# Patient Record
Sex: FEMALE | Race: WHITE | NOT HISPANIC OR LATINO | Employment: FULL TIME | URBAN - METROPOLITAN AREA
[De-identification: names, ages, dates, MRNs, and addresses within clinical notes are randomized per-mention and may not be internally consistent; named-entity substitution may affect disease eponyms.]

---

## 2017-02-01 ENCOUNTER — OFFICE VISIT (OUTPATIENT)
Dept: INTERNAL MEDICINE | Facility: CLINIC | Age: 37
End: 2017-02-01
Attending: INTERNAL MEDICINE
Payer: COMMERCIAL

## 2017-02-01 VITALS
HEIGHT: 64 IN | DIASTOLIC BLOOD PRESSURE: 65 MMHG | WEIGHT: 135 LBS | SYSTOLIC BLOOD PRESSURE: 99 MMHG | HEART RATE: 83 BPM | BODY MASS INDEX: 23.05 KG/M2

## 2017-02-01 DIAGNOSIS — Z15.01 BRCA1 GENETIC CARRIER: Primary | ICD-10-CM

## 2017-02-01 DIAGNOSIS — Z15.09 BRCA1 GENETIC CARRIER: Primary | ICD-10-CM

## 2017-02-01 DIAGNOSIS — R05.9 COUGH: ICD-10-CM

## 2017-02-01 DIAGNOSIS — Z00.00 ROUTINE GENERAL MEDICAL EXAMINATION AT A HEALTH CARE FACILITY: ICD-10-CM

## 2017-02-01 PROCEDURE — 99213 OFFICE O/P EST LOW 20 MIN: CPT | Mod: ZF

## 2017-02-01 ASSESSMENT — PAIN SCALES - GENERAL: PAINLEVEL: NO PAIN (0)

## 2017-02-01 NOTE — Clinical Note
2/1/2017       RE: Yamila Myles  [address] [address]     Dear Colleague,    Thank you for referring your patient, Yamila Myles, to the [facility]  at [facility]. Please see a copy of my visit note below.       SUBJECTIVE:     CC: Yamila Myles is an 36 year old woman who presents for preventive health visit.     Healthy Habits:    Do you get at least three servings of calcium containing foods daily (dairy, green leafy vegetables, etc.)? yes    Amount of exercise or daily activities, outside of work: elliptical, 10 minutes, stretching.     Problems taking medications regularly No    Medication side effects: No    Have you had an eye exam in the past two years? yes    Do you see a dentist twice per year? yes    Do you have sleep apnea, excessive snoring or daytime drowsiness?no    Patient reports that she had 2 episodes of cough and sore throat. She initially had symptoms in November. She developed cough productive of clear mucus. She was seen at the [facility]. She also had nasal congestion. She was diagnosed with bronchitis which lasted 3 weeks. She states that she has noticed that she still has a cough that starts when she has to talk for extended period of time. She also has cough with exposure to cold. She has some shortness of breath with cough. She denies wheezing. She has some nasal congestion, has been using neti pot. She denies post-nasal drip.    -------------------------------------    Today's PHQ-2 Score:   PHQ-2 ( 1999 Pfizer) 1/1/2016   Little interest or pleasure in doing things Not at all   Feeling down, depressed or hopeless Not at all   PHQ-2 Score 0       Abuse: Current or Past(Physical, Sexual or Emotional)- No  Do you feel safe in your environment - Yes    Social History   Substance Use Topics     Smoking status: Never Smoker      Smokeless tobacco: Never Used     Alcohol Use: 0.0 oz/week     0  Standard drinks or equivalent per week      Comment: 2 glasses/wk - none during pregnancy     The patient does not drink >3 drinks per day nor >7 drinks per week.    Recent Labs   Lab Test  01/15/16   0921  02/13/15   0831  01/31/14   1220   CHOL  154  134  155   HDL  71  75  67   LDL  72  48  77   TRIG  55  54  56   CHOLHDLRATIO   --   1.8  2.0   NHDL  83   --    --        Reviewed orders with patient.  Reviewed health maintenance and updated orders accordingly - Yes    Mammo Decision Support:  Alternate mammogram schedule due to BRCA1 carrier state    Pertinent mammograms are reviewed under the imaging tab.  History of abnormal Pap smear: NO - age 30-65 PAP every 5 years with negative HPV co-testing recommended  All Histories reviewed and updated in Epic.  Past Medical History   Diagnosis Date     Lump or mass in breast 2001     BRCA-1 positive (Mother, MGM w/ breast ca, mother w/ gene +), mother dx at 32)     Microcalcifications of the breast, right uoq 6/8/2011     Encounter for insertion of mirena IUD 07/12/16      Past Surgical History   Procedure Laterality Date     C appendectomy  1987     Laparoscopy operative adult N/A 7/12/2016     Procedure: LAPAROSCOPY OPERATIVE ADULT;  Surgeon: Fidelia Ricks MD;  [facility]     Laparoscopic salpingo-oophorectomy Bilateral 7/12/2016     Procedure: LAPAROSCOPIC SALPINGO-OOPHORECTOMY;  Surgeon: Fidelia Ricks MD;  [facility]     Insert intrauterine device N/A 7/12/2016     Procedure: INSERT INTRAUTERINE DEVICE;  Surgeon: Fidelia Ricks MD;  [facility] OR       ROS:  C: NEGATIVE for fever, chills, change in weight  I: NEGATIVE for worrisome rashes, moles or lesions  E: NEGATIVE for vision changes or irritation  ENT: throat irritation  R: dry cough  B: NEGATIVE for masses, tenderness or discharge  CV: NEGATIVE for chest pain, palpitations or peripheral edema  GI: NEGATIVE for nausea, abdominal pain, heartburn, or change in bowel habits  :  "NEGATIVE for unusual urinary or vaginal symptoms. No vaginal bleeding.  M: NEGATIVE for significant arthralgias or myalgia  N: NEGATIVE for weakness, dizziness or paresthesias  P: NEGATIVE for changes in mood or affect     Problem list, Medication list, Allergies, and Medical/Social/Surgical histories reviewed in [facility] and updated as appropriate.  OBJECTIVE:     BP 99/65 mmHg  Pulse 83  Ht 1.626 m (5' 4\")  Wt 61.236 kg (135 lb)  BMI 23.16 kg/m2  Breastfeeding? No  EXAM:  GENERAL APPEARANCE: healthy, alert and no distress  EYES: Eyes grossly normal to inspection, PERRL and conjunctivae and sclerae normal  HENT: ear canals and TM's normal, nose and mouth without ulcers or lesions, oropharynx clear and oral mucous membranes moist  NECK: no adenopathy, no asymmetry, masses, or scars and thyroid normal to palpation  RESP: lungs clear to auscultation - no rales, rhonchi or wheezes  CV: regular rate and rhythm, normal S1 S2, no S3 or S4, no murmur, click or rub, no peripheral edema and peripheral pulses strong  ABDOMEN: soft, nontender, no hepatosplenomegaly, no masses and bowel sounds normal  MS: no musculoskeletal defects are noted and gait is age appropriate without ataxia  SKIN: no suspicious lesions or rashes  NEURO: Normal strength and tone, sensory exam grossly normal, mentation intact and speech normal  PSYCH: mentation appears normal and affect normal/bright    ASSESSMENT/PLAN:     1. 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  Patient will continue with regular breast cancer screening. She is due for MRI of the breast.  - MR Breast Bilateral w/o & w Contrast; Future    2. Routine general medical examination at a health care facility  Patient is up to date on vaccinations. Will screen for diabetes with fasting blood glucose. Patient is up to date on PAP smear.   - Basic Metabolic Panel; Future    3. Cough  Discussed possible causes of cough with patient. Suspect laryngeal hypersensitivity as the most likely etiology. Recommend ENT " "evaluation, referral was made today.   - OTOLARYNGOLOGY REFERRAL    COUNSELING:   Reviewed preventive health counseling, as reflected in patient instructions       Regular exercise       Healthy diet/nutrition       Vision screening         reports that she has never smoked. She has never used smokeless tobacco.    Estimated body mass index is 23.16 kg/(m^2) as calculated from the following:    Height as of this encounter: 1.626 m (5' 4\").    Weight as of this encounter: 61.236 kg (135 lb).       Counseling Resources:  ATP IV Guidelines  Pooled Cohorts Equation Calculator  Breast Cancer Risk Calculator  FRAX Risk Assessment  ICSI Preventive Guidelines  Dietary Guidelines for Americans, 2010  [employer]'s MyPlate  ASA Prophylaxis  Lung CA Screening    Antonina Martinez MD  [facility]     Again, thank you for allowing me to participate in the care of your patient.      Sincerely,    Antonina Martinez MD      "

## 2017-02-01 NOTE — PROGRESS NOTES
SUBJECTIVE:     CC: Yamila Myles is an 36 year old woman who presents for preventive health visit.     Healthy Habits:    Do you get at least three servings of calcium containing foods daily (dairy, green leafy vegetables, etc.)? yes    Amount of exercise or daily activities, outside of work: elliptical, 10 minutes, stretching.     Problems taking medications regularly No    Medication side effects: No    Have you had an eye exam in the past two years? yes    Do you see a dentist twice per year? yes    Do you have sleep apnea, excessive snoring or daytime drowsiness?no    Patient reports that she had 2 episodes of cough and sore throat. She initially had symptoms in November. She developed cough productive of clear mucus. She was seen at the [facility]. She also had nasal congestion. She was diagnosed with bronchitis which lasted 3 weeks. She states that she has noticed that she still has a cough that starts when she has to talk for extended period of time. She also has cough with exposure to cold. She has some shortness of breath with cough. She denies wheezing. She has some nasal congestion, has been using neti pot. She denies post-nasal drip.    -------------------------------------    Today's PHQ-2 Score:   PHQ-2 ( 1999 Pfizer) 1/1/2016   Little interest or pleasure in doing things Not at all   Feeling down, depressed or hopeless Not at all   PHQ-2 Score 0       Abuse: Current or Past(Physical, Sexual or Emotional)- No  Do you feel safe in your environment - Yes    Social History   Substance Use Topics     Smoking status: Never Smoker      Smokeless tobacco: Never Used     Alcohol Use: 0.0 oz/week     0 Standard drinks or equivalent per week      Comment: 2 glasses/wk - none during pregnancy     The patient does not drink >3 drinks per day nor >7 drinks per week.    Recent Labs   Lab Test  01/15/16   0921  02/13/15   0831  01/31/14   1220   CHOL  154  134  155   HDL  71  75  67   LDL  72  48  77    TRIG  55  54  56   CHOLHDLRATIO   --   1.8  2.0   NHDL  83   --    --        Reviewed orders with patient.  Reviewed health maintenance and updated orders accordingly - Yes    Mammo Decision Support:  Alternate mammogram schedule due to BRCA1 carrier state    Pertinent mammograms are reviewed under the imaging tab.  History of abnormal Pap smear: NO - age 30-65 PAP every 5 years with negative HPV co-testing recommended  All Histories reviewed and updated in Epic.  Past Medical History   Diagnosis Date     Lump or mass in breast 2001     BRCA-1 positive (Mother, MGM w/ breast ca, mother w/ gene +), mother dx at 32)     Microcalcifications of the breast, right uoq 6/8/2011     Encounter for insertion of mirena IUD 07/12/16      Past Surgical History   Procedure Laterality Date     C appendectomy  1987     Laparoscopy operative adult N/A 7/12/2016     Procedure: LAPAROSCOPY OPERATIVE ADULT;  Surgeon: Fidelia Ricks MD;  [facility]     Laparoscopic salpingo-oophorectomy Bilateral 7/12/2016     Procedure: LAPAROSCOPIC SALPINGO-OOPHORECTOMY;  Surgeon: Fidelia Ricks MD;  [facility]     Insert intrauterine device N/A 7/12/2016     Procedure: INSERT INTRAUTERINE DEVICE;  Surgeon: Fidelia Ricks MD;  [facility]       ROS:  C: NEGATIVE for fever, chills, change in weight  I: NEGATIVE for worrisome rashes, moles or lesions  E: NEGATIVE for vision changes or irritation  ENT: throat irritation  R: dry cough  B: NEGATIVE for masses, tenderness or discharge  CV: NEGATIVE for chest pain, palpitations or peripheral edema  GI: NEGATIVE for nausea, abdominal pain, heartburn, or change in bowel habits  : NEGATIVE for unusual urinary or vaginal symptoms. No vaginal bleeding.  M: NEGATIVE for significant arthralgias or myalgia  N: NEGATIVE for weakness, dizziness or paresthesias  P: NEGATIVE for changes in mood or affect     Problem list, Medication list, Allergies, and Medical/Social/Surgical  "histories reviewed in EPIC and updated as appropriate.  OBJECTIVE:     BP 99/65 mmHg  Pulse 83  Ht 1.626 m (5' 4\")  Wt 61.236 kg (135 lb)  BMI 23.16 kg/m2  Breastfeeding? No  EXAM:  GENERAL APPEARANCE: healthy, alert and no distress  EYES: Eyes grossly normal to inspection, PERRL and conjunctivae and sclerae normal  HENT: ear canals and TM's normal, nose and mouth without ulcers or lesions, oropharynx clear and oral mucous membranes moist  NECK: no adenopathy, no asymmetry, masses, or scars and thyroid normal to palpation  RESP: lungs clear to auscultation - no rales, rhonchi or wheezes  CV: regular rate and rhythm, normal S1 S2, no S3 or S4, no murmur, click or rub, no peripheral edema and peripheral pulses strong  ABDOMEN: soft, nontender, no hepatosplenomegaly, no masses and bowel sounds normal  MS: no musculoskeletal defects are noted and gait is age appropriate without ataxia  SKIN: no suspicious lesions or rashes  NEURO: Normal strength and tone, sensory exam grossly normal, mentation intact and speech normal  PSYCH: mentation appears normal and affect normal/bright    ASSESSMENT/PLAN:     1. 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  Patient will continue with regular breast cancer screening. She is due for MRI of the breast.  - MR Breast Bilateral w/o & w Contrast; Future    2. Routine general medical examination at a health care facility  Patient is up to date on vaccinations. Will screen for diabetes with fasting blood glucose. Patient is up to date on PAP smear.   - Basic Metabolic Panel; Future    3. Cough  Discussed possible causes of cough with patient. Suspect laryngeal hypersensitivity as the most likely etiology. Recommend ENT evaluation, referral was made today.   - OTOLARYNGOLOGY REFERRAL    COUNSELING:   Reviewed preventive health counseling, as reflected in patient instructions       Regular exercise       Healthy diet/nutrition       Vision screening         reports that she has never smoked. She has never " "used smokeless tobacco.    Estimated body mass index is 23.16 kg/(m^2) as calculated from the following:    Height as of this encounter: 1.626 m (5' 4\").    Weight as of this encounter: 61.236 kg (135 lb).       Counseling Resources:  ATP IV Guidelines  Pooled Cohorts Equation Calculator  Breast Cancer Risk Calculator  FRAX Risk Assessment  ICSI Preventive Guidelines  Dietary Guidelines for Americans, 2010  USDA's MyPlate  ASA Prophylaxis  Lung CA Screening    Antonina Martinez MD  [facility]   "

## 2017-02-01 NOTE — Clinical Note
Date:February 6, 2017      Patient was self referred, no letter generated. Do not send.        [facility] Health Information

## 2017-02-01 NOTE — MR AVS SNAPSHOT
After Visit Summary   2/1/2017    Yamila Myles    MRN: [id number]           Patient Information     Date Of Birth          1980        Visit Information        Provider Department      2/1/2017 9:20 AM Antonina Martinez MD [facility]         Today's Diagnoses     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    -  1     Routine general medical examination at a health care facility         Cough           Care Instructions      Preventive Health Recommendations  Female Ages 26 - 39  Yearly exam:   See your health care provider every year in order to    Review health changes.     Discuss preventive care.      Review your medicines if you your doctor has prescribed any.    Until age 30: Get a Pap test every three years (more often if you have had an abnormal result).    After age 30: Talk to your doctor about whether you should have a Pap test every 3 years or have a Pap test with HPV screening every 5 years.   You do not need a Pap test if your uterus was removed (hysterectomy) and you have not had cancer.  You should be tested each year for STDs (sexually transmitted diseases), if you're at risk.   Talk to your provider about how often to have your cholesterol checked.  If you are at risk for diabetes, you should have a diabetes test (fasting glucose).  Shots: Get a flu shot each year. Get a tetanus shot every 10 years.   Nutrition:     Eat at least 5 servings of fruits and vegetables each day.    Eat whole-grain bread, whole-wheat pasta and brown rice instead of white grains and rice.    Talk to your provider about Calcium and Vitamin D.     Lifestyle    Exercise at least 150 minutes a week (30 minutes a day, 5 days of the week). This will help you control your weight and prevent disease.    Limit alcohol to one drink per day.    No smoking.     Wear sunscreen to prevent skin cancer.    See your dentist every six months for an exam and cleaning.            Follow-ups after your visit         Additional Services     OTOLARYNGOLOGY REFERRAL       Your provider has referred you to: [facility]: Adult [facility] (Otolaryngology) - [place] [phone]  [email or web address]    Please be aware that coverage of these services is subject to the terms and limitations of your health insurance plan.  Call member services at your health plan with any benefit or coverage questions.      Please bring the following with you to your appointment:    (1) Any X-Rays, CTs or MRIs which have been performed.  Contact the facility where they were done to arrange for  prior to your scheduled appointment.   (2) List of current medications  (3) This referral request   (4) Any documents/labs given to you for this referral                  Future tests that were ordered for you today     Open Future Orders        Priority Expected Expires Ordered    MR Breast Bilateral w/o & w Contrast Routine  2/1/2018 2/1/2017            Who to contact     Please call your clinic at [phone] to:    Ask questions about your health    Make or cancel appointments    Discuss your medicines    Learn about your test results    Speak to your doctor   If you have compliments or concerns about an experience at your clinic, or if you wish to file a complaint, please contact [facility] Patient Relations at [phone] or email us at [email or web address]         Additional Information About Your Visit        MyChart Information     [employer] gives you secure access to your electronic health record. If you see a primary care provider, you can also send messages to your care team and make appointments. If you have questions, please call your primary care clinic.  If you do not have a primary care provider, please call [phone] and they will assist you.      [employer] is an electronic gateway that provides easy, online access to your medical records.  "With MyChart, you can request a clinic appointment, read your test results, renew a prescription or communicate with your care team.     To access your existing account, please contact your [facility] or call [phone] for assistance.        Care EveryWhere ID     This is your Care EveryWhere ID. This could be used by other organizations to access your [facility] medical records  [id number]        Your Vitals Were     Pulse Height BMI (Body Mass Index) Breastfeeding?          83 1.626 m (5' 4\") 23.16 kg/m2 No         Blood Pressure from Last 3 Encounters:   02/01/17 99/65   09/09/16 94/64   07/12/16 101/61    Weight from Last 3 Encounters:   02/01/17 61.236 kg (135 lb)   07/12/16 61.8 kg (136 lb 3.9 oz)   07/06/16 61.462 kg (135 lb 8 oz)              We Performed the Following     Basic Metabolic Panel     OTOLARYNGOLOGY REFERRAL          Today's Medication Changes          These changes are accurate as of: 2/1/17  9:49 AM.  If you have any questions, ask your nurse or doctor.               Stop taking these medicines if you haven't already. Please contact your care team if you have questions.     ibuprofen 600 MG tablet   Commonly known as:  ADVIL/MOTRIN   Stopped by:  Antonina Martinez MD           oxyCODONE-acetaminophen 5-325 MG per tablet   Commonly known as:  PERCOCET   Stopped by:  Antonina aMrtinez MD           senna-docusate 8.6-50 MG per tablet   Commonly known as:  SENOKOT-S;PERICOLACE   Stopped by:  Antonina Martinez MD                    Primary Care Provider Office Phone # Fax #    Antonina Martinez [phone]        [facility] [address]  [facility] [address]        Thank you!     Thank you for choosing [facility]   for your care. Our goal is always to provide you with excellent care. Hearing back from our patients is one way we can continue to improve our services. Please take a few minutes to " complete the written survey that you may receive in the mail after your visit with us. Thank you!             Your Updated Medication List - Protect others around you: Learn how to safely use, store and throw away your medicines at www.disposemymeds.org.          This list is accurate as of: 2/1/17  9:49 AM.  Always use your most recent med list.                   Brand Name Dispense Instructions for use    estradiol 2 MG tablet    ESTRACE    30 tablet    Take 1 tablet (2 mg) by mouth daily       fluticasone 50 MCG/ACT spray    FLONASE    16 g    Spray 2 sprays into both nostrils daily       loratadine 10 MG tablet    CLARITIN     Take 10 mg by mouth daily       MIRENA (52 MG) 20 MCG/24HR IUD   Generic drug:  levonorgestrel      1 each by Intrauterine route once. Lot # [id number]  To be removed by 6/12/2017

## 2017-02-20 ENCOUNTER — PRE VISIT (OUTPATIENT)
Dept: OTOLARYNGOLOGY | Facility: CLINIC | Age: 37
End: 2017-02-20

## 2017-02-20 NOTE — TELEPHONE ENCOUNTER
1.  Date/reason for appt: 3/3/17 - cough  2.  Referring provider: Dr. Martinez  3.  Call to patient (Yes / No - short description): no, recs in epic  4.  Previous care at:   - [facility]

## 2017-03-01 DIAGNOSIS — Z00.00 ROUTINE GENERAL MEDICAL EXAMINATION AT A HEALTH CARE FACILITY: ICD-10-CM

## 2017-03-01 LAB
ANION GAP SERPL CALCULATED.3IONS-SCNC: 6 MMOL/L (ref 3–14)
BUN SERPL-MCNC: 18 MG/DL (ref 7–30)
CALCIUM SERPL-MCNC: 8.8 MG/DL (ref 8.5–10.1)
CHLORIDE SERPL-SCNC: 106 MMOL/L (ref 94–109)
CO2 SERPL-SCNC: 29 MMOL/L (ref 20–32)
CREAT SERPL-MCNC: 0.73 MG/DL (ref 0.52–1.04)
GFR SERPL CREATININE-BSD FRML MDRD: 89 ML/MIN/1.7M2
GLUCOSE SERPL-MCNC: 95 MG/DL (ref 70–99)
POTASSIUM SERPL-SCNC: 3.7 MMOL/L (ref 3.4–5.3)
SODIUM SERPL-SCNC: 141 MMOL/L (ref 133–144)

## 2017-03-01 PROCEDURE — 80048 BASIC METABOLIC PNL TOTAL CA: CPT | Performed by: INTERNAL MEDICINE

## 2017-03-01 PROCEDURE — 36415 COLL VENOUS BLD VENIPUNCTURE: CPT | Performed by: INTERNAL MEDICINE

## 2017-03-03 ENCOUNTER — OFFICE VISIT (OUTPATIENT)
Dept: OTOLARYNGOLOGY | Facility: CLINIC | Age: 37
End: 2017-03-03

## 2017-03-03 ENCOUNTER — HOSPITAL ENCOUNTER (OUTPATIENT)
Dept: MRI IMAGING | Facility: CLINIC | Age: 37
Discharge: HOME | End: 2017-03-03
Attending: INTERNAL MEDICINE | Admitting: INTERNAL MEDICINE
Payer: COMMERCIAL

## 2017-03-03 VITALS — WEIGHT: 136 LBS | BODY MASS INDEX: 22.66 KG/M2 | HEIGHT: 65 IN

## 2017-03-03 DIAGNOSIS — Z15.09 BRCA1 GENETIC CARRIER: ICD-10-CM

## 2017-03-03 DIAGNOSIS — Z15.01 BRCA1 GENETIC CARRIER: ICD-10-CM

## 2017-03-03 DIAGNOSIS — J45.909 UNCOMPLICATED ASTHMA, UNSPECIFIED ASTHMA SEVERITY: Primary | ICD-10-CM

## 2017-03-03 DIAGNOSIS — R05.3 CHRONIC COUGHING: ICD-10-CM

## 2017-03-03 PROCEDURE — 0159T MR BREAST BILATERAL W/O & W CONTRAST: CPT

## 2017-03-03 PROCEDURE — 25000128 H RX IP 250 OP 636: Performed by: INTERNAL MEDICINE

## 2017-03-03 PROCEDURE — A9585 GADOBUTROL INJECTION: HCPCS | Performed by: INTERNAL MEDICINE

## 2017-03-03 PROCEDURE — 25500064 ZZH RX 255 OP 636: Performed by: INTERNAL MEDICINE

## 2017-03-03 RX ORDER — GADOBUTROL 604.72 MG/ML
7.5 INJECTION INTRAVENOUS ONCE
Status: COMPLETED | OUTPATIENT
Start: 2017-03-03 | End: 2017-03-03

## 2017-03-03 RX ORDER — ALBUTEROL SULFATE 90 UG/1
2 AEROSOL, METERED RESPIRATORY (INHALATION) EVERY 6 HOURS PRN
Qty: 3 INHALER | Refills: 1 | Status: SHIPPED | OUTPATIENT
Start: 2017-03-03 | End: 2022-04-01

## 2017-03-03 RX ADMIN — SODIUM CHLORIDE 100 ML: 9 INJECTION, SOLUTION INTRAVENOUS at 16:36

## 2017-03-03 RX ADMIN — GADOBUTROL 7.5 ML: 604.72 INJECTION INTRAVENOUS at 16:36

## 2017-03-03 ASSESSMENT — PAIN SCALES - GENERAL: PAINLEVEL: NO PAIN (0)

## 2017-03-03 NOTE — LETTER
3/3/2017       RE: Yamila Myles  [address] [facility] [address]     Dear Colleague,    Thank you for referring your patient, Yamila Myles, to the [facility] EAR NOSE AND THROAT at [facility]. Please see a copy of my visit note below.    The patient presents with a history of chronic coughing. She reports that after she develops a respiratory infection, the patient will cough for months. She reports a deep, non-productive and non-purulent chest cough. The patient also reports that she has recently developed reactions to NSAIDS. She reports allergy like symptoms with nasal congestion and rhinitis immediately after initiating the use of such medications, but if she continues to take the medication daily, her symptoms resolve. The patient denies sinusitis, rhinitis, facial pain, nasal obstruction or purulent nasal discharge. The patient denies chronic or recurrent tonsillitis, chronic or recurrent pharyngitis. The patient denies otalgia, otorrhea, eustachian tube dysfunction, ear infections, dizziness or tinnitus.     This patient is seen in consultation at the request of Dr. Vero Martinez.    All other systems were reviewed and they are either negative or they are not directly pertinent to this Otolaryngology examination.      Past Medical History:    Past Medical History   Diagnosis Date     Encounter for insertion of mirena IUD 07/12/16     Hoarseness      Lump or mass in breast 2001     BRCA-1 positive (Mother, MGM w/ breast ca, mother w/ gene +), mother dx at 32)     Microcalcifications of the breast, right uoq 6/8/2011       Past Surgical History:    Past Surgical History   Procedure Laterality Date     C appendectomy  1987     Laparoscopy operative adult N/A 7/12/2016     Procedure: LAPAROSCOPY OPERATIVE ADULT;  Surgeon: Fidelia Ricks MD;  [facility]     Laparoscopic salpingo-oophorectomy Bilateral 7/12/2016     Procedure: LAPAROSCOPIC  SALPINGO-OOPHORECTOMY;  Surgeon: Fidelia Ricks MD;  [facility] OR     Insert intrauterine device N/A 7/12/2016     Procedure: INSERT INTRAUTERINE DEVICE;  Surgeon: Fidelia Ricks MD;  [facility]       Medications:      Current Outpatient Prescriptions:      estradiol (ESTRACE) 2 MG tablet, Take 1 tablet (2 mg) by mouth daily, Disp: 30 tablet, Rfl: 11     loratadine (CLARITIN) 10 MG tablet, Take 10 mg by mouth daily, Disp: , Rfl:      fluticasone (FLONASE) 50 MCG/ACT nasal spray, Spray 2 sprays into both nostrils daily, Disp: 16 g, Rfl: 3     levonorgestrel (MIRENA) 20 MCG/24HR IUD, 1 each by Intrauterine route once. Lot # [id number]  To be removed by 6/12/2017, Disp: , Rfl:     Allergies:    Advil [nsaids]    Physical Examination:    The patient is a well developed, well nourished female in no apparent distress.  She is normocephalic, atraumatic with pupils equally round and reactive to light.    Oral Cavity Examination:  Normal mucosa with no masses or lesions  Nasal Examination: Normal mucosa with no masses or lesions  Ear Examination: Ear canals clear, tympanic membranes and middle ear spaces normal  Neurological Examination: Facial nerve function intact and symmetric  Integumentary Examination: No lesions on the skin of the head and neck  Neck Examination: No masses or lesions, no lymphadenopathy  Endocrine Examination: Normal thyroid examination  Flexible Fiberoptic Laryngoscopy:  Normal nasopharynx, base of tongue, pyriform sinuses, epiglottis, valleculae, false vocal cords, true vocal cords, and larynx.  Normal motion of the vocal cords with no lesions, masses, nodules, or polyps bilaterally.     Assessment and Plan:    The patient presents with a history of chronic coughing that persists after respiratory infections. The patient is also having allergy type reactions to the use of NSAIDS with suppression of symptoms with persistent use. The patient will be referred to Dr. Hari Maloney for  allergy and asthma evaluation. She will use Albuterol inhaler as needed for chronic wheezing or coughing. She will be referred for PFTs prior to her visit with Dr. Hari Maloney and she will also discuss with Dr. Hari Maloney her symptoms associated with NSAID use. She has no evidence of nasal polyps at this time.     CC: Dr. Antonina Martinez  CC: Dr. Hari Maloney        Again, thank you for allowing me to participate in the care of your patient.      Sincerely,    Jayant Goldberg MD

## 2017-03-03 NOTE — PATIENT INSTRUCTIONS
The patient presents with a history of chronic coughing that persists after respiratory infections. The patient is also having allergy type reactions to the use of NSAIDS with suppression of symptoms with persistent use. The patient will be referred to Dr. Hari Maloney for allergy and asthma evaluation. She will use Albuterol inhaler as needed for chronic wheezing or coughing. She will be referred for PFTs prior to her visit with Dr. Hari Maloney and she will also discuss with Dr. Hari Maloney her symptoms associated with NSAID use. She has no evidence of nasal polyps at this time.

## 2017-03-03 NOTE — PROGRESS NOTES
The patient presents with a history of chronic coughing. She reports that after she develops a respiratory infection, the patient will cough for months. She reports a deep, non-productive and non-purulent chest cough. The patient also reports that she has recently developed reactions to NSAIDS. She reports allergy like symptoms with nasal congestion and rhinitis immediately after initiating the use of such medications, but if she continues to take the medication daily, her symptoms resolve. The patient denies sinusitis, rhinitis, facial pain, nasal obstruction or purulent nasal discharge. The patient denies chronic or recurrent tonsillitis, chronic or recurrent pharyngitis. The patient denies otalgia, otorrhea, eustachian tube dysfunction, ear infections, dizziness or tinnitus.     This patient is seen in consultation at the request of Dr. Vero Martinez.    All other systems were reviewed and they are either negative or they are not directly pertinent to this Otolaryngology examination.      Past Medical History:    Past Medical History   Diagnosis Date     Encounter for insertion of mirena IUD 07/12/16     Hoarseness      Lump or mass in breast 2001     BRCA-1 positive (Mother, MGM w/ breast ca, mother w/ gene +), mother dx at 32)     Microcalcifications of the breast, right uoq 6/8/2011       Past Surgical History:    Past Surgical History   Procedure Laterality Date     C appendectomy  1987     Laparoscopy operative adult N/A 7/12/2016     Procedure: LAPAROSCOPY OPERATIVE ADULT;  Surgeon: Fidelia Ricks MD;  [facility]     Laparoscopic salpingo-oophorectomy Bilateral 7/12/2016     Procedure: LAPAROSCOPIC SALPINGO-OOPHORECTOMY;  Surgeon: Fidelia Ricks MD;  [facility]     Insert intrauterine device N/A 7/12/2016     Procedure: INSERT INTRAUTERINE DEVICE;  Surgeon: Fidelia Ricks MD;  [facility]       Medications:      Current Outpatient Prescriptions:      estradiol (ESTRACE) 2 MG  tablet, Take 1 tablet (2 mg) by mouth daily, Disp: 30 tablet, Rfl: 11     loratadine (CLARITIN) 10 MG tablet, Take 10 mg by mouth daily, Disp: , Rfl:      fluticasone (FLONASE) 50 MCG/ACT nasal spray, Spray 2 sprays into both nostrils daily, Disp: 16 g, Rfl: 3     levonorgestrel (MIRENA) 20 MCG/24HR IUD, 1 each by Intrauterine route once. Lot # [id number]  To be removed by 6/12/2017, Disp: , Rfl:     Allergies:    Advil [nsaids]    Physical Examination:    The patient is a well developed, well nourished female in no apparent distress.  She is normocephalic, atraumatic with pupils equally round and reactive to light.    Oral Cavity Examination:  Normal mucosa with no masses or lesions  Nasal Examination: Normal mucosa with no masses or lesions  Ear Examination: Ear canals clear, tympanic membranes and middle ear spaces normal  Neurological Examination: Facial nerve function intact and symmetric  Integumentary Examination: No lesions on the skin of the head and neck  Neck Examination: No masses or lesions, no lymphadenopathy  Endocrine Examination: Normal thyroid examination  Flexible Fiberoptic Laryngoscopy:  Normal nasopharynx, base of tongue, pyriform sinuses, epiglottis, valleculae, false vocal cords, true vocal cords, and larynx.  Normal motion of the vocal cords with no lesions, masses, nodules, or polyps bilaterally.     Assessment and Plan:    The patient presents with a history of chronic coughing that persists after respiratory infections. The patient is also having allergy type reactions to the use of NSAIDS with suppression of symptoms with persistent use. The patient will be referred to Dr. Hari Maloney for allergy and asthma evaluation. She will use Albuterol inhaler as needed for chronic wheezing or coughing. She will be referred for PFTs prior to her visit with Dr. Hari Maloney and she will also discuss with Dr. Hari Maloney her symptoms associated with NSAID use. She has no evidence of nasal  polyps at this time.     CC: Dr. Antonina Martinez  CC: Dr. Hari Maolney

## 2017-03-03 NOTE — MR AVS SNAPSHOT
After Visit Summary   3/3/2017    Yamila Myles    MRN: [id number]           Patient Information     Date Of Birth          1980        Visit Information        Provider Department      3/3/2017 8:00 AM Jayant Goldberg MD [facility] Ear Nose and Throat        Today's Diagnoses     Uncomplicated asthma, unspecified asthma severity    -  1    Chronic coughing          Care Instructions    The patient presents with a history of chronic coughing that persists after respiratory infections. The patient is also having allergy type reactions to the use of NSAIDS with suppression of symptoms with persistent use. The patient will be referred to Dr. Hari Maloney for allergy and asthma evaluation. She will use Albuterol inhaler as needed for chronic wheezing or coughing. She will be referred for PFTs prior to her visit with Dr. Hari Maloney and she will also discuss with Dr. Hari Maloney her symptoms associated with NSAID use. She has no evidence of nasal polyps at this time.           Follow-ups after your visit        Your next 10 appointments already scheduled     Mar 03, 2017  3:00 PM CST   MR BREAST BILATERAL W/O & W CONTRAST with UUMR1   [facility] [place] [facility]    [address] [address] [phone]           Take your medicines as usual, unless your doctor tells you not to. Bring a list of your current medicines to your exam (including vitamins, minerals and over-the-counter drugs).  The timing of your exam may depend on the start of your last period. If you re in menopause, you may have the exam anytime.  Please bring any previous mammograms or breast MRIs from other facilities to the MRI dept. Do not mail these items to us.  You will have contrast for this exam. To prepare:   The day before your exam, drink extra fluids at least six 8-ounce glasses (unless your doctor tells you to restrict your  fluids).   Have a blood test (creatinine test) within 30 days of your exam. Go to your clinic or Diagnostic Imaging Department for this test.  The MRI machine uses a strong magnet. Please wear clothes without metal (snaps, zippers). A sweatsuit works well, or we may give you a hospital gown.  Please remove any body piercings and hair extensions before you arrive. You will also remove watches, jewelry, hairpins, wallets, dentures, partial dental plates and hearing aids. You may wear contact lenses, and you may be able to wear your rings. We have a safe place to keep your personal items, but it is safer to leave them at home.   **IMPORTANT** THE INSTRUCTIONS BELOW ARE ONLY FOR THOSE PATIENTS WHO HAVE BEEN TOLD THEY WILL RECEIVE SEDATION OR GENERAL ANESTHESIA DURING THEIR MRI PROCEDURE:  IF YOU WILL RECEIVE SEDATION (take medicine to help you relax during your exam)   You must get the medicine from your doctor before you arrive. Bring the medicine to the exam. Do not take it at home.   Arrive one hour early. Bring someone who can take you home after the test. Your medicine will make you sleepy. After the exam, you may not drive, take a bus or take a taxi by yourself.   No eating 8 hours before your exam. You may have clear liquids up until 4 hours before your exam. (Clear liquids include water, clear tea, black coffee and fruit juice without pulp.)  IF YOU WILL RECEIVE ANESTHESIA (be asleep for your exam)   Arrive 1 1/2 hours early. Bring someone who can take you home after the test. You may not drive, take a bus or take a taxi by yourself.   No eating 8 hours before your exam. You may have clear liquids up until 4 hours before your exam. (Clear liquids include water, clear tea, black coffee and fruit juice without pulp.)  If you have any questions, please contact your Imaging Department exam site.            Mar 16, 2017  4:30 PM CDT   Six Minute Walk with UC PFL 6 MINUTE WALK 1   [facility]  ([facility])    [address]  [address] [facility] [id number] [phone]            Mar 27, 2017 12:35 PM CDT   (Arrive by 12:20 PM)   New Allergy with Hari Maloney MD   [facility] ([facility])    [address]  [address] [facility] [id number] [phone]           Do not take anti-histamines or Zantac for seven day prior to your appointment.              Future tests that were ordered for you today     Open Future Orders        Priority Expected Expires Ordered    General PFT Lab (Please always keep checked) Routine  3/3/2018 3/3/2017    Pulmonary Function Test Routine  3/3/2018 3/3/2017            Who to contact     Please call your clinic at [phone] to:    Ask questions about your health    Make or cancel appointments    Discuss your medicines    Learn about your test results    Speak to your doctor   If you have compliments or concerns about an experience at your clinic, or if you wish to file a complaint, please contact [facility] Patient Relations at [phone] or email us at [email or web address]         Additional Information About Your Visit        [employer] Information     [employer] gives you secure access to your electronic health record. If you see a primary care provider, you can also send messages to your care team and make appointments. If you have questions, please call your primary care clinic.  If you do not have a primary care provider, please call [phone] and they will assist you.      [employer] is an electronic gateway that provides easy, online access to your medical records. With [employer], you can request a clinic appointment, read your test results, renew a prescription or communicate with your care team.     To access your existing account, please contact your [facility] Clinic or call [phone] for  "assistance.        Care EveryWhere ID     This is your Care EveryWhere ID. This could be used by other organizations to access your [place] medical records  [id number]        Your Vitals Were     Height BMI (Body Mass Index)                1.65 m (5' 4.96\") 22.66 kg/m2           Blood Pressure from Last 3 Encounters:   02/01/17 99/65   09/09/16 94/64   07/12/16 101/61    Weight from Last 3 Encounters:   03/03/17 61.7 kg (136 lb)   02/01/17 61.2 kg (135 lb)   07/12/16 61.8 kg (136 lb 3.9 oz)              We Performed the Following     LARYNGOSCOPY FLEX FIBEROPTIC, DIAGNOSTIC     OFFICE CONSULTATION,LEVEL III          Today's Medication Changes          These changes are accurate as of: 3/3/17  8:39 AM.  If you have any questions, ask your nurse or doctor.               Start taking these medicines.        Dose/Directions    albuterol 108 (90 BASE) MCG/ACT Inhaler   Commonly known as:  PROAIR HFA/PROVENTIL HFA/VENTOLIN HFA   Used for:  Chronic coughing, Uncomplicated asthma, unspecified asthma severity   Started by:  Jayant Goldberg MD        Dose:  2 puff   Inhale 2 puffs into the lungs every 6 hours as needed for shortness of breath / dyspnea or wheezing   Quantity:  3 Inhaler   Refills:  1            Where to get your medicines      These medications were sent to [facility] [address] [facility] [address] AT [facility] [address]  [address] [address]     Phone:  [phone]     albuterol 108 (90 BASE) MCG/ACT Inhaler                Primary Care Provider Office Phone # Fax #    Antonina Summer Martinez [phone]        [facility] [address] [facility] [address]  [facility] [address]        Thank you!     Thank you for choosing [facility] EAR NOSE AND THROAT  for your care. Our goal is always to provide you with excellent care. Hearing back from our patients is one way we can continue to improve our services. Please take a few minutes to complete " the written survey that you may receive in the mail after your visit with us. Thank you!             Your Updated Medication List - Protect others around you: Learn how to safely use, store and throw away your medicines at www.disposemymeds.org.          This list is accurate as of: 3/3/17  8:39 AM.  Always use your most recent med list.                   Brand Name Dispense Instructions for use    albuterol 108 (90 BASE) MCG/ACT Inhaler    PROAIR HFA/PROVENTIL HFA/VENTOLIN HFA    3 Inhaler    Inhale 2 puffs into the lungs every 6 hours as needed for shortness of breath / dyspnea or wheezing       estradiol 2 MG tablet    ESTRACE    30 tablet    Take 1 tablet (2 mg) by mouth daily       fluticasone 50 MCG/ACT spray    FLONASE    16 g    Spray 2 sprays into both nostrils daily       loratadine 10 MG tablet    CLARITIN     Take 10 mg by mouth daily       MIRENA (52 MG) 20 MCG/24HR IUD   Generic drug:  levonorgestrel      1 each by Intrauterine route once. Lot # [id number]  To be removed by 6/12/2017

## 2017-03-07 ENCOUNTER — TELEPHONE (OUTPATIENT)
Dept: MAMMOGRAPHY | Facility: CLINIC | Age: 37
End: 2017-03-07

## 2017-03-07 NOTE — TELEPHONE ENCOUNTER
Left Yamila a message this afternoon regarding the need for additional imaging needed from her Breast MRI on Friday, March 3rd.  Awaiting return phone call

## 2017-03-16 ENCOUNTER — PRE VISIT (OUTPATIENT)
Dept: PULMONOLOGY | Facility: CLINIC | Age: 37
End: 2017-03-16

## 2017-03-16 DIAGNOSIS — J45.909 UNCOMPLICATED ASTHMA, UNSPECIFIED ASTHMA SEVERITY: ICD-10-CM

## 2017-03-16 DIAGNOSIS — R05.3 CHRONIC COUGHING: ICD-10-CM

## 2017-03-20 ENCOUNTER — CARE COORDINATION (OUTPATIENT)
Dept: PULMONOLOGY | Facility: CLINIC | Age: 37
End: 2017-03-20

## 2017-03-20 NOTE — PROGRESS NOTES
Writer contacted patient and left a message with patient's SO to remind her to hold her antihistamines for a week prior to her appointment. SO stated he would pass on the message to her.

## 2017-03-26 ASSESSMENT — ENCOUNTER SYMPTOMS
JOINT SWELLING: 0
FEVER: 1
DECREASED APPETITE: 0
BACK PAIN: 1
HALLUCINATIONS: 0
SINUS PAIN: 1
DOUBLE VISION: 0
MYALGIAS: 1
EYE PAIN: 0
STIFFNESS: 0
COUGH: 1
TROUBLE SWALLOWING: 0
SINUS CONGESTION: 1
EYE IRRITATION: 1
POLYPHAGIA: 0
WEIGHT GAIN: 0
COUGH DISTURBING SLEEP: 0
SMELL DISTURBANCE: 0
EYE WATERING: 1
FATIGUE: 1
RESPIRATORY PAIN: 0
SORE THROAT: 1
POLYDIPSIA: 0
WEIGHT LOSS: 0
HOARSE VOICE: 1
SPUTUM PRODUCTION: 0
INCREASED ENERGY: 0
NECK PAIN: 1
SNORES LOUDLY: 0
MUSCLE CRAMPS: 0
SHORTNESS OF BREATH: 0
NECK MASS: 0
NIGHT SWEATS: 0
POSTURAL DYSPNEA: 0
TASTE DISTURBANCE: 0
ARTHRALGIAS: 0
MUSCLE WEAKNESS: 0
EYE REDNESS: 0
DYSPNEA ON EXERTION: 0
WHEEZING: 0
CHILLS: 1
ALTERED TEMPERATURE REGULATION: 0
HEMOPTYSIS: 0

## 2017-03-27 ENCOUNTER — OFFICE VISIT (OUTPATIENT)
Dept: PULMONOLOGY | Facility: CLINIC | Age: 37
End: 2017-03-27
Attending: ALLERGY & IMMUNOLOGY
Payer: COMMERCIAL

## 2017-03-27 VITALS
OXYGEN SATURATION: 100 % | DIASTOLIC BLOOD PRESSURE: 67 MMHG | BODY MASS INDEX: 23.22 KG/M2 | HEART RATE: 75 BPM | RESPIRATION RATE: 18 BRPM | TEMPERATURE: 97.8 F | HEIGHT: 64 IN | WEIGHT: 136 LBS | SYSTOLIC BLOOD PRESSURE: 109 MMHG

## 2017-03-27 DIAGNOSIS — T50.905A ADVERSE DRUG EFFECT, INITIAL ENCOUNTER: ICD-10-CM

## 2017-03-27 DIAGNOSIS — J30.1 SEASONAL ALLERGIC RHINITIS DUE TO POLLEN: Primary | ICD-10-CM

## 2017-03-27 PROCEDURE — 99212 OFFICE O/P EST SF 10 MIN: CPT | Mod: ZF

## 2017-03-27 RX ORDER — MONTELUKAST SODIUM 10 MG/1
10 TABLET ORAL EVERY EVENING
Qty: 30 TABLET | Refills: 0 | Status: SHIPPED | OUTPATIENT
Start: 2017-03-27 | End: 2022-04-01

## 2017-03-27 ASSESSMENT — PAIN SCALES - GENERAL: PAINLEVEL: NO PAIN (0)

## 2017-03-27 NOTE — PATIENT INSTRUCTIONS
I recommend 10 mg monteulkast (singulair) and then 2 sprays of Flonase or nasocort in each nostril daily at the onset of colds.  Also 10 mg zyrtec.  Decrease dairy during colds.    If the cough persists a week beyond the cold return and we will do a lung exam and probably spirometry at that time.  Avoid NSAIDs.

## 2017-03-27 NOTE — PROGRESS NOTES
Reason for Visit  Yamila Myles is a 36 year old female who is referred by Antonina Martinez for cough    Allergy HPI  HPI is at th bottom in plan        The patient was seen and examined by Hari Lovett MD   Current Outpatient Prescriptions   Medication     montelukast (SINGULAIR) 10 MG tablet     albuterol (PROAIR HFA/PROVENTIL HFA/VENTOLIN HFA) 108 (90 BASE) MCG/ACT Inhaler     estradiol (ESTRACE) 2 MG tablet     loratadine (CLARITIN) 10 MG tablet     fluticasone (FLONASE) 50 MCG/ACT nasal spray     levonorgestrel (MIRENA) 20 MCG/24HR IUD     No current facility-administered medications for this visit.      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 No     Blood Transfusions No     Caffeine Concern No     Occupational Exposure No     Sleep Concern No     Stress Concern Yes     life/work -->coping     Weight Concern No     Special Diet No     Back Care Yes     chiropracter for old injury     Exercise No     Bike Helmet Not Asked     not biker     Seat Belt No     Social History Narrative    Social Documentation:        Balanced Diet: YES    Calcium intake: less than 2 per day    Caffeine: 0-1 per day    Exercise:  type of  "activity walk; daily times per week    Sunscreen: Yes    Seatbelts:  Yes    Self Breast Exam:  Yes    Self Testicular Exam: No - n/a    Physical/Emotional/Sexual Abuse: No     Do you feel safe in your environment? Yes        Cholesterol screen up to date: Yes-3 yrs ago per pt.  Not fasting today.     Eye Exam up to date: Yes    Dental Exam up to date: No - 9 months ago.     Pap smear up to date: Yes    Mammogram up to date: Does Not Apply    Dexa Scan up to date: Does Not Apply    Colonoscopy up to date: Does Not Apply    Immunizations up to date: Yes-Td 12/06    Glucose screen if over 40:  No - n/a     Past Medical History:   Diagnosis Date     Encounter for insertion of mirena IUD 07/12/16     Hoarseness      Lump or mass in breast 2001    BRCA-1 positive (Mother, MGM w/ breast ca, mother w/ gene +), mother dx at 32)     Microcalcifications of the breast, right uoq 6/8/2011     Past Surgical History:   Procedure Laterality Date     C APPENDECTOMY  1987     INSERT INTRAUTERINE DEVICE N/A 7/12/2016    Procedure: INSERT INTRAUTERINE DEVICE;  Surgeon: Fidelia Ricks MD;  [facility]     LAPAROSCOPIC SALPINGO-OOPHORECTOMY Bilateral 7/12/2016    Procedure: LAPAROSCOPIC SALPINGO-OOPHORECTOMY;  Surgeon: Fidelia Ricks MD;  [facility]     LAPAROSCOPY OPERATIVE ADULT N/A 7/12/2016    Procedure: LAPAROSCOPY OPERATIVE ADULT;  Surgeon: Fidelia Ricks MD;  [facility]     Family History   Problem Relation Age of Onset     Breast Cancer Mother      dx age 32, doing well     Breast Cancer Maternal Grandmother      DIABETES Paternal Grandmother      Breast Cancer Paternal Grandmother      Neurologic Disorder Sister      epilepsy         ROS   A complete ROS was otherwise negative except as noted in the HPI and the end of the note.  /67  Pulse 75  Temp 97.8  F (36.6  C) (Oral)  Resp 18  Ht 1.626 m (5' 4\")  Wt 61.7 kg (136 lb)  SpO2 100%  BMI 23.34 kg/m2  Exam:   GENERAL APPEARANCE: Well " developed, well nourished, alert, and in no apparent distress.  EYES: PERRL, EOMI, conjunctiva clear non-injected  HENT: Nasal mucosa with no edema and no discharge. No nasal polyps.   EARS: Canals clear, TMs normal  MOUTH: Oral mucosa is moist, without any lesions, no tonsillar enlargement, no oropharyngeal exudate.  NECK: Supple, no masses, no thyromegaly.  LYMPHATICS: No significant cervical nodes.  RESP: Good air flow throughout.  No crackles. No rhonchi. No wheezes.  CV: Normal S1, S2, regular rhythm, normal rate. No murmur.  No rub. No gallop. No LE edema.   MS: Extremities normal. No clubbing. No cyanosis.  SKIN: No rashes noted  NEURO: Speech normal, normal strength and tone, normal gait and stance  PSYCH: Normal mentation, orientation to person, place, and time.  Results:    HPI   HISTORY OF PRESENT ILLNESS:  Yamila presents today for initial consultation regarding concern of cough after cold that lingers for months.  It has been going on for about 3-4 years.  The cough seems to be dry.  Yamila thinks it is most likely postnasal drip but she did try a nasal steroid, did not seem to help too much.  She does not have the cough at any other times and she does not have any difficulty breathing with this.  It does seem to be worse with talking.  She does not have it when she is sleeping.  No wheezing with it.  She did get spirometry recently, but she was not coughing during that time.  The cough is no longer present.  She also has spring allergies, runny nose, watery eyes.  Claritin seems to help.  She used to use Flonase.  The Claritin helps better.  Ever since about 10 years ago, she would take it and then her nose would get stuffy, watery eyes, seemed to happen pretty quickly for about 4 hours.  Sudafed seemed to help.  She has mild difficulty breathing with it but if she takes it for a while, the symptoms seem to get better after persistent use.      PAST MEDICAL HISTORY:  She has the BRCA positive gene, so she  has had her ovaries removed.      SOCIAL HISTORY:  She has a cat of 10 years.  She does not smoke, no smokers in her environment.      FAMILY HISTORY:  Mom with spring allergies.           Assessment and plan:ASSESSMENT AND PLAN:  Yamila presents today for initial consultation regarding concern of cough that lingers after colds.  Often these can be bronchitis and often reactive airway disease/asthma component, however, in her situation with normal lungs at this time and normal spirometry and based on history, I feel like this is most likely related to postnasal drip, possibly some vocal cord irritation.  The fact that she has aspirin exacerbated respiratory disease as well makes me want to start montelukast 10 mg at the onset of colds, Flonase and Zyrtec.  Sometimes this can stop the cascade before it starts.  If she has this beyond about 7 days after a cold, I would like her to return and we will do spirometry and a lung exam at that time.  I did explain to her about how all NSAIDs can cause these significant reactions and should be avoided if possible.  We discussed desensitization is not a great option because she would need to continue on this regimen.  If she continues to have persistent problems, we may want to do environmental allergy testing to see if she has underlying environmental allergens such as to her cat that can predispose her to more inflammation.  See her back in a year or sooner if necessary.           Answers for HPI/ROS submitted by the patient on 3/26/2017   General Symptoms: Yes  Skin Symptoms: No  HENT Symptoms: Yes  EYE SYMPTOMS: Yes  HEART SYMPTOMS: No  LUNG SYMPTOMS: Yes  INTESTINAL SYMPTOMS: No  URINARY SYMPTOMS: No  GYNECOLOGIC SYMPTOMS: No  BREAST SYMPTOMS: No  SKELETAL SYMPTOMS: Yes  BLOOD SYMPTOMS: No  NERVOUS SYSTEM SYMPTOMS: No  MENTAL HEALTH SYMPTOMS: No  Fever: Yes  Loss of appetite: No  Weight loss: No  Weight gain: No  Fatigue: Yes  Night sweats: No  Chills: Yes  Increased  stress: Yes  Excessive hunger: No  Excessive thirst: No  Feeling hot or cold when others believe the temperature is normal: No  Loss of height: No  Post-operative complications: No  Surgical site pain: No  Hallucinations: No  Change in or Loss of Energy: No  Hyperactivity: No  Confusion: No  Ear pain: No  Ear discharge: No  Hearing loss: No  Tinnitus: No  Nosebleeds: No  Congestion: Yes  Sinus pain: Yes  Trouble swallowing: No   Voice hoarseness: Yes  Mouth sores: No  Sore throat: Yes  Tooth pain: No  Gum tenderness: No  Bleeding gums: No  Change in taste: No  Change in sense of smell: No  Dry mouth: Yes  Hearing aid used: No  Neck lump: No  Eye pain: No  Vision loss: No  Dry eyes: No  Watery eyes: Yes  Eye bulging: No  Double vision: No  Flashing of lights: No  Spots: No  Floaters: Yes  Redness: No  Crossed eyes: No  Tunnel Vision: No  Yellowing of eyes: No  Eye irritation: Yes  Cough: Yes  Sputum or phlegm: No  Coughing up blood: No  Difficulty breating or shortness of breath: No  Snoring: No  Wheezing: No  Difficulty breathing on exertion: No  Respiratory pain: No  Nighttime Cough: No  Difficulty breathing when lying flat: No  Back pain: Yes  Muscle aches: Yes  Neck pain: Yes  Swollen joints: No  Joint pain: No  Bone pain: No  Muscle cramps: No  Muscle weakness: No  Joint stiffness: No  Bone fracture: No

## 2017-03-27 NOTE — Clinical Note
3/27/2017       RE: Yamila Myles  [address] [address]     Dear Colleague,    Thank you for referring your patient, Yamila Myles, to the [facility] at [facility]. Please see a copy of my visit note below.    Reason for Visit  Yamila Myles is a 36 year old female who is referred by Antonina Martinez for cough    Allergy HPI  HPI is at th bottom in plan        The patient was seen and examined by Hari Lovett MD   Current Outpatient Prescriptions   Medication     montelukast (SINGULAIR) 10 MG tablet     albuterol (PROAIR HFA/PROVENTIL HFA/VENTOLIN HFA) 108 (90 BASE) MCG/ACT Inhaler     estradiol (ESTRACE) 2 MG tablet     loratadine (CLARITIN) 10 MG tablet     fluticasone (FLONASE) 50 MCG/ACT nasal spray     levonorgestrel (MIRENA) 20 MCG/24HR IUD     No current facility-administered medications for this visit.      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 No     Blood Transfusions No     Caffeine Concern No     Occupational Exposure No     Sleep Concern No     Stress Concern Yes     life/work -->coping     Weight Concern  No     Special Diet No     Back Care Yes     chiropracter for old injury     Exercise No     Bike Helmet Not Asked     not biker     Seat Belt No     Social History Narrative    Social Documentation:        Balanced Diet: YES    Calcium intake: less than 2 per day    Caffeine: 0-1 per day    Exercise:  type of activity walk; daily times per week    Sunscreen: Yes    Seatbelts:  Yes    Self Breast Exam:  Yes    Self Testicular Exam: No - n/a    Physical/Emotional/Sexual Abuse: No     Do you feel safe in your environment? Yes        Cholesterol screen up to date: Yes-3 yrs ago per pt.  Not fasting today.     Eye Exam up to date: Yes    Dental Exam up to date: No - 9 months ago.     Pap smear up to date: Yes    Mammogram up to date: Does Not Apply    Dexa Scan up to date: Does Not Apply    Colonoscopy up to date: Does Not Apply    Immunizations up to date: Yes-Td 12/06    Glucose screen if over 40:  No - n/a     Past Medical History:   Diagnosis Date     Encounter for insertion of mirena IUD 07/12/16     Hoarseness      Lump or mass in breast 2001    BRCA-1 positive (Mother, MGM w/ breast ca, mother w/ gene +), mother dx at 32)     Microcalcifications of the breast, right uoq 6/8/2011     Past Surgical History:   Procedure Laterality Date     C APPENDECTOMY  1987     INSERT INTRAUTERINE DEVICE N/A 7/12/2016    Procedure: INSERT INTRAUTERINE DEVICE;  Surgeon: Fidelia Ricks MD;  [facility]     LAPAROSCOPIC SALPINGO-OOPHORECTOMY Bilateral 7/12/2016    Procedure: LAPAROSCOPIC SALPINGO-OOPHORECTOMY;  Surgeon: Fidelia Ricks MD;  [facility]     LAPAROSCOPY OPERATIVE ADULT N/A 7/12/2016    Procedure: LAPAROSCOPY OPERATIVE ADULT;  Surgeon: Fidelia Ricks MD;  [facility]     Family History   Problem Relation Age of Onset     Breast Cancer Mother      dx age 32, doing well     Breast Cancer Maternal Grandmother      DIABETES Paternal Grandmother      Breast Cancer Paternal Grandmother       "Neurologic Disorder Sister      epilepsy         ROS   A complete ROS was otherwise negative except as noted in the HPI and the end of the note.  /67  Pulse 75  Temp 97.8  F (36.6  C) (Oral)  Resp 18  Ht 1.626 m (5' 4\")  Wt 61.7 kg (136 lb)  SpO2 100%  BMI 23.34 kg/m2  Exam:   GENERAL APPEARANCE: Well developed, well nourished, alert, and in no apparent distress.  EYES: PERRL, EOMI, conjunctiva clear non-injected  HENT: Nasal mucosa with no edema and no discharge. No nasal polyps.   EARS: Canals clear, TMs normal  MOUTH: Oral mucosa is moist, without any lesions, no tonsillar enlargement, no oropharyngeal exudate.  NECK: Supple, no masses, no thyromegaly.  LYMPHATICS: No significant cervical nodes.  RESP: Good air flow throughout.  No crackles. No rhonchi. No wheezes.  CV: Normal S1, S2, regular rhythm, normal rate. No murmur.  No rub. No gallop. No LE edema.   MS: Extremities normal. No clubbing. No cyanosis.  SKIN: No rashes noted  NEURO: Speech normal, normal strength and tone, normal gait and stance  PSYCH: Normal mentation, orientation to person, place, and time.  Results:    HPI    Dictation on: 03/27/2017  1:14 PM by: MARIO LEYVA [[email or web address]]         Assessment and plan:ASSESSMENT AND PLAN:  Yamila presents today for initial consultation regarding concern of cough that lingers after colds.  Often these can be bronchitis and often reactive airway disease/asthma component, however, in her situation with normal lungs at this time and normal spirometry and based on history, I feel like this is most likely related to postnasal drip, possibly some vocal cord irritation.  The fact that she has aspirin exacerbated respiratory disease as well makes me want to start montelukast 10 mg at the onset of colds, Flonase and Zyrtec.  Sometimes this can stop the cascade before it starts.  If she has this beyond about 7 days after a cold, I would like her to return and we will do spirometry and a lung exam at that " time.  I did explain to her about how all NSAIDs can cause these significant reactions and should be avoided if possible.  We discussed desensitization is not a great option because she would need to continue on this regimen.  If she continues to have persistent problems, we may want to do environmental allergy testing to see if she has underlying environmental allergens such as to her cat that can predispose her to more inflammation.  See her back in a year or sooner if necessary.           Answers for HPI/ROS submitted by the patient on 3/26/2017   General Symptoms: Yes  Skin Symptoms: No  HENT Symptoms: Yes  EYE SYMPTOMS: Yes  HEART SYMPTOMS: No  LUNG SYMPTOMS: Yes  INTESTINAL SYMPTOMS: No  URINARY SYMPTOMS: No  GYNECOLOGIC SYMPTOMS: No  BREAST SYMPTOMS: No  SKELETAL SYMPTOMS: Yes  BLOOD SYMPTOMS: No  NERVOUS SYSTEM SYMPTOMS: No  MENTAL HEALTH SYMPTOMS: No  Fever: Yes  Loss of appetite: No  Weight loss: No  Weight gain: No  Fatigue: Yes  Night sweats: No  Chills: Yes  Increased stress: Yes  Excessive hunger: No  Excessive thirst: No  Feeling hot or cold when others believe the temperature is normal: No  Loss of height: No  Post-operative complications: No  Surgical site pain: No  Hallucinations: No  Change in or Loss of Energy: No  Hyperactivity: No  Confusion: No  Ear pain: No  Ear discharge: No  Hearing loss: No  Tinnitus: No  Nosebleeds: No  Congestion: Yes  Sinus pain: Yes  Trouble swallowing: No   Voice hoarseness: Yes  Mouth sores: No  Sore throat: Yes  Tooth pain: No  Gum tenderness: No  Bleeding gums: No  Change in taste: No  Change in sense of smell: No  Dry mouth: Yes  Hearing aid used: No  Neck lump: No  Eye pain: No  Vision loss: No  Dry eyes: No  Watery eyes: Yes  Eye bulging: No  Double vision: No  Flashing of lights: No  Spots: No  Floaters: Yes  Redness: No  Crossed eyes: No  Tunnel Vision: No  Yellowing of eyes: No  Eye irritation: Yes  Cough: Yes  Sputum or phlegm: No  Coughing up blood:  No  Difficulty breating or shortness of breath: No  Snoring: No  Wheezing: No  Difficulty breathing on exertion: No  Respiratory pain: No  Nighttime Cough: No  Difficulty breathing when lying flat: No  Back pain: Yes  Muscle aches: Yes  Neck pain: Yes  Swollen joints: No  Joint pain: No  Bone pain: No  Muscle cramps: No  Muscle weakness: No  Joint stiffness: No  Bone fracture: No      HISTORY OF PRESENT ILLNESS:  Yamila presents today for initial consultation regarding concern of cough after cold that lingers for months.  It has been going on for about 3-4 years.  The cough seems to be dry.  Yamila thinks it is most likely postnasal drip but she did try a nasal steroid, did not seem to help too much.  She does not have the cough at any other times and she does not have any difficulty breathing with this.  It does seem to be worse with talking.  She does not have it when she is sleeping.  No wheezing with it.  She did get spirometry recently, but she was not coughing during that time.  The cough is no longer present.  She also has spring allergies, runny nose, watery eyes.  Claritin seems to help.  She used to use Flonase.  The Claritin helps better.  ***, ever since about 10 years ago, she would take it and then her nose would get stuffy, watery eyes, seemed to happen pretty quickly for about 4 hours.  Sudafed seemed to help.  She has mild difficulty breathing with it but if she takes it for a while, the symptoms seem to get better after persistent use.      PAST MEDICAL HISTORY:  She has the BRCA positive gene, so she has had her ovaries removed.      SOCIAL HISTORY:  She has a cat of 10 years.  She does not smoke, no smokers in her environment.      FAMILY HISTORY:  Mom with spring allergies.      Again, thank you for allowing me to participate in the care of your patient.      Sincerely,    Hari Lovett MD

## 2017-03-27 NOTE — NURSING NOTE
Chief Complaint   Patient presents with     Consult     New consult on Yamila and her NSAID allergy     Yordan Tse CMA at 12:42 PM on 3/27/2017

## 2017-03-27 NOTE — MR AVS SNAPSHOT
After Visit Summary   3/27/2017    Yamila Myles    MRN: [id number]           Patient Information     Date Of Birth          1980        Visit Information        Provider Department      3/27/2017 12:35 PM Hari Maloney MD [facility]        Today's Diagnoses     Seasonal allergic rhinitis due to pollen    -  1      Care Instructions    I recommend 10 mg monteulkast (singulair) and then 2 sprays of Flonase or nasocort in each nostril daily at the onset of colds.  Also 10 mg zyrtec.  Decrease dairy during colds.    If the cough persists a week beyond the cold return and we will do a lung exam and probably spirometry at that time.  Avoid NSAIDs.        Follow-ups after your visit        Follow-up notes from your care team     Return if symptoms worsen or fail to improve.      Who to contact     If you have questions or need follow up information about today's clinic visit or your schedule please contact [facility] directly at [phone].  Normal or non-critical lab and imaging results will be communicated to you by [employer], letter or phone within 4 business days after the clinic has received the results. If you do not hear from us within 7 days, please contact the clinic through [employer] or phone. If you have a critical or abnormal lab result, we will notify you by phone as soon as possible.  Submit refill requests through [employer] or call your pharmacy and they will forward the refill request to us. Please allow 3 business days for your refill to be completed.          Additional Information About Your Visit        [employer] Information     [employer] gives you secure access to your electronic health record. If you see a primary care provider, you can also send messages to your care team and make appointments. If you have questions, please call your primary care clinic.  If you do not have a primary care provider, please  "call [phone] and they will assist you.        Care EveryWhere ID     This is your Care EveryWhere ID. This could be used by other organizations to access your [facility] [place] medical records  [id number]        Your Vitals Were     Pulse Temperature Respirations Height Pulse Oximetry BMI (Body Mass Index)    75 97.8  F (36.6  C) (Oral) 18 1.626 m (5' 4\") 100% 23.34 kg/m2       Blood Pressure from Last 3 Encounters:   03/27/17 109/67   02/01/17 99/65   09/09/16 94/64    Weight from Last 3 Encounters:   03/27/17 61.7 kg (136 lb)   03/03/17 61.7 kg (136 lb)   02/01/17 61.2 kg (135 lb)              Today, you had the following     No orders found for display         Today's Medication Changes          These changes are accurate as of: 3/27/17  1:09 PM.  If you have any questions, ask your nurse or doctor.               Start taking these medicines.        Dose/Directions    montelukast 10 MG tablet   Commonly known as:  SINGULAIR   Used for:  Seasonal allergic rhinitis due to pollen        Dose:  10 mg   Take 1 tablet (10 mg) by mouth every evening   Quantity:  30 tablet   Refills:  0            Where to get your medicines      These medications were sent to [facility] [address] [facility] [address] [place] [address] AT [facility] [address]  [address] [facility] [address]     Phone:  [phone]     montelukast 10 MG tablet                Primary Care Provider Office Phone # Fax #    Antonina Summer Martinez [phone]        [facility] [address] [place] [address] [address]  [facility] [address]        Thank you!     Thank you for choosing [facility]  for your care. Our goal is always to provide you with excellent care. Hearing back from our patients is one way we can continue to improve our services. Please take a few minutes to complete the written survey that you may receive in the mail after your visit with us. Thank you!             Your Updated Medication " List - Protect others around you: Learn how to safely use, store and throw away your medicines at www.disposemymeds.org.          This list is accurate as of: 3/27/17  1:09 PM.  Always use your most recent med list.                   Brand Name Dispense Instructions for use    albuterol 108 (90 BASE) MCG/ACT Inhaler    PROAIR HFA/PROVENTIL HFA/VENTOLIN HFA    3 Inhaler    Inhale 2 puffs into the lungs every 6 hours as needed for shortness of breath / dyspnea or wheezing       estradiol 2 MG tablet    ESTRACE    30 tablet    Take 1 tablet (2 mg) by mouth daily       fluticasone 50 MCG/ACT spray    FLONASE    16 g    Spray 2 sprays into both nostrils daily       loratadine 10 MG tablet    CLARITIN     Take 10 mg by mouth daily       MIRENA (52 MG) 20 MCG/24HR IUD   Generic drug:  levonorgestrel      1 each by Intrauterine route once. Lot # [id number]  To be removed by 6/12/2017       montelukast 10 MG tablet    SINGULAIR    30 tablet    Take 1 tablet (10 mg) by mouth every evening

## 2017-03-27 NOTE — LETTER
3/27/2017      RE: Yamila Myles  [address] [facility] [place] [address]       Reason for Visit  Yamila Myles is a 36 year old female who is referred by Antonina Martinez for cough    Allergy HPI  HPI is at th bottom in plan        The patient was seen and examined by Hari Lovett MD   Current Outpatient Prescriptions   Medication     montelukast (SINGULAIR) 10 MG tablet     albuterol (PROAIR HFA/PROVENTIL HFA/VENTOLIN HFA) 108 (90 BASE) MCG/ACT Inhaler     estradiol (ESTRACE) 2 MG tablet     loratadine (CLARITIN) 10 MG tablet     fluticasone (FLONASE) 50 MCG/ACT nasal spray     levonorgestrel (MIRENA) 20 MCG/24HR IUD     No current facility-administered medications for this visit.      Facility-Administered Medications Ordered in Other Visits   Medication     lidocaine 1 % 9 mL     sodium bicarbonate 8.4 % injection 1 mEq     Allergies   Allergen Reactions     Advil [Nsaids] Other (See Comments)     Nasal congestion     Social History     Social History     Marital status:      Spouse name: N/A     Number of children: 1     Years of education: BA     Occupational History     Organizer [employer]     Social History Main Topics     Smoking status: Never Smoker     Smokeless tobacco: Never Used     Alcohol use 0.0 oz/week      Comment: 2 glasses/wk - none during pregnancy     Drug use: No     Sexual activity: Yes     Partners: Male     Birth control/ protection: IUD      Comment: Mirena     Other Topics Concern     Parent/Sibling W/ Cabg, Mi Or Angioplasty Before 65f 55m? No     Blood Transfusions No     Caffeine Concern No     Occupational Exposure No     Sleep Concern No     Stress Concern Yes     life/work -->coping     Weight Concern No     Special Diet No     Back Care Yes     chiropracter for old injury     Exercise No     Bike Helmet Not Asked     not biker     Seat Belt No     Social History Narrative    Social Documentation:        Balanced Diet:  YES    Calcium intake: less than 2 per day    Caffeine: 0-1 per day    Exercise:  type of activity walk; daily times per week    Sunscreen: Yes    Seatbelts:  Yes    Self Breast Exam:  Yes    Self Testicular Exam: No - n/a    Physical/Emotional/Sexual Abuse: No     Do you feel safe in your environment? Yes        Cholesterol screen up to date: Yes-3 yrs ago per pt.  Not fasting today.     Eye Exam up to date: Yes    Dental Exam up to date: No - 9 months ago.     Pap smear up to date: Yes    Mammogram up to date: Does Not Apply    Dexa Scan up to date: Does Not Apply    Colonoscopy up to date: Does Not Apply    Immunizations up to date: Yes-Td 12/06    Glucose screen if over 40:  No - n/a     Past Medical History:   Diagnosis Date     Encounter for insertion of mirena IUD 07/12/16     Hoarseness      Lump or mass in breast 2001    BRCA-1 positive (Mother, MGM w/ breast ca, mother w/ gene +), mother dx at 32)     Microcalcifications of the breast, right uoq 6/8/2011     Past Surgical History:   Procedure Laterality Date     C APPENDECTOMY  1987     INSERT INTRAUTERINE DEVICE N/A 7/12/2016    Procedure: INSERT INTRAUTERINE DEVICE;  Surgeon: Fidelia Ricks MD;  Location: [facility]     LAPAROSCOPIC SALPINGO-OOPHORECTOMY Bilateral 7/12/2016    Procedure: LAPAROSCOPIC SALPINGO-OOPHORECTOMY;  Surgeon: Fidelia Ricks MD;  Location: [facility] OR     LAPAROSCOPY OPERATIVE ADULT N/A 7/12/2016    Procedure: LAPAROSCOPY OPERATIVE ADULT;  Surgeon: Fidelia Ricks MD;  Location: [facility] OR     Family History   Problem Relation Age of Onset     Breast Cancer Mother      dx age 32, doing well     Breast Cancer Maternal Grandmother      DIABETES Paternal Grandmother      Breast Cancer Paternal Grandmother      Neurologic Disorder Sister      epilepsy         ROS   A complete ROS was otherwise negative except as noted in the HPI and the end of the note.  /67  Pulse 75  Temp 97.8  F (36.6  C) (Oral)  Resp 18  Ht 1.626 m (5'  "4\")  Wt 61.7 kg (136 lb)  SpO2 100%  BMI 23.34 kg/m2  Exam:   GENERAL APPEARANCE: Well developed, well nourished, alert, and in no apparent distress.  EYES: PERRL, EOMI, conjunctiva clear non-injected  HENT: Nasal mucosa with no edema and no discharge. No nasal polyps.   EARS: Canals clear, TMs normal  MOUTH: Oral mucosa is moist, without any lesions, no tonsillar enlargement, no oropharyngeal exudate.  NECK: Supple, no masses, no thyromegaly.  LYMPHATICS: No significant cervical nodes.  RESP: Good air flow throughout.  No crackles. No rhonchi. No wheezes.  CV: Normal S1, S2, regular rhythm, normal rate. No murmur.  No rub. No gallop. No LE edema.   MS: Extremities normal. No clubbing. No cyanosis.  SKIN: No rashes noted  NEURO: Speech normal, normal strength and tone, normal gait and stance  PSYCH: Normal mentation, orientation to person, place, and time.  Results:    HPI   HISTORY OF PRESENT ILLNESS:  Yamila presents today for initial consultation regarding concern of cough after cold that lingers for months.  It has been going on for about 3-4 years.  The cough seems to be dry.  Yamila thinks it is most likely postnasal drip but she did try a nasal steroid, did not seem to help too much.  She does not have the cough at any other times and she does not have any difficulty breathing with this.  It does seem to be worse with talking.  She does not have it when she is sleeping.  No wheezing with it.  She did get spirometry recently, but she was not coughing during that time.  The cough is no longer present.  She also has spring allergies, runny nose, watery eyes.  Claritin seems to help.  She used to use Flonase.  The Claritin helps better.  Ever since about 10 years ago, she would take it and then her nose would get stuffy, watery eyes, seemed to happen pretty quickly for about 4 hours.  Sudafed seemed to help.  She has mild difficulty breathing with it but if she takes it for a while, the symptoms seem to get better " after persistent use.      PAST MEDICAL HISTORY:  She has the BRCA positive gene, so she has had her ovaries removed.      SOCIAL HISTORY:  She has a cat of 10 years.  She does not smoke, no smokers in her environment.      FAMILY HISTORY:  Mom with spring allergies.           Assessment and plan:ASSESSMENT AND PLAN:  Yamila presents today for initial consultation regarding concern of cough that lingers after colds.  Often these can be bronchitis and often reactive airway disease/asthma component, however, in her situation with normal lungs at this time and normal spirometry and based on history, I feel like this is most likely related to postnasal drip, possibly some vocal cord irritation.  The fact that she has aspirin exacerbated respiratory disease as well makes me want to start montelukast 10 mg at the onset of colds, Flonase and Zyrtec.  Sometimes this can stop the cascade before it starts.  If she has this beyond about 7 days after a cold, I would like her to return and we will do spirometry and a lung exam at that time.  I did explain to her about how all NSAIDs can cause these significant reactions and should be avoided if possible.  We discussed desensitization is not a great option because she would need to continue on this regimen.  If she continues to have persistent problems, we may want to do environmental allergy testing to see if she has underlying environmental allergens such as to her cat that can predispose her to more inflammation.  See her back in a year or sooner if necessary.         Hari Lovett MD

## 2017-04-11 LAB
DLCOUNC-%PRED-PRE: 120 %
DLCOUNC-PRE: 29.12 ML/MIN/MMHG
DLCOUNC-PRED: 24.08 ML/MIN/MMHG
ERV-%PRED-PRE: 69 %
ERV-PRE: 0.82 L
ERV-PRED: 1.18 L
EXPTIME-PRE: 8.02 SEC
FEF2575-%PRED-PRE: 85 %
FEF2575-PRE: 2.8 L/SEC
FEF2575-PRED: 3.27 L/SEC
FEFMAX-%PRED-PRE: 102 %
FEFMAX-PRE: 7.19 L/SEC
FEFMAX-PRED: 6.99 L/SEC
FEV1-%PRED-PRE: 86 %
FEV1-PRE: 2.67 L
FEV1FEV6-PRE: 84 %
FEV1FEV6-PRED: 84 %
FEV1FVC-PRE: 84 %
FEV1FVC-PRED: 83 %
FEV1SVC-PRE: 85 %
FEV1SVC-PRED: 82 %
FIFMAX-PRE: 3.53 L/SEC
FRCPLETH-%PRED-PRE: 106 %
FRCPLETH-PRE: 2.86 L
FRCPLETH-PRED: 2.68 L
FVC-%PRED-PRE: 85 %
FVC-PRE: 3.2 L
FVC-PRED: 3.72 L
IC-%PRED-PRE: 90 %
IC-PRE: 2.31 L
IC-PRED: 2.56 L
RVPLETH-%PRED-PRE: 131 %
RVPLETH-PRE: 2.04 L
RVPLETH-PRED: 1.55 L
TLCPLETH-%PRED-PRE: 104 %
TLCPLETH-PRE: 5.17 L
TLCPLETH-PRED: 4.94 L
VA-%PRED-PRE: 92 %
VA-PRE: 4.7 L
VC-%PRED-PRE: 83 %
VC-PRE: 3.13 L
VC-PRED: 3.74 L

## 2017-07-12 ENCOUNTER — TELEPHONE (OUTPATIENT)
Dept: OBGYN | Facility: CLINIC | Age: 37
End: 2017-07-12

## 2017-07-12 DIAGNOSIS — Z90.722 S/P BSO (BILATERAL SALPINGO-OOPHORECTOMY): ICD-10-CM

## 2017-07-12 RX ORDER — ESTRADIOL 2 MG/1
TABLET ORAL
Qty: 60 TABLET | Refills: 0 | Status: SHIPPED | OUTPATIENT
Start: 2017-07-12 | End: 2017-09-11

## 2017-07-12 NOTE — TELEPHONE ENCOUNTER
Received refill request for estrace. Rx was originally filled by a resident. Patient has upcoming appt. With Dr. Ricks in 2 months. Nurse sent Rx to get her through until then.

## 2017-09-11 DIAGNOSIS — Z90.722 S/P BSO (BILATERAL SALPINGO-OOPHORECTOMY): ICD-10-CM

## 2017-09-11 RX ORDER — ESTRADIOL 2 MG/1
2 TABLET ORAL DAILY
Qty: 30 TABLET | Refills: 0 | Status: SHIPPED | OUTPATIENT
Start: 2017-09-11 | End: 2017-09-27

## 2017-09-11 NOTE — TELEPHONE ENCOUNTER
Received refill request for estradiol tabs. Has clinic appt at end of September. Will refill for 30 days.

## 2017-09-15 ENCOUNTER — HOSPITAL ENCOUNTER (OUTPATIENT)
Dept: MRI IMAGING | Facility: CLINIC | Age: 37
Discharge: HOME | End: 2017-09-15
Attending: INTERNAL MEDICINE | Admitting: INTERNAL MEDICINE
Payer: COMMERCIAL

## 2017-09-15 DIAGNOSIS — Z15.09 BRCA1 GENETIC CARRIER: ICD-10-CM

## 2017-09-15 DIAGNOSIS — Z15.01 BRCA1 GENETIC CARRIER: ICD-10-CM

## 2017-09-15 PROCEDURE — 25000128 H RX IP 250 OP 636: Performed by: INTERNAL MEDICINE

## 2017-09-15 PROCEDURE — A9585 GADOBUTROL INJECTION: HCPCS | Performed by: INTERNAL MEDICINE

## 2017-09-15 PROCEDURE — 0159T MR BREAST BILATERAL W/O & W CONTRAST: CPT

## 2017-09-15 RX ORDER — GADOBUTROL 604.72 MG/ML
7.5 INJECTION INTRAVENOUS ONCE
Status: DISCONTINUED | OUTPATIENT
Start: 2017-09-15 | End: 2017-09-16 | Stop reason: HOSPADM

## 2017-09-15 RX ORDER — GADOBUTROL 604.72 MG/ML
7.5 INJECTION INTRAVENOUS ONCE
Status: COMPLETED | OUTPATIENT
Start: 2017-09-15 | End: 2017-09-15

## 2017-09-15 RX ADMIN — GADOBUTROL 7.5 ML: 604.72 INJECTION INTRAVENOUS at 17:49

## 2017-09-15 RX ADMIN — SODIUM CHLORIDE 100 ML: 9 INJECTION, SOLUTION INTRAVENOUS at 17:49

## 2017-09-19 ENCOUNTER — TELEPHONE (OUTPATIENT)
Dept: MAMMOGRAPHY | Facility: CLINIC | Age: 37
End: 2017-09-19

## 2017-09-19 NOTE — TELEPHONE ENCOUNTER
Yuly Driscoll a message about the need for follow up imaging post there breast MRI done on 9/15/2017.  Awaiting return phone call.

## 2017-09-27 ENCOUNTER — OFFICE VISIT (OUTPATIENT)
Dept: OBGYN | Facility: CLINIC | Age: 37
End: 2017-09-27
Attending: OBSTETRICS & GYNECOLOGY
Payer: COMMERCIAL

## 2017-09-27 VITALS
HEART RATE: 73 BPM | HEIGHT: 64 IN | WEIGHT: 138.6 LBS | DIASTOLIC BLOOD PRESSURE: 65 MMHG | SYSTOLIC BLOOD PRESSURE: 96 MMHG | BODY MASS INDEX: 23.66 KG/M2

## 2017-09-27 DIAGNOSIS — Z90.722 S/P BSO (BILATERAL SALPINGO-OOPHORECTOMY): ICD-10-CM

## 2017-09-27 DIAGNOSIS — Z12.4 SCREENING FOR MALIGNANT NEOPLASM OF CERVIX: Primary | ICD-10-CM

## 2017-09-27 PROCEDURE — 99212 OFFICE O/P EST SF 10 MIN: CPT | Mod: ZF

## 2017-09-27 PROCEDURE — 87624 HPV HI-RISK TYP POOLED RSLT: CPT | Performed by: OBSTETRICS & GYNECOLOGY

## 2017-09-27 PROCEDURE — G0145 SCR C/V CYTO,THINLAYER,RESCR: HCPCS | Performed by: OBSTETRICS & GYNECOLOGY

## 2017-09-27 RX ORDER — ESTRADIOL 2 MG/1
2 TABLET ORAL DAILY
Qty: 90 TABLET | Refills: 3 | Status: SHIPPED | OUTPATIENT
Start: 2017-09-27 | End: 2018-04-26

## 2017-09-27 ASSESSMENT — ANXIETY QUESTIONNAIRES
7. FEELING AFRAID AS IF SOMETHING AWFUL MIGHT HAPPEN: NOT AT ALL
5. BEING SO RESTLESS THAT IT IS HARD TO SIT STILL: NOT AT ALL
1. FEELING NERVOUS, ANXIOUS, OR ON EDGE: NOT AT ALL
3. WORRYING TOO MUCH ABOUT DIFFERENT THINGS: NOT AT ALL
GAD7 TOTAL SCORE: 0
6. BECOMING EASILY ANNOYED OR IRRITABLE: NOT AT ALL
2. NOT BEING ABLE TO STOP OR CONTROL WORRYING: NOT AT ALL

## 2017-09-27 ASSESSMENT — PATIENT HEALTH QUESTIONNAIRE - PHQ9
SUM OF ALL RESPONSES TO PHQ QUESTIONS 1-9: 0
5. POOR APPETITE OR OVEREATING: NOT AT ALL

## 2017-09-27 NOTE — MR AVS SNAPSHOT
After Visit Summary   9/27/2017    Yamila Myles    MRN: [id number]           Patient Information     Date Of Birth          1980        Visit Information        Provider Department      9/27/2017 9:45 AM Fidelia Ricks MD [facility]        Today's Diagnoses     Screening for malignant neoplasm of cervix    -  1    S/P BSO (bilateral salpingo-oophorectomy)           Follow-ups after your visit        Your next 10 appointments already scheduled     Oct 17, 2017  8:30 AM CDT   (Arrive by 8:15 AM)   MR BREAST PERCUTANEOUS NEEDLE CORE BX with  Breast Rad, UCMR1,  BREAST NURSE   [facility] ([facility])    [address] [address] [phone]           Take your medicines as usual, unless your doctor tells you not to. Bring a list of your current medicines to your exam (including vitamins, minerals and over-the-counter drugs).  Have a blood test (creatinine test) within 30 days of your exam. Go to your clinic or Diagnostic Imaging Department for this test.  The MRI machine uses a strong magnet. Please wear clothes without metal (snaps, zippers). A sweatsuit works well, or we may give you a hospital gown.  Please remove any body piercings and hair extensions before you arrive. You will also remove watches, jewelry, hairpins, wallets, dentures, partial dental plates and hearing aids. You may wear contact lenses, and you may be able to wear your rings. We have a safe place to keep your personal items, but it is safer to leave them at home.   **IMPORTANT** THE INSTRUCTIONS BELOW ARE ONLY FOR THOSE PATIENTS WHO HAVE BEEN TOLD THEY WILL RECEIVE SEDATION OR GENERAL ANESTHESIA DURING THEIR MRI PROCEDURE:  IF YOU WILL RECEIVE SEDATION (take medicine to help you relax during your exam)   You must get the medicine from your doctor before you arrive. Bring the medicine to the exam. Do not take it at  home.   Arrive one hour early. Bring someone who can take you home after the test. Your medicine will make you sleepy. After the exam, you may not drive, take a bus or take a taxi by yourself.   No eating 8 hours before your exam. You may have clear liquids up until 4 hours before your exam. (Clear liquids include water, clear tea, black coffee and fruit juice without pulp.)  IF YOU WILL RECEIVE ANESTHESIA (be asleep for your exam)   Arrive 1 1/2 hours early. Bring someone who can take you home after the test. You may not drive, take a bus or take a taxi by yourself.   No eating 8 hours before your exam. You may have clear liquids up until 4 hours before your exam. (Clear liquids include water, clear tea, black coffee and fruit juice without pulp.)  If you have any questions, please contact your Imaging Department exam site.            Oct 17, 2017 10:00 AM CDT   MA POST PROCEDURE RIGHT with [id number]   [facility] ([email or web address])    [address] [place] [address] [phone]           Do not use any powder, lotion or deodorant under your arms or on your breast. If you do, we will ask you to remove it before your exam.  Wear comfortable, two-piece clothing.  If you have any allergies, tell your care team.  Bring any previous mammograms from other facilities or have them mailed to the breast center.              Who to contact     Please call your clinic at [phone] to:    Ask questions about your health    Make or cancel appointments    Discuss your medicines    Learn about your test results    Speak to your doctor   If you have compliments or concerns about an experience at your clinic, or if you wish to file a complaint, please contact [facility] Patient Relations at [phone] or email us at [email or web address]         Additional Information About Your Visit        MyChart Information     [employer]  "gives you secure access to your electronic health record. If you see a primary care provider, you can also send messages to your care team and make appointments. If you have questions, please call your primary care clinic.  If you do not have a primary care provider, please call [phone] and they will assist you.      [employer] is an electronic gateway that provides easy, online access to your medical records. With [employer], you can request a clinic appointment, read your test results, renew a prescription or communicate with your care team.     To access your existing account, please contact your [facility] or call [phone] for assistance.        Care EveryWhere ID     This is your Care EveryWhere ID. This could be used by other organizations to access your [place] medical records  [id number]        Your Vitals Were     Pulse Height BMI (Body Mass Index)             73 1.626 m (5' 4\") 23.79 kg/m2          Blood Pressure from Last 3 Encounters:   09/27/17 96/65   03/27/17 109/67   02/01/17 99/65    Weight from Last 3 Encounters:   09/27/17 62.9 kg (138 lb 9.6 oz)   03/27/17 61.7 kg (136 lb)   03/03/17 61.7 kg (136 lb)              We Performed the Following     Obtaining, preparing and conveyance of cervical or vaginal smear to laboratory.     Pap imaged thin layer screen with HPV - recommended age 30 - 65 years (select HPV order below)          Where to get your medicines      These medications were sent to [facility] [address] [facility] [address] AT [facility] [address] [facility]  [address] [facility] [address]     Phone:  [phone]     estradiol 2 MG tablet          Primary Care Provider Office Phone # Fax #    Antonina Martinez [phone]       [facility] [address] [facility] [address]        Equal Access to Services     MASOUD DAUGHERTY AH: Keyona Lawton, jovani cardenas, qaybta " gold love ana luisagarcia diane tang ah. [facility] [phone].    ATENCIÓN: Si felicia moore, tiene a pak disposición servicios gratuitos de asistencia lingüística. Lc al [phone].    We comply with applicable federal civil rights laws and [place] laws. We do not discriminate on the basis of race, color, national origin, age, disability sex, sexual orientation or gender identity.            Thank you!     Thank you for choosing [facility]  for your care. Our goal is always to provide you with excellent care. Hearing back from our patients is one way we can continue to improve our services. Please take a few minutes to complete the written survey that you may receive in the mail after your visit with us. Thank you!             Your Updated Medication List - Protect others around you: Learn how to safely use, store and throw away your medicines at www.disposemymeds.org.          This list is accurate as of: 9/27/17 12:24 PM.  Always use your most recent med list.                   Brand Name Dispense Instructions for use Diagnosis    albuterol 108 (90 BASE) MCG/ACT Inhaler    PROAIR HFA/PROVENTIL HFA/VENTOLIN HFA    3 Inhaler    Inhale 2 puffs into the lungs every 6 hours as needed for shortness of breath / dyspnea or wheezing    Chronic coughing, Uncomplicated asthma, unspecified asthma severity       estradiol 2 MG tablet    ESTRACE    90 tablet    Take 1 tablet (2 mg) by mouth daily    S/P BSO (bilateral salpingo-oophorectomy)       fluticasone 50 MCG/ACT spray    FLONASE    16 g    Spray 2 sprays into both nostrils daily    Allergic rhinitis due to pollen       loratadine 10 MG tablet    CLARITIN     Take 10 mg by mouth daily        MIRENA (52 MG) 20 MCG/24HR IUD   Generic drug:  levonorgestrel      1 each by Intrauterine route once. Lot # [id number]  To be removed by 6/12/2017        montelukast 10 MG tablet    SINGULAIR    30 tablet    Take 1 tablet (10 mg) by  mouth every evening    Seasonal allergic rhinitis due to pollen

## 2017-09-27 NOTE — LETTER
2017     RE: Yamila Myles  [address] [facility] [id number]     Dear Colleague,    Thank you for referring your patient, Yamila Myles, to the [facility] at [facility]. Please see a copy of my visit note below.  Progress Note    SUBJECTIVE:  Yamila Myles is an 37 year old  BRCA1 carrier s/p BSO currently on ERT and Mirena who requests an Annual Preventive Exam.     Patient is doing well and does not have any concerns. She is closely monitored by breast MRI due to her high risk of breast cancer. She mentions that there is a suspicious area in her right breast that will need a biopsy soon. She has no bleeding while on ERT with Mirena. Her Mirena was put in in .     Menstrual History:  Menstrual History 2014 1/15/2016 2016   LAST MENSTRUAL PERIOD 2014       Last    Lab Results   Component Value Date    PAP ASC-US 01/15/2016     History of abnormal Pap smear:   ASC-US with neg HPVs, 1/15/16      Last   Lab Results   Component Value Date    HPV16 Negative 01/15/2016     Last   Lab Results   Component Value Date    HPV18 Negative 01/15/2016     Last   Lab Results   Component Value Date    HRHPV Negative 01/15/2016       Last breast MRI: 9/15/17    Mammogram Screening Bilateral    Result Date: 8/3/2016  Narrative: Examination: Bilateral digital screening mammography with computer aided detection    Comparison: 2015, 2014, 2012 History: No symptoms, routine screening. Family history for breast cancer in mother, at age 32. Family history of breast cancer in 2 grandmothers, ages 40s. Reported personal history of genetic mutation in the BRCA gene. BREAST DENSITY: Extremely dense. COMMENTS: There Has been no significant change.          Mammo Screening Digital (bilateral)    Result Date: 2015  Narrative: Examination: Bilateral digital screening mammography with computer  aided detection. Comparison: Stereotactic vacuum-assisted biopsy of the right breast on 6/13/2011.  Screening mammograms on 6/22/2012 and 1/20/2014. History: No symptoms, routine screening. One previous benign right breast biopsy. One first degree relative diagnosed with breast cancer at the age of 32.  2 second degree relatives diagnosed with breast cancer in their 40s.  Patient has tested positive for BRCA1 mutation. Both the patient's mother and sister are BRCA+.  1 second degree relative diagnosed with ovarian cancer in her 40s. BREAST DENSITY: Extremely dense COMMENTS: Biopsy marker in right breast. No significant change.     Ma Screening Digital Bilateral    Result Date: 1/22/2014  Narrative:  Examination: Bilateral digital screening mammography with computer aided detection.  Comparison: 20/3/2011, 6/22/2012  History: No symptoms, routine screening. BRCA1 positive with first and second degree relatives with history breast cancer. History of benign breast biopsy on the right.  Findings: The breast tissue is extremely dense. There has been no significant change since the comparison study.      Us Breast Right Limited 1-3 Quadrants    Result Date: 9/21/2017  Narrative: Examination: US BREAST RIGHT LIMITED 1-3 QUADRANTS, 9/21/2017 11:19 AM Comparison: MRI 9/15/2017, 2/15/2016 . History: Second look ultrasound of  2 enhancing areas seen on MRI. Personal history of BRCA1 mutation. Findings: Targeted ultrasound evaluation was performed by the technologist and radiologist. In the right lower inner quadrant, there are islands of tissue that correlates with MR findings morphologically. There are no suspicious sonographic findings or significant change. In the right breast at 11:30 o'clock, there is a group of microcysts that is located superior to the largest simple cyst visualized at 12:00. Attention was paid to the tissues deep to the largest cyst, and there were no suspicious findings or definite correlate for the  MR finding.     Us Breast Right Limited 1-3 Quadrants    Result Date: 3/13/2017  Narrative: Right breast ultrasound Comparisons: Breast MRI 3/3/2017, mammogram 08/02/2016 History: 36-year-old female with family history of breast cancer, personal history of BRCA1 mutation, presents for second look ultrasound for enhancing areas in the right inferior medial breast on breast MRI FINDINGS:     Focused right breast ultrasound was performed by the radiologist in the technologist. There are no suspicious findings in the right breast lower inner quadrant. The inframammary fold was evaluated, and no sonographic correlate is identified for the areas of concern in the right inferior medial breast.        Last Colonoscopy:  No results found for this or any previous visit.      HISTORY:    Current Outpatient Prescriptions on File Prior to Visit:  montelukast (SINGULAIR) 10 MG tablet Take 1 tablet (10 mg) by mouth every evening   albuterol (PROAIR HFA/PROVENTIL HFA/VENTOLIN HFA) 108 (90 BASE) MCG/ACT Inhaler Inhale 2 puffs into the lungs every 6 hours as needed for shortness of breath / dyspnea or wheezing   loratadine (CLARITIN) 10 MG tablet Take 10 mg by mouth daily   fluticasone (FLONASE) 50 MCG/ACT nasal spray Spray 2 sprays into both nostrils daily   levonorgestrel (MIRENA) 20 MCG/24HR IUD 1 each by Intrauterine route once. Lot # [id number]  To be removed by 6/12/2017   [DISCONTINUED] estradiol (ESTRACE) 2 MG tablet Take 1 tablet (2 mg) by mouth daily     Current Facility-Administered Medications on File Prior to Visit:  sodium bicarbonate 8.4 % injection 1 mEq   lidocaine 1 % 9 mL   lidocaine 1 % 9 mL   sodium bicarbonate 8.4 % injection 1 mEq     Allergies   Allergen Reactions     Advil [Nsaids] Other (See Comments)     Nasal congestion     Immunization History   Administered Date(s) Administered     Influenza (H1N1) 12/07/2009     Influenza (IIV3) 10/29/2007, 09/28/2010, 11/22/2011     TD (ADULT, 7+) 12/01/2006     TDAP  Vaccine (Adacel) 2012       Obstetric History       T2      L2     SAB0   TAB0   Ectopic0   Multiple0   Live Births2    Obstetric Comments   Possible early SAB in      Past Medical History:   Diagnosis Date     Encounter for insertion of mirena IUD 16     Hoarseness      Lump or mass in breast     BRCA-1 positive (Mother, MGM w/ breast ca, mother w/ gene +), mother dx at 32)     Microcalcifications of the breast, right uoq 2011     Past Surgical History:   Procedure Laterality Date     C APPENDECTOMY       INSERT INTRAUTERINE DEVICE N/A 2016    Procedure: INSERT INTRAUTERINE DEVICE;  Surgeon: Fidelia Ricks MD;  [facility]     LAPAROSCOPIC SALPINGO-OOPHORECTOMY Bilateral 2016    Procedure: LAPAROSCOPIC SALPINGO-OOPHORECTOMY;  Surgeon: Fidelia Ricks MD;  [facility]     LAPAROSCOPY OPERATIVE ADULT N/A 2016    Procedure: LAPAROSCOPY OPERATIVE ADULT;  Surgeon: Fidelia Ricks MD;  [facility]     Family History   Problem Relation Age of Onset     Breast Cancer Mother      dx age 32, doing well     Breast Cancer Maternal Grandmother      DIABETES Paternal Grandmother      Breast Cancer Paternal Grandmother      Neurologic Disorder Sister      epilepsy     Social History     Social History     Marital status:      Spouse name: N/A     Number of children: 1     Years of education: BA     Occupational History     Organizer [employer]     Social History Main Topics     Smoking status: Never Smoker     Smokeless tobacco: Never Used     Alcohol use 0.0 oz/week      Comment: 2 glasses/wk - none during pregnancy     Drug use: No     Sexual activity: Yes     Partners: Male     Birth control/ protection: IUD      Comment: Mirena     Other Topics Concern     Parent/Sibling W/ Cabg, Mi Or Angioplasty Before 65f 55m? No     Blood Transfusions No     Caffeine Concern No     Occupational Exposure No     Sleep Concern  "No     Stress Concern Yes     life/work -->coping     Weight Concern No     Special Diet No     Back Care Yes     chiropracter for old injury     Exercise No     Bike Helmet Not Asked     not biker     Seat Belt No     Social History Narrative    Social Documentation:        Balanced Diet: YES    Calcium intake: less than 2 per day    Caffeine: 0-1 per day    Exercise:  type of activity walk; daily times per week    Sunscreen: Yes    Seatbelts:  Yes    Self Breast Exam:  Yes    Self Testicular Exam: No - n/a    Physical/Emotional/Sexual Abuse: No     Do you feel safe in your environment? Yes        Cholesterol screen up to date: Yes-3 yrs ago per pt.  Not fasting today.     Eye Exam up to date: Yes    Dental Exam up to date: No - 9 months ago.     Pap smear up to date: Yes    Mammogram up to date: Does Not Apply    Dexa Scan up to date: Does Not Apply    Colonoscopy up to date: Does Not Apply    Immunizations up to date: Yes-Td 12/06    Glucose screen if over 40:  No - n/a       ROS  [unfilled]  PHQ-9 SCORE 1/15/2016 9/27/2017   Total Score 2 0     FAYE-7 SCORE 9/27/2017   Total Score 0         EXAM:  Blood pressure 96/65, pulse 73, height 1.626 m (5' 4\"), weight 62.9 kg (138 lb 9.6 oz), not currently breastfeeding. Body mass index is 23.79 kg/(m^2).  General - pleasant female in no acute distress.  Skin - a few seborrheic keratosis on her back, a couple moles under her right breast, no suspicious lesions or rashes   EENT-  PERRLA, euthyroid with out palpable nodules  Neck - supple without lymphadenopathy.  Lungs - clear to auscultation bilaterally.  Heart - regular rate and rhythm without murmur.  Abdomen - soft, nontender, nondistended, no masses or organomegaly noted.  Musculoskeletal - no gross deformities.  Neurological - normal strength, sensation, and mental status.    Breast Exam:  Breast: Without visible skin changes. No dimpling or lesions seen.   Breasts supple, non-tender with palpation, no dominant mass, " nodularity, or nipple discharge noted bilaterally. There is some fibrocystic changes in the left breast. Axillary nodes negative.      Pelvic Exam:  EG/BUS: Normal genital architecture without lesions, erythema or abnormal secretions. Normal genital architecture without lesions, erythema or abnormal secretions Bartholin's, Urethra, [place]'s normal   Urethral meatus: normal   Urethra: no masses, tenderness, or scarring   Vagina: moist, pink, rugae with creamy, white and odorless  secretions  Cervix: no lesions  Uterus: deferred  Adnexa: Within normal limits, No masses, nodularity, tenderness and Surgically absent  Rectum:anus normal       ASSESSMENT & PLAN: :  Yamila Myles is an 37 year old  BRCA1 carrier s/p BSO currently on ERT and Mirena who requests an Annual Preventive Exam.  Her pap smear last year showed ASC-US with neg HPV tests.     - Pap smear with HPV today, follow up results  - Continue to take estradiol 2 mg daily  - Replacement of Mirena at next year's annul exam. Pt is allergic to ibuprofen and requests extra-strength tylenol for replacement for pain control.       Additional teaching done at this visit regarding exercise and weight/diet.    Return to clinic in one year.  Follow-up as needed.    I, Emmy Jain, MS3, am serving as a scribe on 17 to document services personally performed by Dr. Ricks, based on data collection and the provider's statements to me.     The student acted as my scribe. I have seen, examined and counseled the patient. I have reviewed and edited the note.   Fidelia Ricks

## 2017-09-27 NOTE — PROGRESS NOTES
Progress Note    SUBJECTIVE:  Yamila Myles is an 37 year old  BRCA1 carrier s/p BSO currently on ERT and Mirena who requests an Annual Preventive Exam.     Patient is doing well and does not have any concerns. She is closely monitored by breast MRI due to her high risk of breast cancer. She mentions that there is a suspicious area in her right breast that will need a biopsy soon. She has no bleeding while on ERT with Mirena. Her Mirena was put in in .     Menstrual History:  Menstrual History 2014 1/15/2016 2016   LAST MENSTRUAL PERIOD 2014       Last    Lab Results   Component Value Date    PAP ASC-US 01/15/2016     History of abnormal Pap smear:   ASC-US with neg HPVs, 1/15/16      Last   Lab Results   Component Value Date    HPV16 Negative 01/15/2016     Last   Lab Results   Component Value Date    HPV18 Negative 01/15/2016     Last   Lab Results   Component Value Date    HRHPV Negative 01/15/2016       Last breast MRI: 9/15/17    Mammogram Screening Bilateral    Result Date: 8/3/2016  Narrative: Examination: Bilateral digital screening mammography with computer aided detection    Comparison: 2015, 2014, 2012 History: No symptoms, routine screening. Family history for breast cancer in mother, at age 32. Family history of breast cancer in 2 grandmothers, ages 40s. Reported personal history of genetic mutation in the BRCA gene. BREAST DENSITY: Extremely dense. COMMENTS: There Has been no significant change.          Mammo Screening Digital (bilateral)    Result Date: 2015  Narrative: Examination: Bilateral digital screening mammography with computer aided detection. Comparison: Stereotactic vacuum-assisted biopsy of the right breast on 2011.  Screening mammograms on 2012 and 2014. History: No symptoms, routine screening. One previous benign right breast biopsy. One first degree relative diagnosed with breast cancer at the age of  32.  2 second degree relatives diagnosed with breast cancer in their 40s.  Patient has tested positive for BRCA1 mutation. Both the patient's mother and sister are BRCA+.  1 second degree relative diagnosed with ovarian cancer in her 40s. BREAST DENSITY: Extremely dense COMMENTS: Biopsy marker in right breast. No significant change.     Ma Screening Digital Bilateral    Result Date: 1/22/2014  Narrative:  Examination: Bilateral digital screening mammography with computer aided detection.  Comparison: 20/3/2011, 6/22/2012  History: No symptoms, routine screening. BRCA1 positive with first and second degree relatives with history breast cancer. History of benign breast biopsy on the right.  Findings: The breast tissue is extremely dense. There has been no significant change since the comparison study.      Us Breast Right Limited 1-3 Quadrants    Result Date: 9/21/2017  Narrative: Examination: US BREAST RIGHT LIMITED 1-3 QUADRANTS, 9/21/2017 11:19 AM Comparison: MRI 9/15/2017, 2/15/2016 . History: Second look ultrasound of  2 enhancing areas seen on MRI. Personal history of BRCA1 mutation. Findings: Targeted ultrasound evaluation was performed by the technologist and radiologist. In the right lower inner quadrant, there are islands of tissue that correlates with MR findings morphologically. There are no suspicious sonographic findings or significant change. In the right breast at 11:30 o'clock, there is a group of microcysts that is located superior to the largest simple cyst visualized at 12:00. Attention was paid to the tissues deep to the largest cyst, and there were no suspicious findings or definite correlate for the MR finding.     Us Breast Right Limited 1-3 Quadrants    Result Date: 3/13/2017  Narrative: Right breast ultrasound Comparisons: Breast MRI 3/3/2017, mammogram 08/02/2016 History: 36-year-old female with family history of breast cancer, personal history of BRCA1 mutation, presents for second look  ultrasound for enhancing areas in the right inferior medial breast on breast MRI FINDINGS:     Focused right breast ultrasound was performed by the radiologist in the technologist. There are no suspicious findings in the right breast lower inner quadrant. The inframammary fold was evaluated, and no sonographic correlate is identified for the areas of concern in the right inferior medial breast.        Last Colonoscopy:  No results found for this or any previous visit.      HISTORY:    Current Outpatient Prescriptions on File Prior to Visit:  montelukast (SINGULAIR) 10 MG tablet Take 1 tablet (10 mg) by mouth every evening   albuterol (PROAIR HFA/PROVENTIL HFA/VENTOLIN HFA) 108 (90 BASE) MCG/ACT Inhaler Inhale 2 puffs into the lungs every 6 hours as needed for shortness of breath / dyspnea or wheezing   loratadine (CLARITIN) 10 MG tablet Take 10 mg by mouth daily   fluticasone (FLONASE) 50 MCG/ACT nasal spray Spray 2 sprays into both nostrils daily   levonorgestrel (MIRENA) 20 MCG/24HR IUD 1 each by Intrauterine route once. Lot # [id number]  To be removed by 2017   [DISCONTINUED] estradiol (ESTRACE) 2 MG tablet Take 1 tablet (2 mg) by mouth daily     Current Facility-Administered Medications on File Prior to Visit:  sodium bicarbonate 8.4 % injection 1 mEq   lidocaine 1 % 9 mL   lidocaine 1 % 9 mL   sodium bicarbonate 8.4 % injection 1 mEq     Allergies   Allergen Reactions     Advil [Nsaids] Other (See Comments)     Nasal congestion     Immunization History   Administered Date(s) Administered     Influenza (H1N1) 2009     Influenza (IIV3) 10/29/2007, 2010, 2011     TD (ADULT, 7+) 2006     TDAP Vaccine (Adacel) 2012       Obstetric History       T2      L2     SAB0   TAB0   Ectopic0   Multiple0   Live Births2    Obstetric Comments   Possible early SAB in      Past Medical History:   Diagnosis Date     Encounter for insertion of mirena IUD 16     Hoarseness       Lump or mass in breast 2001    BRCA-1 positive (Mother, MGM w/ breast ca, mother w/ gene +), mother dx at 32)     Microcalcifications of the breast, right uoq 6/8/2011     Past Surgical History:   Procedure Laterality Date     C APPENDECTOMY  1987     INSERT INTRAUTERINE DEVICE N/A 7/12/2016    Procedure: INSERT INTRAUTERINE DEVICE;  Surgeon: Fidelia Ricks MD;  [facility]     LAPAROSCOPIC SALPINGO-OOPHORECTOMY Bilateral 7/12/2016    Procedure: LAPAROSCOPIC SALPINGO-OOPHORECTOMY;  Surgeon: Fidelia Ricks MD;  [facility]     LAPAROSCOPY OPERATIVE ADULT N/A 7/12/2016    Procedure: LAPAROSCOPY OPERATIVE ADULT;  Surgeon: Fidelia Ricks MD;  [facility]     Family History   Problem Relation Age of Onset     Breast Cancer Mother      dx age 32, doing well     Breast Cancer Maternal Grandmother      DIABETES Paternal Grandmother      Breast Cancer Paternal Grandmother      Neurologic Disorder Sister      epilepsy     Social History     Social History     Marital status:      Spouse name: N/A     Number of children: 1     Years of education: BA     Occupational History     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 No     Blood Transfusions No     Caffeine Concern No     Occupational Exposure No     Sleep Concern No     Stress Concern Yes     life/work -->coping     Weight Concern No     Special Diet No     Back Care Yes     chiropracter for old injury     Exercise No     Bike Helmet Not Asked     not biker     Seat Belt No     Social History Narrative    Social Documentation:        Balanced Diet: YES     "Calcium intake: less than 2 per day    Caffeine: 0-1 per day    Exercise:  type of activity walk; daily times per week    Sunscreen: Yes    Seatbelts:  Yes    Self Breast Exam:  Yes    Self Testicular Exam: No - n/a    Physical/Emotional/Sexual Abuse: No     Do you feel safe in your environment? Yes        Cholesterol screen up to date: Yes-3 yrs ago per pt.  Not fasting today.     Eye Exam up to date: Yes    Dental Exam up to date: No - 9 months ago.     Pap smear up to date: Yes    Mammogram up to date: Does Not Apply    Dexa Scan up to date: Does Not Apply    Colonoscopy up to date: Does Not Apply    Immunizations up to date: Yes-Td 12/06    Glucose screen if over 40:  No - n/a       ROS  [unfilled]  PHQ-9 SCORE 1/15/2016 9/27/2017   Total Score 2 0     FAYE-7 SCORE 9/27/2017   Total Score 0         EXAM:  Blood pressure 96/65, pulse 73, height 1.626 m (5' 4\"), weight 62.9 kg (138 lb 9.6 oz), not currently breastfeeding. Body mass index is 23.79 kg/(m^2).  General - pleasant female in no acute distress.  Skin - a few seborrheic keratosis on her back, a couple moles under her right breast, no suspicious lesions or rashes   EENT-  PERRLA, euthyroid with out palpable nodules  Neck - supple without lymphadenopathy.  Lungs - clear to auscultation bilaterally.  Heart - regular rate and rhythm without murmur.  Abdomen - soft, nontender, nondistended, no masses or organomegaly noted.  Musculoskeletal - no gross deformities.  Neurological - normal strength, sensation, and mental status.    Breast Exam:  Breast: Without visible skin changes. No dimpling or lesions seen.   Breasts supple, non-tender with palpation, no dominant mass, nodularity, or nipple discharge noted bilaterally. There is some fibrocystic changes in the left breast. Axillary nodes negative.      Pelvic Exam:  EG/BUS: Normal genital architecture without lesions, erythema or abnormal secretions. Normal genital architecture without lesions, erythema or abnormal " secretions Bartholin's, Urethra, [place]'s normal   Urethral meatus: normal   Urethra: no masses, tenderness, or scarring   Vagina: moist, pink, rugae with creamy, white and odorless  secretions  Cervix: no lesions  Uterus: deferred  Adnexa: Within normal limits, No masses, nodularity, tenderness and Surgically absent  Rectum:anus normal       ASSESSMENT & PLAN: :  Yamila Myles is an 37 year old  BRCA1 carrier s/p BSO currently on ERT and Mirena who requests an Annual Preventive Exam.  Her pap smear last year showed ASC-US with neg HPV tests.     - Pap smear with HPV today, follow up results  - Continue to take estradiol 2 mg daily  - Replacement of Mirena at next year's annul exam. Pt is allergic to ibuprofen and requests extra-strength tylenol for replacement for pain control.       Additional teaching done at this visit regarding exercise and weight/diet.    Return to clinic in one year.  Follow-up as needed.    I, Emmy Jain, MS3, am serving as a scribe on 17 to document services personally performed by Dr. Ricks, based on data collection and the provider's statements to me.     The student acted as my scribe. I have seen, examined and counseled the patient. I have reviewed and edited the note.   Fidelia Ricks

## 2017-09-28 ASSESSMENT — ANXIETY QUESTIONNAIRES: GAD7 TOTAL SCORE: 0

## 2017-09-29 LAB
COPATH REPORT: NORMAL
PAP: NORMAL

## 2017-10-03 LAB
FINAL DIAGNOSIS: NORMAL
HPV HR 12 DNA CVX QL NAA+PROBE: NEGATIVE
HPV16 DNA SPEC QL NAA+PROBE: NEGATIVE
HPV18 DNA SPEC QL NAA+PROBE: NEGATIVE
SPECIMEN DESCRIPTION: NORMAL

## 2017-10-17 ENCOUNTER — RADIANT APPOINTMENT (OUTPATIENT)
Dept: MRI IMAGING | Facility: CLINIC | Age: 37
End: 2017-10-17
Attending: INTERNAL MEDICINE

## 2017-10-17 DIAGNOSIS — R92.8 ABNORMAL MRI, BREAST: ICD-10-CM

## 2017-10-17 RX ORDER — GADOBUTROL 604.72 MG/ML
7.5 INJECTION INTRAVENOUS ONCE
Status: COMPLETED | OUTPATIENT
Start: 2017-10-17 | End: 2017-10-17

## 2017-10-17 RX ADMIN — GADOBUTROL 7.5 ML: 604.72 INJECTION INTRAVENOUS at 09:21

## 2017-10-17 RX ADMIN — Medication 1 MEQ: at 09:00

## 2017-10-17 NOTE — DISCHARGE INSTRUCTIONS
MRI Contrast Discharge Instructions    The IV contrast you received today will pass out of your body in your  urine. This will happen in the next 24 hours. You will not feel this process.  Your urine will not change color.    Drink at least 4 extra glasses of water or juice today (unless your doctor  has restricted your fluids). This reduces the stress on your kidneys.  You may take your regular medicines.    If you are on dialysis: It is best to have dialysis today.    If you have a reaction: Most reactions happen right away. If you have  any new symptoms after leaving the hospital (such as hives or swelling),  call your hospital at the correct number below. Or call your family doctor.  If you have breathing distress or wheezing, call 911.    Special instructions: ***    I have read and understand the above information.    Signature:______________________________________ Date:___________    Staff:__________________________________________ Date:___________     Time:__________    [facility] Departments:    ___[place]: [phone]  ___[facility]: [phone]  ___[place]: [phone] ___[facility]: [phone]  ___[facility]: [phone]  ___[facility]: [phone]  ___[phone]  ___[facility]: [phone]  ___[phone]

## 2017-10-18 LAB — COPATH REPORT: NORMAL

## 2017-10-19 ENCOUNTER — TELEPHONE (OUTPATIENT)
Dept: MAMMOGRAPHY | Facility: CLINIC | Age: 37
End: 2017-10-19

## 2017-10-19 NOTE — TELEPHONE ENCOUNTER
Spoke to Yamila regarding her benign breast biopsy results and the Radiologist's recommendation of 6 month follow-up with a breast MRI. She verbalized that insurance will cover that. MRI order was placed and sent to MD to sign. Writer also provided the contact information for scheduling the MRI. Yamila verbalized understanding and all questions were addressed.

## 2018-04-26 ENCOUNTER — HOSPITAL ENCOUNTER (OUTPATIENT)
Dept: MRI IMAGING | Facility: CLINIC | Age: 38
Discharge: HOME | End: 2018-04-26
Attending: INTERNAL MEDICINE | Admitting: INTERNAL MEDICINE
Payer: COMMERCIAL

## 2018-04-26 ENCOUNTER — OFFICE VISIT (OUTPATIENT)
Dept: OBGYN | Facility: CLINIC | Age: 38
End: 2018-04-26
Attending: OBSTETRICS & GYNECOLOGY
Payer: COMMERCIAL

## 2018-04-26 VITALS
HEART RATE: 73 BPM | DIASTOLIC BLOOD PRESSURE: 68 MMHG | SYSTOLIC BLOOD PRESSURE: 101 MMHG | HEIGHT: 64 IN | BODY MASS INDEX: 23.05 KG/M2 | WEIGHT: 135 LBS

## 2018-04-26 DIAGNOSIS — N89.8 VAGINAL DRYNESS: Primary | ICD-10-CM

## 2018-04-26 DIAGNOSIS — Z90.722 S/P BSO (BILATERAL SALPINGO-OOPHORECTOMY): ICD-10-CM

## 2018-04-26 DIAGNOSIS — G89.29 CHRONIC BILATERAL LOW BACK PAIN WITHOUT SCIATICA: ICD-10-CM

## 2018-04-26 DIAGNOSIS — Z09 FOLLOW-UP EXAM, 3-6 MONTHS SINCE PREVIOUS EXAM: ICD-10-CM

## 2018-04-26 DIAGNOSIS — M54.50 CHRONIC BILATERAL LOW BACK PAIN WITHOUT SCIATICA: ICD-10-CM

## 2018-04-26 PROBLEM — Z97.5 FAMILY PLANNING, IUD (INTRAUTERINE DEVICE) IN PLACE: Status: ACTIVE | Noted: 2018-04-26

## 2018-04-26 PROCEDURE — 58301 REMOVE INTRAUTERINE DEVICE: CPT | Mod: ZF | Performed by: OBSTETRICS & GYNECOLOGY

## 2018-04-26 PROCEDURE — 25000125 ZZHC RX 250: Mod: ZF

## 2018-04-26 PROCEDURE — A9585 GADOBUTROL INJECTION: HCPCS | Performed by: INTERNAL MEDICINE

## 2018-04-26 PROCEDURE — 0159T MR BREAST BILATERAL W/O & W CONTRAST: CPT

## 2018-04-26 PROCEDURE — 40000809 ZZH STATISTIC NO DOCUMENTATION TO SUPPORT CHARGE

## 2018-04-26 PROCEDURE — G0463 HOSPITAL OUTPT CLINIC VISIT: HCPCS | Mod: 25,ZF

## 2018-04-26 PROCEDURE — 25000128 H RX IP 250 OP 636: Performed by: INTERNAL MEDICINE

## 2018-04-26 PROCEDURE — 58300 INSERT INTRAUTERINE DEVICE: CPT

## 2018-04-26 RX ORDER — GADOBUTROL 604.72 MG/ML
7.5 INJECTION INTRAVENOUS ONCE
Status: COMPLETED | OUTPATIENT
Start: 2018-04-26 | End: 2018-04-26

## 2018-04-26 RX ORDER — ESTRADIOL 0.1 MG/G
2 CREAM VAGINAL WEEKLY
Qty: 60 G | Refills: 1 | Status: SHIPPED | OUTPATIENT
Start: 2018-04-26 | End: 2018-06-26

## 2018-04-26 RX ORDER — ESTRADIOL 2 MG/1
2 TABLET ORAL DAILY
Qty: 90 TABLET | Refills: 3 | Status: SHIPPED | OUTPATIENT
Start: 2018-04-26 | End: 2019-05-06

## 2018-04-26 RX ADMIN — GADOBUTROL 7 ML: 604.72 INJECTION INTRAVENOUS at 13:48

## 2018-04-26 ASSESSMENT — PAIN SCALES - GENERAL: PAINLEVEL: NO PAIN (0)

## 2018-04-26 NOTE — PROGRESS NOTES
IUD REMOVAL AND INSERTION PROCEDURE    Yamila Myles is a 37 year old female  who presents for Mirena IUD removal and insertion.  Indication for IUD insertion is endometrial protection in the setting of oral estrogen supplementation for surgical menopause.  The patient is a BRCA carrier and underwent a laparoscopic BSO in .  No LMP recorded. Patient is not currently having periods (Reason: IUD).   The patient is currently using Mirena IUD for contraception.  Reported some vaginal dryness with intercourse, otherwise no hot flushes, night sweats, fatigue or other systemic concerns.     Lab Results   Component Value Date    PAP NIL 2017     A complete discussion of the risks and benefits of IUD use and the details of the insertion procedure was held with the patient.  All questions were answered.  A consent form was signed.  Prior to the beginning of the procedure the team paused to verify the patient's identity, as well as the procedure to be performed and the site.  All equipment required was ready and available. The patient was positioned appropriately.     IUD Lot # [id number]; Exp: 10/2020    The patient was placed in low lithotomy.  A speculum was placed and the cervix swabbed with Betadine.  Her IUD strings were visualized and grasped with a ring forceps.  The IUD was removed without difficulty.  The fundus sounded to 7 cm. The Mirena IUD was placed to the uterine fundus without difficulty.  The strings were cut to 3 cms.  The tenaculum was removed and hemostasis was ensured.  The speculum was removed.  The patient tolerated the procedure well.    PLAN:   The patient was asked to contact the clinic for any fever/chills/severe pelvic or abdominal pain or heavy bleeding. She was instructed in how to palpate her IUD strings.    Will try vaginal estrogen for her symptoms.     FOLLOW-UP:  She was asked to follow up prn, and for her routine annual screening.     Procedure supervised by   Rambo Day  OBGYN Resident PGY1  04/26/18 3:22 PM    I was present for and supervised the entire procedure.   Fidelia Ricks

## 2018-04-26 NOTE — NURSING NOTE
Chief Complaint   Patient presents with     Minor Procedure     IUD removal/insert   Zita Humphries LPN

## 2018-04-26 NOTE — MR AVS SNAPSHOT
After Visit Summary   4/26/2018    Yamila Myles    MRN: [id number]           Patient Information     Date Of Birth          1980        Visit Information        Provider Department      4/26/2018 2:45 PM Fidelia Ricks MD [facility]        Today's Diagnoses     Vaginal dryness    -  1    Chronic bilateral low back pain without sciatica           Follow-ups after your visit        Additional Services     MASSAGE THERAPY REFERRAL       Please be aware that coverage of these services is subject to the terms and limitations of your health insurance plan.  Call member services at your health plan with any benefit or coverage questions.      Please bring the following to your appointment:    >>   Any x-rays, CTs or MRIs which have been performed.  Contact the facility where they were done to arrange for  prior to your scheduled appointment.    >>   List of current medications   >>   This referral request   >>   Any documents/labs given to you for this referral                  Who to contact     Please call your clinic at [phone] to:    Ask questions about your health    Make or cancel appointments    Discuss your medicines    Learn about your test results    Speak to your doctor            Additional Information About Your Visit        [employer] Information     [email or web address] gives you secure access to your electronic health record. If you see a primary care provider, you can also send messages to your care team and make appointments. If you have questions, please call your primary care clinic.  If you do not have a primary care provider, please call [phone] and they will assist you.      [email or web address] is an electronic gateway that provides easy, online access to your medical records. With [email or web address], you can request a clinic appointment, read your test results, renew a prescription or communicate with your care team.     To access your existing account, please contact  "your [facility] or call [phone] for assistance.        Care EveryWhere ID     This is your Care EveryWhere ID. This could be used by other organizations to access your [place] medical records  [id number]        Your Vitals Were     Pulse Height Breastfeeding? BMI (Body Mass Index)          73 1.626 m (5' 4\") No 23.17 kg/m2         Blood Pressure from Last 3 Encounters:   04/26/18 101/68   09/27/17 96/65   03/27/17 109/67    Weight from Last 3 Encounters:   04/26/18 61.2 kg (135 lb)   09/27/17 62.9 kg (138 lb 9.6 oz)   03/27/17 61.7 kg (136 lb)              We Performed the Following     MASSAGE THERAPY REFERRAL          Today's Medication Changes          These changes are accurate as of 4/26/18  3:32 PM.  If you have any questions, ask your nurse or doctor.               Start taking these medicines.        Dose/Directions    estradiol 0.1 MG/GM cream   Commonly known as:  ESTRACE VAGINAL   Used for:  Vaginal dryness   Started by:  Fidelia Ricks MD        Dose:  2 g   Place 2 g vaginally once a week   Quantity:  60 g   Refills:  1            Where to get your medicines      These medications were sent to [facility] [address] [place] [address] [address] [place] [address] AT [facility] [address]  [address] [place] [address]     Phone:  [phone]     estradiol 0.1 MG/GM cream                Primary Care Provider Office Phone # Fax #    Antonina Summer Martinez [phone]       [facility] [address] [address]        Equal Access to Services     FABIAN DAUGHERTY AH: Hadlouie Lawton, jovani cardenas, gold krause. So [facility] [phone].    ATENCIÓN: Si habla español, tiene a pak disposición servicios gratuitos de asistencia lingüística. Llame al [phone].    We comply with applicable federal civil rights laws and [place] laws. We do not " discriminate on the basis of race, color, national origin, age, disability, sex, sexual orientation, or gender identity.            Thank you!     Thank you for choosing [facility]  for your care. Our goal is always to provide you with excellent care. Hearing back from our patients is one way we can continue to improve our services. Please take a few minutes to complete the written survey that you may receive in the mail after your visit with us. Thank you!             Your Updated Medication List - Protect others around you: Learn how to safely use, store and throw away your medicines at www.disposemymeds.org.          This list is accurate as of 4/26/18  3:32 PM.  Always use your most recent med list.                   Brand Name Dispense Instructions for use Diagnosis    albuterol 108 (90 Base) MCG/ACT Inhaler    PROAIR HFA/PROVENTIL HFA/VENTOLIN HFA    3 Inhaler    Inhale 2 puffs into the lungs every 6 hours as needed for shortness of breath / dyspnea or wheezing    Chronic coughing, Uncomplicated asthma, unspecified asthma severity       estradiol 0.1 MG/GM cream    ESTRACE VAGINAL    60 g    Place 2 g vaginally once a week    Vaginal dryness       estradiol 2 MG tablet    ESTRACE    90 tablet    Take 1 tablet (2 mg) by mouth daily    S/P BSO (bilateral salpingo-oophorectomy)       fluticasone 50 MCG/ACT spray    FLONASE    16 g    Spray 2 sprays into both nostrils daily    Allergic rhinitis due to pollen       loratadine 10 MG tablet    CLARITIN     Take 10 mg by mouth daily        MIRENA (52 MG) 20 MCG/24HR IUD   Generic drug:  levonorgestrel      1 each by Intrauterine route once. Lot # [id number]  To be removed by 6/12/2017        montelukast 10 MG tablet    SINGULAIR    30 tablet    Take 1 tablet (10 mg) by mouth every evening    Seasonal allergic rhinitis due to pollen

## 2018-04-26 NOTE — LETTER
2018       RE: Yamila Myles  [address] [facility] [address]     Dear Colleague,    Thank you for referring your patient, Yamila Myles, to the [facility] at [facility]. Please see a copy of my visit note below.    IUD REMOVAL AND INSERTION PROCEDURE    Yamila Myles is a 37 year old female  who presents for Mirena IUD removal and insertion.  Indication for IUD insertion is endometrial protection in the setting of oral estrogen supplementation for surgical menopause.  The patient is a BRCA carrier and underwent a laparoscopic BSO in .  No LMP recorded. Patient is not currently having periods (Reason: IUD).   The patient is currently using Mirena IUD for contraception.  Reported some vaginal dryness with intercourse, otherwise no hot flushes, night sweats, fatigue or other systemic concerns.     Lab Results   Component Value Date    PAP NIL 2017     A complete discussion of the risks and benefits of IUD use and the details of the insertion procedure was held with the patient.  All questions were answered.  A consent form was signed.  Prior to the beginning of the procedure the team paused to verify the patient's identity, as well as the procedure to be performed and the site.  All equipment required was ready and available. The patient was positioned appropriately.     IUD Lot # [id number]; Exp: 10/2020    The patient was placed in low lithotomy.  A speculum was placed and the cervix swabbed with Betadine.  Her IUD strings were visualized and grasped with a ring forceps.  The IUD was removed without difficulty.  The fundus sounded to 7 cm. The Mirena IUD was placed to the uterine fundus without difficulty.  The strings were cut to 3 cms.  The tenaculum was removed and hemostasis was ensured.  The speculum was removed.  The patient tolerated the procedure well.    PLAN:   The patient was asked to contact the  clinic for any fever/chills/severe pelvic or abdominal pain or heavy bleeding. She was instructed in how to palpate her IUD strings.    Will try vaginal estrogen for her symptoms.     FOLLOW-UP:  She was asked to follow up prn, and for her routine annual screening.     Procedure supervised by Dr. Rambo Day  OBGYN Resident PGY1  04/26/18 3:22 PM    I was present for and supervised the entire procedure.       Again, thank you for allowing me to participate in the care of your patient.      Sincerely,    Fidelia Ricks MD

## 2018-06-26 DIAGNOSIS — N89.8 VAGINAL DRYNESS: ICD-10-CM

## 2018-06-26 RX ORDER — ESTRADIOL 0.1 MG/G
2 CREAM VAGINAL WEEKLY
Qty: 60 G | Refills: 1 | Status: SHIPPED | OUTPATIENT
Start: 2018-06-26 | End: 2019-05-01 | Stop reason: ALTCHOICE

## 2018-08-29 ENCOUNTER — MYC MEDICAL ADVICE (OUTPATIENT)
Dept: INTERNAL MEDICINE | Facility: CLINIC | Age: 38
End: 2018-08-29

## 2018-08-29 DIAGNOSIS — Z84.81 FAMILY HISTORY OF CARRIER OF GENETIC DISEASE: Primary | ICD-10-CM

## 2018-08-31 NOTE — TELEPHONE ENCOUNTER
Recommend annual breast MRI alternating with annual mammography. The interval between the studies should be 6 months.   Antonina Martinez MD

## 2018-10-28 NOTE — TELEPHONE ENCOUNTER
Received refill request for estrace cream.  Last in clinic 4/2018 and this started along with po estrogen, and IUD placed d/t surgical menopause. Refill sent  
yes

## 2018-11-30 ENCOUNTER — RADIANT APPOINTMENT (OUTPATIENT)
Dept: MAMMOGRAPHY | Facility: CLINIC | Age: 38
End: 2018-11-30
Attending: INTERNAL MEDICINE
Payer: COMMERCIAL

## 2018-11-30 DIAGNOSIS — Z84.81 FAMILY HISTORY OF CARRIER OF GENETIC DISEASE: ICD-10-CM

## 2018-11-30 DIAGNOSIS — Z12.31 VISIT FOR SCREENING MAMMOGRAM: ICD-10-CM

## 2019-02-18 ENCOUNTER — OFFICE VISIT (OUTPATIENT)
Dept: INTERNAL MEDICINE | Facility: CLINIC | Age: 39
End: 2019-02-18
Attending: INTERNAL MEDICINE
Payer: COMMERCIAL

## 2019-02-18 VITALS
HEART RATE: 66 BPM | HEIGHT: 64 IN | BODY MASS INDEX: 24.07 KG/M2 | DIASTOLIC BLOOD PRESSURE: 70 MMHG | WEIGHT: 141 LBS | SYSTOLIC BLOOD PRESSURE: 107 MMHG

## 2019-02-18 DIAGNOSIS — Z15.01 BRCA1 GENE MUTATION POSITIVE IN FEMALE: Primary | ICD-10-CM

## 2019-02-18 DIAGNOSIS — Z15.09 BRCA1 GENE MUTATION POSITIVE IN FEMALE: Primary | ICD-10-CM

## 2019-02-18 DIAGNOSIS — Z15.02 BRCA1 GENE MUTATION POSITIVE IN FEMALE: Primary | ICD-10-CM

## 2019-02-18 DIAGNOSIS — M54.6 CHRONIC BILATERAL THORACIC BACK PAIN: ICD-10-CM

## 2019-02-18 DIAGNOSIS — G89.29 CHRONIC BILATERAL THORACIC BACK PAIN: ICD-10-CM

## 2019-02-18 DIAGNOSIS — Z00.00 PREVENTATIVE HEALTH CARE: ICD-10-CM

## 2019-02-18 LAB
ANION GAP SERPL CALCULATED.3IONS-SCNC: 4 MMOL/L (ref 3–14)
BUN SERPL-MCNC: 15 MG/DL (ref 7–30)
CALCIUM SERPL-MCNC: 8.7 MG/DL (ref 8.5–10.1)
CHLORIDE SERPL-SCNC: 111 MMOL/L (ref 94–109)
CO2 SERPL-SCNC: 26 MMOL/L (ref 20–32)
CREAT SERPL-MCNC: 0.82 MG/DL (ref 0.52–1.04)
GFR SERPL CREATININE-BSD FRML MDRD: 90 ML/MIN/{1.73_M2}
GLUCOSE SERPL-MCNC: 85 MG/DL (ref 70–99)
POTASSIUM SERPL-SCNC: 4.5 MMOL/L (ref 3.4–5.3)
SODIUM SERPL-SCNC: 141 MMOL/L (ref 133–144)

## 2019-02-18 PROCEDURE — 36415 COLL VENOUS BLD VENIPUNCTURE: CPT | Performed by: INTERNAL MEDICINE

## 2019-02-18 PROCEDURE — 80048 BASIC METABOLIC PNL TOTAL CA: CPT | Performed by: INTERNAL MEDICINE

## 2019-02-18 ASSESSMENT — ANXIETY QUESTIONNAIRES
3. WORRYING TOO MUCH ABOUT DIFFERENT THINGS: NOT AT ALL
5. BEING SO RESTLESS THAT IT IS HARD TO SIT STILL: NOT AT ALL
7. FEELING AFRAID AS IF SOMETHING AWFUL MIGHT HAPPEN: NOT AT ALL
GAD7 TOTAL SCORE: 0
6. BECOMING EASILY ANNOYED OR IRRITABLE: NOT AT ALL
2. NOT BEING ABLE TO STOP OR CONTROL WORRYING: NOT AT ALL
1. FEELING NERVOUS, ANXIOUS, OR ON EDGE: NOT AT ALL

## 2019-02-18 ASSESSMENT — PATIENT HEALTH QUESTIONNAIRE - PHQ9
5. POOR APPETITE OR OVEREATING: NOT AT ALL
SUM OF ALL RESPONSES TO PHQ QUESTIONS 1-9: 0

## 2019-02-18 ASSESSMENT — MIFFLIN-ST. JEOR: SCORE: 1299.57

## 2019-02-18 NOTE — LETTER
RE: Yamila Myles  [address] [facility] [address]     Dear Colleague,    Thank you for referring your patient, Yamila Myles, to the [facility]  at [facility]. Please see a copy of my visit note below.     SUBJECTIVE:   CC: Yamila Myles is an 38 year old woman who presents for preventive health visit.     Healthy Habits:    Do you get at least three servings of calcium containing foods daily (dairy, green leafy vegetables, etc.)? yes    Amount of exercise or daily activities, outside of work: 15 minutes daily, elliptical     Problems taking medications regularly No    Medication side effects: No    Have you had an eye exam in the past two years? yes    Do you see a dentist twice per year? yes    Do you have sleep apnea, excessive snoring or daytime drowsiness?no      -------------------------------------    Today's PHQ-2 Score:   PHQ-2 ( 1999 Pfizer) 1/1/2016   Q1: Little interest or pleasure in doing things Not at all   Q2: Feeling down, depressed or hopeless Not at all   PHQ-2 Score 0       Abuse: Current or Past(Physical, Sexual or Emotional)- No  Do you feel safe in your environment? Yes    Social History     Tobacco Use     Smoking status: Never Smoker     Smokeless tobacco: Never Used   Substance Use Topics     Alcohol use: Yes     Alcohol/week: 0.0 oz     Comment: 2 glasses/wk - none during pregnancy     If you drink alcohol do you typically have >3 drinks per day or >7 drinks per week? No                     Reviewed orders with patient.  Reviewed health maintenance and updated orders accordingly - Yes  Labs reviewed in EPIC    Alternate mammogram schedule due to BRCA 1 positive test    Pertinent mammograms are reviewed under the imaging tab.  History of abnormal Pap smear: NO - age 30-65 PAP every 5 years with negative HPV co-testing recommended  PAP / HPV Latest Ref Rng & Units 9/27/2017 1/15/2016 6/12/2012   PAP -  "NIL ASC-US(A) NIL   HPV 16 DNA NEG:Negative Negative Negative -   HPV 18 DNA NEG:Negative Negative Negative -   OTHER HR HPV NEG:Negative Negative Negative -     Reviewed and updated as needed this visit by clinical staff  Tobacco  Allergies  Meds       Reviewed and updated as needed this visit by Provider        Past Medical History:   Diagnosis Date     Encounter for insertion of mirena IUD 07/12/16     Hoarseness      Lump or mass in breast 2001    BRCA-1 positive (Mother, MGM w/ breast ca, mother w/ gene +), mother dx at 32)     Microcalcifications of the breast, right uoq 6/8/2011      Past Surgical History:   Procedure Laterality Date     C APPENDECTOMY  1987     INSERT INTRAUTERINE DEVICE N/A 7/12/2016    Procedure: INSERT INTRAUTERINE DEVICE;  Surgeon: Fidelia Ricks MD;  [facility]     LAPAROSCOPIC SALPINGO-OOPHORECTOMY Bilateral 7/12/2016    Procedure: LAPAROSCOPIC SALPINGO-OOPHORECTOMY;  Surgeon: Fidelia Ricks MD;  [facility]     LAPAROSCOPY OPERATIVE ADULT N/A 7/12/2016    Procedure: LAPAROSCOPY OPERATIVE ADULT;  Surgeon: Fidelia Ricks MD;  [facility]     OBJECTIVE:   /70   Pulse 66   Ht 1.626 m (5' 4\")   Wt 64 kg (141 lb)   Breastfeeding? No   BMI 24.20 kg/m     EXAM:  GENERAL: healthy, alert and no distress  EYES: Eyes grossly normal to inspection, PERRL and conjunctivae and sclerae normal  HENT: ear canals and TM's normal, nose and mouth without ulcers or lesions  NECK: no adenopathy, no asymmetry, masses, or scars and thyroid normal to palpation  RESP: lungs clear to auscultation - no rales, rhonchi or wheezes  CV: regular rate and rhythm, normal S1 S2, no S3 or S4, no murmur, click or rub, no peripheral edema and peripheral pulses strong  ABDOMEN: soft, nontender, no hepatosplenomegaly, no masses and bowel sounds normal  MS: no gross musculoskeletal defects noted, no edema  SKIN: no suspicious lesions or rashes  NEURO: Normal strength and tone, " "mentation intact and speech normal  PSYCH: mentation appears normal, affect normal/bright    Diagnostic Test Results:  none     ASSESSMENT/PLAN:   1. BRCA1 gene mutation positive in female  Patient was advised to schedule MRI of the breast for high-risk breast cancer screening. She had bilateral oophorectomy.   - MRI Breast bilateral w/contrast; Future    2. Chronic bilateral thoracic back pain  Patient has requested referral to massage therapy which was placed today.   - MASSAGE THERAPY REFERRAL    3. Preventative health care  Patient is up to date on vaccinations. Recommend checking chemistry panel today. PAP smear and mammogram are up to date.   - Basic Metabolic Panel; Future    COUNSELING:   Reviewed preventive health counseling, as reflected in patient instructions       Regular exercise       Healthy diet/nutrition       Vision screening    BP Readings from Last 1 Encounters:   02/18/19 107/70     Estimated body mass index is 24.2 kg/m  as calculated from the following:    Height as of this encounter: 1.626 m (5' 4\").    Weight as of this encounter: 64 kg (141 lb).     reports that  has never smoked. she has never used smokeless tobacco.      Counseling Resources:  ATP IV Guidelines  Pooled Cohorts Equation Calculator  Breast Cancer Risk Calculator  FRAX Risk Assessment  ICSI Preventive Guidelines  Dietary Guidelines for Americans, 2010  [employer]'s MyPlate  ASA Prophylaxis  Lung CA Screening    Antonina Martinez MD  [facility]   "

## 2019-02-18 NOTE — PROGRESS NOTES
SUBJECTIVE:   CC: Yamila Myles is an 38 year old woman who presents for preventive health visit.     Healthy Habits:    Do you get at least three servings of calcium containing foods daily (dairy, green leafy vegetables, etc.)? yes    Amount of exercise or daily activities, outside of work: 15 minutes daily, elliptical     Problems taking medications regularly No    Medication side effects: No    Have you had an eye exam in the past two years? yes    Do you see a dentist twice per year? yes    Do you have sleep apnea, excessive snoring or daytime drowsiness?no      -------------------------------------    Today's PHQ-2 Score:   PHQ-2 ( 1999 Pfizer) 1/1/2016   Q1: Little interest or pleasure in doing things Not at all   Q2: Feeling down, depressed or hopeless Not at all   PHQ-2 Score 0       Abuse: Current or Past(Physical, Sexual or Emotional)- No  Do you feel safe in your environment? Yes    Social History     Tobacco Use     Smoking status: Never Smoker     Smokeless tobacco: Never Used   Substance Use Topics     Alcohol use: Yes     Alcohol/week: 0.0 oz     Comment: 2 glasses/wk - none during pregnancy     If you drink alcohol do you typically have >3 drinks per day or >7 drinks per week? No                     Reviewed orders with patient.  Reviewed health maintenance and updated orders accordingly - Yes  Labs reviewed in EPIC    Alternate mammogram schedule due to BRCA 1 positive test    Pertinent mammograms are reviewed under the imaging tab.  History of abnormal Pap smear: NO - age 30-65 PAP every 5 years with negative HPV co-testing recommended  PAP / HPV Latest Ref Rng & Units 9/27/2017 1/15/2016 6/12/2012   PAP - NIL ASC-US(A) NIL   HPV 16 DNA NEG:Negative Negative Negative -   HPV 18 DNA NEG:Negative Negative Negative -   OTHER HR HPV NEG:Negative Negative Negative -     Reviewed and updated as needed this visit by clinical staff  Tobacco  Allergies  Meds         Reviewed and updated as needed  "this visit by Provider        Past Medical History:   Diagnosis Date     Encounter for insertion of mirena IUD 07/12/16     Hoarseness      Lump or mass in breast 2001    BRCA-1 positive (Mother, MGM w/ breast ca, mother w/ gene +), mother dx at 32)     Microcalcifications of the breast, right uoq 6/8/2011      Past Surgical History:   Procedure Laterality Date     C APPENDECTOMY  1987     INSERT INTRAUTERINE DEVICE N/A 7/12/2016    Procedure: INSERT INTRAUTERINE DEVICE;  Surgeon: Fidelia Ricks MD;  [facility]     LAPAROSCOPIC SALPINGO-OOPHORECTOMY Bilateral 7/12/2016    Procedure: LAPAROSCOPIC SALPINGO-OOPHORECTOMY;  Surgeon: Fidelia Ricks MD;  [facility]     LAPAROSCOPY OPERATIVE ADULT N/A 7/12/2016    Procedure: LAPAROSCOPY OPERATIVE ADULT;  Surgeon: Fidelia Ricks MD;  [facility]       ROS:  CONSTITUTIONAL: NEGATIVE for fever, chills, change in weight  INTEGUMENTARU/SKIN: NEGATIVE for worrisome rashes, moles or lesions  EYES: NEGATIVE for vision changes or irritation  ENT: NEGATIVE for ear, mouth and throat problems  RESP: NEGATIVE for significant cough or SOB  BREAST: NEGATIVE for masses, tenderness or discharge  CV: NEGATIVE for chest pain, palpitations or peripheral edema  GI: NEGATIVE for nausea, abdominal pain, heartburn, or change in bowel habits   female: no unusual urinary symptoms and no unusual vaginal symptoms  MUSCULOSKELETAL: NEGATIVE for significant arthralgias or myalgia  NEURO: NEGATIVE for weakness, dizziness or paresthesias  PSYCHIATRIC: NEGATIVE for changes in mood or affect    OBJECTIVE:   /70   Pulse 66   Ht 1.626 m (5' 4\")   Wt 64 kg (141 lb)   Breastfeeding? No   BMI 24.20 kg/m    EXAM:  GENERAL: healthy, alert and no distress  EYES: Eyes grossly normal to inspection, PERRL and conjunctivae and sclerae normal  HENT: ear canals and TM's normal, nose and mouth without ulcers or lesions  NECK: no adenopathy, no asymmetry, masses, or scars and " "thyroid normal to palpation  RESP: lungs clear to auscultation - no rales, rhonchi or wheezes  CV: regular rate and rhythm, normal S1 S2, no S3 or S4, no murmur, click or rub, no peripheral edema and peripheral pulses strong  ABDOMEN: soft, nontender, no hepatosplenomegaly, no masses and bowel sounds normal  MS: no gross musculoskeletal defects noted, no edema  SKIN: no suspicious lesions or rashes  NEURO: Normal strength and tone, mentation intact and speech normal  PSYCH: mentation appears normal, affect normal/bright    Diagnostic Test Results:  none     ASSESSMENT/PLAN:   1. BRCA1 gene mutation positive in female  Patient was advised to schedule MRI of the breast for high-risk breast cancer screening. She had bilateral oophorectomy.   - MRI Breast bilateral w/contrast; Future    2. Chronic bilateral thoracic back pain  Patient has requested referral to massage therapy which was placed today.   - MASSAGE THERAPY REFERRAL    3. Preventative health care  Patient is up to date on vaccinations. Recommend checking chemistry panel today. PAP smear and mammogram are up to date.   - Basic Metabolic Panel; Future    COUNSELING:   Reviewed preventive health counseling, as reflected in patient instructions       Regular exercise       Healthy diet/nutrition       Vision screening    BP Readings from Last 1 Encounters:   02/18/19 107/70     Estimated body mass index is 24.2 kg/m  as calculated from the following:    Height as of this encounter: 1.626 m (5' 4\").    Weight as of this encounter: 64 kg (141 lb).           reports that  has never smoked. she has never used smokeless tobacco.      Counseling Resources:  ATP IV Guidelines  Pooled Cohorts Equation Calculator  Breast Cancer Risk Calculator  FRAX Risk Assessment  ICSI Preventive Guidelines  Dietary Guidelines for Americans, 2010  [employer]'s MyPlate  ASA Prophylaxis  Lung CA Screening    Antonina Martinez MD  [facility]   "

## 2019-02-19 ASSESSMENT — ANXIETY QUESTIONNAIRES: GAD7 TOTAL SCORE: 0

## 2019-04-15 ENCOUNTER — MYC REFILL (OUTPATIENT)
Dept: OBGYN | Facility: CLINIC | Age: 39
End: 2019-04-15

## 2019-04-15 DIAGNOSIS — N89.8 VAGINAL DRYNESS: ICD-10-CM

## 2019-04-15 RX ORDER — ESTRADIOL 0.1 MG/G
2 CREAM VAGINAL WEEKLY
Qty: 60 G | Refills: 1 | Status: CANCELLED | OUTPATIENT
Start: 2019-04-15

## 2019-04-22 DIAGNOSIS — N89.8 VAGINAL DRYNESS: ICD-10-CM

## 2019-04-22 NOTE — TELEPHONE ENCOUNTER
Yamila wanting to switch from vaginal cream so pended vaginal tablets to Dr Ricks to fill out dose etc.

## 2019-04-24 RX ORDER — ESTRADIOL 10 UG/1
10 INSERT VAGINAL
Qty: 24 TABLET | Refills: 3 | Status: SHIPPED | OUTPATIENT
Start: 2019-04-25 | End: 2019-07-31

## 2019-05-01 DIAGNOSIS — Z90.722 S/P BSO (BILATERAL SALPINGO-OOPHORECTOMY): ICD-10-CM

## 2019-05-01 RX ORDER — ESTRADIOL 2 MG/1
2 TABLET ORAL DAILY
Qty: 90 TABLET | Refills: 3 | Status: CANCELLED | OUTPATIENT
Start: 2019-05-01

## 2019-05-01 NOTE — TELEPHONE ENCOUNTER
Received refill request for estradiol tablets.  Last in clinic 4/2018 with Dr. Ricks. Pt has IUD in place and also used estradiol vaginal tablets (vagifem).  Of note, pt is BRCA-1 positive.  Please review.    Also left message for Yamila that she is due for annual exam with Dr. Ricks.

## 2019-05-03 ENCOUNTER — HOSPITAL ENCOUNTER (OUTPATIENT)
Dept: MRI IMAGING | Facility: CLINIC | Age: 39
Discharge: HOME | End: 2019-05-03
Attending: INTERNAL MEDICINE | Admitting: INTERNAL MEDICINE
Payer: COMMERCIAL

## 2019-05-03 DIAGNOSIS — Z15.02 BRCA1 GENE MUTATION POSITIVE IN FEMALE: ICD-10-CM

## 2019-05-03 DIAGNOSIS — Z15.01 BRCA1 GENE MUTATION POSITIVE IN FEMALE: ICD-10-CM

## 2019-05-03 DIAGNOSIS — Z15.09 BRCA1 GENE MUTATION POSITIVE IN FEMALE: ICD-10-CM

## 2019-05-03 PROCEDURE — 77049 MRI BREAST C-+ W/CAD BI: CPT

## 2019-05-03 PROCEDURE — 25000128 H RX IP 250 OP 636: Performed by: INTERNAL MEDICINE

## 2019-05-03 PROCEDURE — 25500064 ZZH RX 255 OP 636: Performed by: INTERNAL MEDICINE

## 2019-05-03 PROCEDURE — A9585 GADOBUTROL INJECTION: HCPCS | Performed by: INTERNAL MEDICINE

## 2019-05-03 RX ORDER — GADOBUTROL 604.72 MG/ML
7.5 INJECTION INTRAVENOUS ONCE
Status: COMPLETED | OUTPATIENT
Start: 2019-05-03 | End: 2019-05-03

## 2019-05-03 RX ADMIN — GADOBUTROL 7.5 ML: 604.72 INJECTION INTRAVENOUS at 15:30

## 2019-05-03 RX ADMIN — SODIUM CHLORIDE 100 ML: 9 INJECTION, SOLUTION INTRAVENOUS at 15:30

## 2019-07-28 ASSESSMENT — ANXIETY QUESTIONNAIRES
7. FEELING AFRAID AS IF SOMETHING AWFUL MIGHT HAPPEN: NOT AT ALL
6. BECOMING EASILY ANNOYED OR IRRITABLE: SEVERAL DAYS
3. WORRYING TOO MUCH ABOUT DIFFERENT THINGS: NOT AT ALL
4. TROUBLE RELAXING: NOT AT ALL
GAD7 TOTAL SCORE: 1
5. BEING SO RESTLESS THAT IT IS HARD TO SIT STILL: NOT AT ALL
GAD7 TOTAL SCORE: 1
1. FEELING NERVOUS, ANXIOUS, OR ON EDGE: NOT AT ALL
7. FEELING AFRAID AS IF SOMETHING AWFUL MIGHT HAPPEN: NOT AT ALL
2. NOT BEING ABLE TO STOP OR CONTROL WORRYING: NOT AT ALL

## 2019-07-28 ASSESSMENT — ENCOUNTER SYMPTOMS
NECK PAIN: 1
POOR WOUND HEALING: 0
MUSCLE CRAMPS: 0
NAIL CHANGES: 0
DOUBLE VISION: 0
ARTHRALGIAS: 0
EYE IRRITATION: 1
EYE PAIN: 0
EYE REDNESS: 0
SKIN CHANGES: 0
JOINT SWELLING: 0
BACK PAIN: 1
EYE WATERING: 1
MYALGIAS: 1
STIFFNESS: 0
MUSCLE WEAKNESS: 0

## 2019-07-31 ENCOUNTER — OFFICE VISIT (OUTPATIENT)
Dept: OBGYN | Facility: CLINIC | Age: 39
End: 2019-07-31
Attending: OBSTETRICS & GYNECOLOGY
Payer: COMMERCIAL

## 2019-07-31 VITALS
DIASTOLIC BLOOD PRESSURE: 71 MMHG | HEART RATE: 84 BPM | SYSTOLIC BLOOD PRESSURE: 105 MMHG | HEIGHT: 64 IN | BODY MASS INDEX: 24.77 KG/M2 | WEIGHT: 145.1 LBS

## 2019-07-31 DIAGNOSIS — N89.8 VAGINAL DRYNESS: ICD-10-CM

## 2019-07-31 DIAGNOSIS — Z90.722 S/P BSO (BILATERAL SALPINGO-OOPHORECTOMY): ICD-10-CM

## 2019-07-31 DIAGNOSIS — Z01.419 ENCOUNTER FOR GYNECOLOGICAL EXAMINATION WITHOUT ABNORMAL FINDING: ICD-10-CM

## 2019-07-31 PROCEDURE — G0463 HOSPITAL OUTPT CLINIC VISIT: HCPCS | Mod: ZF

## 2019-07-31 RX ORDER — ESTRADIOL 2 MG/1
2 TABLET ORAL DAILY
Qty: 90 TABLET | Refills: 3 | Status: SHIPPED | OUTPATIENT
Start: 2019-07-31 | End: 2020-07-31

## 2019-07-31 RX ORDER — CETIRIZINE HYDROCHLORIDE 10 MG/1
10 TABLET ORAL EVERY EVENING
COMMUNITY

## 2019-07-31 RX ORDER — ESTRADIOL 10 UG/1
10 INSERT VAGINAL
Qty: 24 TABLET | Refills: 3 | Status: SHIPPED | OUTPATIENT
Start: 2019-08-01 | End: 2020-04-14

## 2019-07-31 ASSESSMENT — ANXIETY QUESTIONNAIRES
GAD7 TOTAL SCORE: 0
2. NOT BEING ABLE TO STOP OR CONTROL WORRYING: NOT AT ALL
1. FEELING NERVOUS, ANXIOUS, OR ON EDGE: NOT AT ALL
3. WORRYING TOO MUCH ABOUT DIFFERENT THINGS: NOT AT ALL
5. BEING SO RESTLESS THAT IT IS HARD TO SIT STILL: NOT AT ALL
7. FEELING AFRAID AS IF SOMETHING AWFUL MIGHT HAPPEN: NOT AT ALL
6. BECOMING EASILY ANNOYED OR IRRITABLE: NOT AT ALL

## 2019-07-31 ASSESSMENT — MIFFLIN-ST. JEOR: SCORE: 1313.17

## 2019-07-31 ASSESSMENT — PATIENT HEALTH QUESTIONNAIRE - PHQ9: 5. POOR APPETITE OR OVEREATING: NOT AT ALL

## 2019-07-31 NOTE — LETTER
2019       RE: Yamila Myles  [address] [facility] [address]     Dear Colleague,    Thank you for referring your patient, Yamila Myles, to the [facility] at [facility]. Please see a copy of my visit note below.    Progress Note    SUBJECTIVE:  Yamila Myles is an 39 year old  , who requests a breast and pelvic exam.  BRCA carrier s/p BSO and has Mirena in situ.   Patient is followed by Michelle for primary care.    Concerns today include: refill ERT.     Menstrual History:  Menstrual History 2007 2011 2014 1/15/2016 2016   LAST MENSTRUAL PERIOD 2007 11/15/2009 2014 2016 2016       Last    Lab Results   Component Value Date    PAP NIL 2017     History of abnormal Pap smear: NO - age 30-65 PAP every 5 years with negative HPV co-testing recommended    Last   Lab Results   Component Value Date    HPV16 Negative 2017     Last   Lab Results   Component Value Date    HPV18 Negative 2017     Last   Lab Results   Component Value Date    HRHPV Negative 2017       Mammogram current: yes    HISTORY:  Prescription Medications as of 2019       Rx Number Disp Refills Start End Last Dispensed Date Next Fill Date Owning Pharmacy    albuterol (PROAIR HFA/PROVENTIL HFA/VENTOLIN HFA) 108 (90 BASE) MCG/ACT Inhaler  3 Inhaler 1 3/3/2017    [facility] #[id number] - [place], [place] - [address] AT [facility] OF  [address]    Sig: Inhale 2 puffs into the lungs every 6 hours as needed for shortness of breath / dyspnea or wheezing    Class: E-Prescribe    Route: Inhalation    cetirizine (ZYRTEC) 10 MG tablet            Sig: Take 10 mg by mouth daily    Class: Historical    Route: Oral    estradiol (ESTRACE) 2 MG tablet  90 tablet 3 2019    [facility] #[id number] - [place], [place] - [address] AT [facility] OF [address]    Sig: Take 1 tablet (2 mg) by  mouth daily    Class: E-Prescribe    Route: Oral    estradiol (VAGIFEM) 10 MCG TABS vaginal tablet  24 tablet 3 2019    [id number] - [place], [place] - [address] AT [address] & [place]    Sig: Place 1 tablet (10 mcg) vaginally twice a week    Class: E-Prescribe    Route: Vaginal    fluticasone (FLONASE) 50 MCG/ACT nasal spray  16 g 3 2016    [id number] - [place], [place] - [address] AT [address] & [place]    Sig: Spray 2 sprays into both nostrils daily    Class: E-Prescribe    Route: Both Nostrils    levonorgestrel (MIRENA) 20 MCG/24HR IUD            Si each by Intrauterine route once. Lot # [id number]  To be removed by 2017    Class: Historical    Route: Intrauterine    loratadine (CLARITIN) 10 MG tablet            Sig: Take 10 mg by mouth daily    Class: Historical    Route: Oral    montelukast (SINGULAIR) 10 MG tablet  30 tablet 0 3/27/2017    [facility][id number] - [place], [place] - [address] AT [address] & [place]    Sig: Take 1 tablet (10 mg) by mouth every evening    Class: E-Prescribe    Route: Oral      Clinic-Administered Medications as of 2019       Dose Frequency Start End    lidocaine 1 % 9 mL 9 mL ONCE 2017     Sig: Inject 9 mLs into the skin once    Route: Intradermal    lidocaine 1 % 9 mL 9 mL ONCE 3/13/2017     Sig: Inject 9 mLs into the skin once    Class: E-Prescribe    Route: Intradermal    sodium bicarbonate 8.4 % injection 1 mEq 1 mEq ONCE 2017     Sig: Inject 1 mL (1 mEq) into the skin once    Route: Intradermal    sodium bicarbonate 8.4 % injection 1 mEq 1 mEq ONCE 3/13/2017     Sig: Inject 1 mL (1 mEq) into the skin once    Class: E-Prescribe    Route: Intradermal        Allergies   Allergen Reactions     Advil [Nsaids] Other (See Comments)     Nasal congestion     Immunization History   Administered Date(s) Administered     Influenza (H1N1) 2009     Influenza (IIV3) PF 10/29/2007,  2010, 2011     TD (ADULT, 7+) 2006     TDAP Vaccine (Adacel) 2012       OB History    Para Term  AB Living   2 2 2 0 0 2   SAB TAB Ectopic Multiple Live Births   0 0 0 0 2   Obstetric Comments   Possible early SAB in      Past Medical History:   Diagnosis Date     BRCA1 positive 2001     Breast disorder 2011     Encounter for insertion of mirena IUD 16     Hoarseness      Lump or mass in breast     BRCA-1 positive (Mother, MGM w/ breast ca, mother w/ gene +), mother dx at 32)     Microcalcifications of the breast, right uoq 2011     Past Surgical History:   Procedure Laterality Date     C APPENDECTOMY       INSERT INTRAUTERINE DEVICE N/A 2016    Procedure: INSERT INTRAUTERINE DEVICE;  Surgeon: Fidelia Ricks MD;  [facility]     LAPAROSCOPIC SALPINGO-OOPHORECTOMY Bilateral 2016    Procedure: LAPAROSCOPIC SALPINGO-OOPHORECTOMY;  Surgeon: Fidelia Ricks MD;  [facility]     LAPAROSCOPY OPERATIVE ADULT N/A 2016    Procedure: LAPAROSCOPY OPERATIVE ADULT;  Surgeon: Fidelia Ricks MD;  [facility]     Family History   Problem Relation Age of Onset     Breast Cancer Mother         dx age 32, doing well     Breast Cancer Maternal Grandmother      Diabetes Paternal Grandmother      Breast Cancer Paternal Grandmother      Neurologic Disorder Sister         epilepsy     Social History     Socioeconomic History     Marital status:      Spouse name: None     Number of children: 1     Years of education: BA     Highest education level: None   Occupational History     Occupation: Organizer     Employer: [employer]      Comment: [employer]   Social Needs     Financial resource strain: None     Food insecurity:     Worry: None     Inability: None     Transportation needs:     Medical: None     Non-medical: None   Tobacco Use     Smoking status: Never Smoker     Smokeless tobacco: Never Used   Substance and  Sexual Activity     Alcohol use: Yes     Alcohol/week: 0.0 oz     Comment: 2 glasses/wk - none during pregnancy     Drug use: No     Sexual activity: Yes     Partners: Male     Birth control/protection: IUD     Comment: Mirena   Lifestyle     Physical activity:     Days per week: None     Minutes per session: None     Stress: None   Relationships     Social connections:     Talks on phone: None     Gets together: None     Attends Advent service: None     Active member of club or organization: None     Attends meetings of clubs or organizations: None     Relationship status: None     Intimate partner violence:     Fear of current or ex partner: None     Emotionally abused: None     Physically abused: None     Forced sexual activity: None   Other Topics Concern     Parent/sibling w/ CABG, MI or angioplasty before 65F 55M? No      Service Not Asked     Blood Transfusions No     Caffeine Concern No     Occupational Exposure No     Hobby Hazards Not Asked     Sleep Concern No     Stress Concern Yes     Comment: life/work -->coping     Weight Concern No     Special Diet No     Back Care Yes     Comment: chiropracter for old injury     Exercise No     Bike Helmet Not Asked     Comment: not biker     Seat Belt No     Self-Exams Not Asked   Social History Narrative    Social Documentation:        Balanced Diet: YES    Calcium intake: less than 2 per day    Caffeine: 0-1 per day    Exercise:  type of activity walk; daily times per week    Sunscreen: Yes    Seatbelts:  Yes    Self Breast Exam:  Yes    Self Testicular Exam: No - n/a    Physical/Emotional/Sexual Abuse: No     Do you feel safe in your environment? Yes        Cholesterol screen up to date: Yes-3 yrs ago per pt.  Not fasting today.     Eye Exam up to date: Yes    Dental Exam up to date: No - 9 months ago.     Pap smear up to date: Yes    Mammogram up to date: Does Not Apply    Dexa Scan up to date: Does Not Apply    Colonoscopy up to date: Does Not Apply  "   Immunizations up to date: Yes-Td 12/06    Glucose screen if over 40:  No - n/a       ROS    EXAM:  Blood pressure 105/71, pulse 84, height 1.626 m (5' 4\"), weight 65.8 kg (145 lb 1.6 oz), not currently breastfeeding. Body mass index is 24.91 kg/m .  General appearance: Pleasant female in no acute distress.     BREAST EXAM:  Breast: Without visible skin changes. No dimpling or lesions seen.   Breasts supple, non-tender with palpation, no dominant mass, nodularity, or nipple discharge noted bilaterally. Axillary nodes negative.      PELVIC EXAM:  EG/BUS: Normal genital architecture without lesions, erythema or abnormal secretions Bartholin's, Urethra, [place]'s normal   Urethral meatus: normal   Urethra: no masses, tenderness, or scarring   Bladder: no masses or tenderness   Vagina: moist, pink, rugae with creamy, white and odorless  secretions  Cervix: Multiparous,, no lesions and pink, moist, closed, without lesion or CMT  Uterus: anteverted,   Adnexa: Within normal limits and No masses, nodularity, tenderness  Rectum:anus normal       ASSESSMENT:  Encounter Diagnoses   Name Primary?     S/P BSO (bilateral salpingo-oophorectomy)      Vaginal dryness       39 year old Female Pelvic and Breast Exam  HRT surveillance    PLAN:   Refill estrace and vaginal E2.   Fidelia Ricks"

## 2019-10-10 ENCOUNTER — TELEPHONE (OUTPATIENT)
Dept: OBGYN | Facility: CLINIC | Age: 39
End: 2019-10-10

## 2019-10-11 ENCOUNTER — OFFICE VISIT (OUTPATIENT)
Dept: INTERNAL MEDICINE | Facility: CLINIC | Age: 39
End: 2019-10-11
Attending: INTERNAL MEDICINE
Payer: COMMERCIAL

## 2019-10-11 VITALS
DIASTOLIC BLOOD PRESSURE: 74 MMHG | HEART RATE: 83 BPM | WEIGHT: 143 LBS | BODY MASS INDEX: 24.41 KG/M2 | SYSTOLIC BLOOD PRESSURE: 109 MMHG | HEIGHT: 64 IN

## 2019-10-11 DIAGNOSIS — G44.219 EPISODIC TENSION-TYPE HEADACHE, NOT INTRACTABLE: Primary | ICD-10-CM

## 2019-10-11 ASSESSMENT — ANXIETY QUESTIONNAIRES
3. WORRYING TOO MUCH ABOUT DIFFERENT THINGS: NOT AT ALL
GAD7 TOTAL SCORE: 0
1. FEELING NERVOUS, ANXIOUS, OR ON EDGE: NOT AT ALL
5. BEING SO RESTLESS THAT IT IS HARD TO SIT STILL: NOT AT ALL
6. BECOMING EASILY ANNOYED OR IRRITABLE: NOT AT ALL
7. FEELING AFRAID AS IF SOMETHING AWFUL MIGHT HAPPEN: NOT AT ALL
2. NOT BEING ABLE TO STOP OR CONTROL WORRYING: NOT AT ALL

## 2019-10-11 ASSESSMENT — PATIENT HEALTH QUESTIONNAIRE - PHQ9
5. POOR APPETITE OR OVEREATING: NOT AT ALL
SUM OF ALL RESPONSES TO PHQ QUESTIONS 1-9: 0

## 2019-10-11 ASSESSMENT — MIFFLIN-ST. JEOR: SCORE: 1303.64
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
## 2019-10-12 ASSESSMENT — ANXIETY QUESTIONNAIRES: GAD7 TOTAL SCORE: 0

## 2019-11-04 ENCOUNTER — HEALTH MAINTENANCE LETTER (OUTPATIENT)
Age: 39
End: 2019-11-04

## 2019-11-26 ENCOUNTER — ANCILLARY PROCEDURE (OUTPATIENT)
Dept: MAMMOGRAPHY | Facility: CLINIC | Age: 39
End: 2019-11-26
Attending: INTERNAL MEDICINE
Payer: COMMERCIAL

## 2019-11-26 DIAGNOSIS — Z12.31 SCREENING MAMMOGRAM, ENCOUNTER FOR: ICD-10-CM

## 2020-04-14 DIAGNOSIS — N89.8 VAGINAL DRYNESS: ICD-10-CM

## 2020-04-14 RX ORDER — ESTRADIOL 10 UG/1
10 INSERT VAGINAL
Qty: 24 TABLET | Refills: 3 | Status: SHIPPED | OUTPATIENT
Start: 2020-04-16 | End: 2022-05-24

## 2020-05-07 ENCOUNTER — MYC REFILL (OUTPATIENT)
Dept: PULMONOLOGY | Facility: CLINIC | Age: 40
End: 2020-05-07

## 2020-05-07 ENCOUNTER — MYC REFILL (OUTPATIENT)
Dept: INTERNAL MEDICINE | Facility: CLINIC | Age: 40
End: 2020-05-07

## 2020-05-07 DIAGNOSIS — J30.1 ALLERGIC RHINITIS DUE TO POLLEN: ICD-10-CM

## 2020-05-07 DIAGNOSIS — J30.1 SEASONAL ALLERGIC RHINITIS DUE TO POLLEN: ICD-10-CM

## 2020-05-07 RX ORDER — MONTELUKAST SODIUM 10 MG/1
10 TABLET ORAL EVERY EVENING
Qty: 30 TABLET | Refills: 0 | OUTPATIENT
Start: 2020-05-07

## 2020-05-07 RX ORDER — FLUTICASONE PROPIONATE 50 MCG
2 SPRAY, SUSPENSION (ML) NASAL DAILY
Qty: 16 G | Refills: 3 | Status: SHIPPED | OUTPATIENT
Start: 2020-05-07

## 2020-05-07 NOTE — TELEPHONE ENCOUNTER
Patient has not been seen by provider in over 3 years. Needs follow up appointment. Refill request refused. Notified patient.

## 2020-07-31 DIAGNOSIS — Z90.722 S/P BSO (BILATERAL SALPINGO-OOPHORECTOMY): ICD-10-CM

## 2020-07-31 RX ORDER — ESTRADIOL 2 MG/1
2 TABLET ORAL DAILY
Qty: 90 TABLET | Refills: 0 | Status: SHIPPED | OUTPATIENT
Start: 2020-07-31 | End: 2020-11-03

## 2020-07-31 NOTE — TELEPHONE ENCOUNTER
Received refill request for estradiol tabs.  Last in clinic 07/2019.     Has an appt 10/2020 with Dr. Ricks :  90 day refill sent to pharmacy

## 2020-09-02 ENCOUNTER — ANCILLARY PROCEDURE (OUTPATIENT)
Dept: MRI IMAGING | Facility: CLINIC | Age: 40
End: 2020-09-02
Attending: INTERNAL MEDICINE
Payer: COMMERCIAL

## 2020-09-02 DIAGNOSIS — Z15.01 BRCA1 GENE MUTATION POSITIVE IN FEMALE: ICD-10-CM

## 2020-09-02 DIAGNOSIS — Z15.09 BRCA1 GENE MUTATION POSITIVE IN FEMALE: ICD-10-CM

## 2020-09-02 DIAGNOSIS — Z15.02 BRCA1 GENE MUTATION POSITIVE IN FEMALE: ICD-10-CM

## 2020-09-02 RX ORDER — GADOBUTROL 604.72 MG/ML
7.5 INJECTION INTRAVENOUS ONCE
Status: COMPLETED | OUTPATIENT
Start: 2020-09-02 | End: 2020-09-02

## 2020-09-02 RX ADMIN — GADOBUTROL 6.5 ML: 604.72 INJECTION INTRAVENOUS at 17:20

## 2020-10-26 ASSESSMENT — ANXIETY QUESTIONNAIRES
2. NOT BEING ABLE TO STOP OR CONTROL WORRYING: NOT AT ALL
1. FEELING NERVOUS, ANXIOUS, OR ON EDGE: SEVERAL DAYS
4. TROUBLE RELAXING: NOT AT ALL
6. BECOMING EASILY ANNOYED OR IRRITABLE: SEVERAL DAYS
3. WORRYING TOO MUCH ABOUT DIFFERENT THINGS: NOT AT ALL
5. BEING SO RESTLESS THAT IT IS HARD TO SIT STILL: NOT AT ALL
GAD7 TOTAL SCORE: 3
7. FEELING AFRAID AS IF SOMETHING AWFUL MIGHT HAPPEN: SEVERAL DAYS
GAD7 TOTAL SCORE: 3
7. FEELING AFRAID AS IF SOMETHING AWFUL MIGHT HAPPEN: SEVERAL DAYS

## 2020-10-26 ASSESSMENT — ENCOUNTER SYMPTOMS
NECK PAIN: 1
MYALGIAS: 1
ARTHRALGIAS: 0
MUSCLE WEAKNESS: 0
STIFFNESS: 0
JOINT SWELLING: 0
BACK PAIN: 1

## 2020-10-27 ASSESSMENT — ANXIETY QUESTIONNAIRES: GAD7 TOTAL SCORE: 3

## 2020-10-28 ENCOUNTER — OFFICE VISIT (OUTPATIENT)
Dept: OBGYN | Facility: CLINIC | Age: 40
End: 2020-10-28
Attending: OBSTETRICS & GYNECOLOGY
Payer: COMMERCIAL

## 2020-10-28 VITALS
HEART RATE: 98 BPM | SYSTOLIC BLOOD PRESSURE: 119 MMHG | HEIGHT: 64 IN | DIASTOLIC BLOOD PRESSURE: 78 MMHG | WEIGHT: 147 LBS | BODY MASS INDEX: 25.1 KG/M2

## 2020-10-28 DIAGNOSIS — Z15.01 BRCA1 GENE MUTATION POSITIVE IN FEMALE: Primary | ICD-10-CM

## 2020-10-28 DIAGNOSIS — Z15.09 BRCA1 GENE MUTATION POSITIVE IN FEMALE: Primary | ICD-10-CM

## 2020-10-28 DIAGNOSIS — Z15.02 BRCA1 GENE MUTATION POSITIVE IN FEMALE: Primary | ICD-10-CM

## 2020-10-28 PROCEDURE — 99386 PREV VISIT NEW AGE 40-64: CPT | Performed by: OBSTETRICS & GYNECOLOGY

## 2020-10-28 ASSESSMENT — MIFFLIN-ST. JEOR: SCORE: 1316.79

## 2020-10-28 NOTE — PROGRESS NOTES
Progress Note    SUBJECTIVE:  Yamila Myles is an 40 year old, , who requests an Annual Preventive Exam. Patient is doing well overall. Her vaginal dryness has been well-managed with topical estrogen. States her mother was diagnosed with colon cancer in 2019. Given both the patient and mother are BRCA1, she wishes to discuss colonoscopy screening.     Menstrual History:  Menstrual History 2014 1/15/2016 2016   LAST MENSTRUAL PERIOD 2014       Last    Lab Results   Component Value Date    PAP NIL 2017     History of abnormal Pap smear: NO - age 30-65 PAP every 5 years with negative HPV co-testing recommended    Last   Lab Results   Component Value Date    HPV16 Negative 2017     Last   Lab Results   Component Value Date    HPV18 Negative 2017     Last   Lab Results   Component Value Date    HRHPV Negative 2017       Last Mammogram:   MR Breast Bilateral 20    FINDINGS: Parenchymal enhancement posteroinferior and medial in the  right appears similar to priors. Minimal change in focus at the 12:00  position, middle depth on the right. Multiple cysts bilaterally.  Overall, no significant change compared to prior.                                                                   IMPRESSION: BI-RADS CATEGORY: 2 - Benign.         HISTORY:    Allergies   Allergen Reactions     Advil [Nsaids] Other (See Comments)     Nasal congestion     Immunization History   Administered Date(s) Administered     Influenza (H1N1) 2009     Influenza (IIV3) PF 10/29/2007, 2010, 2011     TD (ADULT, 7+) 2006     TDAP Vaccine (Adacel) 2012       OB History    Para Term  AB Living   2 2 2 0 0 2   SAB TAB Ectopic Multiple Live Births   0 0 0 0 2   Obstetric Comments   Possible early SAB in      Past Medical History:   Diagnosis Date     BRCA1 positive 2001     Breast disorder 2011     Encounter for insertion of mirena  IUD 07/12/16     Hoarseness      Lump or mass in breast 2001    BRCA-1 positive (Mother, MGM w/ breast ca, mother w/ gene +), mother dx at 32)     Microcalcifications of the breast, right uoq 6/8/2011     Past Surgical History:   Procedure Laterality Date     C APPENDECTOMY  1987     INSERT INTRAUTERINE DEVICE N/A 7/12/2016    Procedure: INSERT INTRAUTERINE DEVICE;  Surgeon: Fidelia Ricks MD;  [facility]     LAPAROSCOPIC SALPINGO-OOPHORECTOMY Bilateral 7/12/2016    Procedure: LAPAROSCOPIC SALPINGO-OOPHORECTOMY;  Surgeon: Fidelia Ricks MD;  [facility]     LAPAROSCOPY OPERATIVE ADULT N/A 7/12/2016    Procedure: LAPAROSCOPY OPERATIVE ADULT;  Surgeon: Fidelia Ricks MD;  [facility]     Family History   Problem Relation Age of Onset     Breast Cancer Mother         dx age 32, doing well     Colon Cancer Mother         dx 2019. s/p surg and chemo     Breast Cancer Maternal Grandmother      Diabetes Paternal Grandmother      Breast Cancer Paternal Grandmother      Neurologic Disorder Sister         epilepsy     Social History     Socioeconomic History     Marital status:      Spouse name: None     Number of children: 1     Years of education: BA     Highest education level: None   Occupational History     Occupation: Organizer     Employer: [employer]      Comment: [employer]   Social Needs     Financial resource strain: None     Food insecurity     Worry: None     Inability: None     Transportation needs     Medical: None     Non-medical: None   Tobacco Use     Smoking status: Never Smoker     Smokeless tobacco: Never Used   Substance and Sexual Activity     Alcohol use: Yes     Alcohol/week: 0.0 standard drinks     Comment: 2 glasses/wk - none during pregnancy     Drug use: No     Sexual activity: Yes     Partners: Male     Birth control/protection: I.U.D.     Comment: Mirena   Lifestyle     Physical activity     Days per week: None     Minutes per session: None      Stress: None   Relationships     Social connections     Talks on phone: None     Gets together: None     Attends Hoahaoism service: None     Active member of club or organization: None     Attends meetings of clubs or organizations: None     Relationship status: None     Intimate partner violence     Fear of current or ex partner: None     Emotionally abused: None     Physically abused: None     Forced sexual activity: None   Other Topics Concern     Parent/sibling w/ CABG, MI or angioplasty before 65F 55M? No      Service Not Asked     Blood Transfusions No     Caffeine Concern No     Occupational Exposure No     Hobby Hazards Not Asked     Sleep Concern No     Stress Concern Yes     Comment: life/work -->coping     Weight Concern No     Special Diet No     Back Care Yes     Comment: chiropracter for old injury     Exercise No     Bike Helmet Not Asked     Comment: not biker     Seat Belt No     Self-Exams Not Asked   Social History Narrative    Social Documentation:        Balanced Diet: YES    Calcium intake: less than 2 per day    Caffeine: 0-1 per day    Exercise:  type of activity walk; daily times per week    Sunscreen: Yes    Seatbelts:  Yes    Self Breast Exam:  Yes    Self Testicular Exam: No - n/a    Physical/Emotional/Sexual Abuse: No     Do you feel safe in your environment? Yes        Cholesterol screen up to date: Yes-3 yrs ago per pt.  Not fasting today.     Eye Exam up to date: Yes    Dental Exam up to date: No - 9 months ago.     Pap smear up to date: Yes    Mammogram up to date: Does Not Apply    Dexa Scan up to date: Does Not Apply    Colonoscopy up to date: Does Not Apply    Immunizations up to date: Yes-Td 12/06    Glucose screen if over 40:  No - n/a       ROS   Ten point review of systems was negative except what was noted in the HPI.    PHQ-9 SCORE 9/27/2017 2/18/2019 10/11/2019   PHQ-9 Total Score 0 0 0     FAYE-7 SCORE 7/31/2019 10/11/2019 10/26/2020   Total Score - - 3 (minimal  "anxiety)   Total Score 0 0 3         EXAM:  Blood pressure 119/78, pulse 98, height 1.626 m (5' 4\"), weight 66.7 kg (147 lb), not currently breastfeeding. Body mass index is 25.23 kg/m .  General - pleasant female in no acute distress.  Skin - Seborrheic keratosis, moles, freckles on back and under right breast. No suspicious lesions or rashes  EENT-  PERRLA, euthyroid with out palpable nodules  Neck - supple without lymphadenopathy.  Lungs - clear to auscultation bilaterally.  Heart - regular rate and rhythm without murmur.  Abdomen - soft, nontender, nondistended, no masses or organomegaly noted.  Musculoskeletal - no gross deformities.  Neurological - normal strength, sensation, and mental status.    Breast Exam:  Breast: Without visible skin changes. No dimpling or lesions seen.   Breasts supple, non-tender with palpation, no dominant mass, nodularity, or nipple discharge noted bilaterally. Axillary nodes negative.      Pelvic Exam:  EG/BUS: Normal genital architecture without lesions, erythema or abnormal secretions Bartholin's, Urethra, [place]'s normal   Urethral meatus: normal   Urethra: no masses, tenderness, or scarring   Bladder: no masses or tenderness   Vagina: moist, pink, rugae with creamy, white and odorless  secretions  Cervix: no lesions  Uterus: anteverted,   Adnexa: Within normal limits and No masses, nodularity, tenderness  Rectum:anus normal       ASSESSMENT:  Encounter Diagnosis   Name Primary?     BRCA1 gene mutation positive in female Yes        PLAN:   Orders Placed This Encounter   Procedures     GASTROENTEROLOGY ADULT REF PROCEDURE ONLY       1. Discussed colonoscopy screening with the patient. She will start them this year.     2. TSH and lipid panel will be ordered next visit. Last TSH was 2013, last lipid was 2016.    3. Breast cancer screening up to date    Return to clinic in one year.  Follow-up as needed.    Juliana Peck  MS3  10/28/20      Answers for HPI/ROS submitted by the " patient on 10/26/2020   FAYE 7 TOTAL SCORE: 3      I agree with the PFSH and ROS as completed by the MS. The remainder of the encounter was performed by me and scribed by the MS. The scribed note accurately reflects my personal services and the decisions made by me.  Fidelia Ricks MD

## 2020-10-28 NOTE — LETTER
10/28/2020       RE: Yamila Myles  [address] [facility] [address]     Dear Colleague,    Thank you for referring your patient, Yamila Myles, to the [facility] [place] [facility]. Please see a copy of my visit note below.      Progress Note    SUBJECTIVE:  Yamila Myles is an 40 year old, , who requests an Annual Preventive Exam. Patient is doing well overall. Her vaginal dryness has been well-managed with topical estrogen. States her mother was diagnosed with colon cancer in 2019. Given both the patient and mother are BRCA1, she wishes to discuss colonoscopy screening.     Menstrual History:  Menstrual History 2014 1/15/2016 2016   LAST MENSTRUAL PERIOD 2014       Last    Lab Results   Component Value Date    PAP NIL 2017     History of abnormal Pap smear: NO - age 30-65 PAP every 5 years with negative HPV co-testing recommended    Last   Lab Results   Component Value Date    HPV16 Negative 2017     Last   Lab Results   Component Value Date    HPV18 Negative 2017     Last   Lab Results   Component Value Date    HRHPV Negative 2017       Last Mammogram:   MR Breast Bilateral 20    FINDINGS: Parenchymal enhancement posteroinferior and medial in the  right appears similar to priors. Minimal change in focus at the 12:00  position, middle depth on the right. Multiple cysts bilaterally.  Overall, no significant change compared to prior.                                                                   IMPRESSION: BI-RADS CATEGORY: 2 - Benign.         HISTORY:    Allergies   Allergen Reactions     Advil [Nsaids] Other (See Comments)     Nasal congestion     Immunization History   Administered Date(s) Administered     Influenza (H1N1) 2009     Influenza (IIV3) PF 10/29/2007, 2010, 2011     TD (ADULT, 7+) 2006     TDAP Vaccine (Adacel)  2012       OB History    Para Term  AB Living   2 2 2 0 0 2   SAB TAB Ectopic Multiple Live Births   0 0 0 0 2   Obstetric Comments   Possible early SAB in      Past Medical History:   Diagnosis Date     BRCA1 positive 2001     Breast disorder 2011     Encounter for insertion of mirena IUD 16     Hoarseness      Lump or mass in breast     BRCA-1 positive (Mother, MGM w/ breast ca, mother w/ gene +), mother dx at 32)     Microcalcifications of the breast, right uoq 2011     Past Surgical History:   Procedure Laterality Date     C APPENDECTOMY       INSERT INTRAUTERINE DEVICE N/A 2016    Procedure: INSERT INTRAUTERINE DEVICE;  Surgeon: Fidelia Ricks MD;  Location: [facility]     LAPAROSCOPIC SALPINGO-OOPHORECTOMY Bilateral 2016    Procedure: LAPAROSCOPIC SALPINGO-OOPHORECTOMY;  Surgeon: Fidelia Ricks MD;  Location[facility]     LAPAROSCOPY OPERATIVE ADULT N/A 2016    Procedure: LAPAROSCOPY OPERATIVE ADULT;  Surgeon: Fidelia Ricks MD;  [facility]: [facility] OR     Family History   Problem Relation Age of Onset     Breast Cancer Mother         dx age 32, doing well     Colon Cancer Mother         dx 2019. s/p surg and chemo     Breast Cancer Maternal Grandmother      Diabetes Paternal Grandmother      Breast Cancer Paternal Grandmother      Neurologic Disorder Sister         epilepsy     Social History     Socioeconomic History     Marital status:      Spouse name: None     Number of children: 1     Years of education: BA     Highest education level: None   Occupational History     Occupation: Organizer     Employer: [employer]      Comment: [employer]   Social Needs     Financial resource strain: None     Food insecurity     Worry: None     Inability: None     Transportation needs     Medical: None     Non-medical: None   Tobacco Use     Smoking status: Never Smoker     Smokeless tobacco: Never Used   Substance and Sexual Activity      Alcohol use: Yes     Alcohol/week: 0.0 standard drinks     Comment: 2 glasses/wk - none during pregnancy     Drug use: No     Sexual activity: Yes     Partners: Male     Birth control/protection: I.U.D.     Comment: Mirena   Lifestyle     Physical activity     Days per week: None     Minutes per session: None     Stress: None   Relationships     Social connections     Talks on phone: None     Gets together: None     Attends [employer] service: None     Active member of club or organization: None     Attends meetings of clubs or organizations: None     Relationship status: None     Intimate partner violence     Fear of current or ex partner: None     Emotionally abused: None     Physically abused: None     Forced sexual activity: None   Other Topics Concern     Parent/sibling w/ CABG, MI or angioplasty before 65F 55M? No      Service Not Asked     Blood Transfusions No     Caffeine Concern No     Occupational Exposure No     Hobby Hazards Not Asked     Sleep Concern No     Stress Concern Yes     Comment: life/work -->coping     Weight Concern No     Special Diet No     Back Care Yes     Comment: chiropracter for old injury     Exercise No     Bike Helmet Not Asked     Comment: not biker     Seat Belt No     Self-Exams Not Asked   Social History Narrative    Social Documentation:        Balanced Diet: YES    Calcium intake: less than 2 per day    Caffeine: 0-1 per day    Exercise:  type of activity walk; daily times per week    Sunscreen: Yes    Seatbelts:  Yes    Self Breast Exam:  Yes    Self Testicular Exam: No - n/a    Physical/Emotional/Sexual Abuse: No     Do you feel safe in your environment? Yes        Cholesterol screen up to date: Yes-3 yrs ago per pt.  Not fasting today.     Eye Exam up to date: Yes    Dental Exam up to date: No - 9 months ago.     Pap smear up to date: Yes    Mammogram up to date: Does Not Apply    Dexa Scan up to date: Does Not Apply    Colonoscopy up to date: Does Not Apply     "Immunizations up to date: Yes-Td 12/06    Glucose screen if over 40:  No - n/a       ROS   Ten point review of systems was negative except what was noted in the HPI.    PHQ-9 SCORE 9/27/2017 2/18/2019 10/11/2019   PHQ-9 Total Score 0 0 0     FAYE-7 SCORE 7/31/2019 10/11/2019 10/26/2020   Total Score - - 3 (minimal anxiety)   Total Score 0 0 3         EXAM:  Blood pressure 119/78, pulse 98, height 1.626 m (5' 4\"), weight 66.7 kg (147 lb), not currently breastfeeding. Body mass index is 25.23 kg/m .  General - pleasant female in no acute distress.  Skin - Seborrheic keratosis, moles, freckles on back and under right breast. No suspicious lesions or rashes  EENT-  PERRLA, euthyroid with out palpable nodules  Neck - supple without lymphadenopathy.  Lungs - clear to auscultation bilaterally.  Heart - regular rate and rhythm without murmur.  Abdomen - soft, nontender, nondistended, no masses or organomegaly noted.  Musculoskeletal - no gross deformities.  Neurological - normal strength, sensation, and mental status.    Breast Exam:  Breast: Without visible skin changes. No dimpling or lesions seen.   Breasts supple, non-tender with palpation, no dominant mass, nodularity, or nipple discharge noted bilaterally. Axillary nodes negative.      Pelvic Exam:  EG/BUS: Normal genital architecture without lesions, erythema or abnormal secretions Bartholin's, Urethra, [place]'s normal   Urethral meatus: normal   Urethra: no masses, tenderness, or scarring   Bladder: no masses or tenderness   Vagina: moist, pink, rugae with creamy, white and odorless  secretions  Cervix: no lesions  Uterus: anteverted,   Adnexa: Within normal limits and No masses, nodularity, tenderness  Rectum:anus normal       ASSESSMENT:  Encounter Diagnosis   Name Primary?     BRCA1 gene mutation positive in female Yes        PLAN:   Orders Placed This Encounter   Procedures     GASTROENTEROLOGY ADULT REF PROCEDURE ONLY       1. Discussed colonoscopy screening with " the patient. She will start them this year.     2. TSH and lipid panel will be ordered next visit. Last TSH was 2013, last lipid was 2016.    3. Breast cancer screening up to date    Return to clinic in one year.  Follow-up as needed.    Juliana Peck  MS3  10/28/20      Answers for HPI/ROS submitted by the patient on 10/26/2020   FAYE 7 TOTAL SCORE: 3      I agree with the PFSH and ROS as completed by the MS. The remainder of the encounter was performed by me and scribed by the MS. The scribed note accurately reflects my personal services and the decisions made by me.  Fidelia Ricks MD

## 2020-10-29 ENCOUNTER — TELEPHONE (OUTPATIENT)
Dept: GASTROENTEROLOGY | Facility: CLINIC | Age: 40
End: 2020-10-29

## 2020-10-29 NOTE — TELEPHONE ENCOUNTER
Left message with patient's  to have her call us back to schedule colonoscopy.     Procedure: Other (Specify in Comments) colonoscopy   [facility] Location[facility]    Scheduling Instructions: If you have not heard from the scheduling office within 2 business days, please call [phone].      Dx: BRCA1 gene mutation positive in female [Z15.01, Z15.02, Z15.09]

## 2020-11-03 ENCOUNTER — TELEPHONE (OUTPATIENT)
Dept: OBGYN | Facility: CLINIC | Age: 40
End: 2020-11-03

## 2020-11-03 DIAGNOSIS — Z90.722 S/P BSO (BILATERAL SALPINGO-OOPHORECTOMY): ICD-10-CM

## 2020-11-03 RX ORDER — ESTRADIOL 2 MG/1
2 TABLET ORAL DAILY
Qty: 90 TABLET | Refills: 3 | Status: SHIPPED | OUTPATIENT
Start: 2020-11-03 | End: 2021-11-01

## 2020-11-03 NOTE — TELEPHONE ENCOUNTER
----- Message from Lainey Arizmendi sent at 11/3/2020  3:27 PM CST -----  Regarding: med refill  Contact: [phone]  Pt is needing a refill on estradiol (ESTRACE) 2 MG tablet, sent to [address] [address], thanks!

## 2020-11-09 ENCOUNTER — TELEPHONE (OUTPATIENT)
Dept: GASTROENTEROLOGY | Facility: CLINIC | Age: 40
End: 2020-11-09

## 2020-11-09 NOTE — TELEPHONE ENCOUNTER
2nd Attempt:     Left message for patient to call back to schedule colonoscopy.      Procedure: Other (Specify in Comments) colonoscopy   Preferred Location: [facility]     Scheduling Instructions: If you have not heard from the scheduling office within 2 business days, please call [phone].        Dx: BRCA1 gene mutation positive in female [Z15.01, Z15.02, Z15.09]

## 2020-11-22 ENCOUNTER — HEALTH MAINTENANCE LETTER (OUTPATIENT)
Age: 40
End: 2020-11-22

## 2020-11-27 DIAGNOSIS — Z11.59 ENCOUNTER FOR SCREENING FOR OTHER VIRAL DISEASES: Primary | ICD-10-CM

## 2021-01-04 DIAGNOSIS — Z11.59 ENCOUNTER FOR SCREENING FOR OTHER VIRAL DISEASES: ICD-10-CM

## 2021-01-04 LAB
SARS-COV-2 RNA SPEC QL NAA+PROBE: NORMAL
SPECIMEN SOURCE: NORMAL

## 2021-01-04 PROCEDURE — U0005 INFEC AGEN DETEC AMPLI PROBE: HCPCS | Performed by: COLON & RECTAL SURGERY

## 2021-01-04 PROCEDURE — U0003 INFECTIOUS AGENT DETECTION BY NUCLEIC ACID (DNA OR RNA); SEVERE ACUTE RESPIRATORY SYNDROME CORONAVIRUS 2 (SARS-COV-2) (CORONAVIRUS DISEASE [COVID-19]), AMPLIFIED PROBE TECHNIQUE, MAKING USE OF HIGH THROUGHPUT TECHNOLOGIES AS DESCRIBED BY CMS-2020-01-R: HCPCS | Performed by: COLON & RECTAL SURGERY

## 2021-01-05 LAB
LABORATORY COMMENT REPORT: NORMAL
SARS-COV-2 RNA SPEC QL NAA+PROBE: NEGATIVE
SPECIMEN SOURCE: NORMAL

## 2021-01-08 ENCOUNTER — TRANSFERRED RECORDS (OUTPATIENT)
Dept: HEALTH INFORMATION MANAGEMENT | Facility: CLINIC | Age: 41
End: 2021-01-08

## 2021-01-08 ENCOUNTER — DOCUMENTATION ONLY (OUTPATIENT)
Dept: GASTROENTEROLOGY | Facility: OUTPATIENT CENTER | Age: 41
End: 2021-01-08
Payer: COMMERCIAL

## 2021-01-08 DIAGNOSIS — Z15.02 BRCA1 GENE MUTATION POSITIVE IN FEMALE: ICD-10-CM

## 2021-01-08 DIAGNOSIS — Z15.01 BRCA1 GENE MUTATION POSITIVE IN FEMALE: ICD-10-CM

## 2021-01-08 DIAGNOSIS — Z15.09 BRCA1 GENE MUTATION POSITIVE IN FEMALE: ICD-10-CM

## 2021-01-12 LAB — COPATH REPORT: NORMAL

## 2021-04-05 ENCOUNTER — MYC MEDICAL ADVICE (OUTPATIENT)
Dept: OBGYN | Facility: CLINIC | Age: 41
End: 2021-04-05

## 2021-04-05 DIAGNOSIS — Z84.81 FAMILY HISTORY OF CARRIER OF GENETIC DISEASE: Primary | ICD-10-CM

## 2021-08-06 ENCOUNTER — ANCILLARY PROCEDURE (OUTPATIENT)
Dept: MAMMOGRAPHY | Facility: CLINIC | Age: 41
End: 2021-08-06
Attending: INTERNAL MEDICINE
Payer: COMMERCIAL

## 2021-08-06 DIAGNOSIS — Z12.31 VISIT FOR SCREENING MAMMOGRAM: ICD-10-CM

## 2021-08-06 PROCEDURE — 77067 SCR MAMMO BI INCL CAD: CPT | Mod: GC

## 2021-08-06 PROCEDURE — 77063 BREAST TOMOSYNTHESIS BI: CPT | Mod: GC

## 2021-08-11 ENCOUNTER — ANCILLARY PROCEDURE (OUTPATIENT)
Dept: MAMMOGRAPHY | Facility: CLINIC | Age: 41
End: 2021-08-11
Attending: INTERNAL MEDICINE
Payer: COMMERCIAL

## 2021-08-11 DIAGNOSIS — R92.8 ABNORMAL MAMMOGRAM OF BOTH BREASTS: ICD-10-CM

## 2021-08-11 PROCEDURE — 77066 DX MAMMO INCL CAD BI: CPT | Performed by: RADIOLOGY

## 2021-08-11 PROCEDURE — G0279 TOMOSYNTHESIS, MAMMO: HCPCS | Performed by: RADIOLOGY

## 2021-09-19 ENCOUNTER — HEALTH MAINTENANCE LETTER (OUTPATIENT)
Age: 41
End: 2021-09-19

## 2021-11-01 DIAGNOSIS — Z90.722 S/P BSO (BILATERAL SALPINGO-OOPHORECTOMY): ICD-10-CM

## 2021-11-01 RX ORDER — ESTRADIOL 2 MG/1
2 TABLET ORAL DAILY
Qty: 90 TABLET | Refills: 3 | Status: SHIPPED | OUTPATIENT
Start: 2021-11-01 | End: 2022-10-13

## 2021-11-01 NOTE — TELEPHONE ENCOUNTER
Pt requested refill of oral Estradiol.  Called pt to inform her that Dr Ricks is out of the office and medication needs to be approved.  Pt verbalized understanding.

## 2021-11-16 ENCOUNTER — OFFICE VISIT (OUTPATIENT)
Dept: PLASTIC SURGERY | Facility: CLINIC | Age: 41
End: 2021-11-16
Attending: PLASTIC SURGERY
Payer: COMMERCIAL

## 2021-11-16 VITALS
SYSTOLIC BLOOD PRESSURE: 105 MMHG | DIASTOLIC BLOOD PRESSURE: 73 MMHG | WEIGHT: 132.9 LBS | OXYGEN SATURATION: 98 % | BODY MASS INDEX: 22.81 KG/M2 | HEART RATE: 80 BPM | TEMPERATURE: 97.6 F

## 2021-11-16 DIAGNOSIS — Z15.01 BRCA1 GENETIC CARRIER: Primary | ICD-10-CM

## 2021-11-16 DIAGNOSIS — Z15.09 BRCA1 GENETIC CARRIER: Primary | ICD-10-CM

## 2021-11-16 PROCEDURE — G0463 HOSPITAL OUTPT CLINIC VISIT: HCPCS

## 2021-11-16 PROCEDURE — 99204 OFFICE O/P NEW MOD 45 MIN: CPT | Performed by: PLASTIC SURGERY

## 2021-11-16 RX ORDER — CEFAZOLIN SODIUM 2 G/50ML
2 SOLUTION INTRAVENOUS SEE ADMIN INSTRUCTIONS
Status: CANCELLED | OUTPATIENT
Start: 2021-11-16

## 2021-11-16 RX ORDER — CEFAZOLIN SODIUM 2 G/50ML
2 SOLUTION INTRAVENOUS
Status: CANCELLED | OUTPATIENT
Start: 2021-11-16

## 2021-11-16 NOTE — PROGRESS NOTES
REFERRING PROVIDER:  Antonina Martinez MD    PRESENTING COMPLAINT:  Consultation for breast reconstruction.    HISTORY OF PRESENTING COMPLAINT:  Ms. Myles is 41 years old.  She has been diagnosed with BRCA1 gene mutation and is interested in prophylactic mastectomy and immediate reconstruction.  She wears a C cup bra.  She would like to be around the same size.  Her last MRI was done in August of this year.  It was normal.  She has already had her oophorectomies in 2016.    PAST MEDICAL HISTORY:  Nil.    PAST SURGICAL HISTORY:  Oophorectomy.    MEDICATIONS:  Nil.    ALLERGIES:  NSAIDS.    SOCIAL HISTORY:  Does not smoke, socially drinks.  Lives in [place].  Works for a [employer].    REVIEW OF SYSTEMS:  Denies chest pain, shortness of breath, MI, CVA, DVT and PE.    PHYSICAL EXAMINATION:  Vital signs stable.  She is afebrile, in no obvious distress.  She has a BMI of 22.8 kg/m2.  On examination of her breasts, she has grade II ptosis, left side larger and longer than the right.  Right is about grade 1 ptosis.  She has a lot of extra skin.  Her upper poles are deficient; she is bottom heavy.  She does not have enough tissue in her abdomen to give her the size she wants.    ASSESSMENT AND PLAN:  Based on above findings, a diagnosis of BRCA1 gene mutation with high risk for breast cancer, interested in prophylactic mastectomy to decrease the chance for breast cancer and is interested in immediate reconstruction.  I had a ghazala detailed discussion with the patient and her  about breast reconstruction, the elective nature of reconstruction, staged nature of reconstruction, implant-based versus autologous reconstruction; the pros and cons of each were all explained.  We must take into account that the patient is undergoing a prophylactic mastectomy.  Therefore, time is on our side, which means that the best aesthetic outcome for this patient would be a multiple staged procedure in which the first stage  would be a bilateral mastopexy with small reduction on the left side to give her more symmetry, get her nipple in the right place and decrease the skin burden to give her about a C cup on each side.  Then, stage 2, 6 months later, would be a nipple-sparing mastectomy and direct to implant reconstruction.  Stage 3 would be a nip/tuck, balancing procedures and fat grafting about 3 months later.  These staged procedures were explained to the patient.  She understood the plan and agreed.  She now has to meet with the breast surgeons.  She will need prior authorization for the above stages.  Concepts of each of these were explained.  The pros and cons, risks, benefits, alternatives, and expectations were all made very clear.  She will think about it and let me know how she wants to proceed.  I am happy to help out.  All questions answered.  All exam and discussion done in presence of a chaperone.    Total time spent with chart review, visit itself and post-visit paperwork was 45 minutes.    cc:  Antonina Martinez MD  [facility] 849

## 2021-11-16 NOTE — LETTER
11/16/2021         RE: Yamila Myles  [address] [facility] [address]        Dear Colleague,    Thank you for referring your patient, Yamila Myles, to the [facility]. Please see a copy of my visit note below.    REFERRING PROVIDER:  Antonina Martinez MD    PRESENTING COMPLAINT:  Consultation for breast reconstruction.    HISTORY OF PRESENTING COMPLAINT:  Ms. Myles is 41 years old.  She has been diagnosed with BRCA1 gene mutation and is interested in prophylactic mastectomy and immediate reconstruction.  She wears a C cup bra.  She would like to be around the same size.  Her last MRI was done in August of this year.  It was normal.  She has already had her oophorectomies in 2016.    PAST MEDICAL HISTORY:  Nil.    PAST SURGICAL HISTORY:  Oophorectomy.    MEDICATIONS:  Nil.    ALLERGIES:  NSAIDS.    SOCIAL HISTORY:  Does not smoke, socially drinks.  Lives in [place].  Works for a [employer].    REVIEW OF SYSTEMS:  Denies chest pain, shortness of breath, MI, CVA, DVT and PE.    PHYSICAL EXAMINATION:  Vital signs stable.  She is afebrile, in no obvious distress.  She has a BMI of 22.8 kg/m2.  On examination of her breasts, she has grade II ptosis, left side larger and longer than the right.  Right is about grade 1 ptosis.  She has a lot of extra skin.  Her upper poles are deficient; she is bottom heavy.  She does not have enough tissue in her abdomen to give her the size she wants.    ASSESSMENT AND PLAN:  Based on above findings, a diagnosis of BRCA1 gene mutation with high risk for breast cancer, interested in prophylactic mastectomy to decrease the chance for breast cancer and is interested in immediate reconstruction.  I had a ghazala detailed discussion with the patient and her  about breast reconstruction, the elective nature of reconstruction, staged nature of reconstruction, implant-based versus autologous reconstruction; the pros and cons  of each were all explained.  We must take into account that the patient is undergoing a prophylactic mastectomy.  Therefore, time is on our side, which means that the best aesthetic outcome for this patient would be a multiple staged procedure in which the first stage would be a bilateral mastopexy with small reduction on the left side to give her more symmetry, get her nipple in the right place and decrease the skin burden to give her about a C cup on each side.  Then, stage 2, 6 months later, would be a nipple-sparing mastectomy and direct to implant reconstruction.  Stage 3 would be a nip/tuck, balancing procedures and fat grafting about 3 months later.  These staged procedures were explained to the patient.  She understood the plan and agreed.  She now has to meet with the breast surgeons.  She will need prior authorization for the above stages.  Concepts of each of these were explained.  The pros and cons, risks, benefits, alternatives, and expectations were all made very clear.  She will think about it and let me know how she wants to proceed.  I am happy to help out.  All questions answered.  All exam and discussion done in presence of a chaperone.    Total time spent with chart review, visit itself and post-visit paperwork was 45 minutes.    cc:  Antonina Martinez MD          M Laxmi Pradhan MD

## 2021-11-16 NOTE — NURSING NOTE
"Oncology Rooming Note    November 16, 2021 10:41 AM   Yamila Myles is a 41 year old female who presents for:    Chief Complaint   Patient presents with     Oncology Clinic Visit     Mastectomy     Initial Vitals: /73 (BP Location: Left arm, Patient Position: Sitting, Cuff Size: Adult Regular)   Pulse 80   Temp 97.6  F (36.4  C) (Oral)   Wt 60.3 kg (132 lb 14.4 oz)   SpO2 98%   BMI 22.81 kg/m   Estimated body mass index is 22.81 kg/m  as calculated from the following:    Height as of 10/28/20: 1.626 m (5' 4\").    Weight as of this encounter: 60.3 kg (132 lb 14.4 oz). Body surface area is 1.65 meters squared.  Data Unavailable Comment: Data Unavailable   No LMP recorded. (Menstrual status: IUD).  Allergies reviewed: Yes  Medications reviewed: Yes    Medications: Medication refills not needed today.  Pharmacy name entered into [facility]:    [facility] #[id number] - [place], [place] - [address] AT [facility] [address] [id number] - [place], [place] - [address] AT [facility] [address]    Clinical concerns: Not discussed-provider began visit.        Luci Mercado LPN November 16, 2021 10:41 AM                "

## 2021-11-17 DIAGNOSIS — Z15.01 BRCA1 GENETIC CARRIER: Primary | ICD-10-CM

## 2021-11-17 DIAGNOSIS — Z15.09 BRCA1 GENETIC CARRIER: Primary | ICD-10-CM

## 2021-11-29 ENCOUNTER — TELEPHONE (OUTPATIENT)
Dept: SURGERY | Facility: CLINIC | Age: 41
End: 2021-11-29
Payer: COMMERCIAL

## 2021-11-29 NOTE — TELEPHONE ENCOUNTER
Called patient and left a voicemail offering 1/5 as a surgery date with Dr. Pradhan. No response to [employer] message sent. Provided call back number.

## 2021-12-03 NOTE — TELEPHONE ENCOUNTER
Called patient - second attempt. LVM to schedule surgery. Patient has not read [employer] message yet.

## 2021-12-15 NOTE — TELEPHONE ENCOUNTER
Called patient for third time and had to leave a voicemail. No return calls from previous attempts. No response from [employer] message sent. Sent message to clinical team letting them know I have not heard back on my several attempts to reach the PT. Writer will wait for patient to call into clinic to get scheduled.

## 2022-01-08 ENCOUNTER — HEALTH MAINTENANCE LETTER (OUTPATIENT)
Age: 42
End: 2022-01-08

## 2022-01-14 ENCOUNTER — ONCOLOGY VISIT (OUTPATIENT)
Dept: ONCOLOGY | Facility: CLINIC | Age: 42
End: 2022-01-14
Attending: PLASTIC SURGERY
Payer: COMMERCIAL

## 2022-01-14 VITALS
HEART RATE: 89 BPM | OXYGEN SATURATION: 100 % | DIASTOLIC BLOOD PRESSURE: 74 MMHG | SYSTOLIC BLOOD PRESSURE: 105 MMHG | TEMPERATURE: 98 F | HEIGHT: 64 IN | WEIGHT: 131.4 LBS | BODY MASS INDEX: 22.43 KG/M2

## 2022-01-14 DIAGNOSIS — Z15.09 BRCA1 GENETIC CARRIER: ICD-10-CM

## 2022-01-14 DIAGNOSIS — Z15.01 BRCA1 GENETIC CARRIER: ICD-10-CM

## 2022-01-14 PROCEDURE — 99204 OFFICE O/P NEW MOD 45 MIN: CPT | Performed by: SURGERY

## 2022-01-14 PROCEDURE — G0463 HOSPITAL OUTPT CLINIC VISIT: HCPCS

## 2022-01-14 RX ORDER — ACETAMINOPHEN 325 MG/1
975 TABLET ORAL ONCE
Status: CANCELLED | OUTPATIENT
Start: 2022-01-14 | End: 2022-01-14

## 2022-01-14 RX ORDER — CEFAZOLIN SODIUM 2 G/50ML
2 SOLUTION INTRAVENOUS SEE ADMIN INSTRUCTIONS
Status: CANCELLED | OUTPATIENT
Start: 2022-01-14

## 2022-01-14 RX ORDER — CEFAZOLIN SODIUM 2 G/50ML
2 SOLUTION INTRAVENOUS
Status: CANCELLED | OUTPATIENT
Start: 2022-01-14

## 2022-01-14 ASSESSMENT — MIFFLIN-ST. JEOR: SCORE: 1241.03

## 2022-01-14 ASSESSMENT — PAIN SCALES - GENERAL: PAINLEVEL: NO PAIN (0)

## 2022-01-14 NOTE — PROGRESS NOTES
NEW SURGICAL CONSULTATION  Jan 14, 2022    Yamila Myles is a 41 year old woman who presents with a pathogenic variant in BRCA1.  She was referred by LING Pradhan MD.    HPI:    She notes no masses in either breast, axilla, or neck. She denies any nipple discharge or nipple inversion.     Imaging showed no abnormalities in August 2021.    BREAST-SPECIFIC HISTORY:  Prior breast biopsies: Yes  Prior breast surgeries: No  Prior radiation history: No  Menopausal: s/p bilateral oophorectomy in 2016  Bra size: 36 C    Past Medical History:   Diagnosis Date     BRCA1 positive 6/2001     Breast disorder 6/8/2011     Encounter for insertion of mirena IUD 07/12/16     Hoarseness      Lump or mass in breast 2001    BRCA-1 positive (Mother, MGM w/ breast ca, mother w/ gene +), mother dx at 32)     Microcalcifications of the breast, right uoq 6/8/2011   No DM, asthma    Past Surgical History:   Procedure Laterality Date     INSERT INTRAUTERINE DEVICE N/A 7/12/2016    Procedure: INSERT INTRAUTERINE DEVICE;  Surgeon: Fidelia Ricks MD;  [facility]     LAPAROSCOPIC SALPINGO-OOPHORECTOMY Bilateral 7/12/2016    Procedure: LAPAROSCOPIC SALPINGO-OOPHORECTOMY;  Surgeon: Fidelia Ricks MD;  [facility]     LAPAROSCOPY OPERATIVE ADULT N/A 7/12/2016    Procedure: LAPAROSCOPY OPERATIVE ADULT;  Surgeon: Fidelia Ricks MD;  [facility]     ZZC APPENDECTOMY  1987   No GA issues    Current Outpatient Medications   Medication Sig Dispense Refill     albuterol (PROAIR HFA/PROVENTIL HFA/VENTOLIN HFA) 108 (90 BASE) MCG/ACT Inhaler Inhale 2 puffs into the lungs every 6 hours as needed for shortness of breath / dyspnea or wheezing (Patient not taking: Reported on 7/31/2019) 3 Inhaler 1     cetirizine (ZYRTEC) 10 MG tablet Take 10 mg by mouth daily       estradiol (ESTRACE) 2 MG tablet Take 1 tablet (2 mg) by mouth daily 90 tablet 3     estradiol (VAGIFEM) 10 MCG TABS vaginal tablet Place 1 tablet (10 mcg)  "vaginally twice a week 24 tablet 3     fluticasone (FLONASE) 50 MCG/ACT nasal spray Spray 2 sprays into both nostrils daily 16 g 3     levonorgestrel (MIRENA) 20 MCG/24HR IUD 1 each by Intrauterine route once. Lot # [id number]  To be removed by 6/12/2017       loratadine (CLARITIN) 10 MG tablet Take 10 mg by mouth daily       montelukast (SINGULAIR) 10 MG tablet Take 1 tablet (10 mg) by mouth every evening (Patient not taking: Reported on 7/31/2019) 30 tablet 0           Allergies   Allergen Reactions     Advil [Nsaids] Other (See Comments)     Nasal congestion        SOCIAL HISTORY:  Smokes: No    She works as a  for teachers. No heavy lifting.    ROS:  Easy bruising/bleeding: No    /74 (BP Location: Right arm, Patient Position: Sitting, Cuff Size: Adult Regular)   Pulse 89   Temp 98  F (36.7  C) (Oral)   Ht 1.626 m (5' 4\")   Wt 59.6 kg (131 lb 6.4 oz)   SpO2 100%   BMI 22.55 kg/m     Physical Exam  Constitutional:       Appearance: She is well-developed.   Chest:   Breasts: Breasts are symmetrical.      Right: No inverted nipple, mass, nipple discharge, skin change, tenderness, axillary adenopathy or supraclavicular adenopathy.      Left: No inverted nipple, mass, nipple discharge, skin change, tenderness, axillary adenopathy or supraclavicular adenopathy.        Comments: Patient was examined in both supine and upright positions.   Lymphadenopathy:      Cervical: No cervical adenopathy.      Right cervical: No superficial, deep or posterior cervical adenopathy.     Left cervical: No superficial, deep or posterior cervical adenopathy.      Upper Body:      Right upper body: No supraclavicular, axillary or pectoral adenopathy.      Left upper body: No supraclavicular, axillary or pectoral adenopathy.      Comments: No lymphedema in bilateral upper extremities.   Skin:     General: Skin is warm and dry.          INVESTIGATIONS:    Diagnostic Mammogram (8/11/2021) " showed:  FINDINGS:  MAMMOGRAM:  TECHNIQUE: Standard mammographic views were performed with tomosynthesis and synthetic mammogram.  BREAST DENSITY: Extremely dense.  Additional mammographic views demonstrates no significant change. No suspicious masses, calcifications, or architectural distortion.  IMPRESSION: BI-RADS CATEGORY: 1 -  Negative.    ASSESSMENT:  Yamila Myles is a 41 year old woman with a pathogenic variant in BRCA1.    I personally reviewed the imaging above.    The natural history of pathogenic variants in BRCA1 and breast cancer were discussed with the patient.  We reviewed the benefits of a risk-reducing mastectomy. We reviewed that a risk-reducing mastectomy does not eliminate the risk of breast cancer entirely, but rather is a relative risk reduction strategy, by reducing the risk by approximately 90%. In the setting of a pathogenic variant in BRCA1, the absolute benefits outweigh the risks.      We discussed the various types of mastectomy, including total, skin-sparing, and nipple-sparing mastectomy.  We reviewed that the nipple-sparing technique is cosmetic; sensation and contractility will likely be lost.  Sensation to the chest wall is lost regardless of technique.    We briefly reviewed the various types of reconstruction, immediate versus delayed, autologous versus implant-based. Yamila Myles IS interested in breast reconstruction and a consultation with Plastic Surgery has been completed.  Mastopexy was recommended by Dr Pradhan first, followed by delayed completion mastectomy with implant based reconstruction.  The risks of bilateral nipple-sparing mastectomy were discussed with the patient, including the risks of bleeding, wound infection, wound dehiscence, skin flap/nipple necrosis, and seroma formation.   We discussed that unless immediate autologous reconstruction is planned, this is a same day surgery.    We also reviewed that subsequent to bilateral risk-reducing mastectomy,  breast cancer screening will be based upon clinical exam.    We also reviewed alternatives to risk-reducing mastectomy, including increased intensity screening with breast MRI alternating with mammograms every 6 months.  Additional risk reduction strategies for breast (and ovarian) cancer include risk-reducing oophorectomy, and endocrine therapy.  Bilateral oophorectomy actually can decrease her risk of breast cancer by approximately 50%. She has already undergone oophorectomy.    Finally, while she is making her decision, it is important that she continue with high risk screening (MRI and mammograms).    All questions were answered.  She did seem to understand the above.  Yamila Myles elected to proceed with bilateral risk-reducing nipple-sparing mastectomy.     Total time spent with the patient was 35 minutes, of which 75% was counseling.     PLAN:  1. Due for bilateral breast MRI now  2. Proceed with mastopexy per Dr Pradhan when ready  3. BILATERAL risk-reducing nipple-sparing mastectomy at least 6 months after healed from mastopexy  4. Patient to report to her PCP for preoperative H&P and testing.    Monica Rodríguez MD MSc [facility] FACS  Assistant Professor of Surgery  Division of Surgical Oncology  [facility]     45 minutes spent on the date of the encounter doing chart review, review of test results, interpretation of tests, patient visit, documentation and discussion with family.

## 2022-01-14 NOTE — LETTER
1/14/2022         RE: Yamila Myles  [address] [facility] [address]        Dear Colleague,    Thank you for referring your patient, Yamila Myles, to the [facility]. Please see a copy of my visit note below.    NEW SURGICAL CONSULTATION  Jan 14, 2022    Yamila Myles is a 41 year old woman who presents with a pathogenic variant in BRCA1.  She was referred by LING Pradhan MD.    HPI:    She notes no masses in either breast, axilla, or neck. She denies any nipple discharge or nipple inversion.     Imaging showed no abnormalities in August 2021.    BREAST-SPECIFIC HISTORY:  Prior breast biopsies: Yes  Prior breast surgeries: No  Prior radiation history: No  Menopausal: s/p bilateral oophorectomy in 2016  Bra size: 36 C    Past Medical History:   Diagnosis Date     BRCA1 positive 6/2001     Breast disorder 6/8/2011     Encounter for insertion of mirena IUD 07/12/16     Hoarseness      Lump or mass in breast 2001    BRCA-1 positive (Mother, MGM w/ breast ca, mother w/ gene +), mother dx at 32)     Microcalcifications of the breast, right uoq 6/8/2011   No DM, asthma    Past Surgical History:   Procedure Laterality Date     INSERT INTRAUTERINE DEVICE N/A 7/12/2016    Procedure: INSERT INTRAUTERINE DEVICE;  Surgeon: Fidelia Ricks MD;  [facility]     LAPAROSCOPIC SALPINGO-OOPHORECTOMY Bilateral 7/12/2016    Procedure: LAPAROSCOPIC SALPINGO-OOPHORECTOMY;  Surgeon: Fidelia Ricks MD;  [facility]     LAPAROSCOPY OPERATIVE ADULT N/A 7/12/2016    Procedure: LAPAROSCOPY OPERATIVE ADULT;  Surgeon: Fidelia Ricks MD;  [facility]     ZZC APPENDECTOMY  1987   No GA issues    Current Outpatient Medications   Medication Sig Dispense Refill     albuterol (PROAIR HFA/PROVENTIL HFA/VENTOLIN HFA) 108 (90 BASE) MCG/ACT Inhaler Inhale 2 puffs into the lungs every 6 hours as needed for shortness of breath / dyspnea or wheezing (Patient not taking:  "Reported on 7/31/2019) 3 Inhaler 1     cetirizine (ZYRTEC) 10 MG tablet Take 10 mg by mouth daily       estradiol (ESTRACE) 2 MG tablet Take 1 tablet (2 mg) by mouth daily 90 tablet 3     estradiol (VAGIFEM) 10 MCG TABS vaginal tablet Place 1 tablet (10 mcg) vaginally twice a week 24 tablet 3     fluticasone (FLONASE) 50 MCG/ACT nasal spray Spray 2 sprays into both nostrils daily 16 g 3     levonorgestrel (MIRENA) 20 MCG/24HR IUD 1 each by Intrauterine route once. 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    ROS:  Easy bruising/bleeding: No    /74 (BP Location: Right arm, Patient Position: Sitting, Cuff Size: Adult Regular)   Pulse 89   Temp 98  F (36.7  C) (Oral)   Ht 1.626 m (5' 4\")   Wt 59.6 kg (131 lb 6.4 oz)   SpO2 100%   BMI 22.55 kg/m     Physical Exam  Constitutional:       Appearance: She is well-developed.   Chest:   Breasts: Breasts are symmetrical.      Right: No inverted nipple, mass, nipple discharge, skin change, tenderness, axillary adenopathy or supraclavicular adenopathy.      Left: No inverted nipple, mass, nipple discharge, skin change, tenderness, axillary adenopathy or supraclavicular adenopathy.        Comments: Patient was examined in both supine and upright positions.   Lymphadenopathy:      Cervical: No cervical adenopathy.      Right cervical: No superficial, deep or posterior cervical adenopathy.     Left cervical: No superficial, deep or posterior cervical adenopathy.      Upper Body:      Right upper body: No supraclavicular, axillary or pectoral adenopathy.      Left " upper body: No supraclavicular, axillary or pectoral adenopathy.      Comments: No lymphedema in bilateral upper extremities.   Skin:     General: Skin is warm and dry.          INVESTIGATIONS:    Diagnostic Mammogram (8/11/2021) showed:  FINDINGS:  MAMMOGRAM:  TECHNIQUE: Standard mammographic views were performed with tomosynthesis and synthetic mammogram.  BREAST DENSITY: Extremely dense.  Additional mammographic views demonstrates no significant change. No suspicious masses, calcifications, or architectural distortion.  IMPRESSION: BI-RADS CATEGORY: 1 -  Negative.    ASSESSMENT:  Yamila Myles is a 41 year old woman with a pathogenic variant in BRCA1.    I personally reviewed the imaging above.    The natural history of pathogenic variants in BRCA1 and breast cancer were discussed with the patient.  We reviewed the benefits of a risk-reducing mastectomy. We reviewed that a risk-reducing mastectomy does not eliminate the risk of breast cancer entirely, but rather is a relative risk reduction strategy, by reducing the risk by approximately 90%. In the setting of a pathogenic variant in BRCA1, the absolute benefits outweigh the risks.      We discussed the various types of mastectomy, including total, skin-sparing, and nipple-sparing mastectomy.  We reviewed that the nipple-sparing technique is cosmetic; sensation and contractility will likely be lost.  Sensation to the chest wall is lost regardless of technique.    We briefly reviewed the various types of reconstruction, immediate versus delayed, autologous versus implant-based. Yamila Myles IS interested in breast reconstruction and a consultation with Plastic Surgery has been completed.  Mastopexy was recommended by Dr Pradhan first, followed by delayed completion mastectomy with implant based reconstruction.  The risks of bilateral nipple-sparing mastectomy were discussed with the patient, including the risks of bleeding, wound infection, wound dehiscence,  skin flap/nipple necrosis, and seroma formation.   We discussed that unless immediate autologous reconstruction is planned, this is a same day surgery.    We also reviewed that subsequent to bilateral risk-reducing mastectomy, breast cancer screening will be based upon clinical exam.    We also reviewed alternatives to risk-reducing mastectomy, including increased intensity screening with breast MRI alternating with mammograms every 6 months.  Additional risk reduction strategies for breast (and ovarian) cancer include risk-reducing oophorectomy, and endocrine therapy.  Bilateral oophorectomy actually can decrease her risk of breast cancer by approximately 50%. She has already undergone oophorectomy.    Finally, while she is making her decision, it is important that she continue with high risk screening (MRI and mammograms).    All questions were answered.  She did seem to understand the above.  Yamila Myles elected to proceed with bilateral risk-reducing nipple-sparing mastectomy.     Total time spent with the patient was 35 minutes, of which 75% was counseling.     PLAN:  1. Due for bilateral breast MRI now  2. Proceed with mastopexy per Dr Pradhan when ready  3. BILATERAL risk-reducing nipple-sparing mastectomy at least 6 months after healed from mastopexy  4. Patient to report to her PCP for preoperative H&P and testing.    Monica Rodríguez MD MSc [facility] FACS  Assistant Professor of Surgery  Division of Surgical Oncology  [facility]     45 minutes spent on the date of the encounter doing chart review, review of test results, interpretation of tests, patient visit, documentation and discussion with family.          Again, thank you for allowing me to participate in the care of your patient.      Sincerely,    Monica Rodríguez MD

## 2022-01-14 NOTE — NURSING NOTE
"Oncology Rooming Note    January 14, 2022 1:50 PM   Yamila Myles is a 41 year old female who presents for:    Chief Complaint   Patient presents with     Oncology Clinic Visit     BRCA1 Genetic Carrier      Initial Vitals: /74 (BP Location: Right arm, Patient Position: Sitting, Cuff Size: Adult Regular)   Pulse 89   Temp 98  F (36.7  C) (Oral)   Ht 1.626 m (5' 4\")   Wt 59.6 kg (131 lb 6.4 oz)   SpO2 100%   BMI 22.55 kg/m   Estimated body mass index is 22.55 kg/m  as calculated from the following:    Height as of this encounter: 1.626 m (5' 4\").    Weight as of this encounter: 59.6 kg (131 lb 6.4 oz). Body surface area is 1.64 meters squared.  No Pain (0) Comment: Data Unavailable   No LMP recorded. (Menstrual status: IUD).  Allergies reviewed: Yes  Medications reviewed: Yes    Medications: Medication refills not needed today.  Pharmacy name entered into [facility]:    [id number] - [place], [place] - [address] AT [facility] [address]  [id number] - [place], [place] - [address] AT [facility] [address]    Clinical concerns: None       Luis Pérez MA            "

## 2022-01-26 ENCOUNTER — TELEPHONE (OUTPATIENT)
Dept: SURGERY | Facility: CLINIC | Age: 42
End: 2022-01-26
Payer: COMMERCIAL

## 2022-01-26 PROBLEM — Z15.09 BRCA1 GENETIC CARRIER: Status: ACTIVE | Noted: 2022-01-26

## 2022-01-26 PROBLEM — Z15.01 BRCA1 GENETIC CARRIER: Status: ACTIVE | Noted: 2022-01-26

## 2022-01-26 NOTE — TELEPHONE ENCOUNTER
Contacted the patient to confirm the scheduled dates and provide the following information:     Surgeon/surgery date/location:  Dr. Pradhan on 4/13 at [facility].  Arrival:   10:30 AM   Pre-op consult:   Dr. Pradhan on 4/5.   Pre-op physical with:   PCP. Patient is aware this needs to be completed within 30 days of surgery.  COVID-19 test:   4/9.   Post-op:   4/26.    The surgery packet was provided via Switch[employer].

## 2022-02-21 ENCOUNTER — ANCILLARY PROCEDURE (OUTPATIENT)
Dept: MRI IMAGING | Facility: CLINIC | Age: 42
End: 2022-02-21
Attending: SURGERY
Payer: COMMERCIAL

## 2022-02-21 DIAGNOSIS — Z15.01 BRCA1 GENETIC CARRIER: ICD-10-CM

## 2022-02-21 DIAGNOSIS — Z15.09 BRCA1 GENETIC CARRIER: ICD-10-CM

## 2022-02-21 PROCEDURE — A9585 GADOBUTROL INJECTION: HCPCS | Performed by: RADIOLOGY

## 2022-02-21 PROCEDURE — 77049 MRI BREAST C-+ W/CAD BI: CPT | Performed by: RADIOLOGY

## 2022-02-21 RX ORDER — GADOBUTROL 604.72 MG/ML
7.5 INJECTION INTRAVENOUS ONCE
Status: COMPLETED | OUTPATIENT
Start: 2022-02-21 | End: 2022-02-21

## 2022-02-21 RX ADMIN — GADOBUTROL 6 ML: 604.72 INJECTION INTRAVENOUS at 18:07

## 2022-02-22 NOTE — DISCHARGE INSTRUCTIONS
MRI Contrast Discharge Instructions    The IV contrast you received today will pass out of your body in your  urine. This will happen in the next 24 hours. You will not feel this process.  Your urine will not change color.    Drink at least 4 extra glasses of water or juice today (unless your doctor  has restricted your fluids). This reduces the stress on your kidneys.  You may take your regular medicines.    If you are on dialysis: It is best to have dialysis today.    If you have a reaction: Most reactions happen right away. If you have  any new symptoms after leaving the hospital (such as hives or swelling),  call your hospital at the correct number below. Or call your family doctor.  If you have breathing distress or wheezing, call 911.    Special instructions: ***    I have read and understand the above information.    Signature:______________________________________ Date:___________    Staff:__________________________________________ Date:___________     Time:__________    [facility] Departments:    ___[place]: [phone]  ___[facility]: [phone]  ___[place]: [phone] ___[facility]: [phone]  ___[facility]: [phone]  ___[facility]: [phone]  ___[phone]  ___[facility]: [phone]  ___[phone]

## 2022-02-27 DIAGNOSIS — Z11.59 ENCOUNTER FOR SCREENING FOR OTHER VIRAL DISEASES: Primary | ICD-10-CM

## 2022-04-01 ENCOUNTER — OFFICE VISIT (OUTPATIENT)
Dept: OBGYN | Facility: CLINIC | Age: 42
End: 2022-04-01
Attending: NURSE PRACTITIONER
Payer: COMMERCIAL

## 2022-04-01 VITALS
WEIGHT: 123 LBS | HEIGHT: 64 IN | HEART RATE: 69 BPM | SYSTOLIC BLOOD PRESSURE: 106 MMHG | BODY MASS INDEX: 21 KG/M2 | DIASTOLIC BLOOD PRESSURE: 71 MMHG

## 2022-04-01 DIAGNOSIS — Z15.09 BRCA1 GENE MUTATION POSITIVE IN FEMALE: ICD-10-CM

## 2022-04-01 DIAGNOSIS — Z15.01 BRCA1 GENE MUTATION POSITIVE IN FEMALE: ICD-10-CM

## 2022-04-01 DIAGNOSIS — Z01.818 PREOP GENERAL PHYSICAL EXAM: Primary | ICD-10-CM

## 2022-04-01 DIAGNOSIS — Z15.02 BRCA1 GENE MUTATION POSITIVE IN FEMALE: ICD-10-CM

## 2022-04-01 PROCEDURE — 99214 OFFICE O/P EST MOD 30 MIN: CPT | Performed by: NURSE PRACTITIONER

## 2022-04-01 PROCEDURE — G0463 HOSPITAL OUTPT CLINIC VISIT: HCPCS

## 2022-04-01 RX ORDER — CALCIUM CARBONATE 500(1250)
1 TABLET ORAL EVERY EVENING
COMMUNITY

## 2022-04-01 RX ORDER — FAMOTIDINE 20 MG
1000 TABLET ORAL EVERY EVENING
COMMUNITY
End: 2024-05-21

## 2022-04-01 ASSESSMENT — PAIN SCALES - GENERAL: PAINLEVEL: NO PAIN (0)

## 2022-04-01 NOTE — PROGRESS NOTES
Patient Name:  Yamila Myles present for Pre-operative clearance  Procedure: Bilateral breast lift, due to known BRCA 1 gene mutation positive; eventual planned prophylactic nipple-sparing mastectomy.  Surgeon: Dr. Pradhan  Date of Surgery:  4/13/2022  Location of Surgery:  [facility]  Fax number for pre-op form:  EPIC    Chief Complaint:   Chief Complaint   Patient presents with     Pre Op Exam     Bilateral breast lift 4/13/2022      Allergies:   Allergies   Allergen Reactions     Advil [Nsaids] Other (See Comments)     Nasal congestion  Eye get puffy        Seasonal Allergies      Pollen and mold   Spring and Fall are main periods. Takes Zyrtec year round to manage it.   Gets post nasal drip       Preoperative Questions   1. No - Have you ever had a heart attack or stroke?  2. No - Have you ever had surgery on your heart or blood vessels, such as a stent, coronary (heart) bypass, or surgery on an artery in the head, neck, heart, or legs?  3. No - Do you have chest pain when you are physically active?  4. No - Do you have a history of heart failure?  5. No - Do you currently have a cold, bronchitis, or symptoms of other respiratory (head and chest) infections?  6. No - Do you have a cough, shortness of breath, or wheezing?  7. No - Do you or anyone in your family have a history of blood clots?  8. No - Do you or anyone in your family have a serious bleeding problem, such as long-lasting bleeding after surgeries or cuts?  9. No - Have you ever had anemia or been told to take iron pills?  10. No - Have you had any abnormal blood loss such as black, tarry or bloody stools, or abnormal vaginal bleeding?  11. No - Have you ever had a blood transfusion?  12. Yes- Are you willing to have a blood transfusion if it is medically needed before, during, or after your surgery?  13. No- Have you or anyone in your family ever had problems with anesthesia (sedation for surgery)? Sister has hx of post-op nausea   14. No - Do you have  sleep apnea, excessive snoring, or daytime drowsiness?   15. No - Do you have any artifical heart valves or other implanted medical devices, such as a pacemaker, defibrillator, or continuous glucose monitor?  16. No - Do you have any artifical joints?  17. No-  Are you allergic to latex?    PAST MEDICAL HISTORY:  =====================  Past Medical History:   Diagnosis Date     BRCA1 positive 6/2001     Breast disorder 6/8/2011     Encounter for insertion of mirena IUD 07/12/16     Hoarseness      Lump or mass in breast 2001    BRCA-1 positive (Mother, MGM w/ breast ca, mother w/ gene +), mother dx at 32)     Microcalcifications of the breast, right uoq 6/8/2011     PAST SURGICAL HISTORY:  ======================  Past Surgical History:   Procedure Laterality Date     INSERT INTRAUTERINE DEVICE N/A 7/12/2016    Procedure: INSERT INTRAUTERINE DEVICE;  Surgeon: Fidelia Ricks MD;  [facility]     LAPAROSCOPIC SALPINGO-OOPHORECTOMY Bilateral 7/12/2016    Procedure: LAPAROSCOPIC SALPINGO-OOPHORECTOMY;  Surgeon: Fidelia Ricks MD;  [facility]     LAPAROSCOPY OPERATIVE ADULT N/A 7/12/2016    Procedure: LAPAROSCOPY OPERATIVE ADULT;  Surgeon: Fidelia Ricks MD;  [facility]     ZZC APPENDECTOMY  1987       REVIEW OF SYSTEMS:  ==================  CONSTITUTIONAL:NEGATIVE for fever, chills, change in weight  INTEGUMENTARY/SKIN: NEGATIVE for worrisome rashes, moles or lesions  EYES: NEGATIVE for vision changes or irritation  ENT/MOUTH: NEGATIVE for ear, mouth and throat problems  RESP:NEGATIVE for significant cough or SOB  BREAST: NEGATIVE for masses, tenderness or discharge  CV: NEGATIVE for chest pain, palpitations or peripheral edema  GI: NEGATIVE for nausea, abdominal pain, heartburn, or change in bowel habits  : amenorrhea due to oophorectomy;  dysparunia, dysuria, hematuria and incontinence  MUSCULOSKELETAL: NEGATIVE for significant arthralgias or myalgia  NEURO: NEGATIVE for weakness,  "dizziness or paresthesias  ENDOCRINE: NEGATIVE for temperature intolerance, skin/hair changes  HEME/ALLERGY/IMMUNE: NEGATIVE for bleeding problems  PSYCHIATRIC: NEGATIVE for changes in mood or affect    MEDICATIONS:  =====  Current Outpatient Medications   Medication     calcium carbonate (OS-GRETCHEN) 500 MG tablet     cetirizine (ZYRTEC) 10 MG tablet     estradiol (ESTRACE) 2 MG tablet     fluticasone (FLONASE) 50 MCG/ACT nasal spray     HEMP OIL OR EXTRACT OR OTHER CBD CANNABINOID, NOT MEDICAL CANNABIS,     levonorgestrel (MIRENA) 20 MCG/24HR IUD     Vitamin D, Cholecalciferol, 25 MCG (1000 UT) CAPS     estradiol (VAGIFEM) 10 MCG TABS vaginal tablet     loratadine (CLARITIN) 10 MG tablet     No current facility-administered medications for this visit.     Facility-Administered Medications Ordered in Other Visits   Medication     lidocaine 1 % 9 mL     lidocaine 1 % 9 mL     sodium bicarbonate 8.4 % injection 1 mEq     sodium bicarbonate 8.4 % injection 1 mEq      EXAM:  =====  /71   Pulse 69   Ht 1.619 m (5' 3.75\")   Wt 55.8 kg (123 lb)   Breastfeeding No   BMI 21.28 kg/m    GENERAL APPEARANCE: healthy, alert and no distress  HENT: ear canals and TM's normal and nose and mouth without ulcers or lesions  Neck: Trachea midline, thyroid without enlargement  RESP: lungs clear to auscultation - no rales, rhonchi or wheezes  CV: regular rate and rhythm, normal S1 S2, no S3 or S4 and no murmur, click or rub   ABDOMEN: soft, nontender, no masses; bowel sounds normal  NEURO: Normal strength and tone, sensory exam grossly normal, mentation intact and speech normal   MUSCULOSKELETAL: no gross deformities,normal gait    DIAGNOSTICS:   ============  EKG: Not indicated due to non-vascular surgery and low risk of event (age <65 and without cardiac risk factors)  No labs indicated    IMPRESSION:  ===========  For above listed surgery and anesthesia:   Patient is LOW risk for surgery and perioperative " complications.    RECOMMENDATIONS:  ==================  Below recommendations were reviewed with patient  Approval given to proceed with proposed procedure, without further diagnostic evaluation.      Signed Electronically by:     I, Shanna Hill, completed the PFSH and ROS. I then acted as a scribe for ZARA Fontaine, for the remainder of the visit.  Shanna MACEN, RN, ZARA DNP Student    I agree with the PFSH and ROS as completed by the ZARA Student, except for changes made by me.  The remainder of the encounter was performed by me and scribed by the ZARA Student.  The scribed note accurately reflects my personal services and decisions made by me.  Elly Hernandez DNP, APRBRYAN, ZARA    Copy of this consultation report is provided to requesting physician.

## 2022-04-01 NOTE — H&P (VIEW-ONLY)
Patient Name:  Yamila Myles present for Pre-operative clearance  Procedure: Bilateral breast lift, due to known BRCA 1 gene mutation positive; eventual planned prophylactic nipple-sparing mastectomy.  Surgeon: Dr. Pradhan  Date of Surgery:  4/13/2022  Location of Surgery:  [facility]  Fax number for pre-op form:  EPIC    Chief Complaint:   Chief Complaint   Patient presents with     Pre Op Exam     Bilateral breast lift 4/13/2022      Allergies:   Allergies   Allergen Reactions     Advil [Nsaids] Other (See Comments)     Nasal congestion  Eye get puffy        Seasonal Allergies      Pollen and mold   Spring and Fall are main periods. Takes Zyrtec year round to manage it.   Gets post nasal drip       Preoperative Questions   1. No - Have you ever had a heart attack or stroke?  2. No - Have you ever had surgery on your heart or blood vessels, such as a stent, coronary (heart) bypass, or surgery on an artery in the head, neck, heart, or legs?  3. No - Do you have chest pain when you are physically active?  4. No - Do you have a history of heart failure?  5. No - Do you currently have a cold, bronchitis, or symptoms of other respiratory (head and chest) infections?  6. No - Do you have a cough, shortness of breath, or wheezing?  7. No - Do you or anyone in your family have a history of blood clots?  8. No - Do you or anyone in your family have a serious bleeding problem, such as long-lasting bleeding after surgeries or cuts?  9. No - Have you ever had anemia or been told to take iron pills?  10. No - Have you had any abnormal blood loss such as black, tarry or bloody stools, or abnormal vaginal bleeding?  11. No - Have you ever had a blood transfusion?  12. Yes- Are you willing to have a blood transfusion if it is medically needed before, during, or after your surgery?  13. No- Have you or anyone in your family ever had problems with anesthesia (sedation for surgery)? Sister has hx of post-op nausea   14. No - Do you have  sleep apnea, excessive snoring, or daytime drowsiness?   15. No - Do you have any artifical heart valves or other implanted medical devices, such as a pacemaker, defibrillator, or continuous glucose monitor?  16. No - Do you have any artifical joints?  17. No-  Are you allergic to latex?    PAST MEDICAL HISTORY:  =====================  Past Medical History:   Diagnosis Date     BRCA1 positive 6/2001     Breast disorder 6/8/2011     Encounter for insertion of mirena IUD 07/12/16     Hoarseness      Lump or mass in breast 2001    BRCA-1 positive (Mother, MGM w/ breast ca, mother w/ gene +), mother dx at 32)     Microcalcifications of the breast, right uoq 6/8/2011     PAST SURGICAL HISTORY:  ======================  Past Surgical History:   Procedure Laterality Date     INSERT INTRAUTERINE DEVICE N/A 7/12/2016    Procedure: INSERT INTRAUTERINE DEVICE;  Surgeon: Fidelia Ricks MD;  [facility]     LAPAROSCOPIC SALPINGO-OOPHORECTOMY Bilateral 7/12/2016    Procedure: LAPAROSCOPIC SALPINGO-OOPHORECTOMY;  Surgeon: Fidelia Ricks MD;  [facility]     LAPAROSCOPY OPERATIVE ADULT N/A 7/12/2016    Procedure: LAPAROSCOPY OPERATIVE ADULT;  Surgeon: Fidelia Ricks MD;  [facility]     ZZC APPENDECTOMY  1987       REVIEW OF SYSTEMS:  ==================  CONSTITUTIONAL:NEGATIVE for fever, chills, change in weight  INTEGUMENTARY/SKIN: NEGATIVE for worrisome rashes, moles or lesions  EYES: NEGATIVE for vision changes or irritation  ENT/MOUTH: NEGATIVE for ear, mouth and throat problems  RESP:NEGATIVE for significant cough or SOB  BREAST: NEGATIVE for masses, tenderness or discharge  CV: NEGATIVE for chest pain, palpitations or peripheral edema  GI: NEGATIVE for nausea, abdominal pain, heartburn, or change in bowel habits  : amenorrhea due to oophorectomy;  dysparunia, dysuria, hematuria and incontinence  MUSCULOSKELETAL: NEGATIVE for significant arthralgias or myalgia  NEURO: NEGATIVE for weakness,  "dizziness or paresthesias  ENDOCRINE: NEGATIVE for temperature intolerance, skin/hair changes  HEME/ALLERGY/IMMUNE: NEGATIVE for bleeding problems  PSYCHIATRIC: NEGATIVE for changes in mood or affect    MEDICATIONS:  =====  Current Outpatient Medications   Medication     calcium carbonate (OS-GRETCHEN) 500 MG tablet     cetirizine (ZYRTEC) 10 MG tablet     estradiol (ESTRACE) 2 MG tablet     fluticasone (FLONASE) 50 MCG/ACT nasal spray     HEMP OIL OR EXTRACT OR OTHER CBD CANNABINOID, NOT MEDICAL CANNABIS,     levonorgestrel (MIRENA) 20 MCG/24HR IUD     Vitamin D, Cholecalciferol, 25 MCG (1000 UT) CAPS     estradiol (VAGIFEM) 10 MCG TABS vaginal tablet     loratadine (CLARITIN) 10 MG tablet     No current facility-administered medications for this visit.     Facility-Administered Medications Ordered in Other Visits   Medication     lidocaine 1 % 9 mL     lidocaine 1 % 9 mL     sodium bicarbonate 8.4 % injection 1 mEq     sodium bicarbonate 8.4 % injection 1 mEq      EXAM:  =====  /71   Pulse 69   Ht 1.619 m (5' 3.75\")   Wt 55.8 kg (123 lb)   Breastfeeding No   BMI 21.28 kg/m    GENERAL APPEARANCE: healthy, alert and no distress  HENT: ear canals and TM's normal and nose and mouth without ulcers or lesions  Neck: Trachea midline, thyroid without enlargement  RESP: lungs clear to auscultation - no rales, rhonchi or wheezes  CV: regular rate and rhythm, normal S1 S2, no S3 or S4 and no murmur, click or rub   ABDOMEN: soft, nontender, no masses; bowel sounds normal  NEURO: Normal strength and tone, sensory exam grossly normal, mentation intact and speech normal   MUSCULOSKELETAL: no gross deformities,normal gait    DIAGNOSTICS:   ============  EKG: Not indicated due to non-vascular surgery and low risk of event (age <65 and without cardiac risk factors)  No labs indicated    IMPRESSION:  ===========  For above listed surgery and anesthesia:   Patient is LOW risk for surgery and perioperative " complications.    RECOMMENDATIONS:  ==================  Below recommendations were reviewed with patient  Approval given to proceed with proposed procedure, without further diagnostic evaluation.      Signed Electronically by:     I, Shanna Hill, completed the PFSH and ROS. I then acted as a scribe for ZARA Fontaine, for the remainder of the visit.  Shanna MACEN, RN, ZARA DNP Student    I agree with the PFSH and ROS as completed by the ZARA Student, except for changes made by me.  The remainder of the encounter was performed by me and scribed by the ZARA Student.  The scribed note accurately reflects my personal services and decisions made by me.  Elly Hernandez DNP, APRBRYAN, ZARA    Copy of this consultation report is provided to requesting physician.

## 2022-04-01 NOTE — LETTER
4/1/2022       RE: Yamila Myles  [address] [address]     Dear Colleague,    Thank you for referring your patient, Yamila Myles, to the [facility] [place] [facility]. Please see a copy of my visit note below.    Patient Name:  Yamila Myles present for Pre-operative clearance  Procedure: Bilateral breast lift, due to known BRCA 1 gene mutation positive; eventual planned prophylactic nipple-sparing mastectomy.  Surgeon: Dr. Pradhan  Date of Surgery:  4/13/2022  Location of Surgery:  [facility]  Fax number for pre-op form:  EPIC    Chief Complaint:   Chief Complaint   Patient presents with     Pre Op Exam     Bilateral breast lift 4/13/2022      Allergies:   Allergies   Allergen Reactions     Advil [Nsaids] Other (See Comments)     Nasal congestion  Eye get puffy        Seasonal Allergies      Pollen and mold   Spring and Fall are main periods. Takes Zyrtec year round to manage it.   Gets post nasal drip       Preoperative Questions   1. No - Have you ever had a heart attack or stroke?  2. No - Have you ever had surgery on your heart or blood vessels, such as a stent, coronary (heart) bypass, or surgery on an artery in the head, neck, heart, or legs?  3. No - Do you have chest pain when you are physically active?  4. No - Do you have a history of heart failure?  5. No - Do you currently have a cold, bronchitis, or symptoms of other respiratory (head and chest) infections?  6. No - Do you have a cough, shortness of breath, or wheezing?  7. No - Do you or anyone in your family have a history of blood clots?  8. No - Do you or anyone in your family have a serious bleeding problem, such as long-lasting bleeding after surgeries or cuts?  9. No - Have you ever had anemia or been told to take iron pills?  10. No - Have you had any abnormal blood loss such as black, tarry or bloody stools, or abnormal vaginal bleeding?  11. No -  Have you ever had a blood transfusion?  12. Yes- Are you willing to have a blood transfusion if it is medically needed before, during, or after your surgery?  13. No- Have you or anyone in your family ever had problems with anesthesia (sedation for surgery)? Sister has hx of post-op nausea   14. No - Do you have sleep apnea, excessive snoring, or daytime drowsiness?   15. No - Do you have any artifical heart valves or other implanted medical devices, such as a pacemaker, defibrillator, or continuous glucose monitor?  16. No - Do you have any artifical joints?  17. No-  Are you allergic to latex?    PAST MEDICAL HISTORY:  =====================  Past Medical History:   Diagnosis Date     BRCA1 positive 6/2001     Breast disorder 6/8/2011     Encounter for insertion of mirena IUD 07/12/16     Hoarseness      Lump or mass in breast 2001    BRCA-1 positive (Mother, MGM w/ breast ca, mother w/ gene +), mother dx at 32)     Microcalcifications of the breast, right uoq 6/8/2011     PAST SURGICAL HISTORY:  ======================  Past Surgical History:   Procedure Laterality Date     INSERT INTRAUTERINE DEVICE N/A 7/12/2016    Procedure: INSERT INTRAUTERINE DEVICE;  Surgeon: Fidelia Ricks MD;  [facility]     LAPAROSCOPIC SALPINGO-OOPHORECTOMY Bilateral 7/12/2016    Procedure: LAPAROSCOPIC SALPINGO-OOPHORECTOMY;  Surgeon: Fidelia Ricks MD;  [facility]     LAPAROSCOPY OPERATIVE ADULT N/A 7/12/2016    Procedure: LAPAROSCOPY OPERATIVE ADULT;  Surgeon: Fidelia Ricks MD;  [facility]     ZZC APPENDECTOMY  1987       REVIEW OF SYSTEMS:  ==================  CONSTITUTIONAL:NEGATIVE for fever, chills, change in weight  INTEGUMENTARY/SKIN: NEGATIVE for worrisome rashes, moles or lesions  EYES: NEGATIVE for vision changes or irritation  ENT/MOUTH: NEGATIVE for ear, mouth and throat problems  RESP:NEGATIVE for significant cough or SOB  BREAST: NEGATIVE for masses, tenderness or discharge  CV: NEGATIVE  "for chest pain, palpitations or peripheral edema  GI: NEGATIVE for nausea, abdominal pain, heartburn, or change in bowel habits  : amenorrhea due to oophorectomy;  dysparunia, dysuria, hematuria and incontinence  MUSCULOSKELETAL: NEGATIVE for significant arthralgias or myalgia  NEURO: NEGATIVE for weakness, dizziness or paresthesias  ENDOCRINE: NEGATIVE for temperature intolerance, skin/hair changes  HEME/ALLERGY/IMMUNE: NEGATIVE for bleeding problems  PSYCHIATRIC: NEGATIVE for changes in mood or affect    MEDICATIONS:  =====  Current Outpatient Medications   Medication     calcium carbonate (OS-GRETCHEN) 500 MG tablet     cetirizine (ZYRTEC) 10 MG tablet     estradiol (ESTRACE) 2 MG tablet     fluticasone (FLONASE) 50 MCG/ACT nasal spray     HEMP OIL OR EXTRACT OR OTHER CBD CANNABINOID, NOT MEDICAL CANNABIS,     levonorgestrel (MIRENA) 20 MCG/24HR IUD     Vitamin D, Cholecalciferol, 25 MCG (1000 UT) CAPS     estradiol (VAGIFEM) 10 MCG TABS vaginal tablet     loratadine (CLARITIN) 10 MG tablet     No current facility-administered medications for this visit.     Facility-Administered Medications Ordered in Other Visits   Medication     lidocaine 1 % 9 mL     lidocaine 1 % 9 mL     sodium bicarbonate 8.4 % injection 1 mEq     sodium bicarbonate 8.4 % injection 1 mEq      EXAM:  =====  /71   Pulse 69   Ht 1.619 m (5' 3.75\")   Wt 55.8 kg (123 lb)   Breastfeeding No   BMI 21.28 kg/m    GENERAL APPEARANCE: healthy, alert and no distress  HENT: ear canals and TM's normal and nose and mouth without ulcers or lesions  Neck: Trachea midline, thyroid without enlargement  RESP: lungs clear to auscultation - no rales, rhonchi or wheezes  CV: regular rate and rhythm, normal S1 S2, no S3 or S4 and no murmur, click or rub   ABDOMEN: soft, nontender, no masses; bowel sounds normal  NEURO: Normal strength and tone, sensory exam grossly normal, mentation intact and speech normal   MUSCULOSKELETAL: no gross deformities,normal " gait    DIAGNOSTICS:   ============  EKG: Not indicated due to non-vascular surgery and low risk of event (age <65 and without cardiac risk factors)  No labs indicated    IMPRESSION:  ===========  For above listed surgery and anesthesia:   Patient is LOW risk for surgery and perioperative complications.    RECOMMENDATIONS:  ==================  Below recommendations were reviewed with patient  Approval given to proceed with proposed procedure, without further diagnostic evaluation.      Signed Electronically by:     I, Shanna Hill, completed the PFSH and ROS. I then acted as a scribe for ZARA Fontaine, for the remainder of the visit.  Shanna Hill BSN, RN, ZARA DNP Student    I agree with the PFSH and ROS as completed by the ZARA Student, except for changes made by me.  The remainder of the encounter was performed by me and scribed by the ZARA Student.  The scribed note accurately reflects my personal services and decisions made by me.  Elly Hernandez DNP, APRBRYAN, ZARA    Copy of this consultation report is provided to requesting physician.

## 2022-04-01 NOTE — NURSING NOTE
Chief Complaint   Patient presents with     Pre Op Exam     Bilateral breast lift 4/13/2022   Zita Humphries LPN

## 2022-04-05 ENCOUNTER — OFFICE VISIT (OUTPATIENT)
Dept: PLASTIC SURGERY | Facility: CLINIC | Age: 42
End: 2022-04-05
Attending: PLASTIC SURGERY
Payer: COMMERCIAL

## 2022-04-05 VITALS
HEART RATE: 76 BPM | DIASTOLIC BLOOD PRESSURE: 66 MMHG | BODY MASS INDEX: 21.28 KG/M2 | TEMPERATURE: 97.7 F | WEIGHT: 123 LBS | SYSTOLIC BLOOD PRESSURE: 97 MMHG | OXYGEN SATURATION: 98 %

## 2022-04-05 DIAGNOSIS — Z15.01 BRCA1 GENETIC CARRIER: Primary | ICD-10-CM

## 2022-04-05 DIAGNOSIS — Z15.09 BRCA1 GENETIC CARRIER: Primary | ICD-10-CM

## 2022-04-05 PROCEDURE — 99213 OFFICE O/P EST LOW 20 MIN: CPT | Performed by: PLASTIC SURGERY

## 2022-04-05 PROCEDURE — G0463 HOSPITAL OUTPT CLINIC VISIT: HCPCS

## 2022-04-05 ASSESSMENT — PAIN SCALES - GENERAL: PAINLEVEL: NO PAIN (0)

## 2022-04-05 NOTE — NURSING NOTE
"Oncology Rooming Note    April 5, 2022 9:40 AM   Yamila Myles is a 41 year old female who presents for:    Chief Complaint   Patient presents with     Oncology Clinic Visit     rtn for pre-op consult     Initial Vitals: BP 97/66   Pulse 76   Temp 97.7  F (36.5  C) (Oral)   Wt 55.8 kg (123 lb)   SpO2 98%   BMI 21.28 kg/m   Estimated body mass index is 21.28 kg/m  as calculated from the following:    Height as of 4/1/22: 1.619 m (5' 3.75\").    Weight as of this encounter: 55.8 kg (123 lb). Body surface area is 1.58 meters squared.  No Pain (0) Comment: Data Unavailable   No LMP recorded. (Menstrual status: IUD).  Allergies reviewed: Yes  Medications reviewed: Yes    Medications: Medication refills not needed today.  Pharmacy name entered into [facility]:    [id number] - [place], [place] - [address] AT [facility] [address] [id number] - [place], [place] - [address] AT [facility] [address]    Clinical concerns: none       Chantelle Oliveira, EMT            "

## 2022-04-05 NOTE — PROGRESS NOTES
PRESENTING COMPLAINT:  Preoperative visit for upcoming bilateral stage I mastopexy in preparation for prophylactic nipple-sparing mastectomy and reconstruction for BRCA1 gene mutation scheduled for 04/13/2022.    HISTORY OF PRESENTING COMPLAINT:  Ms. Myles is 41 years old.  No change in her history and physical exam.  Here for a preoperative visit, scheduled for the above-named procedure.    ASSESSMENT AND PLAN:  Based on the above findings, a diagnosis of BRCA1 gene mutation, undergoing prophylactic nipple-sparing mastectomy, in preparation for getting a stage I mastopexy.  Went over the planned procedure with the patient and her  in detail.  Expectations were made very clear.  All risks, benefits, and alternatives of the procedures including pain, infection, bleeding, scarring, asymmetry, seromas, hematomas, wound breakdown, wound dehiscence, nipple necrosis, skin necrosis, fat necrosis, T junction site necrosis, requirement of free nipple graft, asymmetries, DVT, PE, MI, CVA, pneumonia, renal failure, and death were all explained.  She understood them all and wants to proceed.  I look forward to helping her out in the near future.  All questions were answered.  She was happy with the visit.  All exam and discussion from beginning to end done in the presence of my nurse, Frederick Carey.    Total time spent with chart review, visit itself and post-visit paperwork was 20 minutes.

## 2022-04-05 NOTE — LETTER
4/5/2022     RE: Yamila Myles  [address] [facility] [address]    Dear Colleague,    Thank you for referring your patient, Yamila Myles, to the [facility]. Please see a copy of my visit note below.    PRESENTING COMPLAINT:  Preoperative visit for upcoming bilateral stage I mastopexy in preparation for prophylactic nipple-sparing mastectomy and reconstruction for BRCA1 gene mutation scheduled for 04/13/2022.    HISTORY OF PRESENTING COMPLAINT:  Ms. Myles is 41 years old.  No change in her history and physical exam.  Here for a preoperative visit, scheduled for the above-named procedure.    ASSESSMENT AND PLAN:  Based on the above findings, a diagnosis of BRCA1 gene mutation, undergoing prophylactic nipple-sparing mastectomy, in preparation for getting a stage I mastopexy.  Went over the planned procedure with the patient and her  in detail.  Expectations were made very clear.  All risks, benefits, and alternatives of the procedures including pain, infection, bleeding, scarring, asymmetry, seromas, hematomas, wound breakdown, wound dehiscence, nipple necrosis, skin necrosis, fat necrosis, T junction site necrosis, requirement of free nipple graft, asymmetries, DVT, PE, MI, CVA, pneumonia, renal failure, and death were all explained.  She understood them all and wants to proceed.  I look forward to helping her out in the near future.  All questions were answered.  She was happy with the visit.  All exam and discussion from beginning to end done in the presence of my nurse, Frederick Carey.    Total time spent with chart review, visit itself and post-visit paperwork was 20 minutes.    Again, thank you for allowing me to participate in the care of your patient.      Sincerely,    LING Pradhan MD

## 2022-04-09 ENCOUNTER — LAB (OUTPATIENT)
Dept: LAB | Facility: CLINIC | Age: 42
End: 2022-04-09
Attending: PLASTIC SURGERY

## 2022-04-09 DIAGNOSIS — Z11.59 ENCOUNTER FOR SCREENING FOR OTHER VIRAL DISEASES: ICD-10-CM

## 2022-04-09 LAB — SARS-COV-2 RNA RESP QL NAA+PROBE: NEGATIVE

## 2022-04-09 PROCEDURE — U0003 INFECTIOUS AGENT DETECTION BY NUCLEIC ACID (DNA OR RNA); SEVERE ACUTE RESPIRATORY SYNDROME CORONAVIRUS 2 (SARS-COV-2) (CORONAVIRUS DISEASE [COVID-19]), AMPLIFIED PROBE TECHNIQUE, MAKING USE OF HIGH THROUGHPUT TECHNOLOGIES AS DESCRIBED BY CMS-2020-01-R: HCPCS | Performed by: PLASTIC SURGERY

## 2022-04-12 ENCOUNTER — ANESTHESIA EVENT (OUTPATIENT)
Dept: SURGERY | Facility: AMBULATORY SURGERY CENTER | Age: 42
End: 2022-04-12
Payer: COMMERCIAL

## 2022-04-12 RX ORDER — FENTANYL CITRATE 50 UG/ML
25 INJECTION, SOLUTION INTRAMUSCULAR; INTRAVENOUS
Status: CANCELLED | OUTPATIENT
Start: 2022-04-12

## 2022-04-13 ENCOUNTER — ANESTHESIA (OUTPATIENT)
Dept: SURGERY | Facility: AMBULATORY SURGERY CENTER | Age: 42
End: 2022-04-13
Payer: COMMERCIAL

## 2022-04-13 ENCOUNTER — HOSPITAL ENCOUNTER (OUTPATIENT)
Facility: AMBULATORY SURGERY CENTER | Age: 42
Discharge: HOME | End: 2022-04-13
Attending: PLASTIC SURGERY
Payer: COMMERCIAL

## 2022-04-13 VITALS
TEMPERATURE: 97.4 F | OXYGEN SATURATION: 98 % | HEIGHT: 64 IN | BODY MASS INDEX: 22.2 KG/M2 | RESPIRATION RATE: 16 BRPM | WEIGHT: 130 LBS | DIASTOLIC BLOOD PRESSURE: 74 MMHG | HEART RATE: 95 BPM | SYSTOLIC BLOOD PRESSURE: 103 MMHG

## 2022-04-13 DIAGNOSIS — Z15.09 BRCA1 GENETIC CARRIER: ICD-10-CM

## 2022-04-13 DIAGNOSIS — Z15.01 BRCA1 GENETIC CARRIER: ICD-10-CM

## 2022-04-13 PROCEDURE — 19316 MASTOPEXY: CPT | Mod: 50

## 2022-04-13 PROCEDURE — 88305 TISSUE EXAM BY PATHOLOGIST: CPT | Mod: TC | Performed by: PLASTIC SURGERY

## 2022-04-13 PROCEDURE — 19316 MASTOPEXY: CPT | Mod: 50 | Performed by: PLASTIC SURGERY

## 2022-04-13 RX ORDER — PROPOFOL 10 MG/ML
INJECTION, EMULSION INTRAVENOUS PRN
Status: DISCONTINUED | OUTPATIENT
Start: 2022-04-13 | End: 2022-04-13

## 2022-04-13 RX ORDER — AMOXICILLIN 250 MG
1-2 CAPSULE ORAL 2 TIMES DAILY
Qty: 30 TABLET | Refills: 0 | Status: SHIPPED | OUTPATIENT
Start: 2022-04-13 | End: 2022-12-09

## 2022-04-13 RX ORDER — DEXAMETHASONE SODIUM PHOSPHATE 4 MG/ML
INJECTION, SOLUTION INTRA-ARTICULAR; INTRALESIONAL; INTRAMUSCULAR; INTRAVENOUS; SOFT TISSUE PRN
Status: DISCONTINUED | OUTPATIENT
Start: 2022-04-13 | End: 2022-04-13

## 2022-04-13 RX ORDER — EPHEDRINE SULFATE 50 MG/ML
INJECTION, SOLUTION INTRAMUSCULAR; INTRAVENOUS; SUBCUTANEOUS PRN
Status: DISCONTINUED | OUTPATIENT
Start: 2022-04-13 | End: 2022-04-13

## 2022-04-13 RX ORDER — LABETALOL HYDROCHLORIDE 5 MG/ML
10 INJECTION, SOLUTION INTRAVENOUS
Status: DISCONTINUED | OUTPATIENT
Start: 2022-04-13 | End: 2022-04-14 | Stop reason: HOSPADM

## 2022-04-13 RX ORDER — FENTANYL CITRATE 50 UG/ML
INJECTION, SOLUTION INTRAMUSCULAR; INTRAVENOUS PRN
Status: DISCONTINUED | OUTPATIENT
Start: 2022-04-13 | End: 2022-04-13

## 2022-04-13 RX ORDER — SODIUM CHLORIDE, SODIUM LACTATE, POTASSIUM CHLORIDE, CALCIUM CHLORIDE 600; 310; 30; 20 MG/100ML; MG/100ML; MG/100ML; MG/100ML
INJECTION, SOLUTION INTRAVENOUS CONTINUOUS PRN
Status: DISCONTINUED | OUTPATIENT
Start: 2022-04-13 | End: 2022-04-13

## 2022-04-13 RX ORDER — OXYCODONE HYDROCHLORIDE 5 MG/1
5 TABLET ORAL EVERY 4 HOURS PRN
Status: DISCONTINUED | OUTPATIENT
Start: 2022-04-13 | End: 2022-04-14 | Stop reason: HOSPADM

## 2022-04-13 RX ORDER — GABAPENTIN 300 MG/1
300 CAPSULE ORAL
Status: COMPLETED | OUTPATIENT
Start: 2022-04-13 | End: 2022-04-13

## 2022-04-13 RX ORDER — LIDOCAINE 40 MG/G
CREAM TOPICAL
Status: DISCONTINUED | OUTPATIENT
Start: 2022-04-13 | End: 2022-04-13 | Stop reason: HOSPADM

## 2022-04-13 RX ORDER — CEFAZOLIN SODIUM 2 G/50ML
2 SOLUTION INTRAVENOUS SEE ADMIN INSTRUCTIONS
Status: DISCONTINUED | OUTPATIENT
Start: 2022-04-13 | End: 2022-04-13 | Stop reason: HOSPADM

## 2022-04-13 RX ORDER — FENTANYL CITRATE 50 UG/ML
25 INJECTION, SOLUTION INTRAMUSCULAR; INTRAVENOUS EVERY 5 MIN PRN
Status: DISCONTINUED | OUTPATIENT
Start: 2022-04-13 | End: 2022-04-14 | Stop reason: HOSPADM

## 2022-04-13 RX ORDER — CEFAZOLIN SODIUM 2 G/50ML
2 SOLUTION INTRAVENOUS
Status: DISCONTINUED | OUTPATIENT
Start: 2022-04-13 | End: 2022-04-13 | Stop reason: HOSPADM

## 2022-04-13 RX ORDER — HYDROMORPHONE HYDROCHLORIDE 1 MG/ML
0.2 INJECTION, SOLUTION INTRAMUSCULAR; INTRAVENOUS; SUBCUTANEOUS EVERY 5 MIN PRN
Status: DISCONTINUED | OUTPATIENT
Start: 2022-04-13 | End: 2022-04-14 | Stop reason: HOSPADM

## 2022-04-13 RX ORDER — SODIUM CHLORIDE, SODIUM LACTATE, POTASSIUM CHLORIDE, CALCIUM CHLORIDE 600; 310; 30; 20 MG/100ML; MG/100ML; MG/100ML; MG/100ML
INJECTION, SOLUTION INTRAVENOUS CONTINUOUS
Status: DISCONTINUED | OUTPATIENT
Start: 2022-04-13 | End: 2022-04-13 | Stop reason: HOSPADM

## 2022-04-13 RX ORDER — ONDANSETRON 4 MG/1
4-8 TABLET, ORALLY DISINTEGRATING ORAL EVERY 8 HOURS PRN
Qty: 4 TABLET | Refills: 0 | Status: SHIPPED | OUTPATIENT
Start: 2022-04-13 | End: 2022-12-09

## 2022-04-13 RX ORDER — BUPIVACAINE HYDROCHLORIDE 2.5 MG/ML
INJECTION, SOLUTION EPIDURAL; INFILTRATION; INTRACAUDAL
Status: COMPLETED | OUTPATIENT
Start: 2022-04-13 | End: 2022-04-13

## 2022-04-13 RX ORDER — ACETAMINOPHEN 325 MG/1
975 TABLET ORAL ONCE
Status: COMPLETED | OUTPATIENT
Start: 2022-04-13 | End: 2022-04-13

## 2022-04-13 RX ORDER — SODIUM CHLORIDE, SODIUM LACTATE, POTASSIUM CHLORIDE, CALCIUM CHLORIDE 600; 310; 30; 20 MG/100ML; MG/100ML; MG/100ML; MG/100ML
INJECTION, SOLUTION INTRAVENOUS CONTINUOUS
Status: DISCONTINUED | OUTPATIENT
Start: 2022-04-13 | End: 2022-04-14 | Stop reason: HOSPADM

## 2022-04-13 RX ORDER — GLYCOPYRROLATE 0.2 MG/ML
INJECTION, SOLUTION INTRAMUSCULAR; INTRAVENOUS PRN
Status: DISCONTINUED | OUTPATIENT
Start: 2022-04-13 | End: 2022-04-13

## 2022-04-13 RX ORDER — ONDANSETRON 2 MG/ML
4 INJECTION INTRAMUSCULAR; INTRAVENOUS EVERY 30 MIN PRN
Status: DISCONTINUED | OUTPATIENT
Start: 2022-04-13 | End: 2022-04-14 | Stop reason: HOSPADM

## 2022-04-13 RX ORDER — PROPOFOL 10 MG/ML
INJECTION, EMULSION INTRAVENOUS CONTINUOUS PRN
Status: DISCONTINUED | OUTPATIENT
Start: 2022-04-13 | End: 2022-04-13

## 2022-04-13 RX ORDER — MEPERIDINE HYDROCHLORIDE 25 MG/ML
12.5 INJECTION INTRAMUSCULAR; INTRAVENOUS; SUBCUTANEOUS
Status: DISCONTINUED | OUTPATIENT
Start: 2022-04-13 | End: 2022-04-14 | Stop reason: HOSPADM

## 2022-04-13 RX ORDER — ONDANSETRON 4 MG/1
4 TABLET, ORALLY DISINTEGRATING ORAL EVERY 30 MIN PRN
Status: DISCONTINUED | OUTPATIENT
Start: 2022-04-13 | End: 2022-04-14 | Stop reason: HOSPADM

## 2022-04-13 RX ORDER — OXYCODONE HYDROCHLORIDE 5 MG/1
5-10 TABLET ORAL EVERY 6 HOURS PRN
Qty: 25 TABLET | Refills: 0 | Status: SHIPPED | OUTPATIENT
Start: 2022-04-13 | End: 2022-12-09

## 2022-04-13 RX ORDER — ONDANSETRON 2 MG/ML
INJECTION INTRAMUSCULAR; INTRAVENOUS PRN
Status: DISCONTINUED | OUTPATIENT
Start: 2022-04-13 | End: 2022-04-13

## 2022-04-13 RX ORDER — LIDOCAINE HYDROCHLORIDE 20 MG/ML
INJECTION, SOLUTION INFILTRATION; PERINEURAL PRN
Status: DISCONTINUED | OUTPATIENT
Start: 2022-04-13 | End: 2022-04-13

## 2022-04-13 RX ADMIN — FENTANYL CITRATE 25 MCG: 50 INJECTION, SOLUTION INTRAMUSCULAR; INTRAVENOUS at 14:42

## 2022-04-13 RX ADMIN — Medication 0.5 MG: at 12:56

## 2022-04-13 RX ADMIN — FENTANYL CITRATE 50 MCG: 50 INJECTION, SOLUTION INTRAMUSCULAR; INTRAVENOUS at 12:42

## 2022-04-13 RX ADMIN — PROPOFOL 200 MG: 10 INJECTION, EMULSION INTRAVENOUS at 12:25

## 2022-04-13 RX ADMIN — FENTANYL CITRATE 50 MCG: 50 INJECTION, SOLUTION INTRAMUSCULAR; INTRAVENOUS at 12:33

## 2022-04-13 RX ADMIN — BUPIVACAINE HYDROCHLORIDE 40 ML: 2.5 INJECTION, SOLUTION EPIDURAL; INFILTRATION; INTRACAUDAL at 11:48

## 2022-04-13 RX ADMIN — OXYCODONE HYDROCHLORIDE 5 MG: 5 TABLET ORAL at 14:43

## 2022-04-13 RX ADMIN — DEXAMETHASONE SODIUM PHOSPHATE 4 MG: 4 INJECTION, SOLUTION INTRA-ARTICULAR; INTRALESIONAL; INTRAMUSCULAR; INTRAVENOUS; SOFT TISSUE at 12:25

## 2022-04-13 RX ADMIN — PROPOFOL 150 MCG/KG/MIN: 10 INJECTION, EMULSION INTRAVENOUS at 12:27

## 2022-04-13 RX ADMIN — GABAPENTIN 300 MG: 300 CAPSULE ORAL at 11:05

## 2022-04-13 RX ADMIN — LIDOCAINE HYDROCHLORIDE 100 MG: 20 INJECTION, SOLUTION INFILTRATION; PERINEURAL at 12:25

## 2022-04-13 RX ADMIN — GLYCOPYRROLATE 0.2 MG: 0.2 INJECTION, SOLUTION INTRAMUSCULAR; INTRAVENOUS at 12:49

## 2022-04-13 RX ADMIN — ACETAMINOPHEN 975 MG: 325 TABLET ORAL at 11:04

## 2022-04-13 RX ADMIN — ONDANSETRON 4 MG: 2 INJECTION INTRAMUSCULAR; INTRAVENOUS at 12:25

## 2022-04-13 RX ADMIN — EPHEDRINE SULFATE 10 MG: 50 INJECTION, SOLUTION INTRAMUSCULAR; INTRAVENOUS; SUBCUTANEOUS at 12:43

## 2022-04-13 RX ADMIN — SODIUM CHLORIDE, SODIUM LACTATE, POTASSIUM CHLORIDE, CALCIUM CHLORIDE: 600; 310; 30; 20 INJECTION, SOLUTION INTRAVENOUS at 12:19

## 2022-04-13 NOTE — OP NOTE
PREOPERATIVE DIAGNOSIS: BRCA 1 gene mutation and bilateral breast ptosis.     POSTOPERATIVE DIAGNOSIS:  BRCA 1 gene mutation and bilateral breast ptosis.     PROCEDURES: Bilateral superomedial pedicle inverted T skin closure mastopexy in preparation for Nipple sparing mastectomy in the near future.     SURGEON: Laxmi Pradhan MD.     RESIDENTS: Maximilian Alonzo MD; and Vince Nguyen MD.    ANESTHESIA: General anesthesia with endotracheal intubation.     COMPLICATIONS: Nil.     DRAINS: Nil.     Blood Loss: 150 mL    SPECIMENS: Skin and breast tissue from right breast measuring about 10 g, left breast measuring about 60 g.     DESCRIPTION OF PROCEDURE: After informed consent was taken, the proper site and procedure was ascertained with the patient and was appropriately marked and taken to in the operating room.  She was placed in supine position with the knees comfortably flexed with pillows underneath them, and pneumoboots placed and running prior to induction of anesthesia. Preoperative antibiotics given in the OR. All pressure points were appropriately padded. General anesthesia was administered without any complications. She was placed in such a position that she could be flexed to about 50 degrees. Her arms were padded and abducted to about 50 degrees. She was prepped and draped in a standard surgical fashion. I began by first remarking the preop markings and marking a 42 mm areola on each side. I then marked out a superior medial pedicle on each side. I then de-epithelialized the pedicle on each side. I then dissected out the pedicle on each side without actually seeing the deep fascia and also released the pedicle such that it could rotate into its new nipple position without any tension. I then went ahead and on each side excised the skin from and some tissue, more from left than right due to asymmetry, according to the marked out areas. Strict hemostasis was ensured during this entire part of the case. Once  this was done, I then temporarily closed the patient's breast in an inverted T fashion, sat the patient up ensured symmetry and then marked out the new areolar opening symmetrically on each side. I then went ahead and closed the horizontal incision using 2-0 Monocryl suture in a deep dermal layer. Then I made sure that the nipple areolar complex could be retrieved without any tension, which is could on each side. I then checked that they were both pink and viable, which they were. I then went ahead and excised the skin of the new areolar opening and de-epithelialized epithelialized the portion that was involved in the pedicle on each side. I then sutured in the nipple areolar complex using 2-0 Monocryl suture in a deep dermal fashion circumferentially. I then closed the vertical incision using 2-0 Monocryl suture to approximate the medial and lateral pillars, and then the deep dermis. I then ran all the incisions with 3-0 Strattafix suture in a running intracuticular manner followed by placement of Prineo, and then followed by an ACE wrap. At the end of the case, the patient's breasts were soft, nipples were pink, and breasts were symmetric. The patient tolerated the procedure well. All counts correct at the end of the case. The patient was extubated and sent to recovery room in a stable condition.

## 2022-04-13 NOTE — ANESTHESIA CARE TRANSFER NOTE
Patient: Yamila Myles    Procedure: Procedure(s):  Bilateral breast lift       Diagnosis: 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 [Z15.01, Z15.09]  Diagnosis Additional Information: No value filed.    Anesthesia Type:   General     Note:    Oropharynx: oropharynx clear of all foreign objects  Level of Consciousness: awake  Oxygen Supplementation: face mask    Independent Airway: airway patency satisfactory and stable  Dentition: dentition unchanged  Vital Signs Stable: post-procedure vital signs reviewed and stable  Report to RN Given: handoff report given  Patient transferred to: PACU    Handoff Report: Identifed the Patient, Identified the Reponsible Provider, Reviewed the pertinent medical history, Discussed the surgical course, Reviewed Intra-OP anesthesia mangement and issues during anesthesia, Set expectations for post-procedure period and Allowed opportunity for questions and acknowledgement of understanding      Vitals:  Vitals Value Taken Time   /74 04/13/22 1432   Temp     Pulse 92 04/13/22 1434   Resp 27 04/13/22 1434   SpO2 100 % 04/13/22 1434   Vitals shown include unvalidated device data.    Electronically Signed By: CARLOS ALBERTO Son CRNA  April 13, 2022  2:34 PM

## 2022-04-13 NOTE — BRIEF OP NOTE
[facility]    Brief Operative Note    Pre-operative diagnosis: 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 [Z15.01, Z15.09]  Post-operative diagnosis Same as pre-operative diagnosis    Procedure: Procedure(s):  Bilateral breast lift  Surgeon: Surgeon(s) and Role:     * LING Pradhan MD - Primary   Maximilian Alonzo MD- Resident  Vince Nguyen MD- Resident    Anesthesia: Combined General with Block   Estimated Blood Loss: Less than 150 ml    Drains: None  Specimens:   ID Type Source Tests Collected by Time Destination   1 : Left Breast 56g Tissue Breast, Left SURGICAL PATHOLOGY EXAM LING Pradhan MD 4/13/2022  1:40 PM    2 : Right Breast, 14g Tissue Breast, Right SURGICAL PATHOLOGY EXAM LING Pradhan MD 4/13/2022  1:41 PM      Findings:   None.  Complications: None.  Implants: * No implants in log *

## 2022-04-13 NOTE — ANESTHESIA PREPROCEDURE EVALUATION
Anesthesia Pre-Procedure Evaluation    Patient: Yamila Myles   MRN: [id number] : 1980        Procedure : Procedure(s):  Bilateral breast lift          Past Medical History:   Diagnosis Date     BRCA1 positive 2001     Breast disorder 2011     Encounter for insertion of mirena IUD 15     Hoarseness      Lump or mass in breast     BRCA-1 positive (Mother, MGM w/ breast ca, mother w/ gene +), mother dx at 32)     Microcalcifications of the breast, right uoq 2011      Past Surgical History:   Procedure Laterality Date     INSERT INTRAUTERINE DEVICE N/A 2016    Procedure: INSERT INTRAUTERINE DEVICE;  Surgeon: Fidelia Ricks MD;  Location: [facility] OR     LAPAROSCOPIC SALPINGO-OOPHORECTOMY Bilateral 2016    Procedure: LAPAROSCOPIC SALPINGO-OOPHORECTOMY;  Surgeon: Fidelia Ricks MD;  Location: [facility] OR     LAPAROSCOPY OPERATIVE ADULT N/A 2016    Procedure: LAPAROSCOPY OPERATIVE ADULT;  Surgeon: Fidelia Ricks MD;  Location: [facility] OR     ZZC APPENDECTOMY        Allergies   Allergen Reactions     Advil [Nsaids] Other (See Comments)     Nasal congestion  Eye get puffy        Seasonal Allergies      Pollen and mold   Spring and Fall are main periods. Takes Zyrtec year round to manage it.   Gets post nasal drip       Social History     Tobacco Use     Smoking status: Never Smoker     Smokeless tobacco: Never Used   Substance Use Topics     Alcohol use: Yes     Alcohol/week: 0.0 standard drinks     Comment: 2 glasses/month - none during pregnancy.       Wt Readings from Last 1 Encounters:   22 59 kg (130 lb)        Anesthesia Evaluation   Pt has had prior anesthetic. Type: General.        ROS/MED HX  ENT/Pulmonary:  - neg pulmonary ROS     Neurologic:  - neg neurologic ROS     Cardiovascular:  - neg cardiovascular ROS     METS/Exercise Tolerance:     Hematologic:  - neg hematologic  ROS     Musculoskeletal:  - neg musculoskeletal ROS     GI/Hepatic:  - neg GI/hepatic  ROS     Renal/Genitourinary:  - neg Renal ROS     Endo:  - neg endo ROS     Psychiatric/Substance Use:  - neg psychiatric ROS     Infectious Disease:  - neg infectious disease ROS     Malignancy:       Other:            Physical Exam    Airway  airway exam normal           Respiratory Devices and Support         Dental  no notable dental history         Cardiovascular   cardiovascular exam normal          Pulmonary   pulmonary exam normal                OUTSIDE LABS:  CBC:   Lab Results   Component Value Date    WBC 6.5 07/12/2016    WBC 4.1 02/21/2013    HGB 13.2 07/12/2016    HGB 13.8 02/21/2013    HCT 39.8 07/12/2016    HCT 41.3 02/21/2013     07/12/2016     02/21/2013     BMP:   Lab Results   Component Value Date     02/18/2019     03/01/2017    POTASSIUM 4.5 02/18/2019    POTASSIUM 3.7 03/01/2017    CHLORIDE 111 (H) 02/18/2019    CHLORIDE 106 03/01/2017    CO2 26 02/18/2019    CO2 29 03/01/2017    BUN 15 02/18/2019    BUN 18 03/01/2017    CR 0.82 02/18/2019    CR 0.73 03/01/2017    GLC 85 02/18/2019    GLC 95 03/01/2017     COAGS: No results found for: PTT, INR, FIBR  POC:   Lab Results   Component Value Date    HCG Negative 07/12/2016     HEPATIC: No results found for: ALBUMIN, PROTTOTAL, ALT, AST, GGT, ALKPHOS, BILITOTAL, BILIDIRECT, RADHA  OTHER:   Lab Results   Component Value Date    A1C 4.6 02/13/2015    GRETCHEN 8.7 02/18/2019    TSH 1.00 02/21/2013       Anesthesia Plan    ASA Status:  2   NPO Status:  NPO Appropriate    Anesthesia Type: General.     - Airway: LMA   Induction: Intravenous.   Maintenance: TIVA.        Consents    Anesthesia Plan(s) and associated risks, benefits, and realistic alternatives discussed. Questions answered and patient/representative(s) expressed understanding.     - Discussed: Risks, Benefits and Alternatives for BOTH SEDATION and the PROCEDURE were discussed     - Discussed with:  Patient         Postoperative Care    Pain management: Oral pain  medications, Peripheral nerve block (Single Shot).   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             Ellis Miller MD, MD

## 2022-04-13 NOTE — ANESTHESIA POSTPROCEDURE EVALUATION
Patient: Yamila Myles    Procedure: Procedure(s):  Bilateral breast lift       Anesthesia Type:  General    Note:  Disposition: Outpatient   Postop Pain Control: Uneventful            Sign Out: Well controlled pain   PONV: No   Neuro/Psych: Uneventful            Sign Out: Acceptable/Baseline neuro status   Airway/Respiratory: Uneventful            Sign Out: Acceptable/Baseline resp. status   CV/Hemodynamics: Uneventful            Sign Out: Acceptable CV status; No obvious hypovolemia; No obvious fluid overload   Other NRE: NONE   DID A NON-ROUTINE EVENT OCCUR? No           Last vitals:  Vitals Value Taken Time   /71 04/13/22 1445   Temp 36.3  C (97.3  F) 04/13/22 1445   Pulse 80 04/13/22 1449   Resp 4 04/13/22 1449   SpO2 97 % 04/13/22 1449   Vitals shown include unvalidated device data.    Electronically Signed By: Ellis Miller MD, MD  April 13, 2022  3:30 PM

## 2022-04-13 NOTE — ANESTHESIA PROCEDURE NOTES
Pectoralis Procedure Note    Pre-Procedure   Staff -        Anesthesiologist:  Ellis Miller MD       Performed By: anesthesiologist       Location: pre-op       Procedure Start/Stop Times: 4/13/2022 11:48 AM and 4/13/2022 11:54 AM       Pre-Anesthestic Checklist: patient identified, IV checked, site marked, risks and benefits discussed, informed consent, monitors and equipment checked, pre-op evaluation, at physician/surgeon's request and post-op pain management  Timeout:       Correct Patient: Yes        Correct Procedure: Yes        Correct Site: Yes        Correct Position: Yes        Correct Laterality: Yes        Site Marked: Yes  Procedure Documentation  Procedure: Pectoralis       Laterality: bilateral       Patient Position: supine       Patient Prep/Sterile Barriers: sterile gloves, mask       Skin prep: Chloraprep       Needle Type: insulated and short bevel       Needle Gauge: 21.        Needle Length (Inches): 4        Ultrasound guided       1. Ultrasound was used to identify targeted nerve, plexus, vascular marker, or fascial plane and place a needle adjacent to it in real-time.       2. Ultrasound was used to visualize the spread of anesthetic in close proximity to the above referenced structure.       3. A permanent image is entered into the patient's record.       4. The visualized anatomic structures appeared normal.       5. There were no apparent abnormal pathologic findings.    Assessment/Narrative         The placement was negative for: blood aspirated, painful injection and site bleeding       Paresthesias: No.       Bolus given via needle..        Secured via.        Insertion/Infusion Method: Single Shot       Complications: none    Medication(s) Administered   Bupivacaine 0.25% PF (Infiltration) - Infiltration   40 mL - 4/13/2022 11:48:00 AM  Bupivacaine liposome (Exparel) 1.3% LA inj susp (Infiltration) - Infiltration   20 mL - 4/13/2022 11:48:00 AM  Medication Administration Time:  4/13/2022 11:48 AM     Comments:  266 mg exparel given via pectoralis I and II blocks bilaterally

## 2022-04-13 NOTE — DISCHARGE INSTRUCTIONS
BREAST REDUCTION POST-OPERATIVE INSTRUCTIONS    Instructions      Have someone drive you home after surgery and help you at home for 1-2      days.     Get plenty of rest.     Follow balanced diet.     Decreased activity may promote constipation, so you may want to add      more raw fruit to your diet, and be sure to increase fluid intake. Your doctor       may also order a stool softener.     Do not drink alcohol when taking pain medications.     If you are taking vitamins with iron, resume these as tolerated.     Do not smoke, as smoking delays healing and increases the risk of      Complications.    Medications      Please take stool softeners while taking narcotic medications to prevent constipation       You may take tylenol or ibuprofen (eg other NSAIDS) after surgery instead of or in addition to the prescribed narcotic pain medications.     Activities     Do not drive while taking narcotic pain medications and while experiencing any pain.      Do not drive until you have full range of motion with your arms and can stop the car       or swerve in an emergency.     Start walking the evening of surgery, this helps to reduce swelling and       lowers the chance of blood clots.     Refrain from vigorous activities for 2-6 weeks.  Increase activity gradually as tolerated.      After 2 weeks, you may perform lower body exercise, but must wait the full 6 weeks       prior to performing upper body exercise     Avoid lifting anything over 5 pounds for 2 weeks.     Resume social and employment activities in about 2 weeks (if not too strenuous).    Incision Care     You may shower the next day after surgery. The ACE wrap (if used) may be rewrapped as needed (if too tight or loose). Use it for support and may subtitute with a sports bra if preferred.      Avoid exposing scars to sun for at least 12 months.     Always use a strong sunblock, if sun exposure is unavoidable (SPF 50 or      greater).     Keep the tape on  "incisions until it starts to curl up on the ends, and then gently remove them.        You may use moisturizing cream (eg Aquaphor or Vaseline) at 10 days to help the tape come off. By two weeks,      you may pull off the tape off the incisions if still in place.     Wear your surgical bra/wrap 24/7 as directed for 4 weeks.     Avoid bras with stays and underwires for 4-6 weeks.     You may pad the incisions with gauze for comfort (panty liners also work well as they        are absorbent and inexpensive).     Inspect daily for signs of infection.     No tub soaking, bathing, or swimming until wounds are healed.     If your breast skin is dry after surgery, you can apply a moisturizer several times a       day.     What to Expect     Despite layered closing of your incisions, there will be some oozing       of tissue fluid from incision sites. This is expected, especially for the first 2 days or so.  This will soak up on the gauze and the bra       to look like it is more than it really is. If the draining continues past 2 days, with pain, discomfort, and large volumes, please call the clinic.      Most of the higher discomfort will subside after the first few days.     You may experience temporary soreness, bruising, swelling and tightness in the       breasts as well as discomfort in the incision area.     You may have have normal sensation in the nipples. This may be more or less than usual, and usually returns over a couple of months.     Your first menstruation following surgery may cause your breasts to swell and hurt.     You may have random shooting pains, tingling, or other strange sensations in the skin for a few months. These will subside.    Appearance     Most of the discoloration and swelling will subside in 2-4 weeks.     Your breasts will feel firm to the touch initially, but will soften with time.     A more natural shape will occur as the breasts \"settle\" in a slightly lower position over the first " few months.     Scars may be red and thick for 6-12 months (longer in lighter-skinned patients).  In time, these usually soften and fade.    Follow-Up Care     Typically, you will have a post op check at 1-2 weeks, and again with your surgeon in another month.    When to Call     If you have increased swelling or bruising, particular one side greater than the other.     If swelling and redness persist after a few days.     If you have increased redness along the incision.     If you have severe or increased pain not relieved by medication.     If you have any side effects to medications; such as, rash, nausea,      headache, vomiting, or constipation.     If you have an oral temperature over 100.4 degrees.     If you have any yellowish or greenish drainage from the incisions or      notice a foul odor.     If you have bleeding from the incisions that is difficult to control with      light pressure.     If you have loss of feeling or motion.     Any unanswered concern.    For Medical Questions, Please Call:     [phone], Monday - Friday, 8 a.m. - 4:30 p.m.     After hours and on weekends, call Hospital Paging at [phone] and      ask for the Plastic Surgeon on call.  [facility]  Home Care Following Anesthesia  For 24 hours after surgery:  Get plenty of rest.  A responsible adult must stay with you for at least 24 hours after you leave the surgery center.  Do not drive or use heavy equipment.  If you have weakness or tingling, don't drive or use heavy equipment until this feeling goes away.   Do not drink alcohol.   Avoid strenuous or risky activities.  Ask for help when climbing stairs.  You may feel lightheaded.  IF so, sit for a few minutes before standing.  Have someone help you get up.   If you have nausea (feel sick to your stomach): Drink only clear liquids such as apple juice, ginger ale, broth or 7-Up.  Rest may also help.  Be sure to drink enough fluids.  Move  "to a regular diet as you feel able.   You may have a slight fever.  Call the doctor if your fever is over 100 F (37.7 C) (taken under the tongue) or lasts longer than 24 hours.  You may have a dry mouth, a sore throat, muscle aches or trouble sleeping. These should go away after 24 hours.  Do not make important or legal decisions.   It is recommended to avoid smoking.        Today you received an Exparel block to numb the nerves near your surgery site.  This is a block using local anesthetic or \"numbing\" medication injected around the nerves to anesthetize or \"numb\" the area supplied by those nerves.  This block is injected into the muscle layer near your surgical site.  This medication may numb the location where you had surgery up to 72 hours.  If your surgical site is an arm or leg you should be careful with your affected limb, since it is possible to injure your limb without being aware of it due to the numbing.  Until full feeling returns, you should guard against bumping or hitting your limb, and avoid extreme hot or cold temperatures on the skin.  As the block wears off, the feeling will return as a tingling or prickly sensation near your surgical site.  You will experince more discomfort from your incision as the feeling returns.  You may want to take a pain pill (a narcotic or Tylenol if this was prescribed by your surgeon) when you start to experience mild pain before the pain beomes more severe.  If your pain medications do not control your pain, you should notify your surgeon.    Tips for taking pain medications  To get the best pain relief possible, remember these points:  Take pain medications as directed, before pain becomes severe.  Pain medication can upset your stomach: taking it with food may help.  Constipation is a common side effect of pain medication. Drink plenty of  fluids.  Eat foods high in fiber. Take a stool softener if recommended by your doctor or pharmacist.  Do not drink alcohol, " drive or operate machinery while taking pain medications.  Ask about other ways to control pain, such as with heat, ice or relaxation.    Tylenol/Acetaminophen Consumption  To help encourage the safe use of acetaminophen, the makers of TYLENOL  have lowered the maximum daily dose for single-ingredient Extra Strength TYLENOL  (acetaminophen) products sold in the [place] from 8 pills per day (4,000 mg) to 6 pills per day (3,000 mg). The dosing interval has also changed from 2 pills every 4-6 hours to 2 pills every 6 hours.  If you feel your pain relief is insufficient, you may take Tylenol/Acetaminophen in addition to your narcotic pain medication.   Be careful not to exceed 3,000 mg of Tylenol/Acetaminophen in a 24 hour period from all sources.  If you are taking extra strength Tylenol/acetaminophen (500 mg), the maximum dose is 6 tablets in 24 hours.  If you are taking regular strength acetaminophen (325 mg), the maximum dose is 9 tablets in 24 hours.    Call a doctor for any of the following:  Signs of infection (fever, growing tenderness at the surgery site, a large amount of drainage or bleeding, severe pain, foul-smelling drainage, redness, swelling).  It has been over 8 to 10 hours since surgery and you are still not able to urinate (pass water).  Headache for over 24 hours.  Numbness, tingling or weakness the day after surgery (if you had spinal anesthesia).  Signs of Covid-19 infection (temperature over 100 degrees, shortness of breath, cough, loss of taste/smell, generalized body aches, persistent headache, chills, sore throat, nausea/vomiting/diarrhea)  Your doctor is:  Dr. Laxmi Pradhan, Plastic Surgery: [phone]                    Or dial [phone] and ask for the resident on call for:  Plastics  For emergency care, call the:  [place] [facility]:  [phone] (TTY for hearing impaired: [phone])

## 2022-04-15 LAB
PATH REPORT.COMMENTS IMP SPEC: NORMAL
PATH REPORT.FINAL DX SPEC: NORMAL
PATH REPORT.GROSS SPEC: NORMAL
PATH REPORT.MICROSCOPIC SPEC OTHER STN: NORMAL
PATH REPORT.RELEVANT HX SPEC: NORMAL
PHOTO IMAGE: NORMAL

## 2022-04-15 PROCEDURE — 88305 TISSUE EXAM BY PATHOLOGIST: CPT | Mod: 26 | Performed by: PATHOLOGY

## 2022-04-26 ENCOUNTER — OFFICE VISIT (OUTPATIENT)
Dept: PLASTIC SURGERY | Facility: CLINIC | Age: 42
End: 2022-04-26
Payer: COMMERCIAL

## 2022-04-26 VITALS
DIASTOLIC BLOOD PRESSURE: 65 MMHG | OXYGEN SATURATION: 98 % | TEMPERATURE: 97.6 F | HEART RATE: 87 BPM | SYSTOLIC BLOOD PRESSURE: 107 MMHG

## 2022-04-26 DIAGNOSIS — Z15.09 BRCA1 GENETIC CARRIER: Primary | ICD-10-CM

## 2022-04-26 DIAGNOSIS — Z15.01 BRCA1 GENETIC CARRIER: Primary | ICD-10-CM

## 2022-04-26 PROCEDURE — 99024 POSTOP FOLLOW-UP VISIT: CPT | Performed by: PHYSICIAN ASSISTANT

## 2022-04-26 ASSESSMENT — PAIN SCALES - GENERAL: PAINLEVEL: NO PAIN (1)

## 2022-04-26 NOTE — NURSING NOTE
Chief Complaint   Patient presents with     Surgical Followup     2 week post-op -- DOS 4/13       Vitals:    04/26/22 0859   BP: 107/65   Pulse: 87   Temp: 97.6  F (36.4  C)   TempSrc: Oral   SpO2: 98%       There is no height or weight on file to calculate BMI.          HIPOLITO OREILLY EMT

## 2022-04-26 NOTE — LETTER
4/26/2022       RE: Yamila Myles  [address] [address]     Dear Colleague,    Thank you for referring your patient, Yamila Myles, to the [facility] [place] at [facility]. Please see a copy of my visit note below.    Plastic Surgery Outpatient Visit    ID: Yamila Myles is a 41 year old female with PMH BRCA1 gene mutation s/p bilateral mastopexy in anticipation of prophylactic breast reconstruction 4/13 with Dr. Pradhan.     S: Doing well. Having some issues with a rash on chest but otherwise feeling well. Denies itching to rash, Using neosporin. No pain, not taking any pain medication.    O:  /65   Pulse 87   Temp 97.6  F (36.4  C) (Oral)   SpO2 98%    General: NAD  Chest: bilateral breast incisions c/d/i. Nipples intact, viable. Minimal residual ecchymosis. No significant swelling.      PATH:  Final Diagnosis   A. LEFT breast, mastopexy:  -Benign breast tissue with fibrocystic change (including microcysts with apocrine metaplasia), duct ectasia and usual ductal hyperplasia  -Negative for atypia or malignancy      B. RIGHT breast, mastopexy:  -Specimen consists predominantly of unremarkable skin  -Scant benign breast parenchyma present  -Negative for atypia or malignancy      A/P:  -healing well  -continue to moisturize  -can use OTC hydrocortisone cream to rash or aquafor, can stop neosporin. Likely from surgical drape tape or ioban.   -continue soft bra  -lifting activity restrictions discussed, ok to increase activity at 4 weeks post op and return to all activity at 6 weeks post op.  -RTC 4 weeks with Dr. Lorne Chavez PA-C  Plastic and Reconstructive Surgery      10 minutes spent on the date of the encounter doing chart review, history and physical, dressing changes, documentation and further activity as noted above.

## 2022-04-26 NOTE — PROGRESS NOTES
Plastic Surgery Outpatient Visit    ID: Yamila Myles is a 41 year old female with PMH BRCA1 gene mutation s/p bilateral mastopexy in anticipation of prophylactic breast reconstruction 4/13 with Dr. Pradhan.     S: Doing well. Having some issues with a rash on chest but otherwise feeling well. Denies itching to rash, Using neosporin. No pain, not taking any pain medication.    O:  /65   Pulse 87   Temp 97.6  F (36.4  C) (Oral)   SpO2 98%    General: NAD  Chest: bilateral breast incisions c/d/i. Nipples intact, viable. Minimal residual ecchymosis. No significant swelling.      PATH:  Final Diagnosis   A. LEFT breast, mastopexy:  -Benign breast tissue with fibrocystic change (including microcysts with apocrine metaplasia), duct ectasia and usual ductal hyperplasia  -Negative for atypia or malignancy      B. RIGHT breast, mastopexy:  -Specimen consists predominantly of unremarkable skin  -Scant benign breast parenchyma present  -Negative for atypia or malignancy      A/P:  -healing well  -continue to moisturize  -can use OTC hydrocortisone cream to rash or aquafor, can stop neosporin. Likely from surgical drape tape or ioban.   -continue soft bra  -lifting activity restrictions discussed, ok to increase activity at 4 weeks post op and return to all activity at 6 weeks post op.  -RTC 4 weeks with Dr. Lorne Chavez PA-C  Plastic and Reconstructive Surgery    10 minutes spent on the date of the encounter doing chart review, history and physical, dressing changes, documentation and further activity as noted above.

## 2022-05-24 ENCOUNTER — OFFICE VISIT (OUTPATIENT)
Dept: PLASTIC SURGERY | Facility: CLINIC | Age: 42
End: 2022-05-24
Attending: PLASTIC SURGERY
Payer: COMMERCIAL

## 2022-05-24 VITALS
WEIGHT: 125.6 LBS | SYSTOLIC BLOOD PRESSURE: 93 MMHG | TEMPERATURE: 97.6 F | OXYGEN SATURATION: 99 % | DIASTOLIC BLOOD PRESSURE: 63 MMHG | BODY MASS INDEX: 21.56 KG/M2 | HEART RATE: 87 BPM

## 2022-05-24 DIAGNOSIS — Z15.09 BRCA1 POSITIVE: Primary | ICD-10-CM

## 2022-05-24 DIAGNOSIS — Z15.09 BRCA1 GENETIC CARRIER: Primary | ICD-10-CM

## 2022-05-24 DIAGNOSIS — Z15.01 BRCA1 POSITIVE: Primary | ICD-10-CM

## 2022-05-24 DIAGNOSIS — Z15.01 BRCA1 GENETIC CARRIER: Primary | ICD-10-CM

## 2022-05-24 PROCEDURE — G0463 HOSPITAL OUTPT CLINIC VISIT: HCPCS

## 2022-05-24 PROCEDURE — 99024 POSTOP FOLLOW-UP VISIT: CPT | Performed by: PLASTIC SURGERY

## 2022-05-24 RX ORDER — CEFAZOLIN SODIUM 2 G/50ML
2 SOLUTION INTRAVENOUS SEE ADMIN INSTRUCTIONS
Status: CANCELLED | OUTPATIENT
Start: 2022-05-24

## 2022-05-24 RX ORDER — CEFAZOLIN SODIUM 2 G/50ML
2 SOLUTION INTRAVENOUS
Status: CANCELLED | OUTPATIENT
Start: 2022-05-24

## 2022-05-24 ASSESSMENT — PAIN SCALES - GENERAL: PAINLEVEL: NO PAIN (0)

## 2022-05-24 NOTE — LETTER
5/24/2022         RE: Yamila Myles  [address] [address]        Dear Colleague,    Thank you for referring your patient, Yamila Myles, to the [facility]. Please see a copy of my visit note below.    POSTOPERATIVE VISIT NOTE    PRESENTING COMPLAINT:  Postoperative visit status post stage I bilateral mastopexy in preparation for nipple-sparing prophylactic mastectomy for BRCA1 gene mutation, done 04/13/2022.    HISTORY OF PRESENTING COMPLAINT:  Ms. Myles is 42 years old.  She is about a month and a half out from her surgery.  Done extremely well.  Very happy with the results.  No issues.  Pathology was benign.    PHYSICAL EXAMINATION:    VITAL SIGNS:  Stable.  She is afebrile, in no obvious distress.    SKIN:  Everything is healing in well.    ASSESSMENT AND PLAN:  Based on above findings, a diagnosis of bilateral mastopexy in preparation for prophylactic nipple-sparing mastectomy for BRCA1 gene mutation and immediate direct-to-implant reconstruction was made.  Plan now is to proceed with scheduling her next stage which will be a nipple-sparing mastectomy and immediate direct-to-implant reconstruction versus expander-based reconstruction depending on findings in the operating room.  Went over this plan with the patient in detail and she understood and agreed.  We will plan this sometime in November.  All her questions were answered.  She was happy the visit.  All exam and discussion done in the presence of my nurse, Jacqueline Peck.      Sincerely,    LING Pradhan MD

## 2022-05-24 NOTE — NURSING NOTE
"Oncology Rooming Note    May 24, 2022 10:00 AM   Yamila Myles is a 42 year old female who presents for:    Chief Complaint   Patient presents with     Oncology Clinic Visit     Rtn Post Op; 6wk Follow Up     Initial Vitals: BP 93/63   Pulse 87   Temp 97.6  F (36.4  C) (Oral)   Wt 57 kg (125 lb 9.6 oz)   SpO2 99%   BMI 21.56 kg/m   Estimated body mass index is 21.56 kg/m  as calculated from the following:    Height as of 4/13/22: 1.626 m (5' 4\").    Weight as of this encounter: 57 kg (125 lb 9.6 oz). Body surface area is 1.6 meters squared.  No Pain (0)   No LMP recorded. (Menstrual status: IUD).  Allergies reviewed: Yes  Medications reviewed: Yes    Medications: Medication refills not needed today.  Pharmacy name entered into [facility]:    [id number] - [place], [place] - [address] AT [id number] - [place], [place] - [address] AT [facility] [address]    Pt has no concerns for their provider on May 24, 2022 and would like to discuss the original chief complaint of the appointment.     Dodie Shaw, EMT on May 24, 2022 at 10:00 AM            "

## 2022-05-24 NOTE — PROGRESS NOTES
POSTOPERATIVE VISIT NOTE    PRESENTING COMPLAINT:  Postoperative visit status post stage I bilateral mastopexy in preparation for nipple-sparing prophylactic mastectomy for BRCA1 gene mutation, done 04/13/2022.    HISTORY OF PRESENTING COMPLAINT:  Ms. Myles is 42 years old.  She is about a month and a half out from her surgery.  Done extremely well.  Very happy with the results.  No issues.  Pathology was benign.    PHYSICAL EXAMINATION:    VITAL SIGNS:  Stable.  She is afebrile, in no obvious distress.    SKIN:  Everything is healing in well.    ASSESSMENT AND PLAN:  Based on above findings, a diagnosis of bilateral mastopexy in preparation for prophylactic nipple-sparing mastectomy for BRCA1 gene mutation and immediate direct-to-implant reconstruction was made.  Plan now is to proceed with scheduling her next stage which will be a nipple-sparing mastectomy and immediate direct-to-implant reconstruction versus expander-based reconstruction depending on findings in the operating room.  Went over this plan with the patient in detail and she understood and agreed.  We will plan this sometime in November.  All her questions were answered.  She was happy the visit.  All exam and discussion done in the presence of my nurse, Jacqueline Peck.

## 2022-05-27 ENCOUNTER — PATIENT OUTREACH (OUTPATIENT)
Dept: ONCOLOGY | Facility: CLINIC | Age: 42
End: 2022-05-27
Payer: COMMERCIAL

## 2022-05-27 NOTE — PROGRESS NOTES
RN CARE COORDINATION    Outgoing Call:   L/M for Yamila regarding plan for next stage of surgery with Dr. Rodríguez and Dr. Pradhan in November. Inquired if Yamila would like to meet with Dr. Rodríguez again prior to surgery to address any questions or concerns. Contact number provided.       SHALINI Medrano, RN  RN Care Coordinator  [facility]

## 2022-06-04 ENCOUNTER — PREP FOR PROCEDURE (OUTPATIENT)
Dept: PLASTIC SURGERY | Facility: CLINIC | Age: 42
End: 2022-06-04
Payer: COMMERCIAL

## 2022-06-06 ENCOUNTER — TELEPHONE (OUTPATIENT)
Dept: ONCOLOGY | Facility: CLINIC | Age: 42
End: 2022-06-06
Payer: COMMERCIAL

## 2022-06-06 NOTE — TELEPHONE ENCOUNTER
Left msg to confirm surgery on 11/14 with Dr. Rodríguez and Dr. Pradhan will work for her. [phone]      Don Martínez on 6/6/2022 at 2:23 PM

## 2022-06-14 ENCOUNTER — MYC MEDICAL ADVICE (OUTPATIENT)
Dept: SURGERY | Facility: CLINIC | Age: 42
End: 2022-06-14
Payer: COMMERCIAL

## 2022-06-14 ENCOUNTER — TELEPHONE (OUTPATIENT)
Dept: ONCOLOGY | Facility: CLINIC | Age: 42
End: 2022-06-14
Payer: COMMERCIAL

## 2022-06-14 NOTE — TELEPHONE ENCOUNTER
Another voicemail left to schedule pt for November combo case with  and . [phone] Left on pts VM to call back.

## 2022-06-22 ENCOUNTER — TELEPHONE (OUTPATIENT)
Dept: ONCOLOGY | Facility: CLINIC | Age: 42
End: 2022-06-22

## 2022-06-22 NOTE — TELEPHONE ENCOUNTER
LVM and sent mychart offering 1/9, 1/16, 1/23 and 1/30 for possible January surgery dates per pts request.    Don Martínez on 6/22/2022 at 3:23 PM

## 2022-07-12 ENCOUNTER — TELEPHONE (OUTPATIENT)
Dept: ONCOLOGY | Facility: CLINIC | Age: 42
End: 2022-07-12

## 2022-07-12 NOTE — TELEPHONE ENCOUNTER
Left VM to go over surgery outline [phone] left as call back     Don Martínez on 7/12/2022 at 8:58 AM

## 2022-08-19 ENCOUNTER — ANCILLARY PROCEDURE (OUTPATIENT)
Dept: MAMMOGRAPHY | Facility: CLINIC | Age: 42
End: 2022-08-19
Attending: INTERNAL MEDICINE
Payer: COMMERCIAL

## 2022-08-19 DIAGNOSIS — Z12.31 VISIT FOR SCREENING MAMMOGRAM: ICD-10-CM

## 2022-08-19 PROCEDURE — 77067 SCR MAMMO BI INCL CAD: CPT | Mod: GC | Performed by: RADIOLOGY

## 2022-08-19 PROCEDURE — 77063 BREAST TOMOSYNTHESIS BI: CPT | Mod: GC | Performed by: RADIOLOGY

## 2022-09-02 ENCOUNTER — ANCILLARY PROCEDURE (OUTPATIENT)
Dept: MAMMOGRAPHY | Facility: CLINIC | Age: 42
End: 2022-09-02
Attending: INTERNAL MEDICINE
Payer: COMMERCIAL

## 2022-09-02 DIAGNOSIS — R92.8 ABNORMAL MAMMOGRAM OF LEFT BREAST: ICD-10-CM

## 2022-09-02 PROCEDURE — 77065 DX MAMMO INCL CAD UNI: CPT | Mod: LT | Performed by: STUDENT IN AN ORGANIZED HEALTH CARE EDUCATION/TRAINING PROGRAM

## 2022-09-02 PROCEDURE — G0279 TOMOSYNTHESIS, MAMMO: HCPCS | Mod: LT | Performed by: STUDENT IN AN ORGANIZED HEALTH CARE EDUCATION/TRAINING PROGRAM

## 2022-09-04 ENCOUNTER — NURSE TRIAGE (OUTPATIENT)
Dept: NURSING | Facility: CLINIC | Age: 42
End: 2022-09-04

## 2022-09-04 NOTE — TELEPHONE ENCOUNTER
Fever 101 and sore throat started yesterday. Tested positive for coronavirus today, faint line.  Annabella Alcantara, RN  [facility]      Reason for Disposition    [1] COVID-19 diagnosed by positive lab test (e.g., PCR, rapid self-test kit) AND [2] mild symptoms (e.g., cough, fever, others) AND [3] no complications or SOB    Additional Information    Negative: SEVERE difficulty breathing (e.g., struggling for each breath, speaks in single words)    Negative: Difficult to awaken or acting confused (e.g., disoriented, slurred speech)    Negative: Bluish (or gray) lips or face now    Negative: Shock suspected (e.g., cold/pale/clammy skin, too weak to stand, low BP, rapid pulse)    Negative: Sounds like a life-threatening emergency to the triager    Negative: [1] Diagnosed or suspected COVID-19 AND [2] symptoms lasting 3 or more weeks    Negative: [1] COVID-19 exposure AND [2] no symptoms    Negative: COVID-19 vaccine reaction suspected (e.g., fever, headache, muscle aches) occurring 1 to 3 days after getting vaccine    Negative: COVID-19 vaccine, questions about    Negative: [1] Lives with someone known to have influenza (flu test positive) AND [2] flu-like symptoms (e.g., cough, runny nose, sore throat, SOB; with or without fever)    Negative: [1] Adult with possible COVID-19 symptoms AND [2] triager concerned about severity of symptoms or other causes    Negative: COVID-19 and breastfeeding, questions about    Negative: SEVERE or constant chest pain or pressure  (Exception: Mild central chest pain, present only when coughing.)    Negative: MODERATE difficulty breathing (e.g., speaks in phrases, SOB even at rest, pulse 100-120)    Negative: [1] Headache AND [2] stiff neck (can't touch chin to chest)     headache    Negative: Oxygen level (e.g., pulse oximetry) 90 percent or lower    Negative: Chest pain or pressure    Negative: Patient sounds very sick or weak to the triager    Negative: MILD difficulty  breathing (e.g., minimal/no SOB at rest, SOB with walking, pulse <100)    Negative: Fever > 103 F (39.4 C)     101    Negative: [1] Fever > 101 F (38.3 C) AND [2] age > 60 years    Negative: [1] Fever > 100.0 F (37.8 C) AND [2] bedridden (e.g., nursing home patient, CVA, chronic illness, recovering from surgery)    Negative: HIGH RISK for severe COVID complications (e.g., weak immune system, age > 64 years, obesity with BMI > 25, pregnant, chronic lung disease or other chronic medical condition)  (Exception: Already seen by PCP and no new or worsening symptoms.)    Negative: [1] HIGH RISK patient AND [2] influenza is widespread in the community AND [3] ONE OR MORE respiratory symptoms: cough, sore throat, runny or stuffy nose    Negative: [1] HIGH RISK patient AND [2] influenza exposure within the last 7 days AND [3] ONE OR MORE respiratory symptoms: cough, sore throat, runny or stuffy nose    Negative: Oxygen level (e.g., pulse oximetry) 91 to 94 percent    Negative: Fever present > 3 days (72 hours)    Negative: [1] Fever returns after gone for over 24 hours AND [2] symptoms worse or not improved    Negative: [1] Continuous (nonstop) coughing interferes with work or school AND [2] no improvement using cough treatment per Care Advice    Negative: [1] COVID-19 infection suspected by caller or triager AND [2] mild symptoms (cough, fever, or others) AND [3] negative COVID-19 rapid test    Negative: [1] COVID-19 diagnosed by positive lab test (e.g., PCR, rapid self-test kit) AND [2] NO symptoms (e.g., cough, fever, others)    Protocols used: CORONAVIRUS (COVID-19) DIAGNOSED OR [id number] 1.18.2022

## 2022-09-19 NOTE — NURSING NOTE
"Suzanne Trejo" Carl was seen and treated in our emergency department on 9/19/2022.  She may return to school on 09/21/2022.      If you have any questions or concerns, please don't hesitate to call.      Jessa HUTSON" Chief Complaint   Patient presents with     Consult     cough     Messi Garcia LPN

## 2022-09-26 DIAGNOSIS — Z15.09 BRCA1 GENETIC CARRIER: Primary | ICD-10-CM

## 2022-09-26 DIAGNOSIS — Z15.01 BRCA1 GENETIC CARRIER: Primary | ICD-10-CM

## 2022-09-28 DIAGNOSIS — Z90.722 S/P BSO (BILATERAL SALPINGO-OOPHORECTOMY): ICD-10-CM

## 2022-09-28 NOTE — TELEPHONE ENCOUNTER
[facility] Call Center    Phone Message    May a detailed message be left on voicemail: yes     Reason for Call: Medication Refill Request    Has the patient contacted the pharmacy for the refill? Yes   Name of medication being requested: estradiol (ESTRACE) 2 MG tablet [56954] (Order [id number])  Provider who prescribed the medication: Fidelia Ricks MD  Pharmacy: [id number] [place] [address] AT [facility] OF [address]  Date medication is needed: ASAP    Action Taken: Message routed to:  [facility] ([facility]    Travel Screening: Not Applicable

## 2022-10-03 ENCOUNTER — TELEPHONE (OUTPATIENT)
Dept: ONCOLOGY | Facility: CLINIC | Age: 42
End: 2022-10-03

## 2022-10-03 NOTE — TELEPHONE ENCOUNTER
Left voicemail for patient regarding rescheduling surgery with Dr. Rodríguez and Dr. Pradhan. Dr. Rodríguez will be [place] on 1/16.    Provided contact number to discuss.    P: [phone]    __    Hoda Heard on 10/3/2022

## 2022-10-04 ENCOUNTER — TELEPHONE (OUTPATIENT)
Dept: ONCOLOGY | Facility: CLINIC | Age: 42
End: 2022-10-04

## 2022-10-04 NOTE — TELEPHONE ENCOUNTER
LVM for pt to call and discuss new surgery date left call back [phone] and advised pt she can send a mychart if that is easier.

## 2022-10-06 ENCOUNTER — TELEPHONE (OUTPATIENT)
Dept: ONCOLOGY | Facility: CLINIC | Age: 42
End: 2022-10-06

## 2022-10-06 NOTE — TELEPHONE ENCOUNTER
LVM to advise pt of new surgery date per her VM she prefers 1/23. Adjusted associated appts and left details on pts VM per her request. Left [phone] for call back with questions.    Don Martínez on 10/6/2022 at 9:05 AM

## 2022-10-12 RX ORDER — ESTRADIOL 2 MG/1
2 TABLET ORAL DAILY
Qty: 90 TABLET | Refills: 3 | Status: CANCELLED | OUTPATIENT
Start: 2022-10-12

## 2022-10-12 NOTE — TELEPHONE ENCOUNTER
Last visit with Dr. Ricks in 2020. No upcoming appointments. No visits with other [facility] providers. Pending to Dr. Ricks for review.

## 2022-10-13 DIAGNOSIS — Z90.722 S/P BSO (BILATERAL SALPINGO-OOPHORECTOMY): ICD-10-CM

## 2022-10-17 NOTE — TELEPHONE ENCOUNTER
FUTURE VISIT INFORMATION      SURGERY INFORMATION:    Date: 1/23/23    Location: u or    Surgeon:  Monica Rodríguez MD Choudry, M Umar Hasan, MD    Anesthesia Type:  General with Block    Procedure: BILATERAL Risk-Reducing Nipple-Sparing Mastectomy Bilateral breast reconstruction with implant/Alloderm possible expanders, SPY    RECORDS REQUESTED FROM:       Primary Care Provider: Antonina Martinez MD- [facility]    Most recent PFT's: 3/16/17

## 2022-10-19 RX ORDER — ESTRADIOL 2 MG/1
2 TABLET ORAL DAILY
Qty: 90 TABLET | Refills: 3 | Status: SHIPPED | OUTPATIENT
Start: 2022-10-19 | End: 2023-04-12

## 2022-11-20 ENCOUNTER — HEALTH MAINTENANCE LETTER (OUTPATIENT)
Age: 42
End: 2022-11-20

## 2022-12-02 ENCOUNTER — ANCILLARY PROCEDURE (OUTPATIENT)
Dept: MRI IMAGING | Facility: CLINIC | Age: 42
End: 2022-12-02
Attending: SURGERY
Payer: COMMERCIAL

## 2022-12-02 DIAGNOSIS — Z15.01 BRCA1 GENETIC CARRIER: ICD-10-CM

## 2022-12-02 DIAGNOSIS — Z15.09 BRCA1 GENETIC CARRIER: ICD-10-CM

## 2022-12-02 PROCEDURE — A9585 GADOBUTROL INJECTION: HCPCS | Performed by: RADIOLOGY

## 2022-12-02 PROCEDURE — 77049 MRI BREAST C-+ W/CAD BI: CPT | Performed by: RADIOLOGY

## 2022-12-02 RX ORDER — GADOBUTROL 604.72 MG/ML
7.5 INJECTION INTRAVENOUS ONCE
Status: COMPLETED | OUTPATIENT
Start: 2022-12-02 | End: 2022-12-02

## 2022-12-02 RX ADMIN — GADOBUTROL 5.5 ML: 604.72 INJECTION INTRAVENOUS at 15:43

## 2022-12-02 NOTE — DISCHARGE INSTRUCTIONS
MRI Contrast Discharge Instructions    The IV contrast you received today will pass out of your body in your  urine. This will happen in the next 24 hours. You will not feel this process.  Your urine will not change color.    Drink at least 4 extra glasses of water or juice today (unless your doctor  has restricted your fluids). This reduces the stress on your kidneys.  You may take your regular medicines.    If you are on dialysis: It is best to have dialysis today.    If you have a reaction: Most reactions happen right away. If you have  any new symptoms after leaving the hospital (such as hives or swelling),  call your hospital at the correct number below. Or call your family doctor.  If you have breathing distress or wheezing, call 911.    Special instructions: ***    I have read and understand the above information.    Signature:______________________________________ Date:___________    Staff:__________________________________________ Date:___________     Time:__________    [facility] Departments:    ___[place]: [phone]  ___[facility]: [phone]  ___[place]: [phone] ___[facility]: [phone]  ___[facility]: [phone]  ___[facility]: [phone]  ___[phone]  ___[facility]: [phone]  ___[phone]

## 2022-12-09 ENCOUNTER — ONCOLOGY VISIT (OUTPATIENT)
Dept: ONCOLOGY | Facility: CLINIC | Age: 42
End: 2022-12-09
Attending: SURGERY
Payer: COMMERCIAL

## 2022-12-09 ENCOUNTER — PATIENT OUTREACH (OUTPATIENT)
Dept: ONCOLOGY | Facility: CLINIC | Age: 42
End: 2022-12-09

## 2022-12-09 VITALS
SYSTOLIC BLOOD PRESSURE: 110 MMHG | OXYGEN SATURATION: 99 % | WEIGHT: 134.6 LBS | RESPIRATION RATE: 16 BRPM | HEART RATE: 82 BPM | DIASTOLIC BLOOD PRESSURE: 72 MMHG | BODY MASS INDEX: 23.1 KG/M2

## 2022-12-09 DIAGNOSIS — Z15.01 BRCA1 GENETIC CARRIER: Primary | ICD-10-CM

## 2022-12-09 DIAGNOSIS — Z15.09 BRCA1 GENETIC CARRIER: Primary | ICD-10-CM

## 2022-12-09 PROCEDURE — G0463 HOSPITAL OUTPT CLINIC VISIT: HCPCS

## 2022-12-09 PROCEDURE — 99213 OFFICE O/P EST LOW 20 MIN: CPT | Performed by: SURGERY

## 2022-12-09 ASSESSMENT — PAIN SCALES - GENERAL: PAINLEVEL: NO PAIN (0)

## 2022-12-09 NOTE — LETTER
12/9/2022         RE: Yamila Myles  [address] [address]        Dear Colleague,    Thank you for referring your patient, Yamila Myles, to the [facility] BREAST [facility]. Please see a copy of my visit note below.    FOLLOW-UP  Dec 9, 2022    Yamila Myles is a 42 year old female who returns for follow-up for a pathogenic variant in BRCA1.    HPI:    Since her last visit, she has completed bilateral mastopexy.  She is doing well.  Her most recent imaging (breast MRI on 12/2/2022) showed no abnormalities.    /72 (BP Location: Right arm, Patient Position: Sitting, Cuff Size: Adult Regular)   Pulse 82   Resp 16   Wt 61.1 kg (134 lb 9.6 oz)   SpO2 99%   BMI 23.10 kg/m     Physical exam deferred.    ASSESSMENT:    Yamila Myles is a 42 year old female with a pathogenic variant in BRCA1.    She returned today with questions specific to the day of surgery and postoperative recovery. We discussed bilateral nipple-sparing mastectomy and its recovery in detail. We discussed the possibility of flap ischemia and delayed reconstruction.   We discussed some postoperative expectations.    All of the above were discussed and all questions were answered.    Total time spent with the patient was 25 minutes, of which 75% was counseling.     PLAN:  1. BILATERAL risk-reducing nipple-sparing mastectomy  2. Patient to report to her PCP for preoperative H&P and testing.    Monica Rodríguez MD MSc [facility] FACS  Associate Professor of Surgery  Division of Surgical Oncology  [facility]     28 minutes spent on the date of the encounter doing chart review, review of test results, patient visit and documentation.   27-Nov-2017 08:35

## 2022-12-09 NOTE — PROGRESS NOTES
RN CARE COORDINATION  Surgical Oncology    Met with Yamila following her visit with Dr. Rodríguez on 12/9/2022. Introduced self and explained role of RNCC. Reviewed plan for BL mastectomy with immediate reconstruction at the [facility]. Provided appropriate OR location map. Yamila is scheduled for her H&P and Covid test on 1/13. Reviewed shower instructions and provided/mailed written hand-out and bottle of surgical scrub.     Drain instructions were reviewed and provided to patient.      Surgery is scheduled on 1/23/2023.  Writer answered all questions and concerns to the best of her ability and provided her contact information. Reviewed use of [employer] as alternative way to contact team.  Encouraged patient to contact with questions.         SHALINI Medrano, RN  RN Care Coordinator  [facility]  Surgical Onolcogy      Approximately 15 minutes was spent in discussion with patient.

## 2022-12-09 NOTE — NURSING NOTE
"Oncology Rooming Note    December 9, 2022 12:42 PM   Yamila Myles is a 42 year old female who presents for:    Chief Complaint   Patient presents with     Oncology Clinic Visit     Breast Cancer     Initial Vitals: /72 (BP Location: Right arm, Patient Position: Sitting, Cuff Size: Adult Regular)   Pulse 82   Resp 16   Wt 61.1 kg (134 lb 9.6 oz)   SpO2 99%   BMI 23.10 kg/m   Estimated body mass index is 23.1 kg/m  as calculated from the following:    Height as of 4/13/22: 1.626 m (5' 4\").    Weight as of this encounter: 61.1 kg (134 lb 9.6 oz). Body surface area is 1.66 meters squared.  No Pain (0) Comment: Data Unavailable   No LMP recorded. (Menstrual status: IUD).  Allergies reviewed: Yes  Medications reviewed: Yes    Medications: Medication refills not needed today.  Pharmacy name entered into [facility]:    [facility] #[id number] - [place], [place] - [address] AT [facility] [address]  [facility] #[id number] - [place], [place] - [address] AT [facility] OF [address]    Clinical concerns: Pt presents today for f/u.       Sandra Castro LPN  12/9/2022              "

## 2022-12-09 NOTE — PROGRESS NOTES
FOLLOW-UP  Dec 9, 2022    Yamila Myles is a 42 year old female who returns for follow-up for a pathogenic variant in BRCA1.    HPI:    Since her last visit, she has completed bilateral mastopexy.  She is doing well.  Her most recent imaging (breast MRI on 12/2/2022) showed no abnormalities.    /72 (BP Location: Right arm, Patient Position: Sitting, Cuff Size: Adult Regular)   Pulse 82   Resp 16   Wt 61.1 kg (134 lb 9.6 oz)   SpO2 99%   BMI 23.10 kg/m     Physical exam deferred.    ASSESSMENT:    Yamila Myles is a 42 year old female with a pathogenic variant in BRCA1.    She returned today with questions specific to the day of surgery and postoperative recovery. We discussed bilateral nipple-sparing mastectomy and its recovery in detail. We discussed the possibility of flap ischemia and delayed reconstruction.   We discussed some postoperative expectations.    All of the above were discussed and all questions were answered.    Total time spent with the patient was 25 minutes, of which 75% was counseling.     PLAN:  1. BILATERAL risk-reducing nipple-sparing mastectomy  2. Patient to report to her PCP for preoperative H&P and testing.    Monica Rodríguez MD MSc [facility] FACS  Associate Professor of Surgery  Division of Surgical Oncology  [facility]     28 minutes spent on the date of the encounter doing chart review, review of test results, patient visit and documentation.

## 2023-01-03 ENCOUNTER — OFFICE VISIT (OUTPATIENT)
Dept: PLASTIC SURGERY | Facility: CLINIC | Age: 43
End: 2023-01-03
Attending: PLASTIC SURGERY
Payer: COMMERCIAL

## 2023-01-03 VITALS
WEIGHT: 136.6 LBS | RESPIRATION RATE: 16 BRPM | TEMPERATURE: 97.4 F | OXYGEN SATURATION: 99 % | BODY MASS INDEX: 23.45 KG/M2 | HEART RATE: 79 BPM | DIASTOLIC BLOOD PRESSURE: 68 MMHG | SYSTOLIC BLOOD PRESSURE: 106 MMHG

## 2023-01-03 DIAGNOSIS — Z15.09 BRCA1 POSITIVE: Primary | ICD-10-CM

## 2023-01-03 DIAGNOSIS — Z15.01 BRCA1 POSITIVE: Primary | ICD-10-CM

## 2023-01-03 PROCEDURE — 99214 OFFICE O/P EST MOD 30 MIN: CPT | Performed by: PLASTIC SURGERY

## 2023-01-03 PROCEDURE — G0463 HOSPITAL OUTPT CLINIC VISIT: HCPCS

## 2023-01-03 PROCEDURE — G0463 HOSPITAL OUTPT CLINIC VISIT: HCPCS | Performed by: PLASTIC SURGERY

## 2023-01-03 ASSESSMENT — PAIN SCALES - GENERAL: PAINLEVEL: NO PAIN (0)

## 2023-01-03 NOTE — NURSING NOTE
"Oncology Rooming Note    January 3, 2023 12:37 PM   Yamila Myles is a 42 year old female who presents for:    Chief Complaint   Patient presents with     Oncology Clinic Visit     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       Initial Vitals: /68 (BP Location: Right arm, Patient Position: Sitting, Cuff Size: Adult Regular)   Pulse 79   Temp 97.4  F (36.3  C) (Oral)   Resp 16   Wt 62 kg (136 lb 9.6 oz)   SpO2 99%   BMI 23.45 kg/m   Estimated body mass index is 23.45 kg/m  as calculated from the following:    Height as of 4/13/22: 1.626 m (5' 4\").    Weight as of this encounter: 62 kg (136 lb 9.6 oz). Body surface area is 1.67 meters squared.  No Pain (0) Comment: Data Unavailable   No LMP recorded. (Menstrual status: IUD).  Allergies reviewed: Yes  Medications reviewed: Yes    Medications: Medication refills not needed today.  Pharmacy name entered into [facility]:    [id number] - [facility] [address] AT [facility] [address] [facility] #[id number] - [place], [place] - [address] AT [facility] [address]    Clinical concerns: No additional clinical concerns.        Elly Torres), LPN January 3, 2023 12:37 PM              "

## 2023-01-03 NOTE — PROGRESS NOTES
PREOPERATIVE VISIT NOTE    PRESENTING COMPLAINT:  Preop visit for upcoming bilateral prophylactic nipple-sparing mastectomy for BRCA1 gene mutation and immediate direct-to-implant reconstruction scheduled for 01/16/2023.    HISTORY OF PRESENTING COMPLAINT:  Ms. Myles is 42 years old.  She underwent her 1st stage mastopexy back in 04/2022.  She is now ready for her bilateral nipple-sparing mastectomy and immediate direct-to-implant reconstruction.  She is here for a preoperative visit.  No change in her history and physical exam.    PHYSICAL EXAMINATION:  Vital signs are stable.  She is afebrile, in no obvious distress.  Both breasts are completely healed.      ASSESSMENT AND PLAN:  The plan now is to proceed with bilateral nipple-sparing mastectomies and bilateral immediate direct-to-implant breast reconstruction.  I had a ghazala discussion with the patient about this plan. As long as the blood flow to the nipple and the skin envelope is excellent, we can proceed with that.  We will use AlloDerm as well.  All of this was discussed with the patient. If there are any concerns about blood flow, we will stage the procedure and do an expander-based reconstruction followed by 2nd stage implant placement. Even if we do the direct to implant, I discussed with the patient the 50% need for touch-up surgeries as a 2nd stage.  She understood all of this.  All risks, benefits and alternatives of the procedure, including pain, infection, bleeding, scarring, asymmetry, seromas, hematomas, wound breakdown, wound dehiscence, numbness of the chest, wound-healing complications, loss of the nipples, loss of skin, implant issues like visibility, palpability, rippling, capsular contracture, ALCL, requirement of further staged procedures, DVT, PE, MI, CVA, pneumonia, renal failure and death, were all explained.  She understood them all and wants to proceed.  All her questions were answered.  She was happy with the visit.  All exam and  discussion done in the presence of my nurse, Jacqueline Peck.    Total time spent in the encounter today, including chart review, the visit itself and post visit paperwork, was 30 minutes.

## 2023-01-03 NOTE — NURSING NOTE
Pre Op Teaching Note:       Pre and Post op Patient Education    Relevant Diagnosis:  Breast cancer reconstruction  Surgical procedure:  Mastectomy with expander placement    Patient demonstrates understanding of the following:  Date of surgery:  1/23/23  Location of surgery:  [address] [place] [address]  History and Physical and any other testing necessary prior to surgery: Yes, discussed with pt need for pre-op within 30 days of surgery with PCP.    Patient demonstrates understanding of the following:    Pre-op showering/scrub information with PCMX Soap: Yes, soap (pt states has this)    Blood thinner medications discussed and when to stop (if applicable):  Per PCP at pre-op.     Post-op follow-up:  Discussed how to contact the hospital, nurse, and clinic scheduling staff if necessary. (See packet information)    Instructional materials used/given/mailed:  [place] Surgery Packet per surgery scheduler, post op teaching sheet, Soap.  Pt advised that final arrival information to come closer to the surgery date by [facility] nurses.

## 2023-01-03 NOTE — LETTER
1/3/2023         RE: Yamila Myles  [address] [facility] [address]        Dear Colleague,    Thank you for referring your patient, Yamila Myles, to the [facility] BREAST [facility]. Please see a copy of my visit note below.    PREOPERATIVE VISIT NOTE    PRESENTING COMPLAINT:  Preop visit for upcoming bilateral prophylactic nipple-sparing mastectomy for BRCA1 gene mutation and immediate direct-to-implant reconstruction scheduled for 01/16/2023.    HISTORY OF PRESENTING COMPLAINT:  Ms. Myles is 42 years old.  She underwent her 1st stage mastopexy back in 04/2022.  She is now ready for her bilateral nipple-sparing mastectomy and immediate direct-to-implant reconstruction.  She is here for a preoperative visit.  No change in her history and physical exam.    PHYSICAL EXAMINATION:  Vital signs are stable.  She is afebrile, in no obvious distress.  Both breasts are completely healed.      ASSESSMENT AND PLAN:  The plan now is to proceed with bilateral nipple-sparing mastectomies and bilateral immediate direct-to-implant breast reconstruction.  I had a ghazala discussion with the patient about this plan. As long as the blood flow to the nipple and the skin envelope is excellent, we can proceed with that.  We will use AlloDerm as well.  All of this was discussed with the patient. If there are any concerns about blood flow, we will stage the procedure and do an expander-based reconstruction followed by 2nd stage implant placement. Even if we do the direct to implant, I discussed with the patient the 50% need for touch-up surgeries as a 2nd stage.  She understood all of this.  All risks, benefits and alternatives of the procedure, including pain, infection, bleeding, scarring, asymmetry, seromas, hematomas, wound breakdown, wound dehiscence, numbness of the chest, wound-healing complications, loss of the nipples, loss of skin, implant issues like visibility, palpability, rippling, capsular  contracture, ALCL, requirement of further staged procedures, DVT, PE, MI, CVA, pneumonia, renal failure and death, were all explained.  She understood them all and wants to proceed.  All her questions were answered.  She was happy with the visit.  All exam and discussion done in the presence of my nurse, Jacqueline Peck.    Total time spent in the encounter today, including chart review, the visit itself and post visit paperwork, was 30 minutes.    Sincerely,        LING Pradhan MD

## 2023-01-13 ENCOUNTER — ANESTHESIA EVENT (OUTPATIENT)
Dept: SURGERY | Facility: CLINIC | Age: 43
End: 2023-01-13
Payer: COMMERCIAL

## 2023-01-13 ENCOUNTER — PRE VISIT (OUTPATIENT)
Dept: SURGERY | Facility: CLINIC | Age: 43
End: 2023-01-13

## 2023-01-13 ENCOUNTER — LAB (OUTPATIENT)
Dept: LAB | Facility: CLINIC | Age: 43
End: 2023-01-13
Payer: COMMERCIAL

## 2023-01-13 ENCOUNTER — OFFICE VISIT (OUTPATIENT)
Dept: SURGERY | Facility: CLINIC | Age: 43
End: 2023-01-13
Payer: COMMERCIAL

## 2023-01-13 VITALS
TEMPERATURE: 97.9 F | OXYGEN SATURATION: 98 % | WEIGHT: 138.1 LBS | RESPIRATION RATE: 16 BRPM | SYSTOLIC BLOOD PRESSURE: 105 MMHG | BODY MASS INDEX: 23.58 KG/M2 | DIASTOLIC BLOOD PRESSURE: 71 MMHG | HEIGHT: 64 IN | HEART RATE: 78 BPM

## 2023-01-13 DIAGNOSIS — Z15.01 BRCA1 GENETIC CARRIER: ICD-10-CM

## 2023-01-13 DIAGNOSIS — Z01.818 PREOP EXAMINATION: Primary | ICD-10-CM

## 2023-01-13 DIAGNOSIS — Z15.09 BRCA1 GENETIC CARRIER: ICD-10-CM

## 2023-01-13 DIAGNOSIS — Z01.818 PREOP EXAMINATION: ICD-10-CM

## 2023-01-13 LAB
ANION GAP SERPL CALCULATED.3IONS-SCNC: 7 MMOL/L (ref 7–15)
BUN SERPL-MCNC: 11.4 MG/DL (ref 6–20)
CALCIUM SERPL-MCNC: 9.3 MG/DL (ref 8.6–10)
CHLORIDE SERPL-SCNC: 105 MMOL/L (ref 98–107)
CREAT SERPL-MCNC: 0.75 MG/DL (ref 0.51–0.95)
DEPRECATED HCO3 PLAS-SCNC: 26 MMOL/L (ref 22–29)
ERYTHROCYTE [DISTWIDTH] IN BLOOD BY AUTOMATED COUNT: 11.7 % (ref 10–15)
GFR SERPL CREATININE-BSD FRML MDRD: >90 ML/MIN/1.73M2
GLUCOSE SERPL-MCNC: 87 MG/DL (ref 70–99)
HCT VFR BLD AUTO: 36.2 % (ref 35–47)
HGB BLD-MCNC: 12 G/DL (ref 11.7–15.7)
MCH RBC QN AUTO: 30.9 PG (ref 26.5–33)
MCHC RBC AUTO-ENTMCNC: 33.1 G/DL (ref 31.5–36.5)
MCV RBC AUTO: 93 FL (ref 78–100)
PLATELET # BLD AUTO: 231 10E3/UL (ref 150–450)
POTASSIUM SERPL-SCNC: 4.2 MMOL/L (ref 3.4–5.3)
RBC # BLD AUTO: 3.88 10E6/UL (ref 3.8–5.2)
SODIUM SERPL-SCNC: 138 MMOL/L (ref 136–145)
WBC # BLD AUTO: 4.4 10E3/UL (ref 4–11)

## 2023-01-13 PROCEDURE — 36415 COLL VENOUS BLD VENIPUNCTURE: CPT | Performed by: PATHOLOGY

## 2023-01-13 PROCEDURE — 85027 COMPLETE CBC AUTOMATED: CPT | Performed by: PATHOLOGY

## 2023-01-13 PROCEDURE — 80048 BASIC METABOLIC PNL TOTAL CA: CPT | Performed by: PATHOLOGY

## 2023-01-13 PROCEDURE — 99203 OFFICE O/P NEW LOW 30 MIN: CPT | Performed by: NURSE PRACTITIONER

## 2023-01-13 RX ORDER — LACTOBACILLUS RHAMNOSUS GG 10B CELL
1 CAPSULE ORAL EVERY EVENING
COMMUNITY
End: 2023-11-28

## 2023-01-13 ASSESSMENT — PAIN SCALES - GENERAL: PAINLEVEL: NO PAIN (0)

## 2023-01-13 ASSESSMENT — COPD QUESTIONNAIRES: COPD: 0

## 2023-01-13 ASSESSMENT — LIFESTYLE VARIABLES: TOBACCO_USE: 0

## 2023-01-13 ASSESSMENT — ENCOUNTER SYMPTOMS: ORTHOPNEA: 0

## 2023-01-13 NOTE — H&P
Pre-Operative H & P     CC:  Preoperative exam to assess for increased cardiopulmonary risk while undergoing surgery and anesthesia.    Date of Encounter: 1/13/2023  Primary Care Physician:  Antonina Martinez     Reason for visit:   Encounter Diagnoses   Name Primary?     Preop examination Yes     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        HPI  Yamila Myles is a 42 year old female who presents for pre-operative H & P in preparation for  Procedure Information     Case: [id number] Date/Time: 01/23/23 0730    Procedures:       BILATERAL Risk-Reducing Nipple-Sparing Mastectomy (Bilateral: Breast)      Bilateral breast reconstruction with implant/Alloderm (Bilateral: Breast)      possible expanders, SPY (Bilateral: Breast)    Anesthesia type: General with Block    Diagnosis: 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 [Z15.01, Z15.09]    Pre-op diagnosis: 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 [Z15.01, Z15.09]    Location:  OR [address] [place] [address] OR    Providers: Monica Rodríguez MD; LING Pradhan MD          Yamila Myles is a 42 year old female with allergic rhinitis that is a 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.  Her mother had a history of breast cancer.  She had interest in a prophylactic mastectomy so a bilateral mastopexy was done on 4/13/22 as the initial surgery.  She has continued to follow with Dr. Pradhan and has also consulted with Dr. Rodríguez.  The above listed surgery has now been recommended for completion.    History is obtained from the patient and chart review    Hx of abnormal bleeding or anti-platelet use: none    Menstrual history: No LMP recorded (lmp unknown). (Menstrual status: IUD).: s/p oophorectomy     Past Medical History  Past Medical History:   Diagnosis Date     Allergic rhinitis      Allergic rhinitis      BRCA1 positive 06/2001     Breast disorder 06/08/2011     Encounter for insertion of mirena IUD 07/12/2016     Hoarseness      Lump or mass in breast 2001    BRCA-1 positive (Mother, MGM w/ breast ca, mother w/ gene +), mother dx at 32)      Microcalcifications of the breast, right uoq 06/08/2011       Past Surgical History  Past Surgical History:   Procedure Laterality Date     INSERT INTRAUTERINE DEVICE N/A 7/12/2016    Procedure: INSERT INTRAUTERINE DEVICE;  Surgeon: Fidelia Ricks MD;  [facility]: [facility] OR     LAPAROSCOPIC SALPINGO-OOPHORECTOMY Bilateral 7/12/2016    Procedure: LAPAROSCOPIC SALPINGO-OOPHORECTOMY;  Surgeon: Fidelia Ricks MD;  Location: [facility] OR     LAPAROSCOPY OPERATIVE ADULT N/A 7/12/2016    Procedure: LAPAROSCOPY OPERATIVE ADULT;  Surgeon: Fidelia Ricks MD;  Location: [facility] OR     MAMMOPLASTY REDUCTION BILATERAL Bilateral 4/13/2022    Procedure: Bilateral breast lift;  Surgeon: LING Pradhan MD;  [place] [facility] [place]     [facility]CTOM[facility]  1987       Prior to Admission Medications  Current Outpatient Medications   Medication Sig Dispense Refill     calcium carbonate (OS-GRETCHEN) 500 MG tablet Take 1 tablet by mouth every evening Unsure if 500 or 1000mg       cetirizine (ZYRTEC) 10 MG tablet Take 10 mg by mouth every evening       estradiol (ESTRACE) 2 MG tablet Take 1 tablet (2 mg) by mouth daily (Patient taking differently: Take 2 mg by mouth every evening) 90 tablet 3     HEMP OIL OR EXTRACT OR OTHER CBD CANNABINOID, NOT MEDICAL CANNABIS, 10 mg every evening [employer] CBD 10 mg       lactobacillus rhamnosus, GG, (CULTURELL) capsule Take 1 capsule by mouth every evening       levonorgestrel (MIRENA) 20 MCG/24HR IUD 1 each by Intrauterine route once Lot # [id number]  To be removed by 6/12/2017       Vitamin D, Cholecalciferol, 25 MCG (1000 UT) CAPS Take 1,000 mg % by mouth every evening Unsure is 500 or 1000       fluticasone (FLONASE) 50 MCG/ACT nasal spray Spray 2 sprays into both nostrils daily (Patient not taking: Reported on 1/3/2023) 16 g 3       Allergies  Allergies   Allergen Reactions     Advil [Nsaids] Other (See Comments)     Nasal congestion  Eye get puffy        Seasonal Allergies      Pollen and mold    Spring and Fall are main periods. Takes Zyrtec year round to manage it.   Gets post nasal drip      Adhesive Tape Rash     Surgical drape adhesive vs. ioban       Social History  Social History     Socioeconomic History     Marital status:      Spouse name: Not on file     Number of children: 2     Years of education: BA     Highest education level: Not on file   Occupational History     Occupation: staff director     Comment: [employer]   Tobacco Use     Smoking status: Never     Smokeless tobacco: Never   Substance and Sexual Activity     Alcohol use: Yes     Comment: Special occasions     Drug use: Yes     Comment: CBD 10 mg      Sexual activity: Yes     Partners: Male     Birth control/protection: I.U.D.     Comment: Mirena   Other Topics Concern     Parent/sibling w/ CABG, MI or angioplasty before 65F 55M? No      Service Not Asked     Blood Transfusions No     Caffeine Concern No     Occupational Exposure No     Hobby Hazards Not Asked     Sleep Concern No     Stress Concern Yes     Comment: life/work -->coping     Weight Concern No     Special Diet No     Back Care Yes     Comment: chiropracter for old injury     Exercise No     Bike Helmet Not Asked     Comment: not biker     Seat Belt No     Self-Exams Not Asked   Social History Narrative    Social Documentation:        Balanced Diet: YES    Calcium intake: less than 2 per day    Caffeine: 0-1 per day    Exercise:  type of activity walk; daily times per week    Sunscreen: Yes    Seatbelts:  Yes    Self Breast Exam:  Yes    Self Testicular Exam: No - n/a    Physical/Emotional/Sexual Abuse: No     Do you feel safe in your environment? Yes        Cholesterol screen up to date: Yes-3 yrs ago per pt.  Not fasting today.     Eye Exam up to date: Yes    Dental Exam up to date: No - 9 months ago.     Pap smear up to date: Yes    Mammogram up to date: Does Not Apply    Dexa Scan up to date: Does Not Apply    Colonoscopy up to  date: Does Not Apply    Immunizations up to date: Yes-Td 12/06    Glucose screen if over 40:  No - n/a     Social Determinants of Health     Financial Resource Strain: Not on file   Food Insecurity: Not on file   Transportation Needs: Not on file   Physical Activity: Not on file   Stress: Not on file   Social Connections: Not on file   Intimate Partner Violence: Not on file   Housing Stability: Not on file       Family History  Family History   Problem Relation Age of Onset     Breast Cancer Mother         dx age 32, doing well     Colon Cancer Mother         dx 2019. s/p surg and chemo     Anesthesia Reaction Sister         PONV     Hereditary Breast and Ovarian Cancer Syndrome Sister         double Mastectomy and oophrectomy     Breast Cancer Maternal Grandmother      Diabetes Paternal Grandmother      Breast Cancer Paternal Grandmother      Thrombosis No family hx of        Review of Systems  The complete review of systems is negative other than noted in the HPI or here.   Anesthesia Evaluation   Pt has had prior anesthetic.     No history of anesthetic complications       ROS/MED HX  ENT/Pulmonary:  - neg pulmonary ROS  (-) tobacco use, asthma, COPD, JOSE LUIS risk factors and recent URI   Neurologic:  - neg neurologic ROS     Cardiovascular:     (+) -----No previous cardiac testing  (-) CURTIS and orthopnea/PND   METS/Exercise Tolerance: >4 METS Comment: Uses the elliptical for 20 minutes a few times per week for exercise.     Hematologic:  - neg hematologic  ROS  (-) history of blood clots and history of blood transfusion   Musculoskeletal: Comment: Chronic right posterior shoulder pain - manages with chiropractic care.  Has full ROM.        GI/Hepatic:  - neg GI/hepatic ROS     Renal/Genitourinary:  - neg Renal ROS     Endo:  - neg endo ROS     Psychiatric/Substance Use:  - neg psychiatric ROS  (-) psychiatric history   Infectious Disease:  - neg infectious disease ROS  (-) Recent Fever   Malignancy:  - neg malignancy  "ROS  (-) malignancy   Other: Comment: 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    (-) Any chance pregnant       /71 (BP Location: Right arm, Patient Position: Sitting, Cuff Size: Adult Regular)   Pulse 78   Temp 97.9  F (36.6  C) (Oral)   Resp 16   Ht 1.626 m (5' 4\")   Wt 62.6 kg (138 lb 1.6 oz)   LMP  (LMP Unknown)   SpO2 98%   Breastfeeding No   BMI 23.70 kg/m      Physical Exam   Constitutional: Awake, alert, cooperative, no apparent distress, and appears stated age.  Eyes: Pupils equal, round and reactive to light, extra ocular muscles intact, sclera clear, conjunctiva normal.  HENT: Normocephalic, oral pharynx with moist mucus membranes, good dentition. No goiter appreciated.   Respiratory: Clear to auscultation bilaterally, no crackles or wheezing.  Cardiovascular: Regular rate and rhythm, normal S1 and S2, and no murmur noted.  Carotids +2, no bruits. No edema. Palpable pulses to radial  DP and PT arteries.   GI: Normal bowel sounds, soft, non-distended, non-tender, no masses palpated, no hepatosplenomegaly.    Lymph/Hematologic: No cervical lymphadenopathy and no supraclavicular lymphadenopathy.  Genitourinary: deferred  Skin: Warm and dry.  No rashes at anticipated surgical site.   Musculoskeletal: Full ROM of neck. There is no redness, warmth, or swelling of the exposed joints. Gross motor strength is normal.    Neurologic: Awake, alert, oriented to name, place and time. Cranial nerves II-XII are grossly intact. Gait is normal.   Neuropsychiatric: Calm, cooperative. Normal affect.     Prior Labs/Diagnostic Studies   All labs and imaging personally reviewed     EKG/ stress test - if available please see in ROS above   No results found.      The patient's records and results personally reviewed by this provider.     Outside records reviewed from: Care Everywhere    LAB/DIAGNOSTIC STUDIES TODAY:    Component      Latest Ref Rng & Units 1/13/2023   Sodium      136 - 145 mmol/L 138   Potassium      3.4 - 5.3 mmol/L " 4.2   Chloride      98 - 107 mmol/L 105   Carbon Dioxide (CO2)      22 - 29 mmol/L 26   Anion Gap      7 - 15 mmol/L 7   Urea Nitrogen      6.0 - 20.0 mg/dL 11.4   Creatinine      0.51 - 0.95 mg/dL 0.75   Calcium      8.6 - 10.0 mg/dL 9.3   Glucose      70 - 99 mg/dL 87   GFR Estimate      >60 mL/min/1.73m2 >90   WBC      4.0 - 11.0 10e3/uL 4.4   RBC Count      3.80 - 5.20 10e6/uL 3.88   Hemoglobin      11.7 - 15.7 g/dL 12.0   Hematocrit      35.0 - 47.0 % 36.2   MCV      78 - 100 fL 93   MCH      26.5 - 33.0 pg 30.9   MCHC      31.5 - 36.5 g/dL 33.1   RDW      10.0 - 15.0 % 11.7   Platelet Count      150 - 450 10e3/uL 231       Assessment      Yamila Myles is a 42 year old female seen as a PAC referral for risk assessment and optimization for anesthesia.    Plan/Recommendations  Pt will be optimized for the proposed procedure.  See below for details on the assessment, risk, and preoperative recommendations    NEUROLOGY  - No history of TIA, CVA or seizure    -Post Op delirium risk factors:  No risk identified    ENT  - No current airway concerns.  Will need to be reassessed day of surgery.  Mallampati: III with limited mouth opening  TM: > 3    CARDIAC  - No history of CAD, Hypertension and Afib  - METS (Metabolic Equivalents) = >4  Patient performs 4 or more METS exercise without symptoms            Total Score: -        Criteria with incomplete data:    Functional Capacity: Unable to complete 4 METS      - This score is not calculated because of inadequate data     RCRI-Very low risk: Class 1 0.4% complication rate            Total Score: -        Criteria with incomplete data:    RCRI: High Risk Surgery    RCRI: CAD    RCRI: CHF    RCRI: Cerebrovascular Disease     RCRI: Diabetes    RCRI: Elevated Creatinine      - This score is not calculated because of inadequate data       PULMONARY    JOSE LUIS Low Risk            Total Score: -        Criteria with incomplete data:    JOSE LUIS: Snores loudly    JOSE LUIS: Often tired     "JOSE LUIS: Observed stopped breathing    JOSE LUIS: Hypertension    JOSE LUIS: BMI over 35 kg/m2    JOSE LUIS: Over 50 ys old    JOSE LUIS: Neck Circum >16 in    JOSE LUIS: Male      - This score is not calculated because of inadequate data     - Denies asthma or inhaler use  - Tobacco History      History   Smoking Status     Never   Smokeless Tobacco     Never       GI  Denies GERD  PONV Medium Risk  Total Score: 2           1 AN PONV: Pt is Female    1 AN PONV: Patient is not a current smoker            ENDOCRINE    - BMI: Estimated body mass index is 23.7 kg/m  as calculated from the following:    Height as of this encounter: 1.626 m (5' 4\").    Weight as of this encounter: 62.6 kg (138 lb 1.6 oz).  Healthy Weight (BMI 18.5-24.9)  - No history of Diabetes Mellitus    HEME  VTE Low Risk 0.26%            Total Score: 0      - No history of abnormal bleeding or antiplatelet use.      MSK  - chronic right shoulder pain.  ROM is intact.  Consider cautious positioning.    Other  - 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 - surgery planned as above.         Different anesthesia methods/types have been discussed with the patient, but they are aware that the final plan will be decided by the assigned anesthesia provider on the date of service.      The patient is optimized for their procedure. AVS with information on surgery time/arrival time, meds and NPO status given by nursing staff. No further diagnostic testing indicated.      On the day of service:     Prep time: 7 minutes  Visit time: 15 minutes  Documentation time: 5 minutes  ------------------------------------------  Total time: 27 minutes      CARLOS ALBERTO Montes CNP  Preoperative Assessment Center  [facility]  Phone: [phone]  Fax: [phone]  "

## 2023-01-13 NOTE — PATIENT INSTRUCTIONS
Preparing for Your Surgery      Name:  Yamila Myles   MRN:  [id number]   :  1980   Today's Date:  2023       Arriving for surgery:  Surgery date:  23  Arrival time:  5:30am     Surgeries and procedures: Adult patients can have 2 visitors all through the surgery process.     Visiting hours: 8 a.m. to 8:30 p.m.     Hospital: Adult patients and children under age 18 can have 4 visitor at a time     No visitors under the age of 5 are allowed for hospital patients.  Double occupancy rooms: Patients can have only two visitors at a time.     Patients with disabilities: Can have a support person with them (family member, service provider     Or someone well informed about their needs) plus the allowed number of visitors     Patients confirmed or suspected to have symptoms of COVID 19 or flu:     No visitors allowed for adult patients.   Children (under age 18) can have 1 named visitor.     People who are sick or showing symptoms of COVID 19 or flu:    Are not allowed to visit patients--we can only make exceptions in special situations.       Please follow these guidelines for your visit:   Arrive wearing a mask over your mouth and nose; we will give you a medical mask to wear    If you arrive wearing a cloth mask.   Keep it on during your entire visit, even when in patient's room.   If you don't wear a mask we'll ask you to leave.     Clean your hands with alcohol hand . Do this when you arrive at and leave the building and patient room,    And again after you touch your mask or anything in the room.     You can t visit if you have a fever, cough, shortness of breath, muscle aches, headaches, sore throat    Or diarrhea      Stay 6 feet away from others during your visit and between visits     Go directly to and from the room you are visiting.     Stay in the patient s room during your visit. Limit going to other places in the hospital as much as possible     Leave bags and jackets at home or in  the car.     For everyone s health, please don t come and go during your visit. That includes for smoking   during your visit.     Please come to:     [facility] [address]  [address] [place]  [address]    - ? parking is available in front of the hospital      -   Parking is available in the Patient Visitor Ramp on [address] and [facility].     -   When entering the hospital you will be asked COVID screening questions, you will then be directed to Registration.  Registration will direct you to the 3rd floor Surgery waiting room.     -   Please ask if you need an escort or a wheelchair to the Surgery Waiting Room.  Preop number- [phone]      What can I eat or drink?  -  You may eat and drink normally up to 8 hours prior to arrival time. (Until 9:30pm on 1/22/23)  -  You may have clear liquids until 2 hours prior to arrival time. (Until 3:30am on 1/23/23)    Examples of clear liquids:  Water  Clear broth  Juices (apple, white grape, white cranberry  and cider) without pulp  Noncarbonated, powder based beverages  (lemonade and Jordan-Aid)  Sodas (Sprite, 7-Up, ginger ale and seltzer)  Coffee or tea (without milk or cream)  Gatorade    -  No Alcohol for at least 24 hours before surgery.     Which medicines can I take?      Hold Multivitamins for 7 days before surgery.    Hold Ibuprofen (Advil, Motrin) for 1 day before surgery--unless otherwise directed by surgeon.  Hold Naproxen (Aleve) for 4 days before surgery.    Hold hemp oil/CBD for 24 hours before surgery.    -  DO NOT take these medications the day of surgery:   Calcium carbonate (Oscal)    Ok to take acetaminophen (tylenol) as needed    How do I prepare myself?  - Please take 2 showers before surgery using Scrubcare or Hibiclens soap.    Use this soap only from the neck to your toes.     Leave the soap on your skin for one minute--then rinse thoroughly.      You may use your own shampoo  and conditioner. No other hair products.   - Please remove all jewelry and body piercings.  - No lotions, deodorants or fragrance.  - No makeup or fingernail polish.   - Bring your ID and insurance card.    -If you have a Deep Brain Stimulator, Spinal Cord Stimulator, or any Neuro Stimulator device---you must bring the remote control to the hospital.      ALL PATIENTS GOING HOME THE SAME DAY OF SURGERY ARE REQUIRED TO HAVE A RESPONSIBLE ADULT TO DRIVE AND BE IN ATTENDANCE WITH THEM FOR 24 HOURS FOLLOWING SURGERY.         Questions or Concerns:    - For any questions regarding the day of surgery or your hospital stay, please contact the Pre Admission Nursing Office at [phone].       - If you have health changes between today and your surgery, please call your surgeon.       - For questions after surgery, please call your surgeons office.

## 2023-01-13 NOTE — RESULT ENCOUNTER NOTE
Brad Driscoll,    Your test results are attached.  Your labs are both within normal limits and good for surgery.         Sheila Peck DNP, RN, ANP-C

## 2023-01-18 DIAGNOSIS — Z15.09 BRCA1 GENETIC CARRIER: Primary | ICD-10-CM

## 2023-01-18 DIAGNOSIS — Z15.01 BRCA1 GENETIC CARRIER: Primary | ICD-10-CM

## 2023-01-18 RX ORDER — CEFAZOLIN SODIUM 2 G/50ML
2 SOLUTION INTRAVENOUS
Status: CANCELLED | OUTPATIENT
Start: 2023-01-18

## 2023-01-18 RX ORDER — CEFAZOLIN SODIUM 2 G/50ML
2 SOLUTION INTRAVENOUS SEE ADMIN INSTRUCTIONS
Status: CANCELLED | OUTPATIENT
Start: 2023-01-18

## 2023-01-18 RX ORDER — ENOXAPARIN SODIUM 100 MG/ML
40 INJECTION SUBCUTANEOUS
Status: CANCELLED | OUTPATIENT
Start: 2023-01-18

## 2023-01-18 RX ORDER — ACETAMINOPHEN 325 MG/1
975 TABLET ORAL ONCE
Status: CANCELLED | OUTPATIENT
Start: 2023-01-18 | End: 2023-01-18

## 2023-01-23 ENCOUNTER — HOSPITAL ENCOUNTER (OUTPATIENT)
Facility: CLINIC | Age: 43
Discharge: HOME | End: 2023-01-23
Attending: SURGERY | Admitting: SURGERY
Payer: COMMERCIAL

## 2023-01-23 ENCOUNTER — ANESTHESIA (OUTPATIENT)
Dept: SURGERY | Facility: CLINIC | Age: 43
End: 2023-01-23
Payer: COMMERCIAL

## 2023-01-23 VITALS
TEMPERATURE: 98.3 F | HEIGHT: 64 IN | RESPIRATION RATE: 14 BRPM | SYSTOLIC BLOOD PRESSURE: 96 MMHG | OXYGEN SATURATION: 96 % | BODY MASS INDEX: 23.49 KG/M2 | HEART RATE: 99 BPM | DIASTOLIC BLOOD PRESSURE: 63 MMHG | WEIGHT: 137.57 LBS

## 2023-01-23 DIAGNOSIS — Z90.13 S/P MASTECTOMY, BILATERAL: Primary | ICD-10-CM

## 2023-01-23 DIAGNOSIS — G89.18 POSTOPERATIVE PAIN: ICD-10-CM

## 2023-01-23 PROCEDURE — 360N000076 HC SURGERY LEVEL 3, PER MIN: Performed by: SURGERY

## 2023-01-23 PROCEDURE — L8600 IMPLANT BREAST SILICONE/EQ: HCPCS | Performed by: SURGERY

## 2023-01-23 PROCEDURE — 710N000010 HC RECOVERY PHASE 1, LEVEL 2, PER MIN: Performed by: SURGERY

## 2023-01-23 PROCEDURE — 88305 TISSUE EXAM BY PATHOLOGIST: CPT | Mod: 26 | Performed by: PATHOLOGY

## 2023-01-23 PROCEDURE — 88342 IMHCHEM/IMCYTCHM 1ST ANTB: CPT | Mod: 26 | Performed by: PATHOLOGY

## 2023-01-23 PROCEDURE — 250N000009 HC RX 250: Performed by: NURSE ANESTHETIST, CERTIFIED REGISTERED

## 2023-01-23 PROCEDURE — 710N000012 HC RECOVERY PHASE 2, PER MINUTE: Performed by: SURGERY

## 2023-01-23 PROCEDURE — 15777 ACELLULAR DERM MATRIX IMPLT: CPT | Mod: LT | Performed by: PLASTIC SURGERY

## 2023-01-23 PROCEDURE — 370N000017 HC ANESTHESIA TECHNICAL FEE, PER MIN: Performed by: SURGERY

## 2023-01-23 PROCEDURE — 250N000009 HC RX 250: Performed by: ANESTHESIOLOGY

## 2023-01-23 PROCEDURE — 250N000011 HC RX IP 250 OP 636: Performed by: PLASTIC SURGERY

## 2023-01-23 PROCEDURE — 250N000011 HC RX IP 250 OP 636

## 2023-01-23 PROCEDURE — 250N000011 HC RX IP 250 OP 636: Performed by: NURSE ANESTHETIST, CERTIFIED REGISTERED

## 2023-01-23 PROCEDURE — 250N000009 HC RX 250: Performed by: PLASTIC SURGERY

## 2023-01-23 PROCEDURE — 250N000011 HC RX IP 250 OP 636: Performed by: SURGERY

## 2023-01-23 PROCEDURE — 19303 MAST SIMPLE COMPLETE: CPT | Mod: 50 | Performed by: SURGERY

## 2023-01-23 PROCEDURE — 250N000011 HC RX IP 250 OP 636: Performed by: ANESTHESIOLOGY

## 2023-01-23 PROCEDURE — 88307 TISSUE EXAM BY PATHOLOGIST: CPT | Mod: 26 | Performed by: PATHOLOGY

## 2023-01-23 PROCEDURE — 37799 UNLISTED PX VASCULAR SURGERY: CPT | Mod: GC | Performed by: PLASTIC SURGERY

## 2023-01-23 PROCEDURE — 999N000141 HC STATISTIC PRE-PROCEDURE NURSING ASSESSMENT: Performed by: SURGERY

## 2023-01-23 PROCEDURE — 250N000013 HC RX MED GY IP 250 OP 250 PS 637

## 2023-01-23 PROCEDURE — 258N000003 HC RX IP 258 OP 636: Performed by: NURSE ANESTHETIST, CERTIFIED REGISTERED

## 2023-01-23 PROCEDURE — 250N000013 HC RX MED GY IP 250 OP 250 PS 637: Performed by: SURGERY

## 2023-01-23 PROCEDURE — 19340 INSJ BREAST IMPLT SM D MAST: CPT | Mod: 50 | Performed by: PLASTIC SURGERY

## 2023-01-23 PROCEDURE — 88305 TISSUE EXAM BY PATHOLOGIST: CPT | Mod: TC | Performed by: SURGERY

## 2023-01-23 PROCEDURE — 272N000001 HC OR GENERAL SUPPLY STERILE: Performed by: SURGERY

## 2023-01-23 PROCEDURE — 278N000051 HC OR IMPLANT GENERAL: Performed by: SURGERY

## 2023-01-23 DEVICE — GRAFT ALLODERM 16X20CM  1518320P CHARGE PER SQ CM= 320 UNITS: Type: IMPLANTABLE DEVICE | Site: BREAST | Status: FUNCTIONAL

## 2023-01-23 DEVICE — NATRELLE INSPIRA SCF 450CC FULL PROFILE  SMOOTH ROUND SILICONE
Type: IMPLANTABLE DEVICE | Site: BREAST | Status: FUNCTIONAL
Brand: NATRELLE INSPIRA COHESIVE BREAST IMPLANTS

## 2023-01-23 RX ORDER — NALOXONE HYDROCHLORIDE 0.4 MG/ML
0.4 INJECTION, SOLUTION INTRAMUSCULAR; INTRAVENOUS; SUBCUTANEOUS
Status: DISCONTINUED | OUTPATIENT
Start: 2023-01-23 | End: 2023-01-23 | Stop reason: HOSPADM

## 2023-01-23 RX ORDER — FENTANYL CITRATE 50 UG/ML
25 INJECTION, SOLUTION INTRAMUSCULAR; INTRAVENOUS EVERY 5 MIN PRN
Status: DISCONTINUED | OUTPATIENT
Start: 2023-01-23 | End: 2023-01-23 | Stop reason: HOSPADM

## 2023-01-23 RX ORDER — ACETAMINOPHEN 325 MG/1
650 TABLET ORAL
Status: DISCONTINUED | OUTPATIENT
Start: 2023-01-23 | End: 2023-01-23 | Stop reason: HOSPADM

## 2023-01-23 RX ORDER — FLUMAZENIL 0.1 MG/ML
0.2 INJECTION, SOLUTION INTRAVENOUS
Status: DISCONTINUED | OUTPATIENT
Start: 2023-01-23 | End: 2023-01-23 | Stop reason: HOSPADM

## 2023-01-23 RX ORDER — CEFAZOLIN SODIUM/WATER 2 G/20 ML
2 SYRINGE (ML) INTRAVENOUS
Status: COMPLETED | OUTPATIENT
Start: 2023-01-23 | End: 2023-01-23

## 2023-01-23 RX ORDER — ONDANSETRON 2 MG/ML
INJECTION INTRAMUSCULAR; INTRAVENOUS PRN
Status: DISCONTINUED | OUTPATIENT
Start: 2023-01-23 | End: 2023-01-23

## 2023-01-23 RX ORDER — HYDROMORPHONE HCL IN WATER/PF 6 MG/30 ML
0.4 PATIENT CONTROLLED ANALGESIA SYRINGE INTRAVENOUS EVERY 5 MIN PRN
Status: DISCONTINUED | OUTPATIENT
Start: 2023-01-23 | End: 2023-01-23 | Stop reason: HOSPADM

## 2023-01-23 RX ORDER — BUPIVACAINE HYDROCHLORIDE 2.5 MG/ML
INJECTION, SOLUTION EPIDURAL; INFILTRATION; INTRACAUDAL
Status: COMPLETED | OUTPATIENT
Start: 2023-01-23 | End: 2023-01-23

## 2023-01-23 RX ORDER — PROPOFOL 10 MG/ML
INJECTION, EMULSION INTRAVENOUS PRN
Status: DISCONTINUED | OUTPATIENT
Start: 2023-01-23 | End: 2023-01-23

## 2023-01-23 RX ORDER — NALOXONE HYDROCHLORIDE 0.4 MG/ML
0.2 INJECTION, SOLUTION INTRAMUSCULAR; INTRAVENOUS; SUBCUTANEOUS
Status: DISCONTINUED | OUTPATIENT
Start: 2023-01-23 | End: 2023-01-23 | Stop reason: HOSPADM

## 2023-01-23 RX ORDER — LIDOCAINE HYDROCHLORIDE 20 MG/ML
INJECTION, SOLUTION INFILTRATION; PERINEURAL PRN
Status: DISCONTINUED | OUTPATIENT
Start: 2023-01-23 | End: 2023-01-23

## 2023-01-23 RX ORDER — INDOCYANINE GREEN AND WATER 25 MG
KIT INJECTION PRN
Status: DISCONTINUED | OUTPATIENT
Start: 2023-01-23 | End: 2023-01-23

## 2023-01-23 RX ORDER — ONDANSETRON 2 MG/ML
4 INJECTION INTRAMUSCULAR; INTRAVENOUS EVERY 30 MIN PRN
Status: DISCONTINUED | OUTPATIENT
Start: 2023-01-23 | End: 2023-01-23 | Stop reason: HOSPADM

## 2023-01-23 RX ORDER — SODIUM CHLORIDE, SODIUM LACTATE, POTASSIUM CHLORIDE, CALCIUM CHLORIDE 600; 310; 30; 20 MG/100ML; MG/100ML; MG/100ML; MG/100ML
INJECTION, SOLUTION INTRAVENOUS CONTINUOUS
Status: DISCONTINUED | OUTPATIENT
Start: 2023-01-23 | End: 2023-01-23 | Stop reason: HOSPADM

## 2023-01-23 RX ORDER — PROPOFOL 10 MG/ML
INJECTION, EMULSION INTRAVENOUS CONTINUOUS PRN
Status: DISCONTINUED | OUTPATIENT
Start: 2023-01-23 | End: 2023-01-23

## 2023-01-23 RX ORDER — DEXAMETHASONE SODIUM PHOSPHATE 4 MG/ML
INJECTION, SOLUTION INTRA-ARTICULAR; INTRALESIONAL; INTRAMUSCULAR; INTRAVENOUS; SOFT TISSUE PRN
Status: DISCONTINUED | OUTPATIENT
Start: 2023-01-23 | End: 2023-01-23

## 2023-01-23 RX ORDER — LABETALOL HYDROCHLORIDE 5 MG/ML
10 INJECTION, SOLUTION INTRAVENOUS
Status: DISCONTINUED | OUTPATIENT
Start: 2023-01-23 | End: 2023-01-23 | Stop reason: HOSPADM

## 2023-01-23 RX ORDER — ONDANSETRON 4 MG/1
4 TABLET, ORALLY DISINTEGRATING ORAL
Status: DISCONTINUED | OUTPATIENT
Start: 2023-01-23 | End: 2023-01-23 | Stop reason: HOSPADM

## 2023-01-23 RX ORDER — OXYCODONE HYDROCHLORIDE 5 MG/1
5-10 TABLET ORAL EVERY 6 HOURS PRN
Qty: 10 TABLET | Refills: 0 | Status: SHIPPED | OUTPATIENT
Start: 2023-01-23 | End: 2023-02-09

## 2023-01-23 RX ORDER — ONDANSETRON 4 MG/1
4 TABLET, ORALLY DISINTEGRATING ORAL EVERY 30 MIN PRN
Status: DISCONTINUED | OUTPATIENT
Start: 2023-01-23 | End: 2023-01-23 | Stop reason: HOSPADM

## 2023-01-23 RX ORDER — ENOXAPARIN SODIUM 100 MG/ML
40 INJECTION SUBCUTANEOUS
Status: COMPLETED | OUTPATIENT
Start: 2023-01-23 | End: 2023-01-23

## 2023-01-23 RX ORDER — FENTANYL CITRATE 50 UG/ML
25-50 INJECTION, SOLUTION INTRAMUSCULAR; INTRAVENOUS
Status: DISCONTINUED | OUTPATIENT
Start: 2023-01-23 | End: 2023-01-23 | Stop reason: HOSPADM

## 2023-01-23 RX ORDER — CEFAZOLIN SODIUM/WATER 2 G/20 ML
2 SYRINGE (ML) INTRAVENOUS SEE ADMIN INSTRUCTIONS
Status: DISCONTINUED | OUTPATIENT
Start: 2023-01-23 | End: 2023-01-23 | Stop reason: HOSPADM

## 2023-01-23 RX ORDER — HYDROMORPHONE HCL IN WATER/PF 6 MG/30 ML
0.2 PATIENT CONTROLLED ANALGESIA SYRINGE INTRAVENOUS EVERY 5 MIN PRN
Status: DISCONTINUED | OUTPATIENT
Start: 2023-01-23 | End: 2023-01-23 | Stop reason: HOSPADM

## 2023-01-23 RX ORDER — FENTANYL CITRATE 50 UG/ML
50 INJECTION, SOLUTION INTRAMUSCULAR; INTRAVENOUS EVERY 5 MIN PRN
Status: DISCONTINUED | OUTPATIENT
Start: 2023-01-23 | End: 2023-01-23 | Stop reason: HOSPADM

## 2023-01-23 RX ORDER — SODIUM CHLORIDE, SODIUM LACTATE, POTASSIUM CHLORIDE, CALCIUM CHLORIDE 600; 310; 30; 20 MG/100ML; MG/100ML; MG/100ML; MG/100ML
INJECTION, SOLUTION INTRAVENOUS CONTINUOUS PRN
Status: DISCONTINUED | OUTPATIENT
Start: 2023-01-23 | End: 2023-01-23

## 2023-01-23 RX ORDER — FENTANYL CITRATE 50 UG/ML
INJECTION, SOLUTION INTRAMUSCULAR; INTRAVENOUS PRN
Status: DISCONTINUED | OUTPATIENT
Start: 2023-01-23 | End: 2023-01-23

## 2023-01-23 RX ORDER — ACETAMINOPHEN 325 MG/1
975 TABLET ORAL ONCE
Status: COMPLETED | OUTPATIENT
Start: 2023-01-23 | End: 2023-01-23

## 2023-01-23 RX ORDER — DEXAMETHASONE SODIUM PHOSPHATE 10 MG/ML
INJECTION, SOLUTION INTRAMUSCULAR; INTRAVENOUS
Status: COMPLETED | OUTPATIENT
Start: 2023-01-23 | End: 2023-01-23

## 2023-01-23 RX ORDER — LIDOCAINE 40 MG/G
CREAM TOPICAL
Status: DISCONTINUED | OUTPATIENT
Start: 2023-01-23 | End: 2023-01-23 | Stop reason: HOSPADM

## 2023-01-23 RX ORDER — DEXMEDETOMIDINE HYDROCHLORIDE 4 UG/ML
INJECTION, SOLUTION INTRAVENOUS
Status: COMPLETED | OUTPATIENT
Start: 2023-01-23 | End: 2023-01-23

## 2023-01-23 RX ADMIN — DEXMEDETOMIDINE 40 MCG: 100 INJECTION, SOLUTION, CONCENTRATE INTRAVENOUS at 07:00

## 2023-01-23 RX ADMIN — Medication 40 MG: at 07:35

## 2023-01-23 RX ADMIN — FENTANYL CITRATE 50 MCG: 50 INJECTION, SOLUTION INTRAMUSCULAR; INTRAVENOUS at 08:16

## 2023-01-23 RX ADMIN — SODIUM CHLORIDE, POTASSIUM CHLORIDE, SODIUM LACTATE AND CALCIUM CHLORIDE: 600; 310; 30; 20 INJECTION, SOLUTION INTRAVENOUS at 11:45

## 2023-01-23 RX ADMIN — Medication 10 MG: at 08:16

## 2023-01-23 RX ADMIN — HYDROMORPHONE HYDROCHLORIDE 0.5 MG: 1 INJECTION, SOLUTION INTRAMUSCULAR; INTRAVENOUS; SUBCUTANEOUS at 12:35

## 2023-01-23 RX ADMIN — PROPOFOL 110 MCG/KG/MIN: 10 INJECTION, EMULSION INTRAVENOUS at 07:39

## 2023-01-23 RX ADMIN — Medication 20 MG: at 09:16

## 2023-01-23 RX ADMIN — SUGAMMADEX 200 MG: 100 INJECTION, SOLUTION INTRAVENOUS at 12:18

## 2023-01-23 RX ADMIN — SODIUM CHLORIDE, POTASSIUM CHLORIDE, SODIUM LACTATE AND CALCIUM CHLORIDE: 600; 310; 30; 20 INJECTION, SOLUTION INTRAVENOUS at 08:26

## 2023-01-23 RX ADMIN — INDOCYANINE GREEN AND WATER 12.5 MG: KIT at 10:31

## 2023-01-23 RX ADMIN — BUPIVACAINE HYDROCHLORIDE 20 ML: 2.5 INJECTION, SOLUTION EPIDURAL; INFILTRATION; INTRACAUDAL; PERINEURAL at 07:00

## 2023-01-23 RX ADMIN — SODIUM CHLORIDE, POTASSIUM CHLORIDE, SODIUM LACTATE AND CALCIUM CHLORIDE: 600; 310; 30; 20 INJECTION, SOLUTION INTRAVENOUS at 07:22

## 2023-01-23 RX ADMIN — PROPOFOL 60 MG: 10 INJECTION, EMULSION INTRAVENOUS at 07:35

## 2023-01-23 RX ADMIN — ENOXAPARIN SODIUM 40 MG: 40 INJECTION SUBCUTANEOUS at 06:18

## 2023-01-23 RX ADMIN — FENTANYL CITRATE 50 MCG: 50 INJECTION, SOLUTION INTRAMUSCULAR; INTRAVENOUS at 10:42

## 2023-01-23 RX ADMIN — Medication 20 MG: at 10:14

## 2023-01-23 RX ADMIN — ONDANSETRON 4 MG: 2 INJECTION INTRAMUSCULAR; INTRAVENOUS at 12:08

## 2023-01-23 RX ADMIN — LIDOCAINE HYDROCHLORIDE 60 MG: 20 INJECTION, SOLUTION INFILTRATION; PERINEURAL at 07:35

## 2023-01-23 RX ADMIN — MIDAZOLAM 1 MG: 1 INJECTION INTRAMUSCULAR; INTRAVENOUS at 07:27

## 2023-01-23 RX ADMIN — DEXAMETHASONE SODIUM PHOSPHATE 2 MG: 10 INJECTION, SOLUTION INTRAMUSCULAR; INTRAVENOUS at 07:00

## 2023-01-23 RX ADMIN — FENTANYL CITRATE 50 MCG: 50 INJECTION, SOLUTION INTRAMUSCULAR; INTRAVENOUS at 09:16

## 2023-01-23 RX ADMIN — ACETAMINOPHEN 975 MG: 325 TABLET ORAL at 06:18

## 2023-01-23 RX ADMIN — FENTANYL CITRATE 25 MCG: 50 INJECTION, SOLUTION INTRAMUSCULAR; INTRAVENOUS at 13:31

## 2023-01-23 RX ADMIN — MIDAZOLAM 1 MG: 1 INJECTION INTRAMUSCULAR; INTRAVENOUS at 06:31

## 2023-01-23 RX ADMIN — PHENYLEPHRINE HYDROCHLORIDE 50 MCG: 10 INJECTION INTRAVENOUS at 07:35

## 2023-01-23 RX ADMIN — MIDAZOLAM 1 MG: 1 INJECTION INTRAMUSCULAR; INTRAVENOUS at 07:22

## 2023-01-23 RX ADMIN — Medication 2 G: at 07:45

## 2023-01-23 RX ADMIN — FENTANYL CITRATE 25 MCG: 50 INJECTION, SOLUTION INTRAMUSCULAR; INTRAVENOUS at 13:53

## 2023-01-23 RX ADMIN — ACETAMINOPHEN 650 MG: 325 TABLET ORAL at 13:56

## 2023-01-23 RX ADMIN — FENTANYL CITRATE 50 MCG: 50 INJECTION, SOLUTION INTRAMUSCULAR; INTRAVENOUS at 07:35

## 2023-01-23 RX ADMIN — Medication 2 G: at 11:45

## 2023-01-23 RX ADMIN — DEXAMETHASONE SODIUM PHOSPHATE 8 MG: 4 INJECTION, SOLUTION INTRA-ARTICULAR; INTRALESIONAL; INTRAMUSCULAR; INTRAVENOUS; SOFT TISSUE at 08:15

## 2023-01-23 RX ADMIN — FENTANYL CITRATE 50 MCG: 50 INJECTION, SOLUTION INTRAMUSCULAR; INTRAVENOUS at 06:31

## 2023-01-23 ASSESSMENT — ACTIVITIES OF DAILY LIVING (ADL)
ADLS_ACUITY_SCORE: 37

## 2023-01-23 ASSESSMENT — ENCOUNTER SYMPTOMS: SEIZURES: 0

## 2023-01-23 NOTE — ANESTHESIA POSTPROCEDURE EVALUATION
Patient: Yamila Myles    Procedure: Procedure(s):  BILATERAL Risk-Reducing Nipple-Sparing Mastectomy  Bilateral breast reconstruction with implant/Alloderm, SPY       Anesthesia Type:  General    Note:  Disposition: Outpatient   Postop Pain Control: Uneventful            Sign Out: Well controlled pain   PONV: No   Neuro/Psych: Uneventful            Sign Out: Acceptable/Baseline neuro status   Airway/Respiratory: Uneventful            Sign Out: Acceptable/Baseline resp. status   CV/Hemodynamics: Uneventful            Sign Out: Acceptable CV status; No obvious hypovolemia; No obvious fluid overload   Other NRE: NONE   DID A NON-ROUTINE EVENT OCCUR? No           Last vitals:  Vitals Value Taken Time   /73 01/23/23 1419   Temp 36.3  C (97.3  F) 01/23/23 1245   Pulse 70 01/23/23 1420   Resp 11 01/23/23 1420   SpO2 94 % 01/23/23 1420   Vitals shown include unvalidated device data.    Electronically Signed By: Maximilian Desir MD  January 23, 2023  2:22 PM

## 2023-01-23 NOTE — OP NOTE
PREOPERATIVE DIAGNOSIS:  High risk for breast cancer with breast cancer gene mutation undergoing bilateral nipple sparing prophylactic mastectomy and immediate reconstruction.       POSTOPERATIVE DIAGNOSIS:  High risk for breast cancer with breast cancer gene mutation undergoing bilateral nipple sparing prophylactic mastectomy and immediate reconstruction.       PLASTIC SURGERY PROCEDURES:     1.  Bilateral direct to implant breast reconstruction with 450 mL SCF Allergan Inspira Gel implants.  2.  Bilateral placement of acellular dermal matrix (thick perforated rectangular 320 cm2 ready to use AlloDerm 100% used bilaterally) to cover the implant and secure it to the chest wall.     3.  Intraoperative use of chemical angiography with injection of 5 mL of ICG dye and interpretation of angiogram to evaluate the blood flow of the skin flaps and Nipple Areolar Complexes bilaterally to determine appropriate reconstructive technique as well as debridement of skin flaps.       SURGEON:  Laxmi Pradhan MD       RESIDENT FELLOW:  Naa Sloan MD       ANESTHESIA:  General anesthesia intubation.       COMPLICATIONS:  Nil.       DRAINS:  A 15-Nepalese Maciej drain on each side.       SPECIMENS:  Skin and breast tissue from each breast.       BLOOD LOSS:  15 mL.       DESCRIPTION OF PROCEDURE:  After informed consent was taken from the patient, proper site and procedure was ascertained with her and she was appropriately marked in the operating room.  She was placed in supine position with the knees comfortably flexed, pillows underneath them and pneumo boots placed and running prior to induction of anesthesia.  Preoperative antibiotics were given in the OR and appropriately redosed during the case.  General anesthesia was administered without any complications.  She was prepped and draped in a standard surgical fashion.  Surgical Oncology began the case and carried out their portion of the case.  I was called to the operating room  once they were done.         I went ahead and reprepped and draped the patient in a standard fashion.  On evaluation clinically of her skin flaps and NACs, both sides had adequate blood flow based on the thickness as well as the pale color of the skin flaps.  Went ahead and carried out the preoperative SPY angiogram using 5 mL of ICG dye and interpreting the blood flow at about 30 and 60 seconds.  Both sides had good blood flow all the way to the edges of the wound, however the NACs partly lit up and were partly dark but not black. Based on these findings, we decided to go ahead with a direct to implant reconstruction as the patient wished.  I went ahead and first of all ensured hemostasis bilaterally.  I then went ahead and marked out the inframammary fold, lateral mammary fold and the superior edge of where the implant would lie based on the appropriate sized implant.  We have decided that about 450 mL would give her the size that she wanted and based on the weight of the mastectomy specimens which were about 450 gms, and based on placing a number of different sizers in both breast pockets and temporarily stapling her in a sitting up position.   The tailor-tacked positions were then marked out with a marker.  Staples were removed and the excess skin was excised on each side, sent off the table as specimen.  Hemostasis was ensured.  Once these markings were made on each side, we then opened 320 cm2 rectangular AlloDerm pieces that were perforated, washed them with a liter of triple antibiotic irrigation each and then used 0 Vicryl suture in interrupted fashion to sew the superior, lateral and inferior edges of the AlloDerm such that it gave the shape that we wanted and held the implant on each side in place on the chest wall without having the skin flaps in the end holding the pressure of the implant to hold the shape.  Once the key stitches were placed and the sizer was placed on each side and we determined that  the shape was good, we then placed a running 0-PDSsutures circumferentially to get the AlloDerm in place on the chest wall on each side.  Hemostasis was once again ensured.  A liter of triple antibiotic irrigation and Betadine was then used to irrigate the breast pockets on each side.  We then changed our gloves and then placed the permanent implants on each side.  A 15-Maltese Maciej drain was placed on each side and secured and then the skin was closed using 2-0 Monocryl suture in a deep dermal layer followed by 3-0 Monocryl suture in a running intracuticular manner followed by surgical tape and glue and an Ace wrap.  The patient tolerated the procedure well.  All counts were correct at the end of the case.  The patient was extubated and sent to recovery room in stable condition.

## 2023-01-23 NOTE — OR NURSING
"Notified Dr. Sloan and Dr. Pradhan that patient has been complaining of left hand numbness via OR nurse in room. Patient able to move arm and hands and feels touch to the entire extremity. She describes the sensation as \"my arm fell asleep\". No new orders at this time. Ace Bandage folded over to allow for better circulation to extremity. Denies CP and SOB.  "

## 2023-01-23 NOTE — ANESTHESIA PREPROCEDURE EVALUATION
Anesthesia Pre-Procedure Evaluation    Patient: Yamila Myles   MRN: [id number] : 1980        Procedure : Procedure(s):  BILATERAL Risk-Reducing Nipple-Sparing Mastectomy  Bilateral breast reconstruction with implant/Alloderm  possible expanders, SPY          Past Medical History:   Diagnosis Date     Allergic rhinitis      Allergic rhinitis      BRCA1 positive 2001     Breast disorder 2011     Encounter for insertion of mirena IUD 2016     Hoarseness      Lump or mass in breast     BRCA-1 positive (Mother, MGM w/ breast ca, mother w/ gene +), mother dx at 32)     Microcalcifications of the breast, right uoq 2011      Past Surgical History:   Procedure Laterality Date     INSERT INTRAUTERINE DEVICE N/A 2016    Procedure: INSERT INTRAUTERINE DEVICE;  Surgeon: Fidelia Ricks MD;  Location: [facility] OR     LAPAROSCOPIC SALPINGO-OOPHORECTOMY Bilateral 2016    Procedure: LAPAROSCOPIC SALPINGO-OOPHORECTOMY;  Surgeon: Fidelia Ricks MD;  Location: [facility] OR     LAPAROSCOPY OPERATIVE ADULT N/A 2016    Procedure: LAPAROSCOPY OPERATIVE ADULT;  Surgeon: Fidelia Ricks MD;  Location: [facility] OR     MAMMOPLASTY REDUCTION BILATERAL Bilateral 2022    Procedure: Bilateral breast lift;  Surgeon: LING Pradhan MD;  Location: [facility] OR     [facility] APPENDECTOMY        Allergies   Allergen Reactions     Advil [Nsaids] Other (See Comments)     Nasal congestion  Eye get puffy        Seasonal Allergies      Pollen and mold   Spring and Fall are main periods. Takes Zyrtec year round to manage it.   Gets post nasal drip      Adhesive Tape Rash     Surgical drape adhesive vs. ioban      Social History     Tobacco Use     Smoking status: Never     Smokeless tobacco: Never   Substance Use Topics     Alcohol use: Yes     Comment: Special occasions      Wt Readings from Last 1 Encounters:   23 62.4 kg (137 lb 9.1 oz)        Anesthesia Evaluation   Pt has had prior anesthetic.  Type: General.    No history of anesthetic complications       ROS/MED HX  ENT/Pulmonary:  - neg pulmonary ROS     Neurologic:    (-) no seizures and no CVA   Cardiovascular:  - neg cardiovascular ROS     METS/Exercise Tolerance:     Hematologic:       Musculoskeletal:       GI/Hepatic:       Renal/Genitourinary:       Endo:       Psychiatric/Substance Use:       Infectious Disease:       Malignancy: Comment: BRCA genetic carrier- scheduled for prophylactic mastectomy      Other:            Physical Exam    Airway        Mallampati: II   TM distance: < 3 FB   Neck ROM: full   Mouth opening: > 3 cm    Respiratory Devices and Support         Dental     Comment: Back braces on top and bottom        Cardiovascular          Rhythm and rate: regular and normal     Pulmonary           breath sounds clear to auscultation           OUTSIDE LABS:  CBC:   Lab Results   Component Value Date    WBC 4.4 01/13/2023    WBC 6.5 07/12/2016    HGB 12.0 01/13/2023    HGB 13.2 07/12/2016    HCT 36.2 01/13/2023    HCT 39.8 07/12/2016     01/13/2023     07/12/2016     BMP:   Lab Results   Component Value Date     01/13/2023     02/18/2019    POTASSIUM 4.2 01/13/2023    POTASSIUM 4.5 02/18/2019    CHLORIDE 105 01/13/2023    CHLORIDE 111 (H) 02/18/2019    CO2 26 01/13/2023    CO2 26 02/18/2019    BUN 11.4 01/13/2023    BUN 15 02/18/2019    CR 0.75 01/13/2023    CR 0.82 02/18/2019    GLC 87 01/13/2023    GLC 85 02/18/2019     COAGS: No results found for: PTT, INR, FIBR  POC:   Lab Results   Component Value Date    HCG Negative 07/12/2016     HEPATIC: No results found for: ALBUMIN, PROTTOTAL, ALT, AST, GGT, ALKPHOS, BILITOTAL, BILIDIRECT, RADHA  OTHER:   Lab Results   Component Value Date    A1C 4.6 02/13/2015    GRETCHEN 9.3 01/13/2023    TSH 1.00 02/21/2013       Anesthesia Plan    ASA Status:  2   NPO Status:  NPO Appropriate    Anesthesia Type: General.     - Airway: ETT   Induction: Intravenous.   Maintenance: TIVA.    Techniques and Equipment:     - Lines/Monitors: 2nd IV, BIS     Consents    Anesthesia Plan(s) and associated risks, benefits, and realistic alternatives discussed. Questions answered and patient/representative(s) expressed understanding.    - Discussed:     - Discussed with:  Patient      - Extended Intubation/Ventilatory Support Discussed: No.      - Patient is DNR/DNI Status: No    Use of blood products discussed: No .     Postoperative Care    Pain management: IV analgesics.   PONV prophylaxis: Ondansetron (or other 5HT-3), Dexamethasone or Solumedrol     Comments:                America Archuleta MD

## 2023-01-23 NOTE — BRIEF OP NOTE
[facility]    Brief Operative Note    Pre-operative diagnosis: 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 [Z15.01, Z15.09]  Post-operative diagnosis Same as pre-operative diagnosis    Procedure: Procedure(s):  BILATERAL Risk-Reducing Nipple-Sparing Mastectomy  Bilateral breast reconstruction with implant/Alloderm, SPY  Surgeon: Surgeon(s) and Role:  Panel 1:     * Monica Rodríguez MD - Primary     * Meredith Stark MD - Resident - Assisting  Panel 2:     * LING Pradhan MD - Primary     * Naa Barraza MD - Resident - Assisting  Anesthesia: General with Block   Estimated Blood Loss: 50 mL for plastic surgery portion of the case    Drains: [place]-Mccrary x2 (left and right breast)  Specimens:   ID Type Source Tests Collected by Time Destination   1 : Left breast retro nipple tissue Tissue Breast, Left SURGICAL PATHOLOGY EXAM Monica Rodríguez MD 1/23/2023  8:17 AM    2 : Left breast, short black stitch marks nipple, long black stitch marks axillary tail, blue stitch marks superior 12 o'clock Tissue Breast, Left SURGICAL PATHOLOGY EXAM Monica Rodríguez MD 1/23/2023  9:00 AM    3 : Right breast retro nipple tissue Tissue Breast, Right SURGICAL PATHOLOGY EXAM Monica Rodríguez MD 1/23/2023  9:21 AM    4 : Right breast, short black stitch marks nipple, long black stitch marks axillary tail, blue stitch marks superior 12 o'clock Tissue Breast, Right SURGICAL PATHOLOGY EXAM Monica Rodríguez MD 1/23/2023 10:10 AM      Findings:   None. Tissue perfusion adequate as confirmed by SPY.   Complications: None.  Implants:   Implant Name Type Inv. Item Serial No.  Lot No. LRB No. Used Action   GRAFT ALLODERM [id number]  [id number] CHARGE PER SQ CM= 320 UNITS - [id number] Bone/Tissue/Biologic GRAFT ALLODERM [id number]  [id number] CHARGE PER SQ CM= 320 UNITS  [employer] [id number] Right 1 Implanted   GRAFT ALLODERM [id number]  [id number] CHARGE PER SQ CM= 320 UNITS -  [id number] Bone/Tissue/Biologic GRAFT ALLODERM [id number]  [id number] CHARGE PER SQ CM= 320 UNITS  [employer] [id number] Left 1 Implanted   Natrelle Inspira 450cc Single Use Sizer Profile F   [id number]   Left 1 Implanted and Explanted   NATRELLE INSPIRA 450CC SINGLE USE SIZER PROFILE   [id number]   Right 1 Implanted and Explanted   IMPLANT BREAST NATRELLE INSPIRA 450CC SMOOTH ROUND SCF-450 - [id number] Breast Implant/Tissue Expander IMPLANT BREAST NATRELLE INSPIRA 450CC SMOOTH ROUND SCF-450 [id number] [employer]  Left 1 Implanted   IMPLANT BREAST NATRELLE INSPIRA 450CC SMOOTH ROUND SCF-450 - [id number] Breast Implant/Tissue Expander IMPLANT BREAST NATRELLE INSPIRA 450CC SMOOTH ROUND SCF-450 [id number] [employer]  Right 1 Implanted       Plan:  - TRACY drain teaching  - Wrap ACE bandage and wear at all times until clinic appointment  - Keep kerlix rolls at the lateral and superior aspects of the breast while wrapped to maintain shape of implants  - Ok to discharge from plastic surgery standpoint once meets criteria    Naa Sloan MD  Plastic Surgery PGY-2

## 2023-01-23 NOTE — ANESTHESIA CARE TRANSFER NOTE
Patient: Yamila Myles    Procedure: Procedure(s):  BILATERAL Risk-Reducing Nipple-Sparing Mastectomy  Bilateral breast reconstruction with implant/Alloderm, SPY       Diagnosis: 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 [Z15.01, Z15.09]  Diagnosis Additional Information: No value filed.    Anesthesia Type:   General     Note:    Oropharynx: oropharynx clear of all foreign objects and spontaneously breathing  Level of Consciousness: awake  Oxygen Supplementation: nasal cannula  Level of Supplemental Oxygen (L/min / FiO2): 2  Independent Airway: airway patency satisfactory and stable  Dentition: dentition unchanged  Vital Signs Stable: post-procedure vital signs reviewed and stable  Report to RN Given: handoff report given  Patient transferred to: PACU  Comments: Awake. Tolerated anesthesia well  Handoff Report: Identifed the Patient, Identified the Reponsible Provider, Reviewed the pertinent medical history, Discussed the surgical course, Reviewed Intra-OP anesthesia mangement and issues during anesthesia, Set expectations for post-procedure period and Allowed opportunity for questions and acknowledgement of understanding      Vitals:  Vitals Value Taken Time   /72 01/23/23 1245   Temp     Pulse 62 01/23/23 1257   Resp 12 01/23/23 1257   SpO2 100 % 01/23/23 1257   Vitals shown include unvalidated device data.    Electronically Signed By: CARLOS ALBERTO Lira CRNA  January 23, 2023  12:58 PM

## 2023-01-23 NOTE — OP NOTE
DATE OF SURGERY: January 23, 2023     SURGEON: Monica Rodríguez MD  ASSISTANT(S):   1. Meredith Stark MD PGY-4  2. Sierra Amezquita MS-3    PREOPERATIVE DIAGNOSIS: Pathogenic variant in BRCA1  POSTOPERATIVE DIAGNOSIS: same    PROCEDURE(S):   1. Bilateral risk-reducing nipple-sparing mastectomy  2. Immediate reconstruction by Dr Pradhan, to be dictated separately    ANESTHESIA: General + Block    CLINICAL NOTE:  Yamila Myles is a 42 year old woman with a pathogenic variant in BRCA1.  The risks and benefits of bilateral risk-reducing nipple-sparing mastectomy were discussed with the patient and she elected to proceed with informed consent.    OPERATIVE FINDINGS:  1. No palpable mass in either breast.    OPERATIVE PROCEDURE:  The patient was brought to the operating room and placed in the supine position with appropriate padding for all of the pressure points. A general anesthetic was administered by the Anesthesia team.  A surgical safety checklist was performed to confirm the patient's identity, the site and laterality of the procedure. Perioperative antibiotics (Ancef) was provided.  VTE prophylaxis was provided with serial compression devices and preoperative subcutaneous lovenox. A nicole catheter was placed. The bilateral breasts were then prepped and draped in the usual sterile fashion using Chloraprep.     We began on the left.  An inframammary fold incision was made in the left breast along the prior mastopexy scar.  Skin flaps were carefully raised, heading superiorly towards the clavicle, medially towards the lateral border of the sternum, and laterally to the lateral edge of the pectoralis major muscle. The breast was then dissected off of the pectoralis major muscle, taking its fascia en bloc with the specimen.  During dissection, I removed a small amount of breast tissue just behind the nipple, which was sent to pathology separately.  The nipple was spared.  The area just behind the nipple was marked with a short  black suture on the main specimen.  The main specimen was also marked with a long black suture for the axillary tail and blue suture for 12:00 (superior) and sent to pathology. The wound was irrigated and hemostasis was achieved.    We then moved to the right.  An inframammary incision was made in the right breast along the prior mastopexy scar.  Skin flaps were carefully raised, heading superiorly towards the clavicle, medially towards the lateral border of the sternum, and laterally to the lateral edge of the pectoralis major muscle. The breast was then dissected off of the pectoralis major muscle, taking its fascia en bloc with the specimen.  During dissection, I removed a small amount of breast tissue just behind the nipple, which was sent to pathology separately.  The nipple was spared.  The area just behind the nipple was marked with a short black suture on the main specimen.  The main specimen was also marked with a long black suture for the axillary tail and blue suture for 12:00 (superior) and sent to pathology.  The wound was irrigated and hemostasis was achieved. The patient tolerated the procedure well thus far. The sponge, needle, instrument counts were correct thus far.  At this point, Dr Pradhan and his team took over for the reconstruction portion of the case, which will be dictated separately.        I was present and scrubbed for the entire above procedure.    Dr. Stark actively first assisted during this operation providing necessary retraction and exposure as well as maintaining hemostasis and clear operative field.  This was helpful in allowing the operation to proceed in a safe and expeditious manner.  They were present for the entire duration of the critical portions of the operation.    EBL: 30 mL    SPECIMEN(S):  A. Left retronipple tissue  B. Left breast; short black suture = nipple position, long black suture = axillary tail, blue suture = 12:00 position   C. Right retronipple tissue  D.  Right breast; short black suture = nipple position, long black suture = axillary tail, blue suture = 12:00 position     Monica Rodríguez MD MSc FRCSC FACS  Associate Professor of Surgery  Division of Surgical Oncology  [facility]

## 2023-01-23 NOTE — ANESTHESIA PROCEDURE NOTES
Pectoralis Procedure Note    Pre-Procedure   Staff -        Anesthesiologist:  Cl Rosado MD       Resident/Fellow: Goldberg, Leslie, MD       Performed By: resident       Location: pre-op       Pre-Anesthestic Checklist: patient identified, IV checked, site marked, risks and benefits discussed, informed consent, monitors and equipment checked, pre-op evaluation, at physician/surgeon's request and post-op pain management  Timeout:       Correct Patient: Yes        Correct Procedure: Yes        Correct Site: Yes        Correct Position: Yes        Correct Laterality: Yes        Site Marked: Yes  Procedure Documentation  Procedure: Pectoralis             Pectoralis II       Diagnosis: POST-OPERATIVE PAIN CONTROL       Laterality: bilateral       Patient Position: supine       Patient Prep/Sterile Barriers: sterile gloves, mask       Skin prep: Chloraprep       Needle Type: short bevel       Needle Gauge: 21.        Needle Length (millimeters): 110        Ultrasound guided       1. Ultrasound was used to identify targeted nerve, plexus, vascular marker, or fascial plane and place a needle adjacent to it in real-time.       2. Ultrasound was used to visualize the spread of anesthetic in close proximity to the above referenced structure.       3. A permanent image is entered into the patient's record.    Assessment/Narrative         The placement was negative for: blood aspirated, painful injection and site bleeding       Paresthesias: No.       Bolus given via needle. no blood aspirated via catheter.        Secured via.        Insertion/Infusion Method: Single Shot       Complications: none       Injection made incrementally with aspirations every 5 mL.    Medication(s) Administered   Bupivacaine 0.25% PF (Infiltration) - Infiltration   20 mL - 1/23/2023 7:00:00 AM  Dexamethasone 10 mg/mL PF (Perineural) - Perineural   2 mg - 1/23/2023 7:00:00 AM  Dexmedetomidine 4 mcg/mL (Perineural) - Perineural   40 mcg  "- 1/23/2023 7:00:00 AM    FOR [facility]) ONLY:   Pain Team Contact information: please page the Pain Team Via [employer]. Search \"Pain\". During daytime hours, please page the attending first. At night please page the resident first.    "

## 2023-01-23 NOTE — DISCHARGE INSTRUCTIONS
"POSTOP Instructions: Mastectomy with Reconstruction    From Dr Rodríguez:    1. Patient to follow up with appointment in 2 weeks with Dr. Rodríguez. Your pathology report will be reviewed with you at the postoperative visit with Dr Rodríguez  2. Do not drive while taking narcotic pain medication (oxycodone) if prescribed.  3. Please take Tylenol 1000 mg by mouth every 8 hours for the first 5 days. After that, you may take Tylenol as needed according to the packaging.  4. Avoid non-steroidal anti-inflammatory medications (Advil, Ibuprofen, Naproxen, aspirin, etc) for 5-7 days.   5. Caution with putting ice on the chest wall as it will be numb; you will not realize it if you leave the ice for too long and can damage your skin.  6. If you develop any fever/chills, worsening pain, redness, swelling, bruising, or drainage from your wound please call the clinic (Monday through Friday 8:00am-5:00pm [phone] Stephy WHITE) or on-call surgical oncology resident (nights and weekends [phone] and ask \"I would like to page the Surgical Oncology Resident on call.\")   7. No lifting over 5-10 lbs and no strenuous physical activity for 6 weeks.   Please continue to lift arms up to shoulder level to maintain a range of motion and prevent stiffness, until the drain is removed.  After the drain is removed, please lift arms overhead to work on improving range of motion.  No bed rest; moving around will keep blood clots from forming in your legs.  8. Keep dressing clean and dry. Please refer to your plastic surgeon's (Dr Pradhan or Dr Preston) wound care instructions.   9. Monitor the drain output. Record the drain output per 24 hours. Your drain will be removed by your plastic surgery team at a follow up appointment.   10. You may have a burning sensation and/or numbness in the armpit and/or along the upper inner arm. This is common and is expected.  11. Your chest wall may feel numb. This is expected.  12. No ice packs or heat packs on the " surgical site(s).    Drain care:  1.  Please keep drain site clean and dry.   2.  Avoid showering until the drain is removed.  3.  Please call the clinic (see phone numbers above) if:    3a. Your drain falls out.    3b. The stitch that is holding the drain in place at the skin is coming loose or missing.    3c. The drainage fluid is very thick and/or has a bad odor.    3d. The squeeze bulb does not stay collapsed.    3e. The drainage suddenly stops or dramatically decreases when the drain has been having steady amounts of drainage.  4. Please keep a log of the drainage amounts every time you empty the drain.  Dr Rodríguez will assess the output amounts to determine when she can remove the drain at your follow-up appointment.  5. Please  strip  the drain 3 times per day:    5a. Wash your hands with soap and water and dry.    5b. Gently squeeze the tubing if you see a clot to loosen it up.    5c.  and pinch the drain where it comes out of the skin with one hand, to steady it.  With the other hand,  and pinch the drain and slide your fingers down the tubing, towards the drainage bulb.  Then release your  on the end where the tube comes out of your body, followed by releasing your  at the end of the drainage bulb.      5d. Repeat several times.    5e. It may be easier to  strip  the drain with an alcohol swab or with hand cleanser on the hand that is moving along the tube.    5f. Wash your hands again.  6. You may need to empty the drain multiple times per day.

## 2023-01-25 ENCOUNTER — PATIENT OUTREACH (OUTPATIENT)
Dept: ONCOLOGY | Facility: CLINIC | Age: 43
End: 2023-01-25
Payer: COMMERCIAL

## 2023-01-25 NOTE — PROGRESS NOTES
Post Op Discharge Call     Surgery:      1. Bilateral risk-reducing nipple-sparing mastectomy  2. Immediate reconstruction by Dr Pradhan, to be dictated separately     Surgery date:  1/23/2023    Discharge Date:  1/23/2023    Date of Post-op Call:  1/25/2023       Left message for Yamila following her surgery. Encouraged  Yamila to call with any questions or concerns. Contact info provided. Reviewed follow up appointment scheduled on 2/9 with Dr. Rodríguez.        Post op/follow up plans:      Post op appointment scheduled,confirmed date and time with patient. Contact number provided for any additional questions or concerns.       NANCY Medrano  [facility]  Surgical Oncology       Future Appointments:       Future Appointments   Date Time Provider Department Center   1/31/2023  9:40 AM Cece Chavez PA-C [facility]   2/9/2023  9:15 AM Monica Rodríguez MD [facility]   4/12/2023 11:00 AM Fidelia Ricks MD [facility]

## 2023-01-30 LAB
PATH REPORT.COMMENTS IMP SPEC: NORMAL
PATH REPORT.COMMENTS IMP SPEC: NORMAL
PATH REPORT.FINAL DX SPEC: NORMAL
PATH REPORT.GROSS SPEC: NORMAL
PATH REPORT.MICROSCOPIC SPEC OTHER STN: NORMAL
PATH REPORT.RELEVANT HX SPEC: NORMAL
PHOTO IMAGE: NORMAL

## 2023-01-30 NOTE — PROGRESS NOTES
Plastic Surgery Outpatient Visit    ID: Yamila Myles is a 42 year old female with PMH high risk for breast cancer now s/p bilateral prophylactic nipple sparing mastectomy with direct to implant with allorderm reconstruction 1/23/23 with Dr. Pradhan.     S: doing ok. Taking only tylenol for pain. Still tired. Wearing supportive bra instead of ace wrap. Was not prescribed antibiotics. Asking about activity restrictions. Drain outputs low.     O:  /74   Pulse 89   Temp 97.5  F (36.4  C) (Oral)   LMP  (LMP Unknown)   SpO2 98%    General: NAD  Chest: bilateral IMF incisions c/d/i with tape intact. Breast skin soft, well perfused. Nipples slightly darker than surrounding tissue bilaterally, R areola with ecchymosis. Implants intact bilaterally. No significant swelling. No erythema.     A/P:  -healing well  -monitor nipples, notify us if any concerning changes--scabbing, wounds, skin breakdown etc. Pre op photos reviewed and nipples noticed to be darker prior to surgery.   -TRACY drains removed  -continue soft, supportive bra  -will clarify activity restrictions at next visit (2 months written on discharge AVS) in general, ok to return to activities at 6 weeks.   -RTC 2 weeks with Dr. Lorne Chavez PA-C  Plastic and Reconstructive Surgery    15 minutes spent on the date of the encounter doing chart review, history and physical, dressing changes, documentation and further activity as noted above.

## 2023-01-31 ENCOUNTER — PRE VISIT (OUTPATIENT)
Dept: PLASTIC SURGERY | Facility: CLINIC | Age: 43
End: 2023-01-31

## 2023-01-31 ENCOUNTER — OFFICE VISIT (OUTPATIENT)
Dept: PLASTIC SURGERY | Facility: CLINIC | Age: 43
End: 2023-01-31
Payer: COMMERCIAL

## 2023-01-31 VITALS
SYSTOLIC BLOOD PRESSURE: 109 MMHG | DIASTOLIC BLOOD PRESSURE: 74 MMHG | TEMPERATURE: 97.5 F | OXYGEN SATURATION: 98 % | HEART RATE: 89 BPM

## 2023-01-31 DIAGNOSIS — Z98.890 S/P BREAST RECONSTRUCTION, BILATERAL: ICD-10-CM

## 2023-01-31 DIAGNOSIS — Z15.01 BRCA1 POSITIVE: Primary | ICD-10-CM

## 2023-01-31 DIAGNOSIS — Z15.09 BRCA1 POSITIVE: Primary | ICD-10-CM

## 2023-01-31 PROCEDURE — 99024 POSTOP FOLLOW-UP VISIT: CPT | Performed by: PHYSICIAN ASSISTANT

## 2023-01-31 NOTE — NURSING NOTE
Chief Complaint   Patient presents with     Surgical Followup     1 week post-op -- DOS 1/26 -- Mastectomy       Vitals:    01/31/23 0942   BP: 109/74   Pulse: 89   Temp: 97.5  F (36.4  C)   TempSrc: Oral   SpO2: 98%       There is no height or weight on file to calculate BMI.          HIPOLITO OREILLY EMT

## 2023-01-31 NOTE — LETTER
1/31/2023       RE: Yamila Myles  [address] [address]     Dear Colleague,    Thank you for referring your patient, Yamila Myles, to the [facility] PLASTIC AND RECONSTRUCTIVE SURGERY CLINIC [place] at [facility]. Please see a copy of my visit note below.    Plastic Surgery Outpatient Visit    ID: Yamila Myles is a 42 year old female with PMH high risk for breast cancer now s/p bilateral prophylactic nipple sparing mastectomy with direct to implant with allorderm reconstruction 1/23/23 with Dr. Pradhan.     S: doing ok. Taking only tylenol for pain. Still tired. Wearing supportive bra instead of ace wrap. Was not prescribed antibiotics. Asking about activity restrictions. Drain outputs low.     O:  /74   Pulse 89   Temp 97.5  F (36.4  C) (Oral)   LMP  (LMP Unknown)   SpO2 98%    General: NAD  Chest: bilateral IMF incisions c/d/i with tape intact. Breast skin soft, well perfused. Nipples slightly darker than surrounding tissue bilaterally, R areola with ecchymosis. Implants intact bilaterally. No significant swelling. No erythema.     A/P:  -healing well  -monitor nipples, notify us if any concerning changes--scabbing, wounds, skin breakdown etc. Pre op photos reviewed and nipples noticed to be darker prior to surgery.   -TRACY drains removed  -continue soft, supportive bra  -will clarify activity restrictions at next visit (2 months written on discharge AVS) in general, ok to return to activities at 6 weeks.   -RTC 2 weeks with Dr. Lorne Chavez PA-C  Plastic and Reconstructive Surgery    15 minutes spent on the date of the encounter doing chart review, history and physical, dressing changes, documentation and further activity as noted above.

## 2023-02-03 NOTE — PROGRESS NOTES
FOLLOW-UP  Feb 9, 2023    Yamila Myles is a 42 year old female who returns for her 1st post-operative follow-up visit.    HPI:    She underwent bilateral risk-reducing nipple-sparing mastectomy on 1/23/2023 for a pathogenic variant in BRCA1.  She is currently 2 week(s) post-op.  Final surgical pathology showed no malignancy.    Since the procedure, she has been doing well.  She denies any redness or swelling, or drainage from the incision, or fever/chills.  She has good range of motion of her shoulders bilaterally and denies any upper extremity swelling.  Her drains were removed by the plastic surgery team on 1/31/2023.    /74 (BP Location: Right arm, Patient Position: Sitting, Cuff Size: Adult Regular)   Pulse 70   Temp 97.8  F (36.6  C) (Oral)   Resp 16   Wt 61.2 kg (135 lb)   LMP  (LMP Unknown)   SpO2 100%   BMI 23.17 kg/m     Physical Exam  Constitutional:       Appearance: She is well-developed.   Pulmonary:      Effort: No respiratory distress.   Chest:      Comments: Bilateral surgical absence of breasts. Incisions well healed. No ischemia, cellulitis or hematoma bilaterally.  Skin:     General: Skin is warm and dry.         INVESTIGATIONS:    Surgical Pathology (1/23/2023):  Final Diagnosis  A. LEFT breast, retro nipple tissue, excision:  -Benign breast tissue, including lactiferous duct with duct ectasia  -Negative for atypia or malignancy     B. LEFT breast, risk reducing nipple-sparing mastectomy:  -Atypical lobular hyperplasia (ALH)  -Other findings: fibrocystic change (including cysts with apocrine metaplasia), columnar cell change, sclerosing adenosis, duct ectasia, and usual ductal hyperplasia  -Foreign body giant cell reaction, consistent with prior operative site  -Calcifications associated with benign ducts and acini  -Negative for malignancy     C. RIGHT breast, retro nipple tissue, excision:  -Benign breast tissue, including lactiferous duct with luminal calcification  -Negative  for atypia or malignancy     D. RIGHT breast, risk reducing nipple-sparing mastectomy:  -Benign breast tissue with fibrocystic change (including cysts with apocrine metaplasia), columnar cell change, fibroadenomatoid change, and usual ductal hyperplasia  -Foreign body giant cell reaction, consistent with prior operative site  -Negative for atypia or malignancy      ASSESSMENT:    Yamila Myles is a 42 year old female with a pathogenic variant in BRCA1, s/p bilateral risk-reducing mastectomy.    She is healing well. She will continue to follow up with Dr Pradhan.    We reviewed the pathology today and a copy of the report was provided. No further surgery or other treatment is indicated.    She is s/p bilateral mastectomy and bilateral oophorectomy.  We reviewed that routine screening mammograms or breast MRI are not recommended following bilateral mastectomy. Regular chest wall exams are recommended. I recommended she see Jessica Long in the high risk clinic for this.    All of the above was discussed with the patient and all questions were answered.     PLAN:  1. Referral to Jessica Long for future physical exam screening  2. Follow up with Dr Pradhan  3. Follow up with me HILTON Rodríguez MD MSc [facility] FACS  Associate Professor of Surgery  Division of Surgical Oncology  [facility]

## 2023-02-09 ENCOUNTER — ONCOLOGY VISIT (OUTPATIENT)
Dept: ONCOLOGY | Facility: CLINIC | Age: 43
End: 2023-02-09
Attending: SURGERY
Payer: COMMERCIAL

## 2023-02-09 VITALS
BODY MASS INDEX: 23.17 KG/M2 | RESPIRATION RATE: 16 BRPM | WEIGHT: 135 LBS | DIASTOLIC BLOOD PRESSURE: 74 MMHG | SYSTOLIC BLOOD PRESSURE: 106 MMHG | OXYGEN SATURATION: 100 % | HEART RATE: 70 BPM | TEMPERATURE: 97.8 F

## 2023-02-09 DIAGNOSIS — Z15.09 BRCA1 GENETIC CARRIER: Primary | ICD-10-CM

## 2023-02-09 DIAGNOSIS — Z15.01 BRCA1 GENETIC CARRIER: Primary | ICD-10-CM

## 2023-02-09 PROCEDURE — 99024 POSTOP FOLLOW-UP VISIT: CPT | Performed by: SURGERY

## 2023-02-09 PROCEDURE — G0463 HOSPITAL OUTPT CLINIC VISIT: HCPCS | Performed by: SURGERY

## 2023-02-09 ASSESSMENT — PAIN SCALES - GENERAL: PAINLEVEL: NO PAIN (0)

## 2023-02-09 NOTE — NURSING NOTE
"Oncology Rooming Note    February 9, 2023 9:33 AM   Yamila Myles is a 42 year old female who presents for:    Chief Complaint   Patient presents with     Oncology Clinic Visit     BRCA 1   post op f/u     Initial Vitals: /74 (BP Location: Right arm, Patient Position: Sitting, Cuff Size: Adult Regular)   Pulse 70   Temp 97.8  F (36.6  C) (Oral)   Resp 16   Wt 61.2 kg (135 lb)   LMP  (LMP Unknown)   SpO2 100%   BMI 23.17 kg/m   Estimated body mass index is 23.17 kg/m  as calculated from the following:    Height as of 1/23/23: 1.626 m (5' 4\").    Weight as of this encounter: 61.2 kg (135 lb). Body surface area is 1.66 meters squared.  No Pain (0) Comment: Data Unavailable   No LMP recorded (lmp unknown). (Menstrual status: IUD).  Allergies reviewed: Yes  Medications reviewed: Yes    Medications: Medication refills not needed today.  Pharmacy name entered into [facility]:    [id number] - [place], [place] - [address] AT [facility] [address] [place]  [id number] - [place], [place] - [address] AT [facility] OF [address]    Clinical concerns: Pt presents today for post op f/u.       Sandra Castro LPN  2/9/2023              "

## 2023-02-09 NOTE — LETTER
2/9/2023         RE: Yamila Myles  [address] [address]        Dear Colleague,    Thank you for referring your patient, Yamila Myles, to the [facility] BREAST [facility]. Please see a copy of my visit note below.    FOLLOW-UP  Feb 9, 2023    Yamila Myles is a 42 year old female who returns for her 1st post-operative follow-up visit.    HPI:    She underwent bilateral risk-reducing nipple-sparing mastectomy on 1/23/2023 for a pathogenic variant in BRCA1.  She is currently 2 week(s) post-op.  Final surgical pathology showed no malignancy.    Since the procedure, she has been doing well.  She denies any redness or swelling, or drainage from the incision, or fever/chills.  She has good range of motion of her shoulders bilaterally and denies any upper extremity swelling.  Her drains were removed by the plastic surgery team on 1/31/2023.    /74 (BP Location: Right arm, Patient Position: Sitting, Cuff Size: Adult Regular)   Pulse 70   Temp 97.8  F (36.6  C) (Oral)   Resp 16   Wt 61.2 kg (135 lb)   LMP  (LMP Unknown)   SpO2 100%   BMI 23.17 kg/m     Physical Exam  Constitutional:       Appearance: She is well-developed.   Pulmonary:      Effort: No respiratory distress.   Chest:      Comments: Bilateral surgical absence of breasts. Incisions well healed. No ischemia, cellulitis or hematoma bilaterally.  Skin:     General: Skin is warm and dry.         INVESTIGATIONS:    Surgical Pathology (1/23/2023):  Final Diagnosis  A. LEFT breast, retro nipple tissue, excision:  -Benign breast tissue, including lactiferous duct with duct ectasia  -Negative for atypia or malignancy     B. LEFT breast, risk reducing nipple-sparing mastectomy:  -Atypical lobular hyperplasia (ALH)  -Other findings: fibrocystic change (including cysts with apocrine metaplasia), columnar cell change, sclerosing adenosis, duct ectasia, and usual ductal hyperplasia  -Foreign body giant cell reaction,  consistent with prior operative site  -Calcifications associated with benign ducts and acini  -Negative for malignancy     C. RIGHT breast, retro nipple tissue, excision:  -Benign breast tissue, including lactiferous duct with luminal calcification  -Negative for atypia or malignancy     D. RIGHT breast, risk reducing nipple-sparing mastectomy:  -Benign breast tissue with fibrocystic change (including cysts with apocrine metaplasia), columnar cell change, fibroadenomatoid change, and usual ductal hyperplasia  -Foreign body giant cell reaction, consistent with prior operative site  -Negative for atypia or malignancy      ASSESSMENT:    Yamila Myles is a 42 year old female with a pathogenic variant in BRCA1, s/p bilateral risk-reducing mastectomy.    She is healing well. She will continue to follow up with Dr Pradhan.    We reviewed the pathology today and a copy of the report was provided. No further surgery or other treatment is indicated.    She is s/p bilateral mastectomy and bilateral oophorectomy.  We reviewed that routine screening mammograms or breast MRI are not recommended following bilateral mastectomy. Regular chest wall exams are recommended. I recommended she see Jessica Long in the high risk clinic for this.    All of the above was discussed with the patient and all questions were answered.     PLAN:  1. Referral to Jessica Long for future physical exam screening  2. Follow up with Dr Pradhan  3. Follow up with me HILTON Rodríguez MD MSc [facility] FACS  Associate Professor of Surgery  Division of Surgical Oncology  [facility]

## 2023-02-10 ENCOUNTER — PRE VISIT (OUTPATIENT)
Dept: ONCOLOGY | Facility: CLINIC | Age: 43
End: 2023-02-10
Payer: COMMERCIAL

## 2023-02-14 ENCOUNTER — OFFICE VISIT (OUTPATIENT)
Dept: PLASTIC SURGERY | Facility: CLINIC | Age: 43
End: 2023-02-14
Attending: PLASTIC SURGERY
Payer: COMMERCIAL

## 2023-02-14 DIAGNOSIS — Z15.09 BRCA1 POSITIVE: Primary | ICD-10-CM

## 2023-02-14 DIAGNOSIS — Z15.01 BRCA1 POSITIVE: Primary | ICD-10-CM

## 2023-02-14 DIAGNOSIS — Z98.890 S/P BREAST RECONSTRUCTION, BILATERAL: ICD-10-CM

## 2023-02-14 PROCEDURE — G0463 HOSPITAL OUTPT CLINIC VISIT: HCPCS | Performed by: PLASTIC SURGERY

## 2023-02-14 PROCEDURE — 99024 POSTOP FOLLOW-UP VISIT: CPT | Performed by: PLASTIC SURGERY

## 2023-02-14 NOTE — PROGRESS NOTES
POSTOPERATIVE VISIT NOTE    PRESENTING COMPLAINT:  Postoperative visit status post bilateral breast reconstruction for high risk for breast cancer with direct-to-implant reconstruction with AlloDerm and 450 mL SCF gel implants, done 01/23/2023.    HISTORY OF PRESENTING COMPLAINT:  Ms. Myles is 42 years old.  She is almost 3-1/2 weeks out from surgery.  Doing well, no major issues.  Happy with results.    PHYSICAL EXAMINATION:    VITAL SIGNS:  Stable.  She is afebrile, in no obvious distress.    CHEST:  Both breasts are healing in well.  No evidence of infection, seroma, hematoma.  She has some minor rippling and some upper pole indentations.    ASSESSMENT AND PLAN:  Based on the above findings, a diagnosis of bilateral breast reconstruction was made.  Plan now is to proceed with allowing everything to settle and mature over the next 6 weeks and then plan the next stages of reconstruction if the patient desires.  This will include fat grafting and nipple areolar revision to make them more round and smaller.  Went over the planned procedure with the patient in detail.  She understood it and will think about it.  She will see me back in 6 weeks and we will make definitive plans.  All questions were answered.  She was happy with the visit.  All exam and discussion done in presence of my nurse, Jacqueline Peck.

## 2023-02-14 NOTE — LETTER
2/14/2023         RE: Yamila Myles  [address] [facility] [address]        Dear Colleague,    Thank you for referring your patient, Yamila Myles, to the [facility]. Please see a copy of my visit note below.    POSTOPERATIVE VISIT NOTE    PRESENTING COMPLAINT:  Postoperative visit status post bilateral breast reconstruction for high risk for breast cancer with direct-to-implant reconstruction with AlloDerm and 450 mL SCF gel implants, done 01/23/2023.    HISTORY OF PRESENTING COMPLAINT:  Ms. Myles is 42 years old.  She is almost 3-1/2 weeks out from surgery.  Doing well, no major issues.  Happy with results.    PHYSICAL EXAMINATION:    VITAL SIGNS:  Stable.  She is afebrile, in no obvious distress.    CHEST:  Both breasts are healing in well.  No evidence of infection, seroma, hematoma.  She has some minor rippling and some upper pole indentations.    ASSESSMENT AND PLAN:  Based on the above findings, a diagnosis of bilateral breast reconstruction was made.  Plan now is to proceed with allowing everything to settle and mature over the next 6 weeks and then plan the next stages of reconstruction if the patient desires.  This will include fat grafting and nipple areolar revision to make them more round and smaller.  Went over the planned procedure with the patient in detail.  She understood it and will think about it.  She will see me back in 6 weeks and we will make definitive plans.  All questions were answered.  She was happy with the visit.  All exam and discussion done in presence of my nurse, Jacqueline Peck.    Sincerely,        LING Pradhan MD

## 2023-02-15 VITALS
DIASTOLIC BLOOD PRESSURE: 65 MMHG | TEMPERATURE: 97.5 F | WEIGHT: 136 LBS | BODY MASS INDEX: 23.34 KG/M2 | OXYGEN SATURATION: 99 % | SYSTOLIC BLOOD PRESSURE: 99 MMHG | HEART RATE: 68 BPM | RESPIRATION RATE: 16 BRPM

## 2023-02-15 ASSESSMENT — PAIN SCALES - GENERAL: PAINLEVEL: NO PAIN (0)

## 2023-02-15 NOTE — NURSING NOTE
"Oncology Rooming Note    February 14, 2023 11:46 AM   Yamila Myles is a 42 year old female who presents for:    Chief Complaint   Patient presents with     Oncology Clinic Visit     BRCA1 Positive     Initial Vitals: BP 99/65 (BP Location: Right arm, Patient Position: Sitting, Cuff Size: Adult Regular)   Pulse 68   Temp 97.5  F (36.4  C) (Oral)   Resp 16   Wt 61.7 kg (136 lb)   LMP  (LMP Unknown)   SpO2 99%   BMI 23.34 kg/m   Estimated body mass index is 23.34 kg/m  as calculated from the following:    Height as of 1/23/23: 1.626 m (5' 4\").    Weight as of this encounter: 61.7 kg (136 lb). Body surface area is 1.67 meters squared.  No Pain (0) Comment: Data Unavailable   No LMP recorded (lmp unknown). (Menstrual status: IUD).  Allergies reviewed: Yes  Medications reviewed: Yes    Medications: Medication refills not needed today.  Pharmacy name entered into [facility]:    [id number] - [place], [place] - [address] AT [facility] [address]  [facility] #[id number] - [place], [place] - [address] AT [facility] OF [address]    Clinical concerns: Pt presents today for f/u.       Sandra Castro LPN  02/14/23            "

## 2023-04-03 ENCOUNTER — OFFICE VISIT (OUTPATIENT)
Dept: FAMILY MEDICINE | Facility: CLINIC | Age: 43
End: 2023-04-03
Payer: COMMERCIAL

## 2023-04-03 VITALS
SYSTOLIC BLOOD PRESSURE: 100 MMHG | DIASTOLIC BLOOD PRESSURE: 62 MMHG | HEIGHT: 65 IN | HEART RATE: 71 BPM | WEIGHT: 133 LBS | OXYGEN SATURATION: 98 % | TEMPERATURE: 97.9 F | BODY MASS INDEX: 22.16 KG/M2

## 2023-04-03 DIAGNOSIS — Z01.84 IMMUNITY STATUS TESTING: ICD-10-CM

## 2023-04-03 DIAGNOSIS — Z71.84 TRAVEL ADVICE ENCOUNTER: Primary | ICD-10-CM

## 2023-04-03 PROCEDURE — 86706 HEP B SURFACE ANTIBODY: CPT | Mod: GA | Performed by: NURSE PRACTITIONER

## 2023-04-03 PROCEDURE — 90472 IMMUNIZATION ADMIN EACH ADD: CPT | Mod: GA | Performed by: NURSE PRACTITIONER

## 2023-04-03 PROCEDURE — 90717 YELLOW FEVER VACCINE SUBQ: CPT | Mod: GA | Performed by: NURSE PRACTITIONER

## 2023-04-03 PROCEDURE — 86735 MUMPS ANTIBODY: CPT | Mod: GA | Performed by: NURSE PRACTITIONER

## 2023-04-03 PROCEDURE — 36415 COLL VENOUS BLD VENIPUNCTURE: CPT | Mod: GA | Performed by: NURSE PRACTITIONER

## 2023-04-03 PROCEDURE — 90715 TDAP VACCINE 7 YRS/> IM: CPT | Mod: GA | Performed by: NURSE PRACTITIONER

## 2023-04-03 PROCEDURE — 90471 IMMUNIZATION ADMIN: CPT | Mod: GA | Performed by: NURSE PRACTITIONER

## 2023-04-03 PROCEDURE — 86708 HEPATITIS A ANTIBODY: CPT | Mod: GA | Performed by: NURSE PRACTITIONER

## 2023-04-03 PROCEDURE — 90691 TYPHOID VACCINE IM: CPT | Mod: GA | Performed by: NURSE PRACTITIONER

## 2023-04-03 PROCEDURE — 99402 PREV MED CNSL INDIV APPRX 30: CPT | Mod: 25 | Performed by: NURSE PRACTITIONER

## 2023-04-03 PROCEDURE — 86762 RUBELLA ANTIBODY: CPT | Mod: GA | Performed by: NURSE PRACTITIONER

## 2023-04-03 PROCEDURE — 86765 RUBEOLA ANTIBODY: CPT | Mod: GA | Performed by: NURSE PRACTITIONER

## 2023-04-03 RX ORDER — ACETAZOLAMIDE 125 MG/1
TABLET ORAL
Qty: 15 TABLET | Refills: 0 | Status: SHIPPED | OUTPATIENT
Start: 2023-04-03 | End: 2023-07-11

## 2023-04-03 RX ORDER — AZITHROMYCIN 500 MG/1
500 TABLET, FILM COATED ORAL DAILY
Qty: 3 TABLET | Refills: 0 | Status: SHIPPED | OUTPATIENT
Start: 2023-04-03 | End: 2023-04-06

## 2023-04-03 ASSESSMENT — PAIN SCALES - GENERAL: PAINLEVEL: NO PAIN (0)

## 2023-04-03 NOTE — PATIENT INSTRUCTIONS
Thank you for visiting the [facility] : [phone]  Today April 3, 2023 you received the    Yellow Fever (YF)    Tetanus (Tdap) Vaccine    Typhoid - injectable. This vaccine is valid for two years.     Blood work for  Measles, mumps, Rubella, Hep A and HEP B immunity     Follow up vaccine appointments can be made as a NURSE ONLY visit at the Travel Clinic, (BE PREPARED TO WAIT, ) or at designated [facility].    If you are receiving the Rabies vaccines series, it is important that you follow the exact schedule ordered.     Pre-travel     We recommend that you purchase Medical Evacuation Insurance prior to your departure.  Https://wwwnc.cdc.gov/travel/page/insurance    [place] your travel plans with the [place] Department of State through STEP ( Smart Traveler Enrollment Program ) https://step.state.gov.  STEP is a free service to allow U[place] citizens and nationals traveling and living abroad to enroll their trip with the nearest [place] Embassy or Consulate.    Animal Exposure: Avoid all mammals even if they look healthy.  If there is a bite, scratch or even a lick, wash area immediately with soap and water for 15 minutes and seek medical care within 24 hours for evaluation of Rabies post exposure treatment.  Contact your Medical Evacuation Insurance.    COVID 19 (Sars Cov2) prevention strategies  Physical distancing: Maintain 6 foot (2m) from others.              Avoid large gatherings and public transportation.   Avoid indoor shopping malls, theaters and restaurants   Practice consistent mask wearing covering the nose, mouth and underneath the chin when unable to maintain 6 foot distance from others.  Hand washing: frequent, thorough handwashing with soap and water for 20 seconds (or using a hand  containing 60% alcohol)   Avoid touching face, nose, eyes, mouth unless you have done appropriate hand washing as above.   Clean high touch surfaces with approved  disinfectant against Covid 19  (70% Ethanol ) or a bleach solution (add 20 mL (4 teaspoons) of bleach to 1 L (1 quart) of water;)  Be careful not to breath or touch bleach.      Travel Covid 19 Testing:  updated 12/06/2021  International travelers: Pre-travel: diagnostic testing (antigen or PCR) may be required for entry:  See country specific Embassy websites or airline websites.    Post travel: CDC recommends getting tested 3-5 days after your trip     COVID-19 testing scheduling number for pre-travel through [facility]  [phone] (Must have an order). Available 24 hours a day.  You can also schedule through My Chart.     Post-travel illness:  Contact your provider or [facility] if you develop a fever, rash, cough, diarrhea or other symptoms for up to 1 year after travel.  Inform your healthcare provider when and where you traveled to.    Please call the [facility] with any questions [phone]  Or send your provider a 'My Chart' note.

## 2023-04-03 NOTE — NURSING NOTE
Prior to immunization administration, verified patients identity using patient s name and date of birth. Please see Immunization Activity for additional information.     Screening Questionnaire for Adult Immunization    Are you sick today?   No   Do you have allergies to medications, food, a vaccine component or latex?   No   Have you ever had a serious reaction after receiving a vaccination?   No   Do you have a long-term health problem with heart, lung, kidney, or metabolic disease (e.g., diabetes), asthma, a blood disorder, no spleen, complement component deficiency, a cochlear implant, or a spinal fluid leak?  Are you on long-term aspirin therapy?   No   Do you have cancer, leukemia, HIV/AIDS, or any other immune system problem?   No   Do you have a parent, brother, or sister with an immune system problem?   No   In the past 3 months, have you taken medications that affect  your immune system, such as prednisone, other steroids, or anticancer drugs; drugs for the treatment of rheumatoid arthritis, Crohn s disease, or psoriasis; or have you had radiation treatments?   No   Have you had a seizure, or a brain or other nervous system problem?   No   During the past year, have you received a transfusion of blood or blood    products, or been given immune (gamma) globulin or antiviral drug?   No   For women: Are you pregnant or is there a chance you could become       pregnant during the next month?   No   Have you received any vaccinations in the past 4 weeks?   No     Immunization questionnaire answers were all negative.      Injection of TDAP,TYPHOID,YELLOW FEVER given by Krishna Peck MA. Patient instructed to remain in clinic for 15 minutes afterwards, and to report any adverse reactions.     Screening performed by Krishna Peck MA on 4/3/2023 at 12:13 PM.

## 2023-04-03 NOTE — PROGRESS NOTES
"Nurse Note ( Pre-Travel Consult)      Itinerary:  [place]      Departure Date: 06/21/23      Return Date: 07/05/23      Length of Trip 2 weeks      Reason for Travel: Tourism           Urban or rural: both      Accommodations: Hotel        IMMUNIZATION HISTORY  Have you received any immunizations within the past 4 weeks?  No  Have you ever fainted from having your blood drawn or from an injection?  Yes  Have you ever had a fever reaction to vaccination?  Yes  Have you ever had any bad reaction or side effect from any vaccination?  N/A  Have you ever had hepatitis A or B vaccine?  No  Do you live (or work closely) with anyone who has AIDS, an AIDS-like condition, any other immune disorder or who is on chemotherapy for cancer?  No  Do you have a family history of immunodeficiency?  No  Have you received any injection of immune globulin or any blood products during the past 12 months?  No    Patient roomed by ciro Downey  Yamila Myles is a 42 year old female seen today with spouse  with child for counsultation for international travel.   Patient will be departing in  2.5 month(s) and  traveling with family member(s).      Patient itinerary :  will be in the urban region of [facility] for 5 days > [facility] > [facility] X 5 days and  possibly [facility] which risk for Yellow Fever, Dengue Fever, food borne illnesses and motor vehicle accidents. exposure.      Patient's activities will include sightseeing, snorkeling, high altitude exposure and boating .    Patient's country of birth is [place]    Special medical concerns: Masectomy 3 months ago   Pre-travel questionnaire was completed by patient and reviewed by provider.     Vitals: /62 (BP Location: Left arm, Patient Position: Sitting, Cuff Size: Adult Regular)   Pulse 71   Temp 97.9  F (36.6  C) (Temporal)   Ht 1.64 m (5' 4.57\")   Wt 60.3 kg (133 lb)   SpO2 98%   BMI 22.43 kg/m    BMI= Body mass index is 22.43 kg/m .    EXAM:  General:  " Well-nourished, well-developed in no acute distress.  Appears to be stated age, interacts appropriately and expresses understanding of information given to patient.    Current Outpatient Medications   Medication Sig Dispense Refill     calcium carbonate (OS-GRETCHEN) 500 MG tablet Take 1 tablet by mouth every evening Unsure if 500 or 1000mg       cetirizine (ZYRTEC) 10 MG tablet Take 10 mg by mouth every evening       estradiol (ESTRACE) 2 MG tablet Take 1 tablet (2 mg) by mouth daily (Patient taking differently: Take 2 mg by mouth every evening) 90 tablet 3     fluticasone (FLONASE) 50 MCG/ACT nasal spray Spray 2 sprays into both nostrils daily 16 g 3     HEMP OIL OR EXTRACT OR OTHER CBD CANNABINOID, NOT MEDICAL CANNABIS, 10 mg every evening [employer] CBD 10 mg       lactobacillus rhamnosus (GG) (CULTURELL) capsule Take 1 capsule by mouth every evening       Vitamin D, Cholecalciferol, 25 MCG (1000 UT) CAPS Take 1,000 mg % by mouth every evening Unsure is 500 or 1000       Patient Active Problem List   Diagnosis     Microcalcifications of the breast, right uoq     Fibrocystic breast changes     BRCA status     Mirena placed 4/26/18 (3rd one)     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 Takes Zyrtec year round to manage it.   Gets post nasal drip      Adhesive Tape Rash     Surgical drape adhesive vs. ioban         Immunizations discussed include:   Covid 19: Up to date  Hepatitis A:  Titers drawn  Hepatitis B: Titers drawn  Influenza: Up to date  Typhoid: Ordered/given today, risks, benefits and side effects reviewed  Rabies: Declined  reviewed managment of a animal bite or scratch (washing wound, seek medical care within 24 hours for post exposure prophylaxis )  Yellow Fever: Yellow Fever ordered/given today - side effects, precautions,  allergies, risks discussed. Patient expressed understanding.  Guyanese Encephalitis: Not indicated  Meningococcus: Not indicated  Tetanus/Diphtheria: Ordered/given today, risks, benefits and side effects reviewed  Measles/Mumps/Rubella: Titers drawn  Cholera: Not needed  Polio: Not indicated  Pneumococcal: Under age of 65  Varicella: Immune by disease history per patient report  Shingrix: Not indicated  HPV:  Not indicated     TB: low risk     Altitude Exposure on this trip: Yes 10,000 ft in [facility]   Past tolerance to Altitude: Limited experience    ASSESSMENT/PLAN:  Yamila was seen today for travel clinic.    Diagnoses and all orders for this visit:    Travel advice encounter  -     azithromycin (ZITHROMAX) 500 MG tablet; Take 1 tablet (500 mg) by mouth daily for 3 doses Take 1 tablet a day for up to 3 days for severe diarrhea  -     acetaZOLAMIDE (DIAMOX) 125 MG tablet; Take one tab every 12 hours starting 24 hours prior to ascent and continue for 24 hours while at the highest altitude    Immunity status testing  -     Hepatitis Antibody A IgG; Future  -     Hepatitis B Surface Antibody; Future  -     Rubeola Antibody IgG; Future  -     Rubella Antibody IgG; Future  -     Mumps Immune Status, IgG; Future  -     azithromycin (ZITHROMAX) 500 MG tablet; Take 1 tablet (500 mg) by mouth daily for 3 doses Take 1 tablet a day for up to 3 days for severe diarrhea  -     Hepatitis Antibody A IgG  -     Hepatitis B Surface Antibody  -     Rubeola Antibody IgG  -     Rubella Antibody IgG  -     Mumps Immune Status, IgG    Other orders  -     YELLOW FEVER IMMUNIZATN,LIVE,SUBCUT  -     TYPHOID VACCINE, IM  -     TDAP VACCINE (Adacel, Boostrix)  [[id number]]      I have reviewed general recommendations for safe travel   including: food/water precautions, insect precautions, roadway safety. Educational materials and [employer] report provided.    Malaraia prophylaxis recommended: none  Symptomatic treatment for traveler's diarrhea:  azithromycin  Altitude illness prevention and treatment: Diamox prescription given with instructions on use and education provided on Acute altitude illness recognition and prevention.        Evacuation insurance advised and resources were provided to patient.    Total visit time 30 minutes  with over 50% of time spent counseling patient and shared decision making as detailed above.    Cece Geronimo CNP  Certificate in Travel Health

## 2023-04-04 ENCOUNTER — OFFICE VISIT (OUTPATIENT)
Dept: PLASTIC SURGERY | Facility: CLINIC | Age: 43
End: 2023-04-04
Attending: PLASTIC SURGERY
Payer: COMMERCIAL

## 2023-04-04 VITALS
TEMPERATURE: 97.7 F | SYSTOLIC BLOOD PRESSURE: 107 MMHG | HEART RATE: 91 BPM | RESPIRATION RATE: 16 BRPM | DIASTOLIC BLOOD PRESSURE: 73 MMHG | BODY MASS INDEX: 22.94 KG/M2 | OXYGEN SATURATION: 98 % | WEIGHT: 136 LBS

## 2023-04-04 DIAGNOSIS — Z98.890 S/P BREAST RECONSTRUCTION, BILATERAL: Primary | ICD-10-CM

## 2023-04-04 LAB
HAV IGG SER QL IA: REACTIVE
HBV SURFACE AB SERPL IA-ACNC: 0.06 M[IU]/ML
HBV SURFACE AB SERPL IA-ACNC: NONREACTIVE M[IU]/ML
MEV IGG SER IA-ACNC: 253 AU/ML
MEV IGG SER IA-ACNC: POSITIVE
MUMPS ANTIBODY IGG INSTRUMENT VALUE: 17.6 AU/ML
MUV IGG SER QL IA: POSITIVE
RUBV IGG SERPL QL IA: 1.45 INDEX
RUBV IGG SERPL QL IA: POSITIVE

## 2023-04-04 PROCEDURE — 99024 POSTOP FOLLOW-UP VISIT: CPT | Performed by: PLASTIC SURGERY

## 2023-04-04 PROCEDURE — G0463 HOSPITAL OUTPT CLINIC VISIT: HCPCS | Performed by: PLASTIC SURGERY

## 2023-04-04 RX ORDER — CEFAZOLIN SODIUM 2 G/50ML
2 SOLUTION INTRAVENOUS SEE ADMIN INSTRUCTIONS
Status: CANCELLED | OUTPATIENT
Start: 2023-04-04

## 2023-04-04 RX ORDER — CEFAZOLIN SODIUM 2 G/50ML
2 SOLUTION INTRAVENOUS
Status: CANCELLED | OUTPATIENT
Start: 2023-04-04

## 2023-04-04 ASSESSMENT — PAIN SCALES - GENERAL: PAINLEVEL: NO PAIN (0)

## 2023-04-04 NOTE — NURSING NOTE
"Oncology Rooming Note    April 4, 2023 10:11 AM   Yamila Myles is a 42 year old female who presents for:    Chief Complaint   Patient presents with     Oncology Clinic Visit     Post op symmetry enhancement     Initial Vitals: /73   Pulse 91   Temp 97.7  F (36.5  C) (Oral)   Resp 16   Wt 61.7 kg (136 lb)   SpO2 98%   BMI 22.94 kg/m   Estimated body mass index is 22.94 kg/m  as calculated from the following:    Height as of 4/3/23: 1.64 m (5' 4.57\").    Weight as of this encounter: 61.7 kg (136 lb). Body surface area is 1.68 meters squared.  No Pain (0) Comment: Data Unavailable   No LMP recorded. (Menstrual status: IUD).  Allergies reviewed: Yes  Medications reviewed: Yes    Medications: Medication refills not needed today.  Pharmacy name entered into [facility]:    [facility] #[id number] - [place], [place] - [address] AT [facility] [address] [facility] #[id number] - [place], [place] - [address] AT [facility] [address]    Clinical concerns:        Trista Romero CMA              "

## 2023-04-04 NOTE — PROGRESS NOTES
POSTOPERATIVE VISIT NOTE    PRESENTING COMPLAINT:  Postoperative visit, status post bilateral breast reconstruction for high risk for breast cancer, underwent bilateral direct-to-implant reconstruction with AlloDerm and 450 cc SCF gel implants on 01/23/2023.    HISTORY OF PRESENTING COMPLAINT:  Ms. Myles is 42 years old.  She is 3 months out from surgery, fully healed.  She wants to now proceed with the last stage of reconstruction.  Happy with the results.    PHYSICAL EXAMINATION:  Vital signs are stable.  She is afebrile, in no obvious distress.  Both breasts are healed.  Overall symmetric.  There is some squaring of the inframammary fold bilaterally.  There is some rippling, more on the left than on the right, and some upper pole indentations bilaterally.    ASSESSMENT AND PLAN:  Based upon the above findings, a diagnosis of bilateral breast reconstruction was made.  The plan now is to proceed with bilateral revision of reconstruction with areolar narrowing as well as a revision of the IMF and fat grafting.  I went over the planned procedure with the patient in detail.  She understood the plan and agreed.  All risks, benefits and alternatives, including pain, infection, bleeding, scarring, asymmetry, seromas, hematomas, wound breakdown, wound dehiscence, injury to deeper structures, requirement of further surgery, staged nature of reconstruction, requirement of further fat grafting, DVT, PE, MI, CVA, pneumonia, renal failure and death, were explained.  She understood them all and wants to proceed.  All questions were answered.  She was happy with the visit.  All exam and discussion done in the presence of my nurse, Frederick Carey.

## 2023-04-04 NOTE — LETTER
4/4/2023         RE: Yamila Myles  [address] [address]        Dear Colleague,    Thank you for referring your patient, Yamila Myles, to the [facility]. Please see a copy of my visit note below.    POSTOPERATIVE VISIT NOTE    PRESENTING COMPLAINT:  Postoperative visit, status post bilateral breast reconstruction for high risk for breast cancer, underwent bilateral direct-to-implant reconstruction with AlloDerm and 450 cc SCF gel implants on 01/23/2023.    HISTORY OF PRESENTING COMPLAINT:  Ms. Myles is 42 years old.  She is 3 months out from surgery, fully healed.  She wants to now proceed with the last stage of reconstruction.  Happy with the results.    PHYSICAL EXAMINATION:  Vital signs are stable.  She is afebrile, in no obvious distress.  Both breasts are healed.  Overall symmetric.  There is some squaring of the inframammary fold bilaterally.  There is some rippling, more on the left than on the right, and some upper pole indentations bilaterally.    ASSESSMENT AND PLAN:  Based upon the above findings, a diagnosis of bilateral breast reconstruction was made.  The plan now is to proceed with bilateral revision of reconstruction with areolar narrowing as well as a revision of the IMF and fat grafting.  I went over the planned procedure with the patient in detail.  She understood the plan and agreed.  All risks, benefits and alternatives, including pain, infection, bleeding, scarring, asymmetry, seromas, hematomas, wound breakdown, wound dehiscence, injury to deeper structures, requirement of further surgery, staged nature of reconstruction, requirement of further fat grafting, DVT, PE, MI, CVA, pneumonia, renal failure and death, were explained.  She understood them all and wants to proceed.  All questions were answered.  She was happy with the visit.  All exam and discussion done in the presence of my nurse, Frederick Carey.      LING Pradhan  MD

## 2023-04-10 ENCOUNTER — TELEPHONE (OUTPATIENT)
Dept: PLASTIC SURGERY | Facility: CLINIC | Age: 43
End: 2023-04-10
Payer: COMMERCIAL

## 2023-04-10 NOTE — TELEPHONE ENCOUNTER
Received voicemail from patient on 4/10 regarding scheduling surgery with Dr. Pradhan.    Patient stated she would like the week of 5/8 if at all possible.    Writer will call patient back at a later time to discuss.  __    Hoda Heard, Senior Perioperative Coordinator, on 4/10/2023  P: [phone]

## 2023-04-10 NOTE — TELEPHONE ENCOUNTER
Left voicemail for patient regarding scheduling surgery with Dr. Pradhan.    Provided contact number to discuss.    P: [phone]    __    Hoda Heard, Senior Perioperative Coordinator, on 4/10/2023 at 2:38 PM

## 2023-04-11 NOTE — TELEPHONE ENCOUNTER
Left voicemail for patient regarding scheduling surgery with Dr. Pradhan.    Added her to cancellation list for week of 5/8.    Tentatively scheduled surgery on 5/24. Will reschedule if this date does not work.    Noted need for consult with Dr. Pradhan prior to surgery, as well as H&P.    Provided contact number to discuss.    P: [phone]    __    Hoda Heard, Senior Perioperative Coordinator, on 4/11/2023 at 10:44 AM

## 2023-04-12 ENCOUNTER — OFFICE VISIT (OUTPATIENT)
Dept: OBGYN | Facility: CLINIC | Age: 43
End: 2023-04-12
Attending: OBSTETRICS & GYNECOLOGY
Payer: COMMERCIAL

## 2023-04-12 VITALS
BODY MASS INDEX: 22.82 KG/M2 | WEIGHT: 137 LBS | HEIGHT: 65 IN | DIASTOLIC BLOOD PRESSURE: 68 MMHG | SYSTOLIC BLOOD PRESSURE: 99 MMHG | HEART RATE: 84 BPM

## 2023-04-12 DIAGNOSIS — Z84.81 FAMILY HISTORY OF CARRIER OF GENETIC DISEASE: ICD-10-CM

## 2023-04-12 DIAGNOSIS — Z90.722 S/P BSO (BILATERAL SALPINGO-OOPHORECTOMY): ICD-10-CM

## 2023-04-12 DIAGNOSIS — Z12.4 SCREENING FOR MALIGNANT NEOPLASM OF CERVIX: Primary | ICD-10-CM

## 2023-04-12 PROCEDURE — G0010 ADMIN HEPATITIS B VACCINE: HCPCS

## 2023-04-12 PROCEDURE — G0145 SCR C/V CYTO,THINLAYER,RESCR: HCPCS | Performed by: OBSTETRICS & GYNECOLOGY

## 2023-04-12 PROCEDURE — 250N000011 HC RX IP 250 OP 636

## 2023-04-12 PROCEDURE — 99396 PREV VISIT EST AGE 40-64: CPT | Performed by: OBSTETRICS & GYNECOLOGY

## 2023-04-12 PROCEDURE — G0463 HOSPITAL OUTPT CLINIC VISIT: HCPCS | Mod: 25 | Performed by: OBSTETRICS & GYNECOLOGY

## 2023-04-12 PROCEDURE — 87624 HPV HI-RISK TYP POOLED RSLT: CPT | Performed by: OBSTETRICS & GYNECOLOGY

## 2023-04-12 PROCEDURE — 90746 HEPB VACCINE 3 DOSE ADULT IM: CPT

## 2023-04-12 RX ORDER — ESTRADIOL 2 MG/1
2 TABLET ORAL DAILY
Qty: 90 TABLET | Refills: 3 | Status: SHIPPED | OUTPATIENT
Start: 2023-04-12 | End: 2023-08-21

## 2023-04-12 NOTE — PROGRESS NOTES
CC/HPI:   Yamila Myles is a 42 year old female  who presents today for her annual exam. She is s/p bilateral mastectomy for BRCA status and on HRT.   She has a mirena for endometrial protection and takes daily estrace 2mg.   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     S/P breast reconstruction, bilateral     Past Medical History:   Diagnosis Date     Allergic rhinitis      Allergic rhinitis      BRCA1 positive 06/2001     Breast disorder 06/08/2011     Encounter for insertion of mirena IUD 07/12/2016     Hoarseness      Lump or mass in breast 2001    BRCA-1 positive (Mother, MGM w/ breast ca, mother w/ gene +), mother dx at 32)     Microcalcifications of the breast, right uoq 06/08/2011     Past Surgical History:   Procedure Laterality Date     INSERT INTRAUTERINE DEVICE N/A 7/12/2016    Procedure: INSERT INTRAUTERINE DEVICE;  Surgeon: Fidelia Ricks MD;  Location: [facility]     LAPAROSCOPIC SALPINGO-OOPHORECTOMY Bilateral 7/12/2016    Procedure: LAPAROSCOPIC SALPINGO-OOPHORECTOMY;  Surgeon: Fidelia Ricks MD;  [facility]: [facility]     LAPAROSCOPY OPERATIVE ADULT N/A 7/12/2016    Procedure: LAPAROSCOPY OPERATIVE ADULT;  Surgeon: Fidelia Ricks MD;  [facility]: [facility]     MAMMOPLASTY REDUCTION BILATERAL Bilateral 4/13/2022    Procedure: Bilateral breast lift;  Surgeon: LING Pradhan MD;  Location: [facility] OR     MASTECTOMY SIMPLE Bilateral 1/23/2023    Procedure: BILATERAL Risk-Reducing Nipple-Sparing Mastectomy;  Surgeon: Monica Rodríguez MD;  Location: [facility]     RECONSTRUCT BREAST BILATERAL, IMPLANT PROSTHESIS BILATERAL, COMBINED Bilateral 1/23/2023    Procedure: Bilateral breast reconstruction with implant/Alloderm, SPY;  Surgeon: LING Pradhan MD;  Location: [facility] OR      ZZC APPENDECTOMY  1987     Current Outpatient Medications   Medication Sig Dispense Refill     estradiol (ESTRACE) 2 MG tablet Take 1 tablet (2 mg) by mouth daily 90 tablet 3     acetaZOLAMIDE (DIAMOX) 125 MG tablet Take one tab every 12 hours starting 24 hours prior to ascent and continue for 24 hours while at the highest altitude 15 tablet 0     calcium carbonate (OS-GRETCHEN) 500 MG tablet Take 1 tablet by mouth every evening Unsure if 500 or 1000mg       cetirizine (ZYRTEC) 10 MG tablet Take 10 mg by mouth every evening       fluticasone (FLONASE) 50 MCG/ACT nasal spray Spray 2 sprays into both nostrils daily 16 g 3     HEMP OIL OR EXTRACT OR OTHER CBD CANNABINOID, NOT MEDICAL CANNABIS, 10 mg every evening [employer] CBD 10 mg       lactobacillus rhamnosus (GG) (CULTURELL) capsule Take 1 capsule by mouth every evening       Vitamin D, Cholecalciferol, 25 MCG (1000 UT) CAPS Take 1,000 mg % by mouth every evening Unsure is 500 or 1000       Allergies   Allergen Reactions     Advil [Nsaids] Other (See Comments)     Nasal congestion  Eye get puffy        Seasonal Allergies      Pollen and mold   Spring and Fall are main periods. Takes Zyrtec year round to manage it.   Gets post nasal drip      Adhesive Tape Rash     Surgical drape adhesive vs. ioban     Social History     Socioeconomic History     Marital status:      Spouse name: Not on file     Number of children: 2     Years of education: BA     Highest education level: Not on file   Occupational History     Occupation: staff director     Comment: [employer]   Tobacco Use     Smoking status: Never     Smokeless tobacco: Never   Vaping Use     Vaping status: Not on file   Substance and Sexual Activity     Alcohol use: Yes     Comment: Special occasions     Drug use: Yes     Comment: CBD 10 mg      Sexual activity: Yes     Partners: Male     Birth control/protection: I.U.D.     Comment: Mirena   Other Topics Concern     Parent/sibling w/  CABG, MI or angioplasty before 65F 55M? No      Service Not Asked     Blood Transfusions No     Caffeine Concern No     Occupational Exposure No     Hobby Hazards Not Asked     Sleep Concern No     Stress Concern Yes     Comment: life/work -->coping     Weight Concern No     Special Diet No     Back Care Yes     Comment: chiropracter for old injury     Exercise No     Bike Helmet Not Asked     Comment: not biker     Seat Belt No     Self-Exams Not Asked   Social History Narrative    Social Documentation:        Balanced Diet: YES    Calcium intake: less than 2 per day    Caffeine: 0-1 per day    Exercise:  type of activity walk; daily times per week    Sunscreen: Yes    Seatbelts:  Yes    Self Breast Exam:  Yes    Self Testicular Exam: No - n/a    Physical/Emotional/Sexual Abuse: No     Do you feel safe in your environment? Yes        Cholesterol screen up to date: Yes-3 yrs ago per pt.  Not fasting today.     Eye Exam up to date: Yes    Dental Exam up to date: No - 9 months ago.     Pap smear up to date: Yes    Mammogram up to date: Does Not Apply    Dexa Scan up to date: Does Not Apply    Colonoscopy up to date: Does Not Apply    Immunizations up to date: Yes-Td 12/06    Glucose screen if over 40:  No - n/a     Social Determinants of Health     Financial Resource Strain: Not on file   Food Insecurity: Not on file   Transportation Needs: Not on file   Physical Activity: Not on file   Stress: Not on file   Social Connections: Not on file   Intimate Partner Violence: Not on file   Housing Stability: Not on file     Family History   Problem Relation Age of Onset     Breast Cancer Mother         dx age 32, doing well     Colon Cancer Mother         dx 2019. s/p surg and chemo     Anesthesia Reaction Sister         PONV     Hereditary Breast and Ovarian Cancer Syndrome Sister         double Mastectomy and oophrectomy     Breast Cancer Maternal Grandmother      Diabetes Paternal Grandmother      Breast Cancer  "Paternal Grandmother      Thrombosis No family hx of           Gyn Hx:   No LMP recorded. (Menstrual status: IUD).  Menses:       Review Of Systems:  CONSTITUTIONAL: NEGATIVE for fever, chills  EYES: NEGATIVE for vision changes   RESP: NEGATIVE for significant cough or SOB  CV: NEGATIVE for chest pain, palpitations   GI: NEGATIVE for nausea, abdominal pain, heartburn, or change in bowel habits  : NEGATIVE for frequency, dysuria, or hematuria  MUSCULOSKELETAL: NEGATIVE for significant arthralgias or myalgia  NEURO: NEGATIVE for weakness, dizziness or paresthesias or headache    EXAM:  BP 99/68   Pulse 84   Ht 1.64 m (5' 4.57\")   Wt 62.1 kg (137 lb)   BMI 23.10 kg/m    Body mass index is 23.1 kg/m .    General - pleasant female in no acute distress.  Skin - no suspicious lesions or rashes  EENT-  PERRLA, euthyroid with out palpable nodules  Neck - supple without lymphadenopathy.  Lungs - clear to auscultation bilaterally.  Heart - regular rate and rhythm without murmur.  Breasts- symmetrical . No dominant fixed or suspicious masses noted.  No skin or nipple changes or axillary nodes. Self exam is taught and encouraged monthly.  Abdomen - soft, nontender, nondistended, no masses or organomegaly noted.  Musculoskeletal - no gross deformities.  Neurological - normal strength, sensation, and mental status.  Pelvic - EG: normal  female, vulva reveals no erythema or lesions.   BUS: within normal limits.  Vagina: well rugated, no lesions polyps or suspicious  discharge.     Cervix: no lesions, polyps discharge or CMT. Strings visible at os  Uterus: firm,  anteverted, normal size and nontender.  Adnexa: no masses or tenderness.  Anus- normal, no lesions.  Rectovaginal - deferred.    ASSESSMENT/PLAN  (Z12.4) Screening for malignant neoplasm of cervix  (primary encounter diagnosis)  Comment:   Plan: Obtaining, preparing and conveyance of cervical        or vaginal smear to laboratory., Pap thin layer        screen with " HPV - recommended age 30 - 65 years            (Z84.81) BRCA status  Comment:   Plan:     (Z90.722) S/P BSO (bilateral salpingo-oophorectomy)  Comment:   Plan: estradiol (ESTRACE) 2 MG tablet              Additional teaching done at this visit regarding perimenopause. I have discussed with patient the harms and  benefits of  medications, treatment options.     Fidelia Ricks MD

## 2023-04-12 NOTE — PATIENT INSTRUCTIONS
Thank you for trusting us with your care!     If you need to contact us for questions about:  Symptoms, Scheduling & Medical Questions; Non-urgent (2-3 day response) Karuna message, Urgent (needing response today) [phone] (if after 3:30pm next day response)   Prescriptions: Please call your Pharmacy   Billing: [facility] [phone] or [facility]:[phone]

## 2023-04-12 NOTE — LETTER
4/12/2023       RE: Yamila Myles  [address] [facility] [address]     Dear Colleague,    Thank you for referring your patient, Yamila Myles, to the [facility] [place] at [facility]. Please see a copy of my visit note below.    CC/HPI:   Yamila Myles is a 42 year old female  who presents today for her annual exam. She is s/p bilateral mastectomy for BRCA status and on HRT.   She has a mirena for endometrial protection and takes daily estrace 2mg.   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    S/P breast reconstruction, bilateral     Past Medical History:   Diagnosis Date    Allergic rhinitis     Allergic rhinitis     BRCA1 positive 06/2001    Breast disorder 06/08/2011    Encounter for insertion of mirena IUD 07/12/2016    Hoarseness     Lump or mass in breast 2001    BRCA-1 positive (Mother, MGM w/ breast ca, mother w/ gene +), mother dx at 32)    Microcalcifications of the breast, right uoq 06/08/2011     Past Surgical History:   Procedure Laterality Date    INSERT INTRAUTERINE DEVICE N/A 7/12/2016    Procedure: INSERT INTRAUTERINE DEVICE;  Surgeon: Fidelia Ricks MD;  Location: [facility]    LAPAROSCOPIC SALPINGO-OOPHORECTOMY Bilateral 7/12/2016    Procedure: LAPAROSCOPIC SALPINGO-OOPHORECTOMY;  Surgeon: Fidelia Ricks MD;  [facility]: [facility]    LAPAROSCOPY OPERATIVE ADULT N/A 7/12/2016    Procedure: LAPAROSCOPY OPERATIVE ADULT;  Surgeon: Fidelia Ricks MD;  [facility]    MAMMOPLASTY REDUCTION BILATERAL Bilateral 4/13/2022    Procedure: Bilateral breast lift;  Surgeon: LING Pradhan MD;  Location: [facility] OR    MASTECTOMY SIMPLE Bilateral 1/23/2023    Procedure: BILATERAL Risk-Reducing  Nipple-Sparing Mastectomy;  Surgeon: Monica Rodríguez MD;  Location: [facility]    RECONSTRUCT BREAST BILATERAL, IMPLANT PROSTHESIS BILATERAL, COMBINED Bilateral 1/23/2023    Procedure: Bilateral breast reconstruction with implant/Alloderm, SPY;  Surgeon: LING Pradhan MD;  Location:  OR    [facility] APPENDECTOMY  1987     Current Outpatient Medications   Medication Sig Dispense Refill    estradiol (ESTRACE) 2 MG tablet Take 1 tablet (2 mg) by mouth daily 90 tablet 3    acetaZOLAMIDE (DIAMOX) 125 MG tablet Take one tab every 12 hours starting 24 hours prior to ascent and continue for 24 hours while at the highest altitude 15 tablet 0    calcium carbonate (OS-GRETCHEN) 500 MG tablet Take 1 tablet by mouth every evening Unsure if 500 or 1000mg      cetirizine (ZYRTEC) 10 MG tablet Take 10 mg by mouth every evening      fluticasone (FLONASE) 50 MCG/ACT nasal spray Spray 2 sprays into both nostrils daily 16 g 3    HEMP OIL OR EXTRACT OR OTHER CBD CANNABINOID, NOT MEDICAL CANNABIS, 10 mg every evening [employer] CBD 10 mg      lactobacillus rhamnosus (GG) (CULTURELL) capsule Take 1 capsule by mouth every evening      Vitamin D, Cholecalciferol, 25 MCG (1000 UT) CAPS Take 1,000 mg % by mouth every evening Unsure is 500 or 1000       Allergies   Allergen Reactions    Advil [Nsaids] Other (See Comments)     Nasal congestion  Eye get puffy       Seasonal Allergies      Pollen and mold   Spring and Fall are main periods. Takes Zyrtec year round to manage it.   Gets post nasal drip     Adhesive Tape Rash     Surgical drape adhesive vs. ioban     Social History     Socioeconomic History    Marital status:      Spouse name: Not on file    Number of children: 2    Years of education: BA    Highest education level: Not on file   Occupational History    Occupation: staff director     Comment: [employer]   Tobacco Use    Smoking status: Never    Smokeless tobacco: Never   Vaping Use    Vaping status:  Not on file   Substance and Sexual Activity    Alcohol use: Yes     Comment: Special occasions    Drug use: Yes     Comment: CBD 10 mg     Sexual activity: Yes     Partners: Male     Birth control/protection: I.U.D.     Comment: Mirena   Other Topics Concern    Parent/sibling w/ CABG, MI or angioplasty before 65F 55M? No     Service Not Asked    Blood Transfusions No    Caffeine Concern No    Occupational Exposure No    Hobby Hazards Not Asked    Sleep Concern No    Stress Concern Yes     Comment: life/work -->coping    Weight Concern No    Special Diet No    Back Care Yes     Comment: chiropracter for old injury    Exercise No    Bike Helmet Not Asked     Comment: not biker    Seat Belt No    Self-Exams Not Asked   Social History Narrative    Social Documentation:        Balanced Diet: YES    Calcium intake: less than 2 per day    Caffeine: 0-1 per day    Exercise:  type of activity walk; daily times per week    Sunscreen: Yes    Seatbelts:  Yes    Self Breast Exam:  Yes    Self Testicular Exam: No - n/a    Physical/Emotional/Sexual Abuse: No     Do you feel safe in your environment? Yes        Cholesterol screen up to date: Yes-3 yrs ago per pt.  Not fasting today.     Eye Exam up to date: Yes    Dental Exam up to date: No - 9 months ago.     Pap smear up to date: Yes    Mammogram up to date: Does Not Apply    Dexa Scan up to date: Does Not Apply    Colonoscopy up to date: Does Not Apply    Immunizations up to date: Yes-Td 12/06    Glucose screen if over 40:  No - n/a     Social Determinants of Health     Financial Resource Strain: Not on file   Food Insecurity: Not on file   Transportation Needs: Not on file   Physical Activity: Not on file   Stress: Not on file   Social Connections: Not on file   Intimate Partner Violence: Not on file   Housing Stability: Not on file     Family History   Problem Relation Age of Onset    Breast Cancer Mother         dx age 32, doing well    Colon Cancer Mother         dx  "2019. s/p surg and chemo    Anesthesia Reaction Sister         PONV    Hereditary Breast and Ovarian Cancer Syndrome Sister         double Mastectomy and oophrectomy    Breast Cancer Maternal Grandmother     Diabetes Paternal Grandmother     Breast Cancer Paternal Grandmother     Thrombosis No family hx of           Gyn Hx:   No LMP recorded. (Menstrual status: IUD).  Menses:       Review Of Systems:  CONSTITUTIONAL: NEGATIVE for fever, chills  EYES: NEGATIVE for vision changes   RESP: NEGATIVE for significant cough or SOB  CV: NEGATIVE for chest pain, palpitations   GI: NEGATIVE for nausea, abdominal pain, heartburn, or change in bowel habits  : NEGATIVE for frequency, dysuria, or hematuria  MUSCULOSKELETAL: NEGATIVE for significant arthralgias or myalgia  NEURO: NEGATIVE for weakness, dizziness or paresthesias or headache    EXAM:  BP 99/68   Pulse 84   Ht 1.64 m (5' 4.57\")   Wt 62.1 kg (137 lb)   BMI 23.10 kg/m    Body mass index is 23.1 kg/m .    General - pleasant female in no acute distress.  Skin - no suspicious lesions or rashes  EENT-  PERRLA, euthyroid with out palpable nodules  Neck - supple without lymphadenopathy.  Lungs - clear to auscultation bilaterally.  Heart - regular rate and rhythm without murmur.  Breasts- symmetrical . No dominant fixed or suspicious masses noted.  No skin or nipple changes or axillary nodes. Self exam is taught and encouraged monthly.  Abdomen - soft, nontender, nondistended, no masses or organomegaly noted.  Musculoskeletal - no gross deformities.  Neurological - normal strength, sensation, and mental status.  Pelvic - EG: normal  female, vulva reveals no erythema or lesions.   BUS: within normal limits.  Vagina: well rugated, no lesions polyps or suspicious  discharge.     Cervix: no lesions, polyps discharge or CMT. Strings visible at os  Uterus: firm,  anteverted, normal size and nontender.  Adnexa: no masses or tenderness.  Anus- normal, no lesions.  Rectovaginal - " deferred.    ASSESSMENT/PLAN  (Z12.4) Screening for malignant neoplasm of cervix  (primary encounter diagnosis)  Comment:   Plan: Obtaining, preparing and conveyance of cervical        or vaginal smear to laboratory., Pap thin layer        screen with HPV - recommended age 30 - 65 years            (Z84.81) BRCA status  Comment:   Plan:     (Z90.722) S/P BSO (bilateral salpingo-oophorectomy)  Comment:   Plan: estradiol (ESTRACE) 2 MG tablet              Additional teaching done at this visit regarding perimenopause. I have discussed with patient the harms and  benefits of  medications, treatment options.     Fidelia Ricks MD

## 2023-04-13 NOTE — TELEPHONE ENCOUNTER
Received voicemail from patient on 4/12 regarding scheduling surgery with Dr. Pradhan.    Patient is planning around a trip and May 24th will not work. She will need early May, or late July/August. Preference is week of August 31st.  __    Hoda Heard, Senior Perioperative Coordinator, on 4/13/2023  P: [phone]

## 2023-04-13 NOTE — TELEPHONE ENCOUNTER
RN Care Coordinator: Jacqueline Peck; [phone]    Surgery is scheduled with Dr. Pradhan   Date: 8/2   [facility]  Scheduled per: patient requested timeframe    H&P to be completed by: PAC clinic on 7/11    Surgical consult: 7/11     Post-op: 8/15    Patient will receive surgery arrival and start time from PAC. Approximate arrival time/surgery time given to patient: 10:30 AM. Patient aware times are subject to change up until day before surgery.     Spoke with the patient and was able to confirm all scheduled information.       Patient questions/concerns: N/A       Surgery packet: was sent via [employer]    __    Hoda Heard, Senior Perioperative Coordinator, on 4/13/2023 at 3:11 PM  P: [phone]

## 2023-04-14 LAB
BKR LAB AP GYN ADEQUACY: NORMAL
BKR LAB AP GYN INTERPRETATION: NORMAL
BKR LAB AP HPV REFLEX: NORMAL
BKR LAB AP PREVIOUS ABNORMAL: NORMAL
PATH REPORT.COMMENTS IMP SPEC: NORMAL
PATH REPORT.COMMENTS IMP SPEC: NORMAL
PATH REPORT.RELEVANT HX SPEC: NORMAL

## 2023-04-18 LAB
HUMAN PAPILLOMA VIRUS 16 DNA: NEGATIVE
HUMAN PAPILLOMA VIRUS 18 DNA: NEGATIVE
HUMAN PAPILLOMA VIRUS FINAL DIAGNOSIS: NORMAL
HUMAN PAPILLOMA VIRUS OTHER HR: NEGATIVE

## 2023-05-10 ENCOUNTER — ALLIED HEALTH/NURSE VISIT (OUTPATIENT)
Dept: OBGYN | Facility: CLINIC | Age: 43
End: 2023-05-10
Payer: COMMERCIAL

## 2023-05-10 DIAGNOSIS — Z23 NEED FOR HEPATITIS B VACCINATION: Primary | ICD-10-CM

## 2023-05-10 PROCEDURE — 90746 HEPB VACCINE 3 DOSE ADULT IM: CPT

## 2023-05-10 PROCEDURE — G0010 ADMIN HEPATITIS B VACCINE: HCPCS

## 2023-05-10 PROCEDURE — 250N000011 HC RX IP 250 OP 636

## 2023-05-31 NOTE — TELEPHONE ENCOUNTER
FUTURE VISIT INFORMATION      SURGERY INFORMATION:    Date: 8/2/23    Location:  or    Surgeon:  LING Pradhan MD    Anesthesia Type:  general    Procedure: Bilateral breast revision and fat grafting from abdomen and thighs    RECORDS REQUESTED FROM:       Primary Care Provider: Antonina Martinez MD- [facility]

## 2023-06-30 ENCOUNTER — TELEPHONE (OUTPATIENT)
Dept: PLASTIC SURGERY | Facility: CLINIC | Age: 43
End: 2023-06-30
Payer: COMMERCIAL

## 2023-06-30 NOTE — TELEPHONE ENCOUNTER
Called to r/s 08/15 Lorne post-op. LVM with K pod #. Will also send MyChart.    Reschedule with Scott or Bev ([facility]) on 08/14 or 08/15

## 2023-07-11 ENCOUNTER — OFFICE VISIT (OUTPATIENT)
Dept: SURGERY | Facility: CLINIC | Age: 43
End: 2023-07-11
Payer: COMMERCIAL

## 2023-07-11 ENCOUNTER — PRE VISIT (OUTPATIENT)
Dept: SURGERY | Facility: CLINIC | Age: 43
End: 2023-07-11

## 2023-07-11 ENCOUNTER — ANESTHESIA EVENT (OUTPATIENT)
Dept: SURGERY | Facility: AMBULATORY SURGERY CENTER | Age: 43
End: 2023-07-11

## 2023-07-11 ENCOUNTER — OFFICE VISIT (OUTPATIENT)
Dept: PLASTIC SURGERY | Facility: CLINIC | Age: 43
End: 2023-07-11
Attending: PLASTIC SURGERY
Payer: COMMERCIAL

## 2023-07-11 VITALS
SYSTOLIC BLOOD PRESSURE: 100 MMHG | RESPIRATION RATE: 12 BRPM | BODY MASS INDEX: 23.52 KG/M2 | OXYGEN SATURATION: 100 % | DIASTOLIC BLOOD PRESSURE: 67 MMHG | TEMPERATURE: 97.7 F | HEIGHT: 64 IN | WEIGHT: 137.8 LBS | HEART RATE: 68 BPM

## 2023-07-11 VITALS
WEIGHT: 137 LBS | TEMPERATURE: 97.9 F | HEIGHT: 64 IN | HEART RATE: 68 BPM | BODY MASS INDEX: 23.39 KG/M2 | OXYGEN SATURATION: 100 % | RESPIRATION RATE: 12 BRPM | DIASTOLIC BLOOD PRESSURE: 67 MMHG | SYSTOLIC BLOOD PRESSURE: 100 MMHG

## 2023-07-11 DIAGNOSIS — Z01.818 PRE-OP EVALUATION: Primary | ICD-10-CM

## 2023-07-11 DIAGNOSIS — Z98.890 S/P BREAST RECONSTRUCTION, BILATERAL: Primary | ICD-10-CM

## 2023-07-11 PROCEDURE — 99214 OFFICE O/P EST MOD 30 MIN: CPT | Performed by: NURSE PRACTITIONER

## 2023-07-11 PROCEDURE — 99214 OFFICE O/P EST MOD 30 MIN: CPT | Performed by: PLASTIC SURGERY

## 2023-07-11 PROCEDURE — G0463 HOSPITAL OUTPT CLINIC VISIT: HCPCS | Performed by: PLASTIC SURGERY

## 2023-07-11 RX ORDER — ACETAMINOPHEN 325 MG/1
325-650 TABLET ORAL EVERY 6 HOURS PRN
COMMUNITY

## 2023-07-11 ASSESSMENT — ENCOUNTER SYMPTOMS: ORTHOPNEA: 0

## 2023-07-11 ASSESSMENT — PAIN SCALES - GENERAL
PAINLEVEL: NO PAIN (0)
PAINLEVEL: NO PAIN (0)

## 2023-07-11 ASSESSMENT — LIFESTYLE VARIABLES: TOBACCO_USE: 0

## 2023-07-11 NOTE — PATIENT INSTRUCTIONS
Preparing for Your Surgery      Name:  Yamila Myles   MRN:  [id number]   :  1980   Today's Date:  2023         Arriving for surgery:  Surgery date:  2023  Arrival time:  10:15 am    Restrictions due to COVID 19:    Please maintain social distance.  Masking is optional.      parking is available for anyone with mobility limitations or disabilities. (Monday- Friday 7 am- 5 pm)    Please come to:    [facility]  [address] [place] [phone]    Please check in on the 5th floor at the [facility].      What can I eat or drink?    -  You may eat and drink normally until 8 hours prior to arrival  time. (Until 2 am)  -  You may have clear liquids until 2 hours prior to arrival  time. (Until 8 am)    Examples of clear liquids:  Water  Clear broth  Juices (apple, white grape, white cranberry  and cider) without pulp  Noncarbonated, powder based beverages  (lemonade and Jordan-Aid)  Sodas (Sprite, 7-Up, ginger ale and seltzer)  Coffee or tea (without milk or cream)  Gatorade      Which medicines can I take?    Hold Aspirin for 7 days before surgery.   Hold Multivitamins for 7 days before surgery.  Hold Supplements for 7 days before surgery.  Hold Ibuprofen (Advil, Motrin) for 1 day before surgery--unless otherwise directed by surgeon.  Hold Naproxen (Aleve) for 4 days before surgery.    No alcohol or cannabis products for 24 hours prior to procedure.      -  DO NOT take the following medications the day of surgery:  Cetirizine (Zyrtec)      -  PLEASE TAKE the following medications the day of surgery:   Acetaminophen (Tylenol) if needed  Flonase if needed       How do I prepare myself?  - Please take 2 showers (one the night prior to surgery and one the morning of surgery) using Scrubcare or Hibiclens soap.    Use this soap only from the neck to your toes.     Leave the soap on your skin for one minute--then rinse thoroughly.      You may use your own  shampoo and conditioner. No other hair products.   - Please remove all jewelry and body piercings.  - No lotions, deodorants or fragrance.  - No makeup or fingernail polish.   - Bring your ID and insurance card.    -If you have a Deep Brain Stimulator, a Spinal Cord Stimulator, or any implanted Neuro Device, you must bring the remote to the Surgery Center.         ALL PATIENTS ARE REQUIRED TO HAVE A RESPONSIBLE ADULT TO DRIVE AND BE IN ATTENDANCE WITH THEM FOR 24 HOURS FOLLOWING SURGERY.     Covid testing policy as of 12/06/2022  Your surgeon will notify and schedule you for a COVID test if one is needed before surgery--please direct any questions or COVID symptoms to your surgeon      Questions or Concerns:    -For questions regarding the day of surgery, please contact the Ambulatory Surgery Center at [phone].    -If you have health changes between today and your surgery, please contact your surgeon.     - For questions after surgery, please contact your surgeon's office.

## 2023-07-11 NOTE — H&P
Pre-Operative H & P     CC:  Preoperative exam to assess for increased cardiopulmonary risk while undergoing surgery and anesthesia.    Date of Encounter: 7/11/2023  Primary Care Physician:  Antonina Martinez     Reason for visit: Pre-operative evaluation    HPI  Yamila Myles is a 43 year old female who presents for pre-operative H & P in preparation for  Procedure Information     Case: [id number] Date/Time: 08/02/23 1150    Procedures:       Bilateral breast revision (Bilateral: Breast)      fat grafting from abdomen and thighs (Bilateral: Update)    Anesthesia type: General    Diagnosis: S/P breast reconstruction, bilateral [Z98.890]    Pre-op diagnosis: S/P breast reconstruction, bilateral [Z98.890]    Location: [place] [address] / 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 She Underwent Prophylactic bilateral mastopexy was done on 4/13/2022 as the initial surgery. She then underwent bilateral risk reducing sparing mastectomy with implant on 1/23/2023. She has done well. She's has been following with Dr. Pradhan and wants to proceed with the last stage of reconstruction. She presents for the above procedure with Dr. Pradhan.      History is obtained from the patient and chart review    Hx of abnormal bleeding or anti-platelet use: no    Menstrual history: No LMP recorded. (Menstrual status: IUD).:      Past Medical History  Past Medical History:   Diagnosis Date     Allergic rhinitis      Allergic rhinitis      BRCA1 positive 06/2001     Breast disorder 06/08/2011     Encounter for insertion of mirena IUD 07/12/2016     Hoarseness      Lump or mass in breast 2001    BRCA-1 positive (Mother, MGM w/ breast ca, mother w/ gene +), mother dx at 32)     Microcalcifications of the breast, right uoq 06/08/2011       Past Surgical History  Past Surgical  History:   Procedure Laterality Date     INSERT INTRAUTERINE DEVICE N/A 7/12/2016    Procedure: INSERT INTRAUTERINE DEVICE;  Surgeon: Fidelia Ricks MD;  Location: [facility]     LAPAROSCOPIC SALPINGO-OOPHORECTOMY Bilateral 7/12/2016    Procedure: LAPAROSCOPIC SALPINGO-OOPHORECTOMY;  Surgeon: Fidelia Ricks MD;  [facility]: [facility]     LAPAROSCOPY OPERATIVE ADULT N/A 7/12/2016    Procedure: LAPAROSCOPY OPERATIVE ADULT;  Surgeon: Fidelia Ricks MD;  [facility]: [facility]     MAMMOPLASTY REDUCTION BILATERAL Bilateral 4/13/2022    Procedure: Bilateral breast lift;  Surgeon: LING Pradhan MD;  Location: [facility] OR     MASTECTOMY SIMPLE Bilateral 1/23/2023    Procedure: BILATERAL Risk-Reducing Nipple-Sparing Mastectomy;  Surgeon: Monica Rodríguez MD;  Location: [facility]     RECONSTRUCT BREAST BILATERAL, IMPLANT PROSTHESIS BILATERAL, COMBINED Bilateral 1/23/2023    Procedure: Bilateral breast reconstruction with implant/Alloderm, SPY;  Surgeon: LING Pradhan MD;  Location: [facility] OR     ZZC APPENDECTOMY  1987       Prior to Admission Medications  Current Outpatient Medications   Medication Sig Dispense Refill     acetaminophen (TYLENOL) 325 MG tablet Take 325-650 mg by mouth every 6 hours as needed for mild pain       calcium carbonate (OS-GRETCHEN) 500 MG tablet Take 1 tablet by mouth every evening Unsure if 500 or 1000mg       cetirizine (ZYRTEC) 10 MG tablet Take 10 mg by mouth every evening       estradiol (ESTRACE) 2 MG tablet Take 1 tablet (2 mg) by mouth daily (Patient taking differently: Take 2 mg by mouth every evening) 90 tablet 3     fluticasone (FLONASE) 50 MCG/ACT nasal spray Spray 2 sprays into both nostrils daily (Patient taking differently: Spray 2 sprays into both nostrils as needed) 16 g 3     HEMP OIL OR EXTRACT OR OTHER CBD CANNABINOID, NOT MEDICAL CANNABIS, 10 mg every evening [employer] CBD 10 mg       lactobacillus rhamnosus (GG) (CULTURELL) capsule Take 1 capsule by mouth every evening        Vitamin D, Cholecalciferol, 25 MCG (1000 UT) CAPS Take 1,000 mg % by mouth every evening Unsure is 500 or 1000       acetaZOLAMIDE (DIAMOX) 125 MG tablet Take one tab every 12 hours starting 24 hours prior to ascent and continue for 24 hours while at the highest altitude (Patient taking differently: Take 125 mg by mouth as needed Take one tab every 12 hours starting 24 hours prior to ascent and continue for 24 hours while at the highest altitude) 15 tablet 0       Allergies  Allergies   Allergen Reactions     Advil [Nsaids] Other (See Comments)     Nasal congestion  Eye get puffy        Seasonal Allergies      Pollen and mold   Spring and Fall are main periods. Takes Zyrtec year round to manage it.   Gets post nasal drip      Adhesive Tape Rash     Surgical drape adhesive vs. ioban       Social History  Social History     Socioeconomic History     Marital status:      Spouse name: Not on file     Number of children: 2     Years of education: BA     Highest education level: Not on file   Occupational History     Occupation: staff director     Comment: [employer]   Tobacco Use     Smoking status: Never     Passive exposure: Never     Smokeless tobacco: Never   Substance and Sexual Activity     Alcohol use: Yes     Comment: 1 drink once a week     Drug use: Yes     Comment: CBD 10 mg      Sexual activity: Yes     Partners: Male     Birth control/protection: I.U.D.     Comment: Mirena   Other Topics Concern     Parent/sibling w/ CABG, MI or angioplasty before 65F 55M? No      Service Not Asked     Blood Transfusions No     Caffeine Concern No     Occupational Exposure No     Hobby Hazards Not Asked     Sleep Concern No     Stress Concern Yes     Comment: life/work -->coping     Weight Concern No     Special Diet No     Back Care Yes     Comment: chiropracter for old injury     Exercise No     Bike Helmet Not Asked     Comment: not biker     Seat Belt No     Self-Exams Not Asked    Social History Narrative    4/12/23    Going to [facility] this summer with daughter for 12th birthday    Fidelia Ricks MD            Social Documentation:        Balanced Diet: YES    Calcium intake: less than 2 per day    Caffeine: 0-1 per day    Exercise:  type of activity walk; daily times per week    Sunscreen: Yes    Seatbelts:  Yes    Self Breast Exam:  Yes    Self Testicular Exam: No - n/a    Physical/Emotional/Sexual Abuse: No     Do you feel safe in your environment? Yes        Cholesterol screen up to date: Yes-3 yrs ago per pt.  Not fasting today.     Eye Exam up to date: Yes    Dental Exam up to date: No - 9 months ago.     Pap smear up to date: Yes    Mammogram up to date: Does Not Apply    Dexa Scan up to date: Does Not Apply    Colonoscopy up to date: Does Not Apply    Immunizations up to date: Yes-Td 12/06    Glucose screen if over 40:  No - n/a     Social Determinants of Health     Financial Resource Strain: Not on file   Food Insecurity: Not on file   Transportation Needs: Not on file   Physical Activity: Not on file   Stress: Not on file   Social Connections: Not on file   Intimate Partner Violence: Not on file   Housing Stability: Not on file       Family History  Family History   Problem Relation Age of Onset     Breast Cancer Mother         dx age 32, doing well     Colon Cancer Mother         dx 2019. s/p surg and chemo     Anesthesia Reaction Sister         PONV     Hereditary Breast and Ovarian Cancer Syndrome Sister         double Mastectomy and oophrectomy     Breast Cancer Maternal Grandmother      Diabetes Paternal Grandmother      Breast Cancer Paternal Grandmother      Thrombosis No family hx of        Review of Systems  The complete review of systems is negative other than noted in the HPI or here.   Anesthesia Evaluation   Pt has had prior anesthetic. Type: General.    No history of anesthetic complications       ROS/MED HX  ENT/Pulmonary:  - neg pulmonary ROS  (-) tobacco use  "and JOSE LUIS risk factors   Neurologic:  - neg neurologic ROS     Cardiovascular:     (+) -----No previous cardiac testing  (-) CURTIS and orthopnea/PND   METS/Exercise Tolerance: >4 METS Comment: Uses the elliptical for 20 minutes a few times per week for exercise.    Walking in the warmer weather   Hematologic:  - neg hematologic  ROS  (-) history of blood clots and history of blood transfusion   Musculoskeletal: Comment: Chronic right posterior shoulder pain - manages with chiropractic care.  Has full ROM.        GI/Hepatic: Comment: Appendectomy as a child - neg GI/hepatic ROS   (+) appendicitis,     Renal/Genitourinary:  - neg Renal ROS     Endo:  - neg endo ROS     Psychiatric/Substance Use:  - neg psychiatric ROS  (-) psychiatric history   Infectious Disease:  - neg infectious disease ROS  (-) Recent Fever   Malignancy:  - neg malignancy ROS  (-) malignancy   Other: Comment: 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    (-) Any chance pregnant       /67   Pulse 68   Temp 97.9  F (36.6  C) (Oral)   Resp 12   Ht 1.619 m (5' 3.74\")   Wt 62.1 kg (137 lb)   SpO2 100%   Breastfeeding No   BMI 23.71 kg/m      Physical Exam   Constitutional: Awake, alert, cooperative, no apparent distress, and appears stated age.  Eyes: Pupils equal, round and reactive to light, extra ocular muscles intact, sclera clear, conjunctiva normal.  HENT: Normocephalic, oral pharynx with moist mucus membranes, good dentition. No goiter appreciated.   Respiratory: Clear to auscultation bilaterally, no crackles or wheezing.  Cardiovascular: Regular rate and rhythm, normal S1 and S2, and no murmur noted.  Carotids +2, no bruits. No edema. Palpable pulses to radial  DP and PT arteries.   GI: Normal bowel sounds, soft, non-distended, non-tender, no masses palpated, no hepatosplenomegaly.  Surgical scars: healed  Lymph/Hematologic: No cervical lymphadenopathy and no supraclavicular lymphadenopathy.  Genitourinary:  deferred  Skin: Warm and dry.  No rashes at " anticipated surgical site.   Musculoskeletal: Full ROM of neck. There is no redness, warmth, or swelling of the joints. Gross motor strength is normal.    Neurologic: Awake, alert, oriented to name, place and time. Cranial nerves II-XII are grossly intact. Gait is normal.   Neuropsychiatric: Calm, cooperative. Normal affect.     Prior Labs/Diagnostic Studies   All labs and imaging personally reviewed   Lab Results   Component Value Date    WBC 4.4 01/13/2023    WBC 6.5 07/12/2016     Lab Results   Component Value Date    RBC 3.88 01/13/2023    RBC 4.19 07/12/2016     Lab Results   Component Value Date    HGB 12.0 01/13/2023    HGB 13.2 07/12/2016     Lab Results   Component Value Date    HCT 36.2 01/13/2023    HCT 39.8 07/12/2016     No components found for: MCT  Lab Results   Component Value Date    MCV 93 01/13/2023    MCV 95 07/12/2016     Lab Results   Component Value Date    MCH 30.9 01/13/2023    MCH 31.5 07/12/2016     Lab Results   Component Value Date    MCHC 33.1 01/13/2023    MCHC 33.2 07/12/2016     Lab Results   Component Value Date    RDW 11.7 01/13/2023    RDW 12.0 07/12/2016     Lab Results   Component Value Date     01/13/2023     07/12/2016     Last Comprehensive Metabolic Panel:  Lab Results   Component Value Date     01/13/2023    POTASSIUM 4.2 01/13/2023    CHLORIDE 105 01/13/2023    CO2 26 01/13/2023    ANIONGAP 7 01/13/2023    GLC 87 01/13/2023    BUN 11.4 01/13/2023    CR 0.75 01/13/2023    GFRESTIMATED >90 01/13/2023    GRETCHEN 9.3 01/13/2023           EKG/ stress test - if available please see in ROS above   No results found.      Latest Ref Rng & Units 3/16/2017     4:32 PM   PFT   FVC L 3.20    FEV1 L 2.67    FVC% % 85    FEV1% % 86          The patient's records and results personally reviewed by this provider.     Outside records reviewed from: Care Everywhere    LAB/DIAGNOSTIC STUDIES TODAY:    None    Assessment      Yamila Myles is a 43 year old female seen as a  "PAC referral for risk assessment and optimization for anesthesia.    Plan/Recommendations  Pt will be optimized for the proposed procedure.  See below for details on the assessment, risk, and preoperative recommendations    NEUROLOGY  - No history of TIA, CVA or seizure    -Post Op delirium risk factors:  No risk identified    ENT  - No current airway concerns.  Will need to be reassessed day of surgery.  Mallampati: I  TM: > 3  Orthodontic braces upper and lower behind her teeth  CARDIAC  - No history of CAD, Hypertension and Afib   No anginal symptoms, Denies palpitations, PND, dizziness or syncope.     - METS (Metabolic Equivalents)   Patient performs 4 or more METS exercise without symptoms            Total Score: 0      RCRI-Very low risk: Class 1 0.4% complication rate            Total Score: 0        PULMONARY  Never smoked, denies SOB or any other pulmonary history.   JOSE LUIS Low Risk            Total Score: 0      - Denies asthma or inhaler use  - Tobacco History      History   Smoking Status     Never   Smokeless Tobacco     Never       GI    PONV High Risk  Total Score: 3           1 AN PONV: Pt is Female    1 AN PONV: Patient is not a current smoker    1 AN PONV: Intended Post Op Opioids        /RENAL  - Baseline Creatinine  WNL    ENDOCRINE    - BMI: Estimated body mass index is 23.71 kg/m  as calculated from the following:    Height as of this encounter: 1.619 m (5' 3.74\").    Weight as of this encounter: 62.1 kg (137 lb).  Healthy Weight (BMI 18.5-24.9)  - No history of Diabetes Mellitus    HEME  VTE Low Risk 0.26%            Total Score: 0      - No history of abnormal bleeding or antiplatelet use.      OTHER-    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 - s/p Prophylactic bilateral mastopexy was done on 4/13/2022 as the initial surgery. She then underwent bilateral risk reducing sparing mastectomy with implant on 1/23/2023.  Presents today in preparation for the above final phase procedure noted above.       Different " anesthesia methods/types have been discussed with the patient, but they are aware that the final plan will be decided by the assigned anesthesia provider on the date of service.    The patient is optimized for their procedure. AVS with information on surgery time/arrival time, meds and NPO status given by nursing staff. No further diagnostic testing indicated.      On the day of service:     Prep time: 10 minutes  Visit time: 15 minutes  Documentation time: 10 minutes  ------------------------------------------  Total time: 35 minutes      CARLOS ALBERTO Aragon CNP  Preoperative Assessment Center  [facility]  Phone: [phone]  Fax: [phone]

## 2023-07-11 NOTE — LETTER
7/11/2023         RE: Yamila Myles  [address] [place] [address]        Dear Colleague,    Thank you for referring your patient, Yamila Myles, to the [facility]. Please see a copy of my visit note below.    PRESENTING COMPLAINT:  Preop visit for upcoming second stage bilateral breast reconstruction for breast cancer, scheduled for 08/02/2023.    HISTORY OF PRESENTING COMPLAINT:  Ms. Myles is 43 years old.  She is scheduled for bilateral breast revisional surgery and fat grafting from abdomen and thighs scheduled for the coming weeks.  No change otherwise in her history and physical exam.    ASSESSMENT AND PLAN:  Based on the above findings, a diagnosis of stage II breast reconstruction, here for preoperative visit was made.  Plan is to do a periareolar narrowing, IMF revision to round out the breast and to do fat grafting.  All risks, benefits, and alternatives of the procedure including pain, infection, bleeding, scarring, asymmetry, seromas, hematomas, wound breakdown, wound dehiscence, injury to deeper structures like the implants, requirement of further surgeries, DVT, PE, MI, CVA, pneumonia, renal failure and death were discussed.  She understood them all and wants to proceed.  All of her questions were answered.  She was happy with the visit.  All exam and discussion done in presence of my nurse, Jacqueline Peck.    Total time spent in the encounter today, including chart review, the visit itself and post-visit paperwork was 30 minutes.          LING Pradhan MD

## 2023-07-11 NOTE — H&P (VIEW-ONLY)
Pre-Operative H & P     CC:  Preoperative exam to assess for increased cardiopulmonary risk while undergoing surgery and anesthesia.    Date of Encounter: 7/11/2023  Primary Care Physician:  Antonina Martinez     Reason for visit: Pre-operative evaluation    HPI  Yamila Myles is a 43 year old female who presents for pre-operative H & P in preparation for  Procedure Information     Case: [id number] Date/Time: 08/02/23 1150    Procedures:       Bilateral breast revision (Bilateral: Breast)      fat grafting from abdomen and thighs (Bilateral: Update)    Anesthesia type: General    Diagnosis: S/P breast reconstruction, bilateral [Z98.890]    Pre-op diagnosis: S/P breast reconstruction, bilateral [Z98.890]    Location: [place] [address] / [facility]    Providers: LING Pradhan MD          Yamila Myles Is a 43 year old female with allergic rhinitis that is a 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 She Underwent Prophylactic bilateral mastopexy was done on 4/13/2022 as the initial surgery. She then underwent bilateral risk reducing sparing mastectomy with implant on 1/23/2023. She has done well. She's has been following with Dr. Pradhan and wants to proceed with the last stage of reconstruction. She presents for the above procedure with Dr. Pradhan.      History is obtained from the patient and chart review    Hx of abnormal bleeding or anti-platelet use: no    Menstrual history: No LMP recorded. (Menstrual status: IUD).:      Past Medical History  Past Medical History:   Diagnosis Date     Allergic rhinitis      Allergic rhinitis      BRCA1 positive 06/2001     Breast disorder 06/08/2011     Encounter for insertion of mirena IUD 07/12/2016     Hoarseness      Lump or mass in breast 2001    BRCA-1 positive (Mother, MGM w/ breast ca, mother w/ gene +), mother dx at 32)     Microcalcifications of the breast, right uoq 06/08/2011       Past Surgical History  Past Surgical  History:   Procedure Laterality Date     INSERT INTRAUTERINE DEVICE N/A 7/12/2016    Procedure: INSERT INTRAUTERINE DEVICE;  Surgeon: Fidelia Ricks MD;  [facility]: [facility] OR     LAPAROSCOPIC SALPINGO-OOPHORECTOMY Bilateral 7/12/2016    Procedure: LAPAROSCOPIC SALPINGO-OOPHORECTOMY;  Surgeon: Fidelia Ricks MD;  [facility]     LAPAROSCOPY OPERATIVE ADULT N/A 7/12/2016    Procedure: LAPAROSCOPY OPERATIVE ADULT;  Surgeon: Fidelia Ricks MD;  [facility]     MAMMOPLASTY REDUCTION BILATERAL Bilateral 4/13/2022    Procedure: Bilateral breast lift;  Surgeon: LING Pradhan MD;  Location: [facility] OR     MASTECTOMY SIMPLE Bilateral 1/23/2023    Procedure: BILATERAL Risk-Reducing Nipple-Sparing Mastectomy;  Surgeon: Monica Rodríguez MD;  [facility]     RECONSTRUCT BREAST BILATERAL, IMPLANT PROSTHESIS BILATERAL, COMBINED Bilateral 1/23/2023    Procedure: Bilateral breast reconstruction with implant/Alloderm, SPY;  Surgeon: LING Pradhan MD;  Location: [facility] OR     ZZC APPENDECTOMY  1987       Prior to Admission Medications  Current Outpatient Medications   Medication Sig Dispense Refill     acetaminophen (TYLENOL) 325 MG tablet Take 325-650 mg by mouth every 6 hours as needed for mild pain       calcium carbonate (OS-GRETCHEN) 500 MG tablet Take 1 tablet by mouth every evening Unsure if 500 or 1000mg       cetirizine (ZYRTEC) 10 MG tablet Take 10 mg by mouth every evening       estradiol (ESTRACE) 2 MG tablet Take 1 tablet (2 mg) by mouth daily (Patient taking differently: Take 2 mg by mouth every evening) 90 tablet 3     fluticasone (FLONASE) 50 MCG/ACT nasal spray Spray 2 sprays into both nostrils daily (Patient taking differently: Spray 2 sprays into both nostrils as needed) 16 g 3     HEMP OIL OR EXTRACT OR OTHER CBD CANNABINOID, NOT MEDICAL CANNABIS, 10 mg every evening Hazel FSI CBD 10 mg       lactobacillus rhamnosus (GG) (CULTURELL) capsule Take 1 capsule by mouth every evening        Vitamin D, Cholecalciferol, 25 MCG (1000 UT) CAPS Take 1,000 mg % by mouth every evening Unsure is 500 or 1000       acetaZOLAMIDE (DIAMOX) 125 MG tablet Take one tab every 12 hours starting 24 hours prior to ascent and continue for 24 hours while at the highest altitude (Patient taking differently: Take 125 mg by mouth as needed Take one tab every 12 hours starting 24 hours prior to ascent and continue for 24 hours while at the highest altitude) 15 tablet 0       Allergies  Allergies   Allergen Reactions     Advil [Nsaids] Other (See Comments)     Nasal congestion  Eye get puffy        Seasonal Allergies      Pollen and mold   Spring and Fall are main periods. Takes Zyrtec year round to manage it.   Gets post nasal drip      Adhesive Tape Rash     Surgical drape adhesive vs. ioban       Social History  Social History     Socioeconomic History     Marital status:      Spouse name: Not on file     Number of children: 2     Years of education: BA     Highest education level: Not on file   Occupational History     Occupation: staff director     Comment: [employer]   Tobacco Use     Smoking status: Never     Passive exposure: Never     Smokeless tobacco: Never   Substance and Sexual Activity     Alcohol use: Yes     Comment: 1 drink once a week     Drug use: Yes     Comment: CBD 10 mg      Sexual activity: Yes     Partners: Male     Birth control/protection: I.U.D.     Comment: Mirena   Other Topics Concern     Parent/sibling w/ CABG, MI or angioplasty before 65F 55M? No      Service Not Asked     Blood Transfusions No     Caffeine Concern No     Occupational Exposure No     Hobby Hazards Not Asked     Sleep Concern No     Stress Concern Yes     Comment: life/work -->coping     Weight Concern No     Special Diet No     Back Care Yes     Comment: chiropracter for old injury     Exercise No     Bike Helmet Not Asked     Comment: not biker     Seat Belt No     Self-Exams Not Asked    Social History Narrative    4/12/23    Going to [facility] this summer with daughter for 12th birthday    Fidelia Ricks MD            Social Documentation:        Balanced Diet: YES    Calcium intake: less than 2 per day    Caffeine: 0-1 per day    Exercise:  type of activity walk; daily times per week    Sunscreen: Yes    Seatbelts:  Yes    Self Breast Exam:  Yes    Self Testicular Exam: No - n/a    Physical/Emotional/Sexual Abuse: No     Do you feel safe in your environment? Yes        Cholesterol screen up to date: Yes-3 yrs ago per pt.  Not fasting today.     Eye Exam up to date: Yes    Dental Exam up to date: No - 9 months ago.     Pap smear up to date: Yes    Mammogram up to date: Does Not Apply    Dexa Scan up to date: Does Not Apply    Colonoscopy up to date: Does Not Apply    Immunizations up to date: Yes-Td 12/06    Glucose screen if over 40:  No - n/a     Social Determinants of Health     Financial Resource Strain: Not on file   Food Insecurity: Not on file   Transportation Needs: Not on file   Physical Activity: Not on file   Stress: Not on file   Social Connections: Not on file   Intimate Partner Violence: Not on file   Housing Stability: Not on file       Family History  Family History   Problem Relation Age of Onset     Breast Cancer Mother         dx age 32, doing well     Colon Cancer Mother         dx 2019. s/p surg and chemo     Anesthesia Reaction Sister         PONV     Hereditary Breast and Ovarian Cancer Syndrome Sister         double Mastectomy and oophrectomy     Breast Cancer Maternal Grandmother      Diabetes Paternal Grandmother      Breast Cancer Paternal Grandmother      Thrombosis No family hx of        Review of Systems  The complete review of systems is negative other than noted in the HPI or here.   Anesthesia Evaluation   Pt has had prior anesthetic. Type: General.    No history of anesthetic complications       ROS/MED HX  ENT/Pulmonary:  - neg pulmonary ROS  (-) tobacco use  "and JOSE LUIS risk factors   Neurologic:  - neg neurologic ROS     Cardiovascular:     (+) -----No previous cardiac testing  (-) CURTIS and orthopnea/PND   METS/Exercise Tolerance: >4 METS Comment: Uses the elliptical for 20 minutes a few times per week for exercise.    Walking in the warmer weather   Hematologic:  - neg hematologic  ROS  (-) history of blood clots and history of blood transfusion   Musculoskeletal: Comment: Chronic right posterior shoulder pain - manages with chiropractic care.  Has full ROM.        GI/Hepatic: Comment: Appendectomy as a child - neg GI/hepatic ROS   (+) appendicitis,     Renal/Genitourinary:  - neg Renal ROS     Endo:  - neg endo ROS     Psychiatric/Substance Use:  - neg psychiatric ROS  (-) psychiatric history   Infectious Disease:  - neg infectious disease ROS  (-) Recent Fever   Malignancy:  - neg malignancy ROS  (-) malignancy   Other: Comment: 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    (-) Any chance pregnant       /67   Pulse 68   Temp 97.9  F (36.6  C) (Oral)   Resp 12   Ht 1.619 m (5' 3.74\")   Wt 62.1 kg (137 lb)   SpO2 100%   Breastfeeding No   BMI 23.71 kg/m      Physical Exam   Constitutional: Awake, alert, cooperative, no apparent distress, and appears stated age.  Eyes: Pupils equal, round and reactive to light, extra ocular muscles intact, sclera clear, conjunctiva normal.  HENT: Normocephalic, oral pharynx with moist mucus membranes, good dentition. No goiter appreciated.   Respiratory: Clear to auscultation bilaterally, no crackles or wheezing.  Cardiovascular: Regular rate and rhythm, normal S1 and S2, and no murmur noted.  Carotids +2, no bruits. No edema. Palpable pulses to radial  DP and PT arteries.   GI: Normal bowel sounds, soft, non-distended, non-tender, no masses palpated, no hepatosplenomegaly.  Surgical scars: healed  Lymph/Hematologic: No cervical lymphadenopathy and no supraclavicular lymphadenopathy.  Genitourinary:  deferred  Skin: Warm and dry.  No rashes at " anticipated surgical site.   Musculoskeletal: Full ROM of neck. There is no redness, warmth, or swelling of the joints. Gross motor strength is normal.    Neurologic: Awake, alert, oriented to name, place and time. Cranial nerves II-XII are grossly intact. Gait is normal.   Neuropsychiatric: Calm, cooperative. Normal affect.     Prior Labs/Diagnostic Studies   All labs and imaging personally reviewed   Lab Results   Component Value Date    WBC 4.4 01/13/2023    WBC 6.5 07/12/2016     Lab Results   Component Value Date    RBC 3.88 01/13/2023    RBC 4.19 07/12/2016     Lab Results   Component Value Date    HGB 12.0 01/13/2023    HGB 13.2 07/12/2016     Lab Results   Component Value Date    HCT 36.2 01/13/2023    HCT 39.8 07/12/2016     No components found for: MCT  Lab Results   Component Value Date    MCV 93 01/13/2023    MCV 95 07/12/2016     Lab Results   Component Value Date    MCH 30.9 01/13/2023    MCH 31.5 07/12/2016     Lab Results   Component Value Date    MCHC 33.1 01/13/2023    MCHC 33.2 07/12/2016     Lab Results   Component Value Date    RDW 11.7 01/13/2023    RDW 12.0 07/12/2016     Lab Results   Component Value Date     01/13/2023     07/12/2016     Last Comprehensive Metabolic Panel:  Lab Results   Component Value Date     01/13/2023    POTASSIUM 4.2 01/13/2023    CHLORIDE 105 01/13/2023    CO2 26 01/13/2023    ANIONGAP 7 01/13/2023    GLC 87 01/13/2023    BUN 11.4 01/13/2023    CR 0.75 01/13/2023    GFRESTIMATED >90 01/13/2023    GRETCHEN 9.3 01/13/2023           EKG/ stress test - if available please see in ROS above   No results found.      Latest Ref Rng & Units 3/16/2017     4:32 PM   PFT   FVC L 3.20    FEV1 L 2.67    FVC% % 85    FEV1% % 86          The patient's records and results personally reviewed by this provider.     Outside records reviewed from: Care Everywhere    LAB/DIAGNOSTIC STUDIES TODAY:    None    Assessment      Yamila Myles is a 43 year old female seen as a  "PAC referral for risk assessment and optimization for anesthesia.    Plan/Recommendations  Pt will be optimized for the proposed procedure.  See below for details on the assessment, risk, and preoperative recommendations    NEUROLOGY  - No history of TIA, CVA or seizure    -Post Op delirium risk factors:  No risk identified    ENT  - No current airway concerns.  Will need to be reassessed day of surgery.  Mallampati: I  TM: > 3  Orthodontic braces upper and lower behind her teeth  CARDIAC  - No history of CAD, Hypertension and Afib   No anginal symptoms, Denies palpitations, PND, dizziness or syncope.     - METS (Metabolic Equivalents)   Patient performs 4 or more METS exercise without symptoms            Total Score: 0      RCRI-Very low risk: Class 1 0.4% complication rate            Total Score: 0        PULMONARY  Never smoked, denies SOB or any other pulmonary history.   JOSE LUIS Low Risk            Total Score: 0      - Denies asthma or inhaler use  - Tobacco History      History   Smoking Status     Never   Smokeless Tobacco     Never       GI    PONV High Risk  Total Score: 3           1 AN PONV: Pt is Female    1 AN PONV: Patient is not a current smoker    1 AN PONV: Intended Post Op Opioids        /RENAL  - Baseline Creatinine  WNL    ENDOCRINE    - BMI: Estimated body mass index is 23.71 kg/m  as calculated from the following:    Height as of this encounter: 1.619 m (5' 3.74\").    Weight as of this encounter: 62.1 kg (137 lb).  Healthy Weight (BMI 18.5-24.9)  - No history of Diabetes Mellitus    HEME  VTE Low Risk 0.26%            Total Score: 0      - No history of abnormal bleeding or antiplatelet use.      OTHER-    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 - s/p Prophylactic bilateral mastopexy was done on 4/13/2022 as the initial surgery. She then underwent bilateral risk reducing sparing mastectomy with implant on 1/23/2023.  Presents today in preparation for the above final phase procedure noted above.       Different " anesthesia methods/types have been discussed with the patient, but they are aware that the final plan will be decided by the assigned anesthesia provider on the date of service.    The patient is optimized for their procedure. AVS with information on surgery time/arrival time, meds and NPO status given by nursing staff. No further diagnostic testing indicated.      On the day of service:     Prep time: 10 minutes  Visit time: 15 minutes  Documentation time: 10 minutes  ------------------------------------------  Total time: 35 minutes      CARLOS ALBERTO Aragon CNP  Preoperative Assessment Center  [facility]  Clinic and Surgery Center  Phone: [phone]  Fax: [phone]

## 2023-07-11 NOTE — NURSING NOTE
"Oncology Rooming Note    July 11, 2023 9:57 AM   Yamila Myles is a 43 year old female who presents for:    Chief Complaint   Patient presents with     Oncology Clinic Visit     Initial Vitals: /67   Pulse 68   Temp 97.7  F (36.5  C) (Oral)   Resp 12   Ht 1.619 m (5' 3.74\")   Wt 62.5 kg (137 lb 12.8 oz)   LMP  (LMP Unknown)   SpO2 100%   BMI 23.85 kg/m   Estimated body mass index is 23.85 kg/m  as calculated from the following:    Height as of this encounter: 1.619 m (5' 3.74\").    Weight as of this encounter: 62.5 kg (137 lb 12.8 oz). Body surface area is 1.68 meters squared.  No Pain (0) Comment: Data Unavailable   No LMP recorded (lmp unknown). (Menstrual status: IUD).  Allergies reviewed: Yes  Medications reviewed: Yes    Medications: Medication refills not needed today.  Pharmacy name entered into [facility]:    [id number] - [place], [place] - [address] AT [facility] [address] [id number] - [place], [place] - [address] AT [facility] OF [address]  [facility] - [place], [place] - [address]    Clinical concerns:  none      Ginger Juarez            "

## 2023-07-11 NOTE — NURSING NOTE
Pre Op Teaching Note:       Pre and Post op Patient Education    Relevant Diagnosis:  Breast cancer reconstruction  Surgical procedure:  Bilateral breast revisions    Patient demonstrates understanding of the following:  Date of surgery:  8/2/23  Location of surgery:  [facility] - 5th Floor  History and Physical and any other testing necessary prior to surgery: Yes, discussed with pt need for pre-op within 30 days of surgery with PCP.    Patient demonstrates understanding of the following:    Pre-op showering/scrub information with PCMX Soap: Yes, soap -pt states has plenty at home  Blood thinner medications discussed and when to stop (if applicable):  Per PCP at pre-op.     Post-op follow-up:  Discussed how to contact the hospital, nurse, and clinic scheduling staff if necessary. (See packet information)    Instructional materials used/given/mailed:  [facility] [facility] Packet per surgery scheduler, post op teaching sheet, Soap.  Pt advised that final arrival information to come closer to the surgery date by [facility] nurses.

## 2023-07-11 NOTE — PROGRESS NOTES
PRESENTING COMPLAINT:  Preop visit for upcoming second stage bilateral breast reconstruction for breast cancer, scheduled for 08/02/2023.    HISTORY OF PRESENTING COMPLAINT:  Ms. Myles is 43 years old.  She is scheduled for bilateral breast revisional surgery and fat grafting from abdomen and thighs scheduled for the coming weeks.  No change otherwise in her history and physical exam.    ASSESSMENT AND PLAN:  Based on the above findings, a diagnosis of stage II breast reconstruction, here for preoperative visit was made.  Plan is to do a periareolar narrowing, IMF revision to round out the breast and to do fat grafting.  All risks, benefits, and alternatives of the procedure including pain, infection, bleeding, scarring, asymmetry, seromas, hematomas, wound breakdown, wound dehiscence, injury to deeper structures like the implants, requirement of further surgeries, DVT, PE, MI, CVA, pneumonia, renal failure and death were discussed.  She understood them all and wants to proceed.  All of her questions were answered.  She was happy with the visit.  All exam and discussion done in presence of my nurse, Jacqueline Peck.    Total time spent in the encounter today, including chart review, the visit itself and post-visit paperwork was 30 minutes.

## 2023-07-13 NOTE — TELEPHONE ENCOUNTER
RECORDS STATUS - Breast       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  RECORDS RECEIVED FROM:    Appt Date: 8/16/2023 with Jessica Long     Action    Action Taken 7/13/2023 2:34pm KEB     I called pt Yamila - unavailable.       NOTES STATUS DETAILS   OFFICE NOTE from referring provider Monica Matute MD   OFFICE NOTE from medical oncologist     CLINICAL TRIAL TREATMENTS TO DATE     LABS     PATHOLOGY REPORTS  1/23/2023  A. LEFT breast, retro nipple tissue, excision:  -Benign breast tissue, including lactiferous duct with duct ectasia  -Negative for atypia or malignancy     B. LEFT breast, risk reducing nipple-sparing mastectomy:  -Atypical lobular hyperplasia (ALH)  -Other findings: fibrocystic change (including cysts with apocrine metaplasia), columnar cell change, sclerosing adenosis, duct ectasia, and usual ductal hyperplasia  -Foreign body giant cell reaction, consistent with prior operative site  -Calcifications associated with benign ducts and acini  -Negative for malignancy     C. RIGHT breast, retro nipple tissue, excision:  -Benign breast tissue, including lactiferous duct with luminal calcification  -Negative for atypia or malignancy     D. RIGHT breast, risk reducing nipple-sparing mastectomy:  -Benign breast tissue with fibrocystic change (including cysts with apocrine metaplasia), columnar cell change, fibroadenomatoid change, and usual ductal hyperplasia  -Foreign body giant cell reaction, consistent with prior operative site  -Negative for atypia or malignancy      4/13/2022  A. LEFT breast, mastopexy:  -Benign breast tissue with fibrocystic change (including microcysts with apocrine metaplasia), duct ectasia and usual ductal hyperplasia  -Negative for atypia or malignancy      B. RIGHT breast, mastopexy:  -Specimen consists predominantly of unremarkable skin  -Scant benign breast parenchyma present  -Negative for atypia or malignancy    ANYTHING RELATED TO DIAGNOSIS     GENONOMIC TESTING     TYPE:      IMAGING (NEED IMAGES & REPORT)     CT SCANS     MRI Complete MRI Breast 12/2/2022,   MAMMO Complete  9/2/2022 more in PACS   ULTRASOUND Complete US Breast  9/2/2023   PET

## 2023-08-01 ASSESSMENT — LIFESTYLE VARIABLES: TOBACCO_USE: 0

## 2023-08-01 ASSESSMENT — ENCOUNTER SYMPTOMS: ORTHOPNEA: 0

## 2023-08-01 NOTE — ANESTHESIA PREPROCEDURE EVALUATION
Anesthesia Pre-Procedure Evaluation    Patient: Yamila Myles   MRN: [id number] : 1980        Procedure : Procedure(s):  Bilateral breast revision  fat grafting from abdomen and thighs          Past Medical History:   Diagnosis Date    Allergic rhinitis     Allergic rhinitis     BRCA1 positive 2001    Breast disorder 2011    Encounter for insertion of mirena IUD 2016    Hoarseness     Lump or mass in breast     BRCA-1 positive (Mother, MGM w/ breast ca, mother w/ gene +), mother dx at 32)    Microcalcifications of the breast, right uoq 2011      Past Surgical History:   Procedure Laterality Date    INSERT INTRAUTERINE DEVICE N/A 2016    Procedure: INSERT INTRAUTERINE DEVICE;  Surgeon: Fidelia Ricks MD;  [facility]: [facility]    LAPAROSCOPIC SALPINGO-OOPHORECTOMY Bilateral 2016    Procedure: LAPAROSCOPIC SALPINGO-OOPHORECTOMY;  Surgeon: Fidelia Ricks MD;  [facility]    LAPAROSCOPY OPERATIVE ADULT N/A 2016    Procedure: LAPAROSCOPY OPERATIVE ADULT;  Surgeon: Fidelia Ricks MD;  [facility]    MAMMOPLASTY REDUCTION BILATERAL Bilateral 2022    Procedure: Bilateral breast lift;  Surgeon: LING Pradhan MD;  Location: [facility] OR    MASTECTOMY SIMPLE Bilateral 2023    Procedure: BILATERAL Risk-Reducing Nipple-Sparing Mastectomy;  Surgeon: Monica Rodríguez MD;  Location: [facility]    RECONSTRUCT BREAST BILATERAL, IMPLANT PROSTHESIS BILATERAL, COMBINED Bilateral 2023    Procedure: Bilateral breast reconstruction with implant/Alloderm, SPY;  Surgeon: LING Pradhan MD;  Location: [facility]    ZZC APPENDECTOMY  1987      Allergies   Allergen Reactions    Advil [Nsaids] Other (See Comments)     Nasal congestion  Eye get puffy       Seasonal Allergies      Pollen and mold   Spring and Fall are main periods. Takes Zyrtec year round to manage it.   Gets post nasal drip     Adhesive Tape Rash     Surgical drape adhesive vs. ioban       Social History     Tobacco Use    Smoking status: Never     Passive exposure: Never    Smokeless tobacco: Never   Substance Use Topics    Alcohol use: Yes     Comment: 1 drink once a week      Wt Readings from Last 1 Encounters:   07/11/23 62.1 kg (137 lb)        Anesthesia Evaluation   Pt has had prior anesthetic. Type: General.    No history of anesthetic complications       ROS/MED HX  ENT/Pulmonary:  - neg pulmonary ROS  (-) tobacco use and JOSE LUIS risk factors   Neurologic:  - neg neurologic ROS     Cardiovascular:     (+)  - -   -  - -                                 No previous cardiac testing  (-) CURTIS and orthopnea/PND   METS/Exercise Tolerance: >4 METS Comment: Uses the elliptical for 20 minutes a few times per week for exercise.    Walking in the warmer weather   Hematologic:  - neg hematologic  ROS  (-) history of blood clots and history of blood transfusion   Musculoskeletal: Comment: Chronic right posterior shoulder pain - manages with chiropractic care.  Has full ROM.        GI/Hepatic: Comment: Appendectomy as a child - neg GI/hepatic ROS   (+)         appendicitis,           Renal/Genitourinary:  - neg Renal ROS     Endo:  - neg endo ROS     Psychiatric/Substance Use:  - neg psychiatric ROS  (-) psychiatric history   Infectious Disease:  - neg infectious disease ROS  (-) Recent Fever   Malignancy:  - neg malignancy ROS  (-) malignancy   Other: Comment: 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    (-) Any chance pregnant       Physical Exam    Airway        Mallampati: II   TM distance: > 3 FB   Neck ROM: full   Mouth opening: > 3 cm    Respiratory Devices and Support         Dental     Comment: Back braces on top and bottom        Cardiovascular   cardiovascular exam normal          Pulmonary   pulmonary exam normal                OUTSIDE LABS:  CBC:   Lab Results   Component Value Date    WBC 4.4 01/13/2023    WBC 6.5 07/12/2016    HGB 12.0 01/13/2023    HGB 13.2 07/12/2016    HCT 36.2 01/13/2023    HCT 39.8 07/12/2016      01/13/2023     07/12/2016     BMP:   Lab Results   Component Value Date     01/13/2023     02/18/2019    POTASSIUM 4.2 01/13/2023    POTASSIUM 4.5 02/18/2019    CHLORIDE 105 01/13/2023    CHLORIDE 111 (H) 02/18/2019    CO2 26 01/13/2023    CO2 26 02/18/2019    BUN 11.4 01/13/2023    BUN 15 02/18/2019    CR 0.75 01/13/2023    CR 0.82 02/18/2019    GLC 87 01/13/2023    GLC 85 02/18/2019     COAGS: No results found for: PTT, INR, FIBR  POC:   Lab Results   Component Value Date    HCG Negative 07/12/2016     HEPATIC: No results found for: ALBUMIN, PROTTOTAL, ALT, AST, GGT, ALKPHOS, BILITOTAL, BILIDIRECT, RADHA  OTHER:   Lab Results   Component Value Date    A1C 4.6 02/13/2015    GRETCHEN 9.3 01/13/2023    TSH 1.00 02/21/2013       Anesthesia Plan    ASA Status:  2    NPO Status:  NPO Appropriate    Anesthesia Type: General.     - Airway: LMA   Induction: Intravenous, Propofol.   Maintenance: Balanced.        Consents    Anesthesia Plan(s) and associated risks, benefits, and realistic alternatives discussed. Questions answered and patient/representative(s) expressed understanding.     - Discussed:     - Discussed with:  Patient      - Extended Intubation/Ventilatory Support Discussed: No.      - Patient is DNR/DNI Status: No     Use of blood products discussed: No .     Postoperative Care    Pain management: IV analgesics, Oral pain medications, Multi-modal analgesia.   PONV prophylaxis: Dexamethasone or Solumedrol, Ondansetron (or other 5HT-3), Background Propofol Infusion     Comments:    Other Comments: No block planned            Elbert Carey MD

## 2023-08-02 ENCOUNTER — ANESTHESIA (OUTPATIENT)
Dept: ANESTHESIOLOGY | Facility: AMBULATORY SURGERY CENTER | Age: 43
End: 2023-08-02

## 2023-08-02 ENCOUNTER — HOSPITAL ENCOUNTER (OUTPATIENT)
Facility: AMBULATORY SURGERY CENTER | Age: 43
Discharge: HOME | End: 2023-08-02
Attending: PLASTIC SURGERY | Admitting: PLASTIC SURGERY
Payer: COMMERCIAL

## 2023-08-02 VITALS
TEMPERATURE: 97 F | HEART RATE: 85 BPM | HEIGHT: 64 IN | BODY MASS INDEX: 23.39 KG/M2 | SYSTOLIC BLOOD PRESSURE: 129 MMHG | OXYGEN SATURATION: 98 % | RESPIRATION RATE: 18 BRPM | WEIGHT: 137 LBS | DIASTOLIC BLOOD PRESSURE: 67 MMHG

## 2023-08-02 DIAGNOSIS — Z98.890 S/P BREAST RECONSTRUCTION, BILATERAL: ICD-10-CM

## 2023-08-02 PROCEDURE — 19380 REVJ RECONSTRUCTED BREAST: CPT | Mod: RT

## 2023-08-02 PROCEDURE — 19380 REVJ RECONSTRUCTED BREAST: CPT | Mod: 50 | Performed by: PLASTIC SURGERY

## 2023-08-02 PROCEDURE — 15771 GRFG AUTOL FAT LIPO 50 CC/<: CPT

## 2023-08-02 PROCEDURE — 88305 TISSUE EXAM BY PATHOLOGIST: CPT | Mod: 26 | Performed by: PATHOLOGY

## 2023-08-02 PROCEDURE — 15772 GRFG AUTOL FAT LIPO EA ADDL: CPT | Mod: GC | Performed by: PLASTIC SURGERY

## 2023-08-02 PROCEDURE — 15771 GRFG AUTOL FAT LIPO 50 CC/<: CPT | Mod: GC | Performed by: PLASTIC SURGERY

## 2023-08-02 PROCEDURE — 88305 TISSUE EXAM BY PATHOLOGIST: CPT | Mod: TC | Performed by: PLASTIC SURGERY

## 2023-08-02 RX ORDER — ONDANSETRON 4 MG/1
4 TABLET, ORALLY DISINTEGRATING ORAL EVERY 30 MIN PRN
Status: DISCONTINUED | OUTPATIENT
Start: 2023-08-02 | End: 2023-08-04 | Stop reason: HOSPADM

## 2023-08-02 RX ORDER — PROPOFOL 10 MG/ML
INJECTION, EMULSION INTRAVENOUS CONTINUOUS PRN
Status: DISCONTINUED | OUTPATIENT
Start: 2023-08-02 | End: 2023-08-02

## 2023-08-02 RX ORDER — KETAMINE HYDROCHLORIDE 10 MG/ML
INJECTION INTRAMUSCULAR; INTRAVENOUS PRN
Status: DISCONTINUED | OUTPATIENT
Start: 2023-08-02 | End: 2023-08-02

## 2023-08-02 RX ORDER — PROPOFOL 10 MG/ML
INJECTION, EMULSION INTRAVENOUS PRN
Status: DISCONTINUED | OUTPATIENT
Start: 2023-08-02 | End: 2023-08-02

## 2023-08-02 RX ORDER — FENTANYL CITRATE 50 UG/ML
50 INJECTION, SOLUTION INTRAMUSCULAR; INTRAVENOUS EVERY 5 MIN PRN
Status: DISCONTINUED | OUTPATIENT
Start: 2023-08-02 | End: 2023-08-02 | Stop reason: HOSPADM

## 2023-08-02 RX ORDER — FENTANYL CITRATE 50 UG/ML
INJECTION, SOLUTION INTRAMUSCULAR; INTRAVENOUS PRN
Status: DISCONTINUED | OUTPATIENT
Start: 2023-08-02 | End: 2023-08-02

## 2023-08-02 RX ORDER — DEXAMETHASONE SODIUM PHOSPHATE 4 MG/ML
INJECTION, SOLUTION INTRA-ARTICULAR; INTRALESIONAL; INTRAMUSCULAR; INTRAVENOUS; SOFT TISSUE PRN
Status: DISCONTINUED | OUTPATIENT
Start: 2023-08-02 | End: 2023-08-02

## 2023-08-02 RX ORDER — ONDANSETRON 2 MG/ML
4 INJECTION INTRAMUSCULAR; INTRAVENOUS EVERY 30 MIN PRN
Status: DISCONTINUED | OUTPATIENT
Start: 2023-08-02 | End: 2023-08-02 | Stop reason: HOSPADM

## 2023-08-02 RX ORDER — AMOXICILLIN 250 MG
1-2 CAPSULE ORAL 2 TIMES DAILY
Qty: 30 TABLET | Refills: 0 | Status: SHIPPED | OUTPATIENT
Start: 2023-08-02 | End: 2023-09-01

## 2023-08-02 RX ORDER — CEFAZOLIN SODIUM 2 G/50ML
2 SOLUTION INTRAVENOUS SEE ADMIN INSTRUCTIONS
Status: DISCONTINUED | OUTPATIENT
Start: 2023-08-02 | End: 2023-08-02 | Stop reason: HOSPADM

## 2023-08-02 RX ORDER — ONDANSETRON 2 MG/ML
4 INJECTION INTRAMUSCULAR; INTRAVENOUS EVERY 30 MIN PRN
Status: DISCONTINUED | OUTPATIENT
Start: 2023-08-02 | End: 2023-08-04 | Stop reason: HOSPADM

## 2023-08-02 RX ORDER — SODIUM CHLORIDE, SODIUM LACTATE, POTASSIUM CHLORIDE, CALCIUM CHLORIDE 600; 310; 30; 20 MG/100ML; MG/100ML; MG/100ML; MG/100ML
INJECTION, SOLUTION INTRAVENOUS CONTINUOUS PRN
Status: DISCONTINUED | OUTPATIENT
Start: 2023-08-02 | End: 2023-08-02

## 2023-08-02 RX ORDER — LIDOCAINE 40 MG/G
CREAM TOPICAL
Status: DISCONTINUED | OUTPATIENT
Start: 2023-08-02 | End: 2023-08-02 | Stop reason: HOSPADM

## 2023-08-02 RX ORDER — SODIUM CHLORIDE, SODIUM LACTATE, POTASSIUM CHLORIDE, CALCIUM CHLORIDE 600; 310; 30; 20 MG/100ML; MG/100ML; MG/100ML; MG/100ML
INJECTION, SOLUTION INTRAVENOUS CONTINUOUS
Status: DISCONTINUED | OUTPATIENT
Start: 2023-08-02 | End: 2023-08-02 | Stop reason: HOSPADM

## 2023-08-02 RX ORDER — ONDANSETRON 4 MG/1
4 TABLET, ORALLY DISINTEGRATING ORAL EVERY 30 MIN PRN
Status: DISCONTINUED | OUTPATIENT
Start: 2023-08-02 | End: 2023-08-02 | Stop reason: HOSPADM

## 2023-08-02 RX ORDER — OXYCODONE HYDROCHLORIDE 5 MG/1
10 TABLET ORAL
Status: DISCONTINUED | OUTPATIENT
Start: 2023-08-02 | End: 2023-08-04 | Stop reason: HOSPADM

## 2023-08-02 RX ORDER — EPHEDRINE SULFATE 50 MG/ML
INJECTION, SOLUTION INTRAMUSCULAR; INTRAVENOUS; SUBCUTANEOUS PRN
Status: DISCONTINUED | OUTPATIENT
Start: 2023-08-02 | End: 2023-08-02

## 2023-08-02 RX ORDER — ONDANSETRON 2 MG/ML
INJECTION INTRAMUSCULAR; INTRAVENOUS PRN
Status: DISCONTINUED | OUTPATIENT
Start: 2023-08-02 | End: 2023-08-02

## 2023-08-02 RX ORDER — FENTANYL CITRATE 50 UG/ML
25 INJECTION, SOLUTION INTRAMUSCULAR; INTRAVENOUS EVERY 5 MIN PRN
Status: DISCONTINUED | OUTPATIENT
Start: 2023-08-02 | End: 2023-08-02 | Stop reason: HOSPADM

## 2023-08-02 RX ORDER — HYDROMORPHONE HYDROCHLORIDE 1 MG/ML
0.2 INJECTION, SOLUTION INTRAMUSCULAR; INTRAVENOUS; SUBCUTANEOUS EVERY 5 MIN PRN
Status: DISCONTINUED | OUTPATIENT
Start: 2023-08-02 | End: 2023-08-02 | Stop reason: HOSPADM

## 2023-08-02 RX ORDER — OXYCODONE HYDROCHLORIDE 5 MG/1
5-10 TABLET ORAL EVERY 6 HOURS PRN
Qty: 10 TABLET | Refills: 0 | Status: SHIPPED | OUTPATIENT
Start: 2023-08-02 | End: 2023-09-01

## 2023-08-02 RX ORDER — HYDROMORPHONE HYDROCHLORIDE 1 MG/ML
0.4 INJECTION, SOLUTION INTRAMUSCULAR; INTRAVENOUS; SUBCUTANEOUS EVERY 5 MIN PRN
Status: DISCONTINUED | OUTPATIENT
Start: 2023-08-02 | End: 2023-08-02 | Stop reason: HOSPADM

## 2023-08-02 RX ORDER — CEFAZOLIN SODIUM 2 G/50ML
2 SOLUTION INTRAVENOUS
Status: COMPLETED | OUTPATIENT
Start: 2023-08-02 | End: 2023-08-02

## 2023-08-02 RX ORDER — ONDANSETRON 4 MG/1
4 TABLET, ORALLY DISINTEGRATING ORAL EVERY 8 HOURS PRN
Qty: 4 TABLET | Refills: 0 | Status: SHIPPED | OUTPATIENT
Start: 2023-08-02 | End: 2023-09-01

## 2023-08-02 RX ORDER — ACETAMINOPHEN 325 MG/1
975 TABLET ORAL ONCE
Status: COMPLETED | OUTPATIENT
Start: 2023-08-02 | End: 2023-08-02

## 2023-08-02 RX ORDER — KETOROLAC TROMETHAMINE 30 MG/ML
15 INJECTION, SOLUTION INTRAMUSCULAR; INTRAVENOUS
Status: DISCONTINUED | OUTPATIENT
Start: 2023-08-02 | End: 2023-08-02 | Stop reason: HOSPADM

## 2023-08-02 RX ORDER — OXYCODONE HYDROCHLORIDE 5 MG/1
5 TABLET ORAL
Status: COMPLETED | OUTPATIENT
Start: 2023-08-02 | End: 2023-08-02

## 2023-08-02 RX ORDER — LIDOCAINE HYDROCHLORIDE 20 MG/ML
INJECTION, SOLUTION INFILTRATION; PERINEURAL PRN
Status: DISCONTINUED | OUTPATIENT
Start: 2023-08-02 | End: 2023-08-02

## 2023-08-02 RX ADMIN — FENTANYL CITRATE 50 MCG: 50 INJECTION, SOLUTION INTRAMUSCULAR; INTRAVENOUS at 12:32

## 2023-08-02 RX ADMIN — OXYCODONE HYDROCHLORIDE 5 MG: 5 TABLET ORAL at 14:49

## 2023-08-02 RX ADMIN — LIDOCAINE HYDROCHLORIDE 60 MG: 20 INJECTION, SOLUTION INFILTRATION; PERINEURAL at 12:25

## 2023-08-02 RX ADMIN — SODIUM CHLORIDE, SODIUM LACTATE, POTASSIUM CHLORIDE, CALCIUM CHLORIDE: 600; 310; 30; 20 INJECTION, SOLUTION INTRAVENOUS at 14:14

## 2023-08-02 RX ADMIN — PROPOFOL 20 MG: 10 INJECTION, EMULSION INTRAVENOUS at 12:50

## 2023-08-02 RX ADMIN — OXYCODONE HYDROCHLORIDE 5 MG: 5 TABLET ORAL at 15:40

## 2023-08-02 RX ADMIN — PROPOFOL 180 MG: 10 INJECTION, EMULSION INTRAVENOUS at 12:25

## 2023-08-02 RX ADMIN — ACETAMINOPHEN 975 MG: 325 TABLET ORAL at 10:39

## 2023-08-02 RX ADMIN — FENTANYL CITRATE 50 MCG: 50 INJECTION, SOLUTION INTRAMUSCULAR; INTRAVENOUS at 12:21

## 2023-08-02 RX ADMIN — PROPOFOL 150 MCG/KG/MIN: 10 INJECTION, EMULSION INTRAVENOUS at 12:21

## 2023-08-02 RX ADMIN — PROPOFOL 20 MG: 10 INJECTION, EMULSION INTRAVENOUS at 13:25

## 2023-08-02 RX ADMIN — SODIUM CHLORIDE, SODIUM LACTATE, POTASSIUM CHLORIDE, CALCIUM CHLORIDE: 600; 310; 30; 20 INJECTION, SOLUTION INTRAVENOUS at 12:32

## 2023-08-02 RX ADMIN — FENTANYL CITRATE 50 MCG: 50 INJECTION, SOLUTION INTRAMUSCULAR; INTRAVENOUS at 14:49

## 2023-08-02 RX ADMIN — ONDANSETRON 4 MG: 2 INJECTION INTRAMUSCULAR; INTRAVENOUS at 13:12

## 2023-08-02 RX ADMIN — EPHEDRINE SULFATE 5 MG: 50 INJECTION, SOLUTION INTRAMUSCULAR; INTRAVENOUS; SUBCUTANEOUS at 13:34

## 2023-08-02 RX ADMIN — EPHEDRINE SULFATE 5 MG: 50 INJECTION, SOLUTION INTRAMUSCULAR; INTRAVENOUS; SUBCUTANEOUS at 13:37

## 2023-08-02 RX ADMIN — KETAMINE HYDROCHLORIDE 25 MG: 10 INJECTION INTRAMUSCULAR; INTRAVENOUS at 12:45

## 2023-08-02 RX ADMIN — CEFAZOLIN SODIUM 2 G: 2 SOLUTION INTRAVENOUS at 12:20

## 2023-08-02 RX ADMIN — FENTANYL CITRATE 50 MCG: 50 INJECTION, SOLUTION INTRAMUSCULAR; INTRAVENOUS at 15:02

## 2023-08-02 RX ADMIN — DEXAMETHASONE SODIUM PHOSPHATE 8 MG: 4 INJECTION, SOLUTION INTRA-ARTICULAR; INTRALESIONAL; INTRAMUSCULAR; INTRAVENOUS; SOFT TISSUE at 12:32

## 2023-08-02 NOTE — ANESTHESIA CARE TRANSFER NOTE
Patient: Yamila Myles    Procedure: Procedure(s):  Bilateral breast revision  fat grafting from abdomen and thighs       Diagnosis: S/P breast reconstruction, bilateral [Z98.890]  Diagnosis Additional Information: No value filed.    Anesthesia Type:   General     Note:    Oropharynx: oropharynx clear of all foreign objects  Level of Consciousness: awake  Oxygen Supplementation: face mask    Independent Airway: airway patency satisfactory and stable  Dentition: dentition unchanged  Vital Signs Stable: post-procedure vital signs reviewed and stable    Patient transferred to: PACU    Handoff Report: Identifed the Patient, Identified the Reponsible Provider, Reviewed the pertinent medical history, Discussed the surgical course, Reviewed Intra-OP anesthesia mangement and issues during anesthesia, Set expectations for post-procedure period and Allowed opportunity for questions and acknowledgement of understanding      Vitals:  Vitals Value Taken Time   BP     Temp     Pulse 82 08/02/23 1432   Resp 12 08/02/23 1432   SpO2 99 % 08/02/23 1432   Vitals shown include unvalidated device data.    Electronically Signed By: CARLOS ALBERTO Son CRNA  August 2, 2023  2:33 PM

## 2023-08-02 NOTE — OP NOTE
Procedure Date: 08/02/2023    PREOPERATIVE DIAGNOSIS:  Status post bilateral breast reconstruction with implants, now ready for revisional symmetry enhancement procedures.    POSTOPERATIVE DIAGNOSIS:  Status post bilateral breast reconstruction with implants, now ready for revisional symmetry enhancement procedures.    PROCEDURES:     1.  Bilateral revision of reconstructed breast, with refinement of inframammary fold, with excision of skin and subcutaneous tissues and reduction of areolas and rounding out of areolas, with excision of donut of areolar skin and layered closure.  2.  Bilateral breast fat grafting, using Marcelino technique and Revolve system, from abdomen, flanks and thighs to bilateral breasts, total of 210 mL.    SURGEON:  Laxmi Pradhan MD.    RESIDENT:  Armando Weathers MD.    ANESTHESIA:  General anesthesia with LMA.    COMPLICATIONS:  Nil.    DRAINS:  Nil.    SPECIMENS:  Bilateral breast skin.    BLOOD LOSS:  20 mL.    DESCRIPTION OF PROCEDURE:  After informed consent was taken from the patient, the proper site and procedure were ascertained with her and she was appropriately marked, she was taken to the operating room.  She was placed in supine position, with the knees comfortably flexed, pillows underneath them and pneumoboots placed and running prior to induction of anesthesia.  Preoperative antibiotics were given in the OR.  All pressure points were appropriately padded.  General anesthesia was administered without any complications.  I began by first instilling tumescent solution in the abdomen, flanks and thighs.  While that took effect, I turned my attention to the revision of the reconstructed breasts.  I went ahead and marked out a 42 mm rounded areola on each side then excised the donut of periareolar skin to the original scar, excised it full-thickness.  The underlying implant was never visualized.  I then went ahead and closed both of the areolas with running 3-0 Stratafix suture.  The  inframammary folds on each side were blunted medially.  This excess skin was then cut out, followed by closure in layered fashion using 2-0 Monocryl suture in deep dermal fashion and 3-0 Stratafix suture in running manner.  By this time, I went ahead and harvested fat from the bilateral flanks, anterior abdomen and anterior and medial thigh areas bilaterally, using a 4 mm cannula, using the Project 10K system.  A total of about 400 mL was aspirated, washed multiple times then collected in 10 mL syringes.  This was then injected through multiple stab incisions in both breasts in the upper poles and medial aspect to fill indents and to give her symmetry.  Once this was completed, I then closed all of the incisions with 5-0 fast-absorbing gut suture.  All of the incisions were then covered with surgical tape and glue, and she was wrapped appropriately.  The patient tolerated the procedure well.  All counts were correct at the end of the case.  The patient was extubated and sent to the recovery room in stable condition.    LING Pradhan MD        D: 2023   T: 2023   MT: marie    Name:     YORDY ANGEL  MRN:      [id number]        Account:        [id number]   :      1980           Procedure Date: 2023     Document: [id number]

## 2023-08-02 NOTE — BRIEF OP NOTE
[facility] [place]    Brief Operative Note    Pre-operative diagnosis: S/P breast reconstruction, bilateral [Z98.890]  Post-operative diagnosis Same as pre-operative diagnosis    Procedure: Procedure(s):  Bilateral breast revision  fat grafting from abdomen and thighs  Surgeon: Surgeon(s) and Role:     * LING Pradhan MD - Primary     * Damir Weathers MD - Resident - Assisting  Anesthesia: General   Estimated Blood Loss: 25mL    Drains: None  Specimens:   ID Type Source Tests Collected by Time Destination   1 : Left breast skin Tissue Breast, Left SURGICAL PATHOLOGY EXAM LING Pradhan MD 8/2/2023 12:59 PM    2 : Right breast skin Tissue Breast, Right SURGICAL PATHOLOGY EXAM LING Pradhan MD 8/2/2023 12:59 PM      Findings:   None.  Complications: None.  Implants: * No implants in log *    Damir Weathers   Plastic & Reconstructive Surgery

## 2023-08-02 NOTE — DISCHARGE INSTRUCTIONS
SCAR REVISION POST-OPERATIVE INSTRUCTIONS    Instructions      Have someone drive you home after surgery and help you at home for 1-2      days.     Get plenty of rest.     Follow balanced diet.     Decreased activity may promote constipation, so you may want to add      more raw fruit to your diet, and be sure to increase fluid intake.     Take pain medication as prescribed. Do not take aspirin or any products      containing aspirin.     Do not drink alcohol when taking pain medications.     Even when not taking pain medications, no alcohol for 3 weeks as it      causes fluid retention.     If you are taking vitamins with iron, resume these as tolerated.     Do not smoke, as smoking delays healing and increases the risk of      complications.    Activities     Do not drive until you are no longer taking any pain medications      (narcotics).     Start walking as soon as possible, this helps to reduce swelling and       lowers the chance of blood clots.     Resume normal activities gradually.     Return to work in 1-2 days, depending upon extent of surgery     No strenuous work or stress on wound for 2-3 weeks.    Incision Care     If steri strips have been used, keep steri-strips on; replace if they come off.     Avoid exposing scars to sun for at least 12 months.     Always use a strong sunblock, if sun exposure is unavoidable (SPF 50 or      greater).     Inspect daily for signs of infection.     No tub soaking, bathing, or swimming while sutures or drains are in place.     Keep area clean and dry for first 24 hours.     What to Expect     Some swelling and bruising.     May have slight bleeding from incision.  Apply 4 x 4 gauze with slight pressure to       control bleeding.     Skin grafts and flaps may take several weeks or months to heal; a support garment or      bandage may be necessary for up to a year.    Appearance     Final results of surgery may take a year or more.    Follow-Up Care     If  external sutures have been used, they will be removed in 5-14 days.    Please note my office will call you 1-2 business days after your procedure to check up on how you're doing and to schedule your post-operative appointment.        When to Call     If you have increased swelling or bruising.     If swelling and redness persist after a few days.     If you have increased redness along the incision.     If you have severe or increased pain not relieved by medication.     If you have any side effects to medications; such as, rash, nausea,      headache, vomiting.     If you have an oral temperature over 100.4 degrees.     If you have any yellowish or greenish drainage from the incisions or      notice a foul odor.     If you have bleeding from the incisions that is difficult to control with      light pressure.     If you have loss of feeling or motion.    For Medical Questions, Please Call:     [phone], Monday - Friday, 8 a.m. - 4:30 p.m.     After hours and on weekends, call Hospital Paging at [phone] and      ask for the Plastic Surgeon on call.    FAT GRAFTING POST-OPERATIVE INSTRUCTIONS    Instructions      Have someone drive you home after surgery and help you at home for 1-2      days.     Get plenty of rest.     Follow balanced diet.     Decreased activity may promote constipation, so you may want to add      more raw fruit to your diet, and be sure to increase fluid intake.     Take pain medication as prescribed. Do not take aspirin or any products      containing aspirin unless approved by your surgeon.     Do not drink alcohol when taking pain medications.     Even when not taking pain medications, no alcohol for 3 weeks as it      causes fluid retention.     If you are taking vitamins with iron, resume these as tolerated.     Do not smoke, as smoking delays healing and increases the risk of      complications.    Activities     Do not drive until you are no longer taking any pain medications       (narcotics).     Start walking as soon as possible, this helps to reduce swelling and       lowers the chance of blood clots.     Unless stated on this form, discuss your time off work with your surgeon.    Treated Area Care      You may shower after 24 hours. The ACE wrap (if used) may be rewrapped as needed (if too tight or loose). Use it for support and may subtitute with a sports bra if preferred.  Wear the compression garment (spandex type clothing or the provided sponge and binder) in the area where the liposuction was performed to harvest the fat for the fat injection for 2 weeks post opor per the surgeon's recommendation.     Avoid exposing scars to sun for at least 12 months.     Always use a strong sunblock, if sun exposure is unavoidable (SPF 50 or      greater).     Inspect daily for signs of infection.     No tub soaking, bathing, or swimming while sutures or drains are in place.     You may wear makeup with sunblock protection shortly.     Stay out of the sun until redness and bruising subsides (usually 48      Hours).    What to Expect     Temporary stinging, throbbing, burning sensation, redness, swelling,       bruising, and excess fullness.     Some swelling, bruising or redness in the donor and recipient sites.     Swelling and puffiness may last several weeks.     Redness and bruising usually lasts about 48 hours.     Appearance     Improved skin texture.     Firmer and smoother skin.    Follow-Up Care     With regular follow-up treatments, you can easily maintain your new       look.     Repeated treatments may be necessary.    When to Call     If you have increased swelling or bruising.     If swelling and redness persist after a few days.     If you have increased redness along the incision.     If you have severe or increased pain not relieved by medication.     If you have any side effects to medications; such as, rash, nausea,      headache, vomiting.     If you have an oral temperature  over 100.4 degrees.     If you have any yellowish or greenish drainage from the incisions or      notice a foul odor.     If you have bleeding from the incisions that is difficult to control with      light pressure.     If you have loss of feeling or motion.     If you have any sign of abscesses, open sores, skin peeling or lumpiness.    For Medical Questions, Please Call:     [phone], Monday - Friday, 8 a.m. - 4:30 p.m.     After hours and on weekends, call Hospital Paging at [phone] and      ask for the Plastic Surgeon on call.      [facility]  Home Care Following Anesthesia  For 24 hours after surgery:  Get plenty of rest.  A responsible adult must stay with you for at least 24 hours after you leave the surgery center.  Do not drive or use heavy equipment.  If you have weakness or tingling, don't drive or use heavy equipment until this feeling goes away.   Do not drink alcohol.   Avoid strenuous or risky activities.  Ask for help when climbing stairs.  You may feel lightheaded.  IF so, sit for a few minutes before standing.  Have someone help you get up.   If you have nausea (feel sick to your stomach): Drink only clear liquids such as apple juice, ginger ale, broth or 7-Up.  Rest may also help.  Be sure to drink enough fluids.  Move to a regular diet as you feel able.   You may have a slight fever.  Call the doctor if your fever is over 100 F (37.7 C) (taken under the tongue) or lasts longer than 24 hours.  You may have a dry mouth, a sore throat, muscle aches or trouble sleeping. These should go away after 24 hours.  Do not make important or legal decisions.   It is recommended to avoid smoking.               Tips for taking pain medications  To get the best pain relief possible, remember these points:  Take pain medications as directed, before pain becomes severe.  Pain medication can upset your stomach: taking it with food may help.  Constipation is a common  side effect of pain medication. Drink plenty of  fluids.  Eat foods high in fiber. Take a stool softener if recommended by your doctor or pharmacist.  Do not drink alcohol, drive or operate machinery while taking pain medications.  Ask about other ways to control pain, such as with heat, ice or relaxation.    Tylenol/Acetaminophen Consumption    If you feel your pain relief is insufficient, you may take Tylenol/Acetaminophen in addition to your narcotic pain medication.   Be careful not to exceed 4,000 mg of Tylenol/Acetaminophen in a 24 hour period from all sources.  If you are taking extra strength Tylenol/acetaminophen (500 mg), the maximum dose is 8 tablets in 24 hours.  If you are taking regular strength acetaminophen (325 mg), the maximum dose is 12 tablets in 24 hours.  Tylenol 975 mg given at 10:30 am, ok to take more after 4:30 pm today     Call a doctor for any of the following:  Signs of infection (fever, growing tenderness at the surgery site, a large amount of drainage or bleeding, severe pain, foul-smelling drainage, redness, swelling).  It has been over 8 to 10 hours since surgery and you are still not able to urinate (pass water).  Headache for over 24 hours.  Numbness, tingling or weakness the day after surgery (if you had spinal anesthesia).  Signs of Covid-19 infection (temperature over 100 degrees, shortness of breath, cough, loss of taste/smell, generalized body aches, persistent headache, chills, sore throat, nausea/vomiting/diarrhea)    Your doctor is:  Dr. Laxmi Pradhan, Plastic Surgery: [phone]                  Or dial [phone] and ask for the resident on call for:  Plastics  For emergency care, call the:  [place] [facility]:  [phone] (TTY for hearing impaired: [phone])

## 2023-08-02 NOTE — ANESTHESIA POSTPROCEDURE EVALUATION
Patient: Yamila Myles    Procedure: Procedure(s):  Bilateral breast revision  fat grafting from abdomen and thighs       Anesthesia Type:  General    Note:  Disposition: Outpatient   Postop Pain Control: Uneventful            Sign Out: Well controlled pain   PONV: No   Neuro/Psych: Uneventful            Sign Out: Acceptable/Baseline neuro status   Airway/Respiratory: Uneventful            Sign Out: Acceptable/Baseline resp. status   CV/Hemodynamics: Uneventful            Sign Out: Acceptable CV status; No obvious hypovolemia; No obvious fluid overload   Other NRE: NONE   DID A NON-ROUTINE EVENT OCCUR? No           Last vitals:  Vitals Value Taken Time   /66 08/02/23 1500   Temp 36.1  C (97  F) 08/02/23 1445   Pulse 85 08/02/23 1509   Resp 11 08/02/23 1509   SpO2 100 % 08/02/23 1509   Vitals shown include unvalidated device data.    Electronically Signed By: Dontrell Guo DO  August 2, 2023  3:13 PM

## 2023-08-16 ENCOUNTER — PRE VISIT (OUTPATIENT)
Dept: ONCOLOGY | Facility: CLINIC | Age: 43
End: 2023-08-16
Payer: COMMERCIAL

## 2023-08-21 DIAGNOSIS — Z90.722 S/P BSO (BILATERAL SALPINGO-OOPHORECTOMY): ICD-10-CM

## 2023-08-21 RX ORDER — ESTRADIOL 2 MG/1
2 TABLET ORAL DAILY
Qty: 90 TABLET | Refills: 3 | Status: SHIPPED | OUTPATIENT
Start: 2023-08-21 | End: 2024-07-16

## 2023-08-22 ENCOUNTER — OFFICE VISIT (OUTPATIENT)
Dept: PLASTIC SURGERY | Facility: CLINIC | Age: 43
End: 2023-08-22
Attending: PLASTIC SURGERY
Payer: COMMERCIAL

## 2023-08-22 VITALS
BODY MASS INDEX: 23.89 KG/M2 | WEIGHT: 139.2 LBS | HEART RATE: 73 BPM | OXYGEN SATURATION: 99 % | DIASTOLIC BLOOD PRESSURE: 64 MMHG | TEMPERATURE: 98 F | SYSTOLIC BLOOD PRESSURE: 97 MMHG

## 2023-08-22 DIAGNOSIS — Z98.890 S/P BREAST RECONSTRUCTION, BILATERAL: Primary | ICD-10-CM

## 2023-08-22 PROCEDURE — 99024 POSTOP FOLLOW-UP VISIT: CPT | Performed by: PLASTIC SURGERY

## 2023-08-22 PROCEDURE — G0463 HOSPITAL OUTPT CLINIC VISIT: HCPCS | Performed by: PLASTIC SURGERY

## 2023-08-22 ASSESSMENT — PAIN SCALES - GENERAL: PAINLEVEL: MODERATE PAIN (4)

## 2023-08-22 NOTE — LETTER
8/22/2023         RE: Yamila Myles  [address] [facility] [address]        Dear Colleague,    Thank you for referring your patient, Yamial Myles, to the [facility]. Please see a copy of my visit note below.    PRESENTING COMPLAINT:  Postoperative visit status post revision and reconstruction of bilateral breasts with BRCA1 gene mutation.  Last surgery done on 08/02/2023.    HISTORY OF PRESENTING COMPLAINT:  Ms. Myles is 43 years old.  She had bilateral breast reconstruction with implants.  She underwent the last symmetry enhancement procedure and has done extremely well, happy with the results.  No issues.    PHYSICAL EXAMINATION:  Vital signs stable.  She is afebrile, in no obvious distress.  Both breasts are healing in well.  They are aesthetic, symmetric.  No evidence of infection, seroma, hematoma.    ASSESSMENT AND PLAN:  Based on the above findings, a diagnosis of bilateral breast reconstruction was made.  The patient is healing in extremely well, has very aesthetic breasts, happy with the results.  I have advised continued moisturization.  I will see her back as needed.  All questions were answered.  All exam and discussion done in the presence of my nurse, Jacqueline Peck.      Sincerely,        LING Pradhan MD

## 2023-08-22 NOTE — NURSING NOTE
"Oncology Rooming Note    August 22, 2023 8:53 AM   Yamila Myles is a 43 year old female who presents for:    Chief Complaint   Patient presents with    Oncology Clinic Visit     Post op     Initial Vitals: LMP  (LMP Unknown)  Estimated body mass index is 23.52 kg/m  as calculated from the following:    Height as of 8/2/23: 1.626 m (5' 4\").    Weight as of 8/2/23: 62.1 kg (137 lb). There is no height or weight on file to calculate BSA.  Data Unavailable Comment: Data Unavailable   No LMP recorded (lmp unknown). (Menstrual status: IUD).  Allergies reviewed: Yes  Medications reviewed: Yes    Medications: Medication refills not needed today.  Pharmacy name entered into [facility]:    [id number] - [place], [place] - [address] AT [facility] [address] [id number] - [place], [place] - [address] AT [facility] [address]  [facility] - [address]    Clinical concerns: none.       Giuliano Sanchez"

## 2023-08-22 NOTE — PROGRESS NOTES
PRESENTING COMPLAINT:  Postoperative visit status post revision and reconstruction of bilateral breasts with BRCA1 gene mutation.  Last surgery done on 08/02/2023.    HISTORY OF PRESENTING COMPLAINT:  Ms. Myles is 43 years old.  She had bilateral breast reconstruction with implants.  She underwent the last symmetry enhancement procedure and has done extremely well, happy with the results.  No issues.    PHYSICAL EXAMINATION:  Vital signs stable.  She is afebrile, in no obvious distress.  Both breasts are healing in well.  They are aesthetic, symmetric.  No evidence of infection, seroma, hematoma.    ASSESSMENT AND PLAN:  Based on the above findings, a diagnosis of bilateral breast reconstruction was made.  The patient is healing in extremely well, has very aesthetic breasts, happy with the results.  I have advised continued moisturization.  I will see her back as needed.  All questions were answered.  All exam and discussion done in the presence of my nurse, Jacqueline Peck.

## 2023-08-24 ENCOUNTER — MYC MEDICAL ADVICE (OUTPATIENT)
Dept: PLASTIC SURGERY | Facility: CLINIC | Age: 43
End: 2023-08-24
Payer: COMMERCIAL

## 2023-08-25 ENCOUNTER — OFFICE VISIT (OUTPATIENT)
Dept: SURGERY | Facility: CLINIC | Age: 43
End: 2023-08-25
Payer: COMMERCIAL

## 2023-08-25 VITALS
SYSTOLIC BLOOD PRESSURE: 101 MMHG | DIASTOLIC BLOOD PRESSURE: 71 MMHG | TEMPERATURE: 97.8 F | HEART RATE: 94 BPM | OXYGEN SATURATION: 100 %

## 2023-08-25 DIAGNOSIS — Z98.890 S/P BREAST RECONSTRUCTION, BILATERAL: Primary | ICD-10-CM

## 2023-08-25 PROCEDURE — 99024 POSTOP FOLLOW-UP VISIT: CPT | Performed by: PLASTIC SURGERY

## 2023-08-25 RX ORDER — CIPROFLOXACIN 500 MG/1
500 TABLET, FILM COATED ORAL 2 TIMES DAILY
Qty: 20 TABLET | Refills: 0 | Status: SHIPPED | OUTPATIENT
Start: 2023-08-25 | End: 2023-09-01

## 2023-08-25 ASSESSMENT — PAIN SCALES - GENERAL: PAINLEVEL: MODERATE PAIN (4)

## 2023-08-25 NOTE — PROGRESS NOTES
POSTOPERATIVE VISIT    PRESENTING COMPLAINT:  Postoperative visit, status post bilateral breast reconstruction with implants, status post staged 3 symmetry enhancement procedure, done 08/02/2023.    HISTORY OF PRESENTING COMPLAINT:  Ms. Myles is 43 years old.  She last saw me a few days ago when she was doing extremely well until that night and she started having a fever of 101 and some redness in the left breast.  She called us on Thursday when after Wednesday things were worsening.  She was started on Bactrim and we had her come in to see us today.  Overall, doing okay but having pain in the left breast with some redness.  No drainage.    PHYSICAL EXAMINATION:  Vital signs stable.  She is afebrile, in no obvious distress.  Left breast is definitely erythematous and slightly more swollen on the left than on the right.  No drainage.  Incisions are intact.  The patient does not look septic.    ASSESSMENT AND PLAN:  Based on the above findings, a diagnosis of left breast cellulitis was made.  She was started on Bactrim yesterday and has taken only 2 doses.  We will add broader coverage to that with ciprofloxacin just to cover gram negatives as well.  We will see her back on Tuesday in clinic.  She will call us if things change in any way or worsen in anyway.  All questions were answered.  All exam and discussion done in the presence of my resident, Damir Weathers.

## 2023-08-25 NOTE — LETTER
8/25/2023         RE: Yamila Myles  [address] [facility] [address]        Dear Colleague,    Thank you for referring your patient, Yamila Myles, to the [facility]. Please see a copy of my visit note below.    POSTOPERATIVE VISIT    PRESENTING COMPLAINT:  Postoperative visit, status post bilateral breast reconstruction with implants, status post staged 3 symmetry enhancement procedure, done 08/02/2023.    HISTORY OF PRESENTING COMPLAINT:  Ms. Myles is 43 years old.  She last saw me a few days ago when she was doing extremely well until that night and she started having a fever of 101 and some redness in the left breast.  She called us on Thursday when after Wednesday things were worsening.  She was started on Bactrim and we had her come in to see us today.  Overall, doing okay but having pain in the left breast with some redness.  No drainage.    PHYSICAL EXAMINATION:  Vital signs stable.  She is afebrile, in no obvious distress.  Left breast is definitely erythematous and slightly more swollen on the left than on the right.  No drainage.  Incisions are intact.  The patient does not look septic.    ASSESSMENT AND PLAN:  Based on the above findings, a diagnosis of left breast cellulitis was made.  She was started on Bactrim yesterday and has taken only 2 doses.  We will add broader coverage to that with ciprofloxacin just to cover gram negatives as well.  We will see her back on Tuesday in clinic.  She will call us if things change in any way or worsen in anyway.  All questions were answered.  All exam and discussion done in the presence of my resident, Damir Weathers.        Again, thank you for allowing me to participate in the care of your patient.        Sincerely,        LING Pradhan MD

## 2023-08-25 NOTE — NURSING NOTE
Yamila Myles's chief complaint for this visit includes:  Chief Complaint   Patient presents with    RECHECK     redness, swelling pain to left breast     PCP: Antonina Martinez    Referring Provider:  No referring provider defined for this encounter.    /71 (BP Location: Left arm, Patient Position: Sitting, Cuff Size: Adult Regular)   Pulse 94   Temp 97.8  F (36.6  C) (Oral)   LMP  (LMP Unknown)   SpO2 100%   Moderate Pain (4)        Allergies   Allergen Reactions    Advil [Nsaids] Other (See Comments)     Nasal congestion  Eye get puffy       Seasonal Allergies      Pollen and mold   Spring and Fall are main periods. Takes Zyrtec year round to manage it.   Gets post nasal drip     Adhesive Tape Rash     Surgical drape adhesive vs. ioban         Do you need any medication refills at today's visit? No    Pall[facility] Visit Facilitator- Surgical Specialties

## 2023-08-25 NOTE — CONFIDENTIAL NOTE
Spoke with Yamila today and she is still concerned about her left breast and feels that the redness, swelling and pain may be worse today than yesterday. RN offered appt today with  in [place] at 1pm and pt accepted. Location and address sent to pt via [facility]..Jacqueline Martinez RN

## 2023-08-29 ENCOUNTER — TELEPHONE (OUTPATIENT)
Dept: PLASTIC SURGERY | Facility: CLINIC | Age: 43
End: 2023-08-29

## 2023-08-29 ENCOUNTER — OFFICE VISIT (OUTPATIENT)
Dept: PLASTIC SURGERY | Facility: CLINIC | Age: 43
End: 2023-08-29
Attending: PLASTIC SURGERY
Payer: COMMERCIAL

## 2023-08-29 VITALS
HEART RATE: 84 BPM | BODY MASS INDEX: 23.93 KG/M2 | SYSTOLIC BLOOD PRESSURE: 106 MMHG | OXYGEN SATURATION: 98 % | DIASTOLIC BLOOD PRESSURE: 69 MMHG | WEIGHT: 139.4 LBS | TEMPERATURE: 97.9 F | RESPIRATION RATE: 16 BRPM

## 2023-08-29 DIAGNOSIS — Z98.890 S/P BREAST RECONSTRUCTION, BILATERAL: Primary | ICD-10-CM

## 2023-08-29 DIAGNOSIS — Z15.01 BRCA1 POSITIVE: ICD-10-CM

## 2023-08-29 DIAGNOSIS — B99.9 INFECTION: ICD-10-CM

## 2023-08-29 DIAGNOSIS — Z15.09 BRCA1 POSITIVE: ICD-10-CM

## 2023-08-29 PROCEDURE — G0463 HOSPITAL OUTPT CLINIC VISIT: HCPCS | Performed by: PLASTIC SURGERY

## 2023-08-29 PROCEDURE — 99024 POSTOP FOLLOW-UP VISIT: CPT | Performed by: PLASTIC SURGERY

## 2023-08-29 RX ORDER — CEFAZOLIN SODIUM 2 G/50ML
2 SOLUTION INTRAVENOUS SEE ADMIN INSTRUCTIONS
Status: CANCELLED | OUTPATIENT
Start: 2023-08-29

## 2023-08-29 RX ORDER — CEFAZOLIN SODIUM 2 G/50ML
2 SOLUTION INTRAVENOUS
Status: CANCELLED | OUTPATIENT
Start: 2023-08-29

## 2023-08-29 ASSESSMENT — PAIN SCALES - GENERAL: PAINLEVEL: MILD PAIN (2)

## 2023-08-29 NOTE — TELEPHONE ENCOUNTER
Surgery is scheduled with Dr. Pradhan  Date: 9/1   Location: [facility]      H&P to be  updated by Dr. Pradhan on DOS      Post-op: 9/8 with Dr. Pradhan, [facility]      Patient will receive a phone call from pre-admission nurses 1-2 days prior to surgery with arrival and start time.       Left a detailed voicemail AND sent a [employer] message with the scheduled information. Provided direct line. Will watch for response and/or call back from patient.       Patient questions/concerns: N/A       Surgery packet: to be sent via [employer]    __    Hoda Heard Senior Perioperative Coordinator, on 8/29/2023 at 11:50 AM  P: [phone]

## 2023-08-29 NOTE — PROGRESS NOTES
PRESENTING COMPLAINT:  Post-operative visit s/p bilateral breast reconstruction with left sided cellulitis, last surgery 8/2/23     HISTORY OF PRESENTING COMPLAINT: The patient is here for post-operative visit.  The patient is being seen in the presence of my nurse.     Unfortunately, still having issues. Feels well. But redness no better. Swelling more. On Cipro/Bactrim. No drainage.      ASSESSMENT AND PLAN:  Based upon the above findings, the patient is here for post-operative visit.     Plan to go to OR next few days where opening found in OR. Plan is to washout and remove implant. Plan will be to wait a few months and then replace with expander and restart process.     All risks, benefits and alternatives were explained to the patient in detail, they were understood and agreed upon by the patient, they were acknowledged by the patient,   all the patient's questions were answered in detail to the patient's fullest understanding that they acknowledged, the team approach for treatment in the operating room was agreed upon by the patient, and proceeding with surgery was agreed upon by the patient.    All questions were answered.  The patient was happy with the visit.

## 2023-08-29 NOTE — NURSING NOTE
"Oncology Rooming Note    August 29, 2023 9:02 AM   Yamila Myles is a 43 year old female who presents for:    Chief Complaint   Patient presents with    Oncology Clinic Visit     Post op infection     Initial Vitals: /69 (BP Location: Right arm, Patient Position: Sitting, Cuff Size: Adult Regular)   Pulse 84   Temp 97.9  F (36.6  C) (Oral)   Resp 16   Wt 63.2 kg (139 lb 6.4 oz)   LMP  (LMP Unknown)   SpO2 98%   BMI 23.93 kg/m   Estimated body mass index is 23.93 kg/m  as calculated from the following:    Height as of 8/2/23: 1.626 m (5' 4\").    Weight as of this encounter: 63.2 kg (139 lb 6.4 oz). Body surface area is 1.69 meters squared.  Mild Pain (2) Comment: Data Unavailable   No LMP recorded (lmp unknown). (Menstrual status: IUD).  Allergies reviewed: Yes  Medications reviewed: Yes    Medications: Medication refills not needed today.  Pharmacy name entered into [facility]:    [id number] - [place], [place] - [address] AT [facility] [address] & [id number] - [place], [place] - [address] AT [facility] [address]  [facility] - [place], [place] - [address] [place] [id number] [facility] [employer] - [place], [place] - [address] [place] [address]    Clinical concerns: Pt presents for post op check.       Sandra Castro LPN  8/29/2023              "

## 2023-08-29 NOTE — LETTER
8/29/2023         RE: Yamila Myles  [address] [facility] [address]        Dear Colleague,    Thank you for referring your patient, Yamila Myles, to the [facility]. Please see a copy of my visit note below.    PRESENTING COMPLAINT:  Post-operative visit s/p bilateral breast reconstruction with left sided cellulitis, last surgery 8/2/23     HISTORY OF PRESENTING COMPLAINT: The patient is here for post-operative visit.  The patient is being seen in the presence of my nurse.     Unfortunately, still having issues. Feels well. But redness no better. Swelling more. On Cipro/Bactrim. No drainage.      ASSESSMENT AND PLAN:  Based upon the above findings, the patient is here for post-operative visit.     Plan to go to OR next few days where opening found in OR. Plan is to washout and remove implant. Plan will be to wait a few months and then replace with expander and restart process.     All risks, benefits and alternatives were explained to the patient in detail, they were understood and agreed upon by the patient, they were acknowledged by the patient,   all the patient's questions were answered in detail to the patient's fullest understanding that they acknowledged, the team approach for treatment in the operating room was agreed upon by the patient, and proceeding with surgery was agreed upon by the patient.    All questions were answered.  The patient was happy with the visit.    Sincerely,    LING Pradhan MD

## 2023-08-30 ENCOUNTER — VIRTUAL VISIT (OUTPATIENT)
Dept: SURGERY | Facility: CLINIC | Age: 43
End: 2023-08-30
Payer: COMMERCIAL

## 2023-08-30 ENCOUNTER — MYC MEDICAL ADVICE (OUTPATIENT)
Dept: PLASTIC SURGERY | Facility: CLINIC | Age: 43
End: 2023-08-30

## 2023-08-30 ENCOUNTER — ANESTHESIA EVENT (OUTPATIENT)
Dept: SURGERY | Facility: AMBULATORY SURGERY CENTER | Age: 43
End: 2023-08-30
Payer: COMMERCIAL

## 2023-08-30 ENCOUNTER — PRE VISIT (OUTPATIENT)
Dept: SURGERY | Facility: CLINIC | Age: 43
End: 2023-08-30

## 2023-08-30 DIAGNOSIS — Z01.818 PREOP EXAMINATION: Primary | ICD-10-CM

## 2023-08-30 PROCEDURE — 99213 OFFICE O/P EST LOW 20 MIN: CPT | Mod: 24 | Performed by: PHYSICIAN ASSISTANT

## 2023-08-30 ASSESSMENT — LIFESTYLE VARIABLES: TOBACCO_USE: 0

## 2023-08-30 ASSESSMENT — ENCOUNTER SYMPTOMS: SEIZURES: 0

## 2023-08-30 ASSESSMENT — PAIN SCALES - GENERAL: PAINLEVEL: MODERATE PAIN (5)

## 2023-08-30 NOTE — PATIENT INSTRUCTIONS
Name:  Yamila Myles   MRN:  [id number]   :  1980   Today's Date:  2023         You were seen today for a pre-operative assessment in the:    Pre-operative Anesthesia Assessment Center(PAC)  [facility]  [address] [place] [id number]  phone [phone]      You will be receiving a call with location, date, arrival time and diet instructions from Preadmission Nursing at your surgical site:    -[facility]: [phone]           Anesthesia recommendations for medications:    Hold Aspirin for 7 days before procedure.  Hold Multivitamins for 7 days before procedure.   Hold Herbal medications and Supplements for 7 days before procedure.  Hold Ibuprofen for 1 day before procedure.   Hold Naproxen for 4 days before procedure.     No alcohol or cannabis products for 24 hours before your procedure           Please DO NOT take the following medications the day of procedure:    Calcium  Hemp Oil/Cannabis  Senna  Vitamin D    Please take these medications the day of procedure:    Acetaminophen(Tylenol) as needed  Flonase as needed  Ondansetron(Zofran) as needed  Bactrim      How do I prepare myself?  - Please take 2 showers (one the night prior to surgery and one the morning of surgery) using Scrubcare or Hibiclens soap.    Use this soap only from the neck to your toes.     Leave the soap on your skin for one minute--then rinse thoroughly.      You may use your own shampoo and conditioner. No other hair products.   - Please remove all jewelry and body piercings.  - No lotions, deodorants or fragrance.  - No makeup or fingernail polish.   - Bring your ID and insurance card.    -If you have a Deep Brain Stimulator, a Spinal Cord Stimulator, or any implanted Neuro Device, you must bring the remote to your appointment                 For further questions regarding your surgery please call your surgeon's office.

## 2023-08-30 NOTE — TELEPHONE ENCOUNTER
FUTURE VISIT INFORMATION      SURGERY INFORMATION:  Date: 9/1/23  Location:  or  Surgeon:  LING Pradhan MD   Anesthesia Type:  general  Procedure: REMOVAL, IMPLANT, BREAST, left and washout   Consult: ov 8/29/23    RECORDS REQUESTED FROM:       Primary Care Provider: Antonina Martinez MD - [facility]    Most recent PFT's: 3/16/17

## 2023-08-30 NOTE — PROGRESS NOTES
Yamila is a 43 year old who is being evaluated via a billable video visit.      How would you like to obtain your AVS? MyChart  If the video visit is dropped, the invitation should be resent by: Text to cell phone: [phone]        HPI             Review of Systems                 Physical Exam

## 2023-08-30 NOTE — H&P
Pre-Operative H & P     CC:  Preoperative exam to assess for increased cardiopulmonary risk while undergoing surgery and anesthesia.    Date of Encounter: 8/30/2023  Primary Care Physician:  Antonina Martinez     Reason for visit:   Encounter Diagnosis   Name Primary?    Preop examination Yes       HPI  Yamila Myles is a 43 year old female who presents for pre-operative H & P in preparation for  Procedure Information       Case: [id number] Date/Time: 09/01/23 1055    Procedure: REMOVAL, IMPLANT, BREAST, left and washout (Left: Breast)    Anesthesia type: General    Diagnosis: S/P breast reconstruction, bilateral [Z98.890]    Pre-op diagnosis: S/P breast reconstruction, bilateral [Z98.890]    Location:  OR [address] [place] [address] OR    Providers: LING Pradhan MD            Patient is being evaluated without significant comorbid conditions     Ms. Myles is BRCA 1 genetic carrier. She is s/p prophylactic bilateral mastopexy 4/2023. She has since undergone bilateral mastectomy and revision. She now has cellulitis and is now scheduled for the above procedure.     History is obtained from the patient and chart review    Hx of abnormal bleeding or anti-platelet use: denies    Menstrual history: No LMP recorded (lmp unknown). (Menstrual status: IUD).     Past Medical History  Past Medical History:   Diagnosis Date    Allergic rhinitis     Allergic rhinitis     BRCA1 positive 06/2001    Breast disorder 06/08/2011    Encounter for insertion of mirena IUD 07/12/2016    Hoarseness     Lump or mass in breast 2001    BRCA-1 positive (Mother, MGM w/ breast ca, mother w/ gene +), mother dx at 32)    Microcalcifications of the breast, right uoq 06/08/2011       Past Surgical History  Past Surgical History:   Procedure Laterality Date    GRAFT FAT TO BREAST Bilateral 8/2/2023    Procedure: fat grafting from abdomen and thighs;  Surgeon: LING Pradhan MD;  [place] [facility] [place]    INSERT INTRAUTERINE DEVICE N/A 7/12/2016     Procedure: INSERT INTRAUTERINE DEVICE;  Surgeon: Fidelia Ricks MD;  [facility] OR    LAPAROSCOPIC SALPINGO-OOPHORECTOMY Bilateral 7/12/2016    Procedure: LAPAROSCOPIC SALPINGO-OOPHORECTOMY;  Surgeon: Fidelia Ricks MD;  [facility] OR    LAPAROSCOPY OPERATIVE ADULT N/A 7/12/2016    Procedure: LAPAROSCOPY OPERATIVE ADULT;  Surgeon: Fidelia Ricks MD;  [facility] OR    MAMMOPLASTY REDUCTION BILATERAL Bilateral 4/13/2022    Procedure: Bilateral breast lift;  Surgeon: LING Pradhan MD;  Location: [facility] OR    MASTECTOMY SIMPLE Bilateral 1/23/2023    Procedure: BILATERAL Risk-Reducing Nipple-Sparing Mastectomy;  Surgeon: Monica Rodríguez MD;  Location: [facility] OR    RECONSTRUCT BREAST BILATERAL, IMPLANT PROSTHESIS BILATERAL, COMBINED Bilateral 1/23/2023    Procedure: Bilateral breast reconstruction with implant/Alloderm, SPY;  Surgeon: LING Pradhan MD;  Location: [facility] OR    REVISE RECONSTRUCTED BREAST BILATERAL Bilateral 8/2/2023    Procedure: Bilateral breast revision;  Surgeon: LING Pradhan MD;  Location: [facility] [place]    [facility] APPENDECTOMY  1987       Prior to Admission Medications  Current Outpatient Medications   Medication Sig Dispense Refill    acetaminophen (TYLENOL) 325 MG tablet Take 325-650 mg by mouth every 6 hours as needed for mild pain      calcium carbonate (OS-GRETCHEN) 500 MG tablet Take 1 tablet by mouth every evening Unsure if 500 or 1000mg      cetirizine (ZYRTEC) 10 MG tablet Take 10 mg by mouth every evening      ciprofloxacin (CIPRO) 500 MG tablet Take 1 tablet (500 mg) by mouth 2 times daily for 10 days 20 tablet 0    estradiol (ESTRACE) 2 MG tablet Take 1 tablet (2 mg) by mouth daily (Patient taking differently: Take 2 mg by mouth every evening) 90 tablet 3    fluticasone (FLONASE) 50 MCG/ACT nasal spray Spray 2 sprays into both nostrils daily (Patient taking differently: Spray 2 sprays into both nostrils as needed) 16 g 3    HEMP OIL OR EXTRACT OR OTHER CBD  CANNABINOID, NOT MEDICAL CANNABIS, 10 mg every evening [employer] CBD 10 mg      lactobacillus rhamnosus (GG) (CULTURELL) capsule Take 1 capsule by mouth every evening      ondansetron (ZOFRAN ODT) 4 MG ODT tab Take 1 tablet (4 mg) by mouth every 8 hours as needed for nausea 4 tablet 0    senna-docusate (SENOKOT-S/PERICOLACE) 8.6-50 MG tablet Take 1-2 tablets by mouth 2 times daily 30 tablet 0    sulfamethoxazole-trimethoprim (BACTRIM DS) 800-160 MG tablet Take 1 tablet by mouth 2 times daily for 10 days 20 tablet 0    Vitamin D, Cholecalciferol, 25 MCG (1000 UT) CAPS Take 1,000 mg % by mouth every evening Unsure is 500 or 1000      oxyCODONE (ROXICODONE) 5 MG tablet Take 1-2 tablets (5-10 mg) by mouth every 6 hours as needed for moderate to severe pain (Patient not taking: Reported on 8/29/2023) 10 tablet 0       Allergies  Allergies   Allergen Reactions    Advil [Nsaids] Other (See Comments)     Nasal congestion  Eye get puffy       Seasonal Allergies      Pollen and mold   Spring and Fall are main periods. Takes Zyrtec year round to manage it.   Gets post nasal drip     Adhesive Tape Rash     Surgical drape adhesive vs. ioban       Social History  Social History     Socioeconomic History    Marital status:      Spouse name: Not on file    Number of children: 2    Years of education: BA    Highest education level: Not on file   Occupational History    Occupation: staff director     Comment: [employer]   Tobacco Use    Smoking status: Never     Passive exposure: Never    Smokeless tobacco: Never   Substance and Sexual Activity    Alcohol use: Not Currently    Drug use: Yes     Comment: CBD 10 mg     Sexual activity: Yes     Partners: Male     Birth control/protection: I.U.D.     Comment: Mirena   Other Topics Concern    Parent/sibling w/ CABG, MI or angioplasty before 65F 55M? No     Service Not Asked    Blood Transfusions No    Caffeine Concern No    Occupational Exposure No     Hobby Hazards Not Asked    Sleep Concern No    Stress Concern Yes     Comment: life/work -->coping    Weight Concern No    Special Diet No    Back Care Yes     Comment: chiropracter for old injury    Exercise No    Bike Helmet Not Asked     Comment: not biker    Seat Belt No    Self-Exams Not Asked   Social History Narrative    4/12/23    Going to [facility] this summer with daughter for 12th birthday    Fidelia Ricks MD            Social Documentation:        Balanced Diet: YES    Calcium intake: less than 2 per day    Caffeine: 0-1 per day    Exercise:  type of activity walk; daily times per week    Sunscreen: Yes    Seatbelts:  Yes    Self Breast Exam:  Yes    Self Testicular Exam: No - n/a    Physical/Emotional/Sexual Abuse: No     Do you feel safe in your environment? Yes        Cholesterol screen up to date: Yes-3 yrs ago per pt.  Not fasting today.     Eye Exam up to date: Yes    Dental Exam up to date: No - 9 months ago.     Pap smear up to date: Yes    Mammogram up to date: Does Not Apply    Dexa Scan up to date: Does Not Apply    Colonoscopy up to date: Does Not Apply    Immunizations up to date: Yes-Td 12/06    Glucose screen if over 40:  No - n/a     Social Determinants of Health     Financial Resource Strain: Not on file   Food Insecurity: Not on file   Transportation Needs: Not on file   Physical Activity: Not on file   Stress: Not on file   Social Connections: Not on file   Intimate Partner Violence: Not on file   Housing Stability: Not on file       Family History  Family History   Problem Relation Age of Onset    Breast Cancer Mother         dx age 32, doing well    Colon Cancer Mother         dx 2019. s/p surg and chemo    Anesthesia Reaction Sister         PONV    Hereditary Breast and Ovarian Cancer Syndrome Sister         double Mastectomy and oophrectomy    Breast Cancer Maternal Grandmother     Diabetes Paternal Grandmother     Breast Cancer Paternal Grandmother     Thrombosis No family  hx of        Review of Systems  The complete review of systems is negative other than noted in the HPI or here.   Anesthesia Evaluation   Pt has had prior anesthetic.     No history of anesthetic complications       ROS/MED HX  ENT/Pulmonary:    (-) tobacco use   Neurologic:  - neg neurologic ROS  (-) no seizures and no CVA   Cardiovascular:     (+)  - -   -  - -                                 No previous cardiac testing  (-) taking anticoagulants/antiplatelets   METS/Exercise Tolerance: >4 METS Comment: 20-40 minutes of elliptical or walking   Hematologic:  - neg hematologic  ROS  (-) history of blood clots and history of blood transfusion   Musculoskeletal:  - neg musculoskeletal ROS     GI/Hepatic:  - neg GI/hepatic ROS  (-) GERD   Renal/Genitourinary:  - neg Renal ROS     Endo:  - neg endo ROS  (-) chronic steroid usage   Psychiatric/Substance Use:  - neg psychiatric ROS     Infectious Disease: Comment: Cellulitis       Malignancy: Comment: BRCA carrier now s/p surgery      Other:            Virtual visit -  No vitals were obtained    Physical Exam  Constitutional: Awake, alert, cooperative, no apparent distress, and appears stated age.  HENT: Normocephalic  Respiratory: non labored breathing   Neurologic: Awake, alert, oriented to name, place and time.   Neuropsychiatric: Calm, cooperative. Normal affect.      Prior Labs/Diagnostic Studies   All labs and imaging personally reviewed     EKG/ stress test - if available please see in ROS above   No results found.      Latest Ref Rng & Units 3/16/2017     4:32 PM   PFT   FVC L 3.20    FEV1 L 2.67    FVC% % 85    FEV1% % 86          The patient's records and results personally reviewed by this provider.     Outside records reviewed from: Care Everywhere      Assessment    Yamila Myles is a 43 year old female seen as a PAC referral for risk assessment and optimization for anesthesia.    Plan/Recommendations  Pt will be optimized for the proposed procedure.  See  "below for details on the assessment, risk, and preoperative recommendations    NEUROLOGY  - No history of TIA, CVA or seizure  -Post Op delirium risk factors:  No risk identified    ENT  - No current airway concerns.  Will need to be reassessed day of surgery.  Mallampati: Unable to assess  TM: Unable to assess    CARDIAC  - No history of CAD, Hypertension, and Afib  -denies cardiac history or symptoms   - METS (Metabolic Equivalents)  Patient performs 4 or more METS exercise without symptoms            Total Score: 0      RCRI-Very low risk: Class 1 0.4% complication rate            Total Score: 0        PULMONARY  JOSE LUIS Low Risk            Total Score: 0      - Denies asthma or inhaler use  - Tobacco History    History   Smoking Status    Never   Smokeless Tobacco    Never       GI  PONV High Risk  Total Score: 3           1 AN PONV: Pt is Female    1 AN PONV: Patient is not a current smoker    1 AN PONV: Intended Post Op Opioids        /RENAL  - Baseline Creatinine  WNL    ENDOCRINE    - BMI: Estimated body mass index is 23.93 kg/m  as calculated from the following:    Height as of 8/2/23: 1.626 m (5' 4\").    Weight as of 8/29/23: 63.2 kg (139 lb 6.4 oz).  Healthy Weight (BMI 18.5-24.9)  - No history of Diabetes Mellitus    HEME  VTE Low Risk 0.26%            Total Score: 0      - No history of abnormal bleeding or antiplatelet use.    MSK  BRCA s/p mastectomy. Cellulitis with the above procedure planned. Continues to have pain and swelling- managing with tylenol. Patient has allergy to NSAIDs. She will contact surgery team to alert that the swelling and pain is worsening. She denies fevers, although continues to take tylenol     Different anesthesia methods/types have been discussed with the patient, but they are aware that the final plan will be decided by the assigned anesthesia provider on the date of service.    The patient is optimized for their procedure. AVS with information on surgery time/arrival time, " meds and NPO status given by nursing staff. No further diagnostic testing indicated.    Please refer to the physical examination documented by the anesthesiologist in the anesthesia record on the day of surgery.    Video-Visit Details    Type of service:  Video Visit    Provider received verbal consent for a Video Visit from the patient? Yes     Originating Location (pt. Location): Home    Distant Location (provider location):  Off-site  Mode of Communication:  Video Conference via [employer]  On the day of service:     Prep time: 8 minutes  Visit time: 9 minutes  Documentation time: 4 minutes  ------------------------------------------  Total time: 21 minutes      Mallory Sotomayor PA-C  Preoperative Assessment Center  [facility]  Clinic and [facility] Center  Phone: [phone]  Fax: [phone]

## 2023-08-31 ASSESSMENT — LIFESTYLE VARIABLES: TOBACCO_USE: 0

## 2023-08-31 ASSESSMENT — ENCOUNTER SYMPTOMS: SEIZURES: 0

## 2023-08-31 NOTE — TELEPHONE ENCOUNTER
Spoke with patient re: L breast concerns. Increased swelling, persistent erythema. Pain manageable with tylenol around the clock. No fevers noted. Denies trauma to breast.     Unable to add on today, will continue with plan for washout and implant removal tomorrow 9/1, will make patient first start case at 7am. She is agreeable to plan.

## 2023-08-31 NOTE — TELEPHONE ENCOUNTER
RECORDS STATUS - ALL OTHER DIAGNOSIS    BRCA 1 genetic carrier   RECORDS RECEIVED FROM:    Appt Date: 9/8/2023 with Jessica Pauler     Action    Action Taken 8/31/2023 8:46AM DAVID     I called pt Yamila - she had some genetic testing done back in 1996 or 1997. She will try to find the reports.     She also saw a genetic counselor btw [phone]. [facility] at the [facility]  I faxed over a request for historical records to our internal HIM Dept.     She took down my contact information.     9/11/2023 1:41pm DAVID   I faxed records from our internal HIM Dept to HIM       NOTES STATUS DETAILS   OFFICE NOTE from referring provider Complete  referred by Monica Rodríguez MD    OFFICE NOTE from medical oncologist     OPERATIVE REPORT     LABS     PATHOLOGY REPORTS     ANYTHING RELATED TO DIAGNOSIS     GENONOMIC TESTING     TYPE:     IMAGING (NEED IMAGES & REPORT)     CT SCANS     MRI Complete MRI Breast 12/2/2022 more in PACS   MAMMO Complete 9/2/2022 more in PACS   ULTRASOUND Complete US Breast Left limited 9/2/2022   PET

## 2023-09-01 ENCOUNTER — ANESTHESIA (OUTPATIENT)
Dept: SURGERY | Facility: AMBULATORY SURGERY CENTER | Age: 43
End: 2023-09-01
Payer: COMMERCIAL

## 2023-09-01 ENCOUNTER — HOSPITAL ENCOUNTER (OUTPATIENT)
Facility: AMBULATORY SURGERY CENTER | Age: 43
Discharge: HOME | End: 2023-09-01
Attending: PLASTIC SURGERY | Admitting: PLASTIC SURGERY
Payer: COMMERCIAL

## 2023-09-01 VITALS
SYSTOLIC BLOOD PRESSURE: 100 MMHG | TEMPERATURE: 97.8 F | OXYGEN SATURATION: 99 % | HEART RATE: 70 BPM | DIASTOLIC BLOOD PRESSURE: 79 MMHG | RESPIRATION RATE: 18 BRPM

## 2023-09-01 DIAGNOSIS — Z98.890 S/P BREAST RECONSTRUCTION, BILATERAL: Primary | ICD-10-CM

## 2023-09-01 LAB
GRAM STAIN RESULT: ABNORMAL
GRAM STAIN RESULT: ABNORMAL

## 2023-09-01 PROCEDURE — 87186 SC STD MICRODIL/AGAR DIL: CPT | Performed by: PLASTIC SURGERY

## 2023-09-01 PROCEDURE — 88300 SURGICAL PATH GROSS: CPT | Mod: XU | Performed by: PATHOLOGY

## 2023-09-01 PROCEDURE — 87077 CULTURE AEROBIC IDENTIFY: CPT | Performed by: PLASTIC SURGERY

## 2023-09-01 PROCEDURE — 19328 RMVL INTACT BREAST IMPLANT: CPT | Mod: LT

## 2023-09-01 PROCEDURE — G8916 PT W IV AB GIVEN ON TIME: HCPCS

## 2023-09-01 PROCEDURE — 88305 TISSUE EXAM BY PATHOLOGIST: CPT | Performed by: PATHOLOGY

## 2023-09-01 PROCEDURE — 87102 FUNGUS ISOLATION CULTURE: CPT | Performed by: PLASTIC SURGERY

## 2023-09-01 PROCEDURE — 87070 CULTURE OTHR SPECIMN AEROBIC: CPT | Performed by: PLASTIC SURGERY

## 2023-09-01 PROCEDURE — 87075 CULTR BACTERIA EXCEPT BLOOD: CPT | Performed by: PLASTIC SURGERY

## 2023-09-01 PROCEDURE — 87205 SMEAR GRAM STAIN: CPT | Performed by: PLASTIC SURGERY

## 2023-09-01 PROCEDURE — G8907 PT DOC NO EVENTS ON DISCHARG: HCPCS

## 2023-09-01 RX ORDER — SODIUM CHLORIDE, SODIUM LACTATE, POTASSIUM CHLORIDE, CALCIUM CHLORIDE 600; 310; 30; 20 MG/100ML; MG/100ML; MG/100ML; MG/100ML
INJECTION, SOLUTION INTRAVENOUS CONTINUOUS
Status: DISCONTINUED | OUTPATIENT
Start: 2023-09-01 | End: 2023-09-02 | Stop reason: HOSPADM

## 2023-09-01 RX ORDER — ONDANSETRON 2 MG/ML
4 INJECTION INTRAMUSCULAR; INTRAVENOUS EVERY 30 MIN PRN
Status: DISCONTINUED | OUTPATIENT
Start: 2023-09-01 | End: 2023-09-02 | Stop reason: HOSPADM

## 2023-09-01 RX ORDER — FENTANYL CITRATE 50 UG/ML
25 INJECTION, SOLUTION INTRAMUSCULAR; INTRAVENOUS EVERY 5 MIN PRN
Status: DISCONTINUED | OUTPATIENT
Start: 2023-09-01 | End: 2023-09-02 | Stop reason: HOSPADM

## 2023-09-01 RX ORDER — CEFAZOLIN SODIUM 2 G/100ML
2 INJECTION, SOLUTION INTRAVENOUS SEE ADMIN INSTRUCTIONS
Status: DISCONTINUED | OUTPATIENT
Start: 2023-09-01 | End: 2023-09-02 | Stop reason: HOSPADM

## 2023-09-01 RX ORDER — SULFAMETHOXAZOLE/TRIMETHOPRIM 800-160 MG
1 TABLET ORAL 2 TIMES DAILY
Qty: 20 TABLET | Refills: 0 | Status: SHIPPED | OUTPATIENT
Start: 2023-09-01 | End: 2023-09-11

## 2023-09-01 RX ORDER — LIDOCAINE HYDROCHLORIDE 20 MG/ML
INJECTION, SOLUTION INFILTRATION; PERINEURAL PRN
Status: DISCONTINUED | OUTPATIENT
Start: 2023-09-01 | End: 2023-09-01

## 2023-09-01 RX ORDER — ONDANSETRON 4 MG/1
4 TABLET, ORALLY DISINTEGRATING ORAL EVERY 8 HOURS PRN
Qty: 4 TABLET | Refills: 0 | Status: SHIPPED | OUTPATIENT
Start: 2023-09-01 | End: 2024-01-30

## 2023-09-01 RX ORDER — CEFAZOLIN SODIUM 2 G/100ML
2 INJECTION, SOLUTION INTRAVENOUS
Status: COMPLETED | OUTPATIENT
Start: 2023-09-01 | End: 2023-09-01

## 2023-09-01 RX ORDER — CIPROFLOXACIN 500 MG/1
500 TABLET, FILM COATED ORAL 2 TIMES DAILY
Qty: 20 TABLET | Refills: 0 | Status: SHIPPED | OUTPATIENT
Start: 2023-09-01 | End: 2023-09-12

## 2023-09-01 RX ORDER — FENTANYL CITRATE 50 UG/ML
50 INJECTION, SOLUTION INTRAMUSCULAR; INTRAVENOUS EVERY 5 MIN PRN
Status: DISCONTINUED | OUTPATIENT
Start: 2023-09-01 | End: 2023-09-02 | Stop reason: HOSPADM

## 2023-09-01 RX ORDER — DEXAMETHASONE SODIUM PHOSPHATE 4 MG/ML
INJECTION, SOLUTION INTRA-ARTICULAR; INTRALESIONAL; INTRAMUSCULAR; INTRAVENOUS; SOFT TISSUE PRN
Status: DISCONTINUED | OUTPATIENT
Start: 2023-09-01 | End: 2023-09-01

## 2023-09-01 RX ORDER — FENTANYL CITRATE 50 UG/ML
INJECTION, SOLUTION INTRAMUSCULAR; INTRAVENOUS PRN
Status: DISCONTINUED | OUTPATIENT
Start: 2023-09-01 | End: 2023-09-01

## 2023-09-01 RX ORDER — OXYCODONE HYDROCHLORIDE 5 MG/1
5 TABLET ORAL ONCE
Status: COMPLETED | OUTPATIENT
Start: 2023-09-01 | End: 2023-09-01

## 2023-09-01 RX ORDER — AMOXICILLIN 250 MG
1-2 CAPSULE ORAL 2 TIMES DAILY
Qty: 30 TABLET | Refills: 0 | Status: ON HOLD | OUTPATIENT
Start: 2023-09-01 | End: 2024-02-05

## 2023-09-01 RX ORDER — PROPOFOL 10 MG/ML
INJECTION, EMULSION INTRAVENOUS PRN
Status: DISCONTINUED | OUTPATIENT
Start: 2023-09-01 | End: 2023-09-01

## 2023-09-01 RX ORDER — LIDOCAINE 40 MG/G
CREAM TOPICAL
Status: DISCONTINUED | OUTPATIENT
Start: 2023-09-01 | End: 2023-09-02 | Stop reason: HOSPADM

## 2023-09-01 RX ORDER — ONDANSETRON 2 MG/ML
INJECTION INTRAMUSCULAR; INTRAVENOUS PRN
Status: DISCONTINUED | OUTPATIENT
Start: 2023-09-01 | End: 2023-09-01

## 2023-09-01 RX ORDER — OXYCODONE HYDROCHLORIDE 5 MG/1
5-10 TABLET ORAL EVERY 6 HOURS PRN
Qty: 15 TABLET | Refills: 0 | Status: SHIPPED | OUTPATIENT
Start: 2023-09-01 | End: 2024-01-30

## 2023-09-01 RX ORDER — ONDANSETRON 4 MG/1
4 TABLET, ORALLY DISINTEGRATING ORAL EVERY 30 MIN PRN
Status: DISCONTINUED | OUTPATIENT
Start: 2023-09-01 | End: 2023-09-02 | Stop reason: HOSPADM

## 2023-09-01 RX ORDER — ACETAMINOPHEN 325 MG/1
975 TABLET ORAL ONCE
Status: COMPLETED | OUTPATIENT
Start: 2023-09-01 | End: 2023-09-01

## 2023-09-01 RX ADMIN — DEXAMETHASONE SODIUM PHOSPHATE 4 MG: 4 INJECTION, SOLUTION INTRA-ARTICULAR; INTRALESIONAL; INTRAMUSCULAR; INTRAVENOUS; SOFT TISSUE at 07:10

## 2023-09-01 RX ADMIN — OXYCODONE HYDROCHLORIDE 5 MG: 5 TABLET ORAL at 08:10

## 2023-09-01 RX ADMIN — PROPOFOL 30 MCG/KG/MIN: 10 INJECTION, EMULSION INTRAVENOUS at 07:02

## 2023-09-01 RX ADMIN — ONDANSETRON 4 MG: 2 INJECTION INTRAMUSCULAR; INTRAVENOUS at 07:10

## 2023-09-01 RX ADMIN — LIDOCAINE HYDROCHLORIDE 60 MG: 20 INJECTION, SOLUTION INFILTRATION; PERINEURAL at 07:01

## 2023-09-01 RX ADMIN — PROPOFOL 150 MG: 10 INJECTION, EMULSION INTRAVENOUS at 07:01

## 2023-09-01 RX ADMIN — CEFAZOLIN SODIUM 2 G: 2 INJECTION, SOLUTION INTRAVENOUS at 06:58

## 2023-09-01 RX ADMIN — FENTANYL CITRATE 50 MCG: 50 INJECTION, SOLUTION INTRAMUSCULAR; INTRAVENOUS at 07:13

## 2023-09-01 RX ADMIN — ACETAMINOPHEN 975 MG: 325 TABLET ORAL at 06:22

## 2023-09-01 RX ADMIN — SODIUM CHLORIDE, SODIUM LACTATE, POTASSIUM CHLORIDE, CALCIUM CHLORIDE: 600; 310; 30; 20 INJECTION, SOLUTION INTRAVENOUS at 06:58

## 2023-09-01 RX ADMIN — FENTANYL CITRATE 50 MCG: 50 INJECTION, SOLUTION INTRAMUSCULAR; INTRAVENOUS at 07:06

## 2023-09-01 NOTE — DISCHARGE INSTRUCTIONS
BREAST IMPLANT REMOVAL POST-OPERATIVE INSTRUCTIONS    Instructions      Have someone drive you home after surgery and help you at home for 1-2      days.     Get plenty of rest.     Follow balanced diet.     Decreased activity may promote constipation, so you may want to add      more raw fruit to your diet, and be sure to increase fluid intake.     Take pain medication as prescribed. Do not take aspirin or any products      containing aspirin.     Do not drink alcohol when taking pain medications.     Even when not taking pain medications, no alcohol for 3 weeks as it      causes fluid retention.     If you are taking vitamins with iron, resume these as tolerated.     Do not smoke, as smoking delays healing and increases the risk of      complications.    Activities     Do not drive until you are no longer taking any pain medications      (narcotics).     Start walking as soon as possible, this helps to reduce swelling and       lowers the chance of blood clots.     Resume normal activities gradually.     Return to work in 1-2 days, depending upon extent of surgery     No strenuous work or stress on wound for 2-3 weeks.    Incision Care     May shower from tomorrow, even with drain. If permanent sutures used, they come out in 2-3 weeks. If tape and glue used, it will fall off in time. Continue on 7-10 days of antibiotics.      Avoid exposing scars to sun for at least 12 months.     Always use a strong sunblock, if sun exposure is unavoidable (SPF 50 or      greater).     Inspect daily for signs of infection.     No tub soaking, bathing, or swimming while sutures or drains are in place.     Keep area clean and dry for first 24 hours.     What to Expect     Some swelling and bruising.     May have slight bleeding from incision.  Apply 4 x 4 gauze with slight pressure to       control bleeding.     Skin grafts and flaps may take several weeks or months to heal; a support garment or      bandage may be necessary for up  to a year.    Appearance     Final results of surgery may take a year or more.    Follow-Up Care     If external sutures have been used, they will be removed in 5-14 days.    Please note my office will call you 1-2 business days after your procedure to check up on how you're doing and to schedule your post-operative appointment.        When to Call     If you have increased swelling or bruising.     If swelling and redness persist after a few days.     If you have increased redness along the incision.     If you have severe or increased pain not relieved by medication.     If you have any side effects to medications; such as, rash, nausea,      headache, vomiting.     If you have an oral temperature over 100.4 degrees.     If you have any yellowish or greenish drainage from the incisions or      notice a foul odor.     If you have bleeding from the incisions that is difficult to control with      light pressure.     If you have loss of feeling or motion.    For Medical Questions, Please Call:     [phone], Monday - Friday, 8 a.m. - 4:30 p.m.     After hours and on weekends, call Hospital Paging at [phone] and      ask for the Plastic Surgeon on call.    [facility]  Same-Day Surgery   Adult Discharge Orders & Instructions   For 24 hours after surgery  Get plenty of rest.  A responsible adult must stay with you for at least 24 hours after you leave the hospital.   Do not drive or use heavy equipment.  If you have weakness or tingling, don't drive or use heavy equipment until this feeling goes away.  Do not drink alcohol.  Avoid strenuous or risky activities.  Ask for help when climbing stairs.   You may feel lightheaded.  IF so, sit for a few minutes before standing.  Have someone help you get up.   If you have nausea (feel sick to your stomach): Drink only clear liquids such as apple juice, ginger ale, broth or 7-Up.  Rest may also help.  Be sure to drink enough fluids.  Move to a  regular diet as you feel able.  You may have a slight fever. Call the doctor if your fever is over 100 F (37.7 C) (taken under the tongue) or lasts longer than 24 hours.  You may have a dry mouth, a sore throat, muscle aches or trouble sleeping.  These should go away after 24 hours.  Do not make important or legal decisions.   Call your doctor for any of the followin.  Signs of infection (fever, growing tenderness at the surgery site, a large amount of drainage or bleeding, severe pain, foul-smelling drainage, redness, swelling).    2. It has been over 8 to 10 hours since surgery and you are still not able to urinate (pass water).    3.  Headache for over 24 hours.  Tylenol 975 mg was given at 6:22 AM.  You should not take more then 4,000 mg of tylenol/acetaminophen in a 24 hour period.

## 2023-09-01 NOTE — BRIEF OP NOTE
[facility]    Brief Operative Note    Pre-operative diagnosis: S/P breast reconstruction, bilateral [Z98.890]  Post-operative diagnosis Left breast infection     Procedure: Procedure(s):  REMOVAL, IMPLANT, BREAST, left and washout  Surgeon: Surgeon(s) and Role:     * LING Pradhan MD - Primary  Anesthesia: General   Estimated Blood Loss: 5 mL from 9/1/2023  6:58 AM to 9/1/2023  7:47 AM      Drains: [place]-Mccrary  Specimens:   ID Type Source Tests Collected by Time Destination   1 : Left Breast Skin Tissue Breast, Left SURGICAL PATHOLOGY EXAM LING Pradhan MD 9/1/2023  7:17 AM    2 : Left Breast Implant Implant Other SURGICAL PATHOLOGY EXAM LING Pradhan MD 9/1/2023  7:24 AM    A : Left Breast Wound Wound Breast, Left ANAEROBIC BACTERIAL CULTURE ROUTINE, GRAM STAIN, FUNGAL OR YEAST CULTURE ROUTINE, AEROBIC BACTERIAL CULTURE ROUTINE LING Pradhan MD 9/1/2023  7:16 AM      Findings:   Left breast pocket with infected appearing seroma . Cultures sent. Implant removed. Closed over 15fr round TRACY.   Complications: None.  Implants:   Implant Name Type Inv. Item Serial No.  Lot No. LRB No. Used Action   IMPLANT BREAST NATRELLE INSPIRA 450CC SMOOTH ROUND SCF-450 - [id number] Breast Implant/Tissue Expander IMPLANT BREAST NATRELLE INSPIRA 450CC SMOOTH ROUND SCF-450 [id number] [employer]  Left 1 Explanted       Chris Nicole MD   Plastic Surgery PGY 4

## 2023-09-01 NOTE — OP NOTE
PREOPERATIVE DIAGNOSIS: Status post bilateral breast reconstruction with implants for BRCA1 gene mutation.  Now with a left-sided deep pocket infection.     POSTOPERATIVE DIAGNOSIS: Status post bilateral breast reconstruction with implants for BRCA1 gene mutation.  Now with a left-sided deep pocket infection.     PROCEDURES:   1.  Left breast implant removal, washout, and closure over drain.    SURGEON: Laxmi Pradhan MD     RESIDENT: Chris Nicole MD.    ANESTHESIA: General anesthesia with endotracheal intubation.     COMPLICATIONS: Nil.     DRAINS: One 15-Algerian channel TRACY drain on each side.    SPECIMENS: #1.  Left breast implant for permanent #2 fluid from left breast pocket for cultures and sensitivity #3 left breast skin for permanent    BLOOD LOSS: 5 mL      DESCRIPTION OF PROCEDURE: After informed consent was taken from the patient, the proper site and procedure was ascertained with her and she was appropriately marked and taken to the operating room. She was placed in a supine position with her knees comfortably flexed, pillows underneath them and pneumoboots placed and running prior to induction of anesthesia. Preoperative antibiotics were given in the OR and appropriately redosed during the case. General anesthesia was administered without any complications.     I began by first making an incision in the original scar in the inframammary fold and excising a bubble/unstable midportion scar along with it.  I dissected into the pocket.  About 100 cc of purulent material was removed.  It was sent for cultures and sensitivity.  The implant was removed.  There was significant irritation in the pocket.  This was cleaned out with 2 L of antibiotic irrigation and 2 L of Betadine irrigation.  Fibrinous materials were all removed.  Hemostasis ensured.  A 15 Algerian round TRACY drain was placed and secured.  The skin was then closed using 2-0 Monocryl suture in a deep layer followed by 3 -0 Strattafix suture in the  skin.  Surgical tape and glue was placed on top.    . Appropriate dressings were placed over the drain sites. The patient was wrapped not tightly. The patient tolerated the procedure well. All counts were correct at the end of the case. The patient was extubated and sent to recovery room in stable condition.

## 2023-09-01 NOTE — ANESTHESIA POSTPROCEDURE EVALUATION
Patient: Yamila Myles    Procedure: Procedure(s):  REMOVAL, IMPLANT, BREAST, left and washout       Anesthesia Type:  General    Note:  Disposition: Outpatient   Postop Pain Control: Uneventful            Sign Out: Well controlled pain   PONV: No   Neuro/Psych: Uneventful            Sign Out: Acceptable/Baseline neuro status   Airway/Respiratory: Uneventful            Sign Out: Acceptable/Baseline resp. status   CV/Hemodynamics: Uneventful            Sign Out: Acceptable CV status; No obvious hypovolemia; No obvious fluid overload   Other NRE:    DID A NON-ROUTINE EVENT OCCUR? No           Last vitals:  Vitals Value Taken Time   /68 09/01/23 0800   Temp 97.2  F (36.2  C) 09/01/23 0748   Pulse 78 09/01/23 0800   Resp 18 09/01/23 0800   SpO2 100 % 09/01/23 0800       Electronically Signed By: Pio Gage MD  September 1, 2023  9:23 AM

## 2023-09-01 NOTE — ANESTHESIA CARE TRANSFER NOTE
Patient: Yamila Myles    Procedure: Procedure(s):  REMOVAL, IMPLANT, BREAST, left and washout       Diagnosis: S/P breast reconstruction, bilateral [Z98.890]  Diagnosis Additional Information: No value filed.    Anesthesia Type:   General     Note:    Oropharynx: oropharynx clear of all foreign objects and spontaneously breathing  Level of Consciousness: drowsy  Oxygen Supplementation: nasal cannula  Level of Supplemental Oxygen (L/min / FiO2): 4  Independent Airway: airway patency satisfactory and stable  Dentition: dentition unchanged  Vital Signs Stable: post-procedure vital signs reviewed and stable  Report to RN Given: handoff report given  Patient transferred to: PACU    Handoff Report: Identifed the Patient, Identified the Reponsible Provider, Reviewed the pertinent medical history, Discussed the surgical course, Reviewed Intra-OP anesthesia mangement and issues during anesthesia, Set expectations for post-procedure period and Allowed opportunity for questions and acknowledgement of understanding    Vitals:  Vitals Value Taken Time   BP     Temp     Pulse     Resp     SpO2 99 % 09/01/23 0749   Vitals shown include unvalidated device data.    Electronically Signed By: CARLOS ALBERTO Higgins CRNA  September 1, 2023  7:50 AM

## 2023-09-01 NOTE — ANESTHESIA PREPROCEDURE EVALUATION
Anesthesia Pre-Procedure Evaluation    Patient: Yamila Myles   MRN: [id number] : 1980        Procedure : Procedure(s):  REMOVAL, IMPLANT, BREAST, left and washout          Past Medical History:   Diagnosis Date    Allergic rhinitis     Allergic rhinitis     BRCA1 positive 2001    Breast disorder 2011    Encounter for insertion of mirena IUD 2016    Hoarseness     Lump or mass in breast     BRCA-1 positive (Mother, MGM w/ breast ca, mother w/ gene +), mother dx at 32)    Microcalcifications of the breast, right uoq 2011      Past Surgical History:   Procedure Laterality Date    GRAFT FAT TO BREAST Bilateral 2023    Procedure: fat grafting from abdomen and thighs;  Surgeon: LING Pradhan MD;  Location: [facility] OR    INSERT INTRAUTERINE DEVICE N/A 2016    Procedure: INSERT INTRAUTERINE DEVICE;  Surgeon: Fidelia Ricks MD;  Location: [facility]    LAPAROSCOPIC SALPINGO-OOPHORECTOMY Bilateral 2016    Procedure: LAPAROSCOPIC SALPINGO-OOPHORECTOMY;  Surgeon: Fidelia Ricks MD;  Location: [facility]    LAPAROSCOPY OPERATIVE ADULT N/A 2016    Procedure: LAPAROSCOPY OPERATIVE ADULT;  Surgeon: Fidelia Ricks MD;  Location: [facility]    MAMMOPLASTY REDUCTION BILATERAL Bilateral 2022    Procedure: Bilateral breast lift;  Surgeon: LING Pradhan MD;  Location: [facility] OR    MASTECTOMY SIMPLE Bilateral 2023    Procedure: BILATERAL Risk-Reducing Nipple-Sparing Mastectomy;  Surgeon: Monica Rodríguez MD;  Location: [facility] OR    RECONSTRUCT BREAST BILATERAL, IMPLANT PROSTHESIS BILATERAL, COMBINED Bilateral 2023    Procedure: Bilateral breast reconstruction with implant/Alloderm, SPY;  Surgeon: LING Pradhan MD;  Location: [facility] OR    REVISE RECONSTRUCTED BREAST BILATERAL Bilateral 2023    Procedure: Bilateral breast revision;  Surgeon: LING Pradhan MD;  [place] [facility] [place]    [facility]CTOMY  1987      Allergies   Allergen Reactions     Advil [Nsaids] Other (See Comments)     Nasal congestion  Eye get puffy       Seasonal Allergies      Pollen and mold   Spring and Fall are main periods. Takes Zyrtec year round to manage it.   Gets post nasal drip     Adhesive Tape Rash     Surgical drape adhesive vs. ioban      Social History     Tobacco Use    Smoking status: Never     Passive exposure: Never    Smokeless tobacco: Never   Substance Use Topics    Alcohol use: Not Currently      Wt Readings from Last 1 Encounters:   08/29/23 63.2 kg (139 lb 6.4 oz)        Anesthesia Evaluation   Pt has had prior anesthetic.     No history of anesthetic complications       ROS/MED HX  ENT/Pulmonary:    (-) tobacco use   Neurologic:  - neg neurologic ROS  (-) no seizures and no CVA   Cardiovascular:     (+)  - -   -  - -                                 No previous cardiac testing  (-) taking anticoagulants/antiplatelets and murmur   METS/Exercise Tolerance: >4 METS Comment: 20-40 minutes of elliptical or walking   Hematologic:  - neg hematologic  ROS  (-) history of blood clots and history of blood transfusion   Musculoskeletal:  - neg musculoskeletal ROS     GI/Hepatic:  - neg GI/hepatic ROS  (-) GERD   Renal/Genitourinary:  - neg Renal ROS     Endo:  - neg endo ROS  (-) chronic steroid usage   Psychiatric/Substance Use:  - neg psychiatric ROS     Infectious Disease: Comment: Cellulitis       Malignancy: Comment: BRCA carrier now s/p surgery      Other:            Physical Exam    Airway        Mallampati: I   TM distance: > 3 FB   Neck ROM: full   Mouth opening: > 3 cm    Respiratory Devices and Support         Dental     Comment: Teeth examined without any significant abnormality, patient denies loose, missing or chipped teeth or anything removable. Braces on back of teeth        Cardiovascular          Rhythm and rate: regular and normal (-) no murmur    Pulmonary   pulmonary exam normal        breath sounds clear to auscultation           OUTSIDE LABS:  CBC:    Lab Results   Component Value Date    WBC 4.4 01/13/2023    WBC 6.5 07/12/2016    HGB 12.0 01/13/2023    HGB 13.2 07/12/2016    HCT 36.2 01/13/2023    HCT 39.8 07/12/2016     01/13/2023     07/12/2016     BMP:   Lab Results   Component Value Date     01/13/2023     02/18/2019    POTASSIUM 4.2 01/13/2023    POTASSIUM 4.5 02/18/2019    CHLORIDE 105 01/13/2023    CHLORIDE 111 (H) 02/18/2019    CO2 26 01/13/2023    CO2 26 02/18/2019    BUN 11.4 01/13/2023    BUN 15 02/18/2019    CR 0.75 01/13/2023    CR 0.82 02/18/2019    GLC 87 01/13/2023    GLC 85 02/18/2019     COAGS: No results found for: PTT, INR, FIBR  POC:   Lab Results   Component Value Date    HCG Negative 07/12/2016     HEPATIC: No results found for: ALBUMIN, PROTTOTAL, ALT, AST, GGT, ALKPHOS, BILITOTAL, BILIDIRECT, RADHA  OTHER:   Lab Results   Component Value Date    A1C 4.6 02/13/2015    GRETCHEN 9.3 01/13/2023    TSH 1.00 02/21/2013       Anesthesia Plan    ASA Status:  2    NPO Status:  NPO Appropriate    Anesthesia Type: General.     - Airway: LMA   Induction: Intravenous, Propofol.   Maintenance: Balanced.        Consents    Anesthesia Plan(s) and associated risks, benefits, and realistic alternatives discussed. Questions answered and patient/representative(s) expressed understanding.     - Discussed:     - Discussed with:  Patient      - Extended Intubation/Ventilatory Support Discussed: No.      - Patient is DNR/DNI Status: No     Use of blood products discussed: No .     Postoperative Care    Pain management: IV analgesics, Oral pain medications, Multi-modal analgesia.   PONV prophylaxis: Ondansetron (or other 5HT-3), Dexamethasone or Solumedrol, Background Propofol Infusion     Comments:    Other Comments: Discussed plan for general anesthetic with LMA. Discussed risks of sore throat, post op pain/nausea, oropharyngeal damage, rare major complications.         H&P reviewed: Unable to attach H&P to encounter due to EHR  limitations. H&P Update: appropriate H&P reviewed, patient examined. No interval changes since H&P (within 30 days).         Pio Gage MD

## 2023-09-01 NOTE — ANESTHESIA PROCEDURE NOTES
Airway       Patient location during procedure: OR  Staff -        CRNA: Giuliano Flowers APRN CRNA       Performed By: CRNA  Consent for Airway        Urgency: elective  Indications and Patient Condition       Indications for airway management: kimberli-procedural       Induction type:intravenous       Mask difficulty assessment: 0 - not attempted    Final Airway Details       Final airway type: supraglottic airway    Supraglottic Airway Details        Type: LMA       Brand: LMA Unique       LMA size: 4    Post intubation assessment        Placement verified by: capnometry, equal breath sounds and chest rise        Number of attempts at approach: 1       Secured with: silk tape       Ease of procedure: easy       Dentition: Intact and Unchanged

## 2023-09-04 LAB — BACTERIA WND CULT: ABNORMAL

## 2023-09-05 ENCOUNTER — OFFICE VISIT (OUTPATIENT)
Dept: PLASTIC SURGERY | Facility: CLINIC | Age: 43
End: 2023-09-05
Attending: PLASTIC SURGERY
Payer: COMMERCIAL

## 2023-09-05 VITALS
OXYGEN SATURATION: 100 % | HEART RATE: 86 BPM | BODY MASS INDEX: 23.62 KG/M2 | RESPIRATION RATE: 16 BRPM | WEIGHT: 137.6 LBS | TEMPERATURE: 97.8 F | DIASTOLIC BLOOD PRESSURE: 67 MMHG | SYSTOLIC BLOOD PRESSURE: 105 MMHG

## 2023-09-05 DIAGNOSIS — Z15.01 BRCA1 POSITIVE: Primary | ICD-10-CM

## 2023-09-05 DIAGNOSIS — B99.9 INFECTION: ICD-10-CM

## 2023-09-05 DIAGNOSIS — Z98.890 S/P BREAST RECONSTRUCTION, BILATERAL: ICD-10-CM

## 2023-09-05 DIAGNOSIS — Z15.09 BRCA1 POSITIVE: Primary | ICD-10-CM

## 2023-09-05 PROCEDURE — 99024 POSTOP FOLLOW-UP VISIT: CPT | Performed by: PLASTIC SURGERY

## 2023-09-05 PROCEDURE — G0463 HOSPITAL OUTPT CLINIC VISIT: HCPCS | Performed by: PLASTIC SURGERY

## 2023-09-05 RX ORDER — HYDROCORTISONE 25 MG/G
OINTMENT TOPICAL 2 TIMES DAILY
Qty: 30 G | Refills: 1 | Status: SHIPPED | OUTPATIENT
Start: 2023-09-05 | End: 2024-02-06

## 2023-09-05 ASSESSMENT — PAIN SCALES - GENERAL: PAINLEVEL: NO PAIN (0)

## 2023-09-05 NOTE — PROGRESS NOTES
PRESENTING COMPLAINT:  Post-operative visit s/p left breast implant removal for deep space infection and washout done 9/1/2023.     HISTORY OF PRESENTING COMPLAINT: The patient is here for post-operative visit.  The patient is being seen in the presence of my nurse.     Feeling better.  No fevers.  Microbiology grew staph aureus pansensitive.  On Bactrim and Cipro.  Complains of itching of the incision.    TRACY drain 20 to 30 cc of serous fluid a day.    On exam: Vital signs stable afebrile no obvious distress.    Incision healing in well.  No cellulitis.  Right breast healing well.  Some evidence of dermatitis from tape and glue.     ASSESSMENT AND PLAN:  Based upon the above findings, the patient is here for post-operative visit.     Advised moisturization with Vaseline and use of hydrocortisone cream twice daily for a week.  Remove the dressing.  Stay on Bactrim for 10 days.  See us back in 1 week.  TRACY drain to stay for another week.    All questions were answered.  The patient was happy with the visit.

## 2023-09-05 NOTE — NURSING NOTE
"Oncology Rooming Note    September 5, 2023 10:20 AM   Yamila Myles is a 43 year old female who presents for:    Chief Complaint   Patient presents with    Oncology Clinic Visit     Breast implant removal post-op     Initial Vitals: /67 (BP Location: Right arm, Patient Position: Sitting, Cuff Size: Adult Regular)   Pulse 86   Temp 97.8  F (36.6  C) (Oral)   Resp 16   Wt 62.4 kg (137 lb 9.6 oz)   LMP  (LMP Unknown)   SpO2 100%   BMI 23.62 kg/m   Estimated body mass index is 23.62 kg/m  as calculated from the following:    Height as of 8/2/23: 1.626 m (5' 4\").    Weight as of this encounter: 62.4 kg (137 lb 9.6 oz). Body surface area is 1.68 meters squared.  No Pain (0) Comment: Data Unavailable   No LMP recorded (lmp unknown). (Menstrual status: IUD).  Allergies reviewed: Yes  Medications reviewed: Yes    Medications: Medication refills not needed today.  Pharmacy name entered into [facility]:    [id number] - [place], [place] - [address] AT [facility] [address] & [id number] - [place], [place] - [address] AT [facility] [address]  [facility] - [address] [address] [facility] [employer] - [place], [place] - [address] [place] [address]    Clinical concerns:       Yury George              "

## 2023-09-05 NOTE — LETTER
9/5/2023         RE: Yamila Myles  [address] [facility] [address]        Dear Colleague,    Thank you for referring your patient, Yamila Myles, to the [facility]. Please see a copy of my visit note below.    PRESENTING COMPLAINT:  Post-operative visit s/p left breast implant removal for deep space infection and washout done 9/1/2023.     HISTORY OF PRESENTING COMPLAINT: The patient is here for post-operative visit.  The patient is being seen in the presence of my nurse.     Feeling better.  No fevers.  Microbiology grew staph aureus pansensitive.  On Bactrim and Cipro.  Complains of itching of the incision.    TRACY drain 20 to 30 cc of serous fluid a day.    On exam: Vital signs stable afebrile no obvious distress.    Incision healing in well.  No cellulitis.  Right breast healing well.  Some evidence of dermatitis from tape and glue.     ASSESSMENT AND PLAN:  Based upon the above findings, the patient is here for post-operative visit.     Advised moisturization with Vaseline and use of hydrocortisone cream twice daily for a week.  Remove the dressing.  Stay on Bactrim for 10 days.  See us back in 1 week.  TRACY drain to stay for another week.    All questions were answered.  The patient was happy with the visit.    Sincerely,    LING Pradhan MD

## 2023-09-08 ENCOUNTER — PRE VISIT (OUTPATIENT)
Dept: ONCOLOGY | Facility: CLINIC | Age: 43
End: 2023-09-08
Payer: COMMERCIAL

## 2023-09-08 ENCOUNTER — VIRTUAL VISIT (OUTPATIENT)
Dept: ONCOLOGY | Facility: CLINIC | Age: 43
End: 2023-09-08
Attending: SURGERY
Payer: COMMERCIAL

## 2023-09-08 VITALS — HEIGHT: 64 IN | BODY MASS INDEX: 23.39 KG/M2 | WEIGHT: 137 LBS

## 2023-09-08 DIAGNOSIS — Z12.11 SPECIAL SCREENING FOR MALIGNANT NEOPLASMS, COLON: Primary | ICD-10-CM

## 2023-09-08 DIAGNOSIS — Z86.0101 HISTORY OF ADENOMATOUS POLYP OF COLON: ICD-10-CM

## 2023-09-08 DIAGNOSIS — Z12.83 SKIN CANCER SCREENING: ICD-10-CM

## 2023-09-08 DIAGNOSIS — Z15.09 BRCA1 GENETIC CARRIER: ICD-10-CM

## 2023-09-08 DIAGNOSIS — Z15.01 BRCA1 GENETIC CARRIER: ICD-10-CM

## 2023-09-08 LAB — BACTERIA WND CULT: NORMAL

## 2023-09-08 PROCEDURE — 99215 OFFICE O/P EST HI 40 MIN: CPT | Mod: VID | Performed by: CLINICAL NURSE SPECIALIST

## 2023-09-08 ASSESSMENT — PAIN SCALES - GENERAL: PAINLEVEL: NO PAIN (0)

## 2023-09-08 NOTE — PROGRESS NOTES
Virtual Visit Details    Type of service:  Video Visit     Originating Location (pt. Location): Home  Distant Location (provider location):  On-site  Platform used for Video Visit: [facility]      Oncology Risk Management Consultation:  Date on this visit: 9/8/2023    Yamila Myles  is referred by Dr.Jane Juan Ramon Rodríguez for an oncology risk management consultation. She requires high risk screening and surveillance to reduce her risk of cancer secondary to Hereditary Breast and Ovarian Cancer Syndrome (due to a deleterious BRCA1 mutation )and is considered to be at high risk for hereditary breast and ovarian cancer.    Primary Physician: Antonina Martinez MD    History Of Present Illness:  Ms. Myles is a very pleasant, healthy 43 year old female who presents with a family history of breast and ovarian cancer and a personal diagnosis of a BRCA1 mutation.     Genetic Testing:  Reports her family participated in genetic testing with Dr. Lyle Edwards in late 1990s; had confirmatory genetic testing for BRCA1+ here at [facility] in early 2000s; no records in the chart. Records requested from archives    Pertinent history:  7/12/2016- FINAL DIAGNOSIS:   A. Right fallopian tube and ovary, right salpingo-oophorectomy:    - Acute salpingitis    - Paratubal cyst    - Ovary with hemorrhagic corpus luteal cyst and benign epithelial   inclusion cysts    - Fibrous adhesions of fallopian tube and ovary    - Negative for dysplasia, atypia, or malignancy     B. Left fallopian tube and ovary, left salpingo-oophorectomy:    - Acute salpingitis    - Ovary with corpus luteal cyst and benign epithelial inclusion cyst    - Fibrous adhesions of fallopian tube and ovary    - Negative for dysplasia, atypia, or malignancy     4/13/2022- Mastopexy:  A. LEFT breast, mastopexy:  -Benign breast tissue with fibrocystic change (including microcysts with apocrine metaplasia), duct ectasia and usual ductal hyperplasia  -Negative for atypia or  malignancy      B. RIGHT breast, mastopexy:  -Specimen consists predominantly of unremarkable skin  -Scant benign breast parenchyma present  -Negative for atypia or malignancy     1/23/2023- A. LEFT breast, retro nipple tissue, excision:  -Benign breast tissue, including lactiferous duct with duct ectasia  -Negative for atypia or malignancy  B. LEFT breast, risk reducing nipple-sparing mastectomy:  -Atypical lobular hyperplasia (ALH)  -Other findings: fibrocystic change (including cysts with apocrine metaplasia), columnar cell change, sclerosing adenosis, duct ectasia, and usual ductal hyperplasia  -Foreign body giant cell reaction, consistent with prior operative site  -Calcifications associated with benign ducts and acini  -Negative for malignancy  C. RIGHT breast, retro nipple tissue, excision:  -Benign breast tissue, including lactiferous duct with luminal calcification  -Negative for atypia or malignancy  D. RIGHT breast, risk reducing nipple-sparing mastectomy:  -Benign breast tissue with fibrocystic change (including cysts with apocrine metaplasia), columnar cell change, fibroadenomatoid change, and usual ductal hyperplasia  -Foreign body giant cell reaction, consistent with prior operative site  -Negative for atypia or malignancy      8/2/2023- A. Skin, LEFT breast, excision during revision of reconstruction:  -Benign skin  -Negative for atypia or malignancy     B. Skin, RIGHT breast, excision during revision of reconstruction:  -Benign skin  -Negative for atypia or malignancy    9/1/2023- A. Skin, reconstructed LEFT breast, excision:  -Skin and subcutaneous adipose tissue with post-operative changes (including dermal scar and foreign body giant cell reaction), granulation tissue, and acute inflammation (inflammation involves the full thickness of the specimen, including abscess in subcutaneous adipose tissue)  -Negative for malignancy     B. Medical device, LEFT breast implant, removal:  -Intact breast  implant (gross examination only)    Pertinent screening history:  1/8/2021- Colonoscopy:  CECUM POLYP, POLYPECTOMY:   - Sessile serrated adenoma; no evidence of cytological dysplasia   - Prominent lymphoid aggregate present Repeat in January 2024 4/26/2018- Mirena IUD inserted; expires April 2025    At this visit, she is waiting to have further surgery to replace her left breast implant, which had become infected. She denies new fatigue and breasts pain.    Past Medical/Surgical History:  Past Medical History:   Diagnosis Date    Allergic rhinitis     Allergic rhinitis     BRCA1 positive 06/2001    Breast disorder 06/08/2011    Encounter for insertion of mirena IUD 07/12/2016    Hoarseness     Lump or mass in breast 2001    BRCA-1 positive (Mother, MGM w/ breast ca, mother w/ gene +), mother dx at 32)    Microcalcifications of the breast, right uoq 06/08/2011     Past Surgical History:   Procedure Laterality Date    GRAFT FAT TO BREAST Bilateral 8/2/2023    Procedure: fat grafting from abdomen and thighs;  Surgeon: LING Pradhan MD;  Location: [facility] OR    INSERT INTRAUTERINE DEVICE N/A 7/12/2016    Procedure: INSERT INTRAUTERINE DEVICE;  Surgeon: Fidelia Ricks MD;  Location: [facility]    LAPAROSCOPIC SALPINGO-OOPHORECTOMY Bilateral 7/12/2016    Procedure: LAPAROSCOPIC SALPINGO-OOPHORECTOMY;  Surgeon: Fidelia Ricks MD;  Location: [facility] OR    LAPAROSCOPY OPERATIVE ADULT N/A 7/12/2016    Procedure: LAPAROSCOPY OPERATIVE ADULT;  Surgeon: Fidelia Ricks MD;  Location: [facility]    MAMMOPLASTY REDUCTION BILATERAL Bilateral 4/13/2022    Procedure: Bilateral breast lift;  Surgeon: LING Pradhan MD;  Location: [facility] OR    MASTECTOMY SIMPLE Bilateral 1/23/2023    Procedure: BILATERAL Risk-Reducing Nipple-Sparing Mastectomy;  Surgeon: Monica Rodríguez MD;  Location: [facility] OR    RECONSTRUCT BREAST BILATERAL, IMPLANT PROSTHESIS BILATERAL, COMBINED Bilateral 1/23/2023    Procedure: Bilateral breast  reconstruction with implant/Alloderm, SPY;  Surgeon: LING Pradhan MD;  Location: [facility] OR    REVISE RECONSTRUCTED BREAST BILATERAL Bilateral 8/2/2023    Procedure: Bilateral breast revision;  Surgeon: LING Pradhan MD;  [place] [facility] [place]    [facility]CTOMY  1987       Allergies:  Allergies as of 09/08/2023 - Reviewed 09/08/2023   Allergen Reaction Noted    Advil [nsaids] Other (See Comments) 04/23/2007    Seasonal allergies  04/01/2022    Adhesive tape Rash 04/26/2022       Current Medications:  Current Outpatient Medications   Medication Sig Dispense Refill    acetaminophen (TYLENOL) 325 MG tablet Take 325-650 mg by mouth every 6 hours as needed for mild pain      calcium carbonate (OS-GRETCHEN) 500 MG tablet Take 1 tablet by mouth every evening Unsure if 500 or 1000mg      cetirizine (ZYRTEC) 10 MG tablet Take 10 mg by mouth every evening      estradiol (ESTRACE) 2 MG tablet Take 1 tablet (2 mg) by mouth daily (Patient taking differently: Take 2 mg by mouth every evening) 90 tablet 3    fluticasone (FLONASE) 50 MCG/ACT nasal spray Spray 2 sprays into both nostrils daily (Patient taking differently: Spray 2 sprays into both nostrils as needed) 16 g 3    HEMP OIL OR EXTRACT OR OTHER CBD CANNABINOID, NOT MEDICAL CANNABIS, 10 mg every evening [employer] CBD 10 mg      hydrocortisone 2.5 % ointment Apply topically 2 times daily 30 g 1    lactobacillus rhamnosus (GG) (CULTURELL) capsule Take 1 capsule by mouth every evening      ondansetron (ZOFRAN ODT) 4 MG ODT tab Take 1 tablet (4 mg) by mouth every 8 hours as needed for nausea 4 tablet 0    senna-docusate (SENOKOT-S/PERICOLACE) 8.6-50 MG tablet Take 1-2 tablets by mouth 2 times daily 30 tablet 0    sulfamethoxazole-trimethoprim (BACTRIM DS) 800-160 MG tablet Take 1 tablet by mouth 2 times daily for 10 days 20 tablet 0    Vitamin D, Cholecalciferol, 25 MCG (1000 UT) CAPS Take 1,000 mg % by mouth every evening Unsure is 500 or 1000      ciprofloxacin  "(CIPRO) 500 MG tablet Take 1 tablet (500 mg) by mouth 2 times daily for 10 days (Patient not taking: Reported on 2023) 20 tablet 0    oxyCODONE (ROXICODONE) 5 MG tablet Take 1-2 tablets (5-10 mg) by mouth every 6 hours as needed for moderate to severe pain (Patient not taking: Reported on 2023) 15 tablet 0        Family History:  Family History   Problem Relation Age of Onset    Breast Cancer Mother 32        dx age 32, doing well    Colon Cancer Mother 67        dx 2019. s/p surg and chemo    Hereditary Breast and Ovarian Cancer Syndrome Mother         BRCA1+    Anesthesia Reaction Sister         PONV    Hereditary Breast and Ovarian Cancer Syndrome Sister         double Mastectomy and oophrectomy; BRCA1+    Other - See Comments Sister         negative genetic testing    Other - See Comments Sister         negative genetic testing    Breast Cancer Maternal Grandmother     Ovarian Cancer Maternal Grandmother          in 40s; \"stomach cancer\"    Diabetes Paternal Grandmother     Breast Cancer Paternal Grandmother 59        cause of death at 62    Skin Cancer Maternal Uncle         negative for BRCA    Genetic Disorder Maternal Uncle         BRCA1+    Genetic Disorder Maternal Uncle         BRCA1+    Thrombosis No family hx of        Social History:  Social History     Socioeconomic History    Marital status:      Spouse name: Mani Robles    Number of children: 2    Years of education: BA    Highest education level: Not on file   Occupational History    Occupation: Staff director     Comment: [employer]   Tobacco Use    Smoking status: Never     Passive exposure: Never    Smokeless tobacco: Never   Substance and Sexual Activity    Alcohol use: Not Currently    Drug use: Yes     Comment: CBD 10 mg     Sexual activity: Yes     Partners: Male     Birth control/protection: I.U.D.     Comment: Mirena   Other Topics Concern    Parent/sibling w/ CABG, MI or angioplasty before " "65F 55M? No     Service Not Asked    Blood Transfusions No    Caffeine Concern No    Occupational Exposure No    Hobby Hazards Not Asked    Sleep Concern No    Stress Concern Yes     Comment: life/work -->coping    Weight Concern No    Special Diet No    Back Care Yes     Comment: chiropracter for old injury    Exercise No    Bike Helmet Not Asked     Comment: not biker    Seat Belt No    Self-Exams Not Asked   Social History Narrative    4/12/23    Going to [facility] this summer with daughter for 11th birthday    Fidelia Ricks MD            Social Documentation:        Balanced Diet: YES    Calcium intake: less than 2 per day    Caffeine: 0-1 per day    Exercise:  type of activity walk; daily times per week    Sunscreen: Yes    Seatbelts:  Yes    Self Breast Exam:  Yes    Self Testicular Exam: No - n/a    Physical/Emotional/Sexual Abuse: No     Do you feel safe in your environment? Yes        Cholesterol screen up to date: Yes-3 yrs ago per pt.  Not fasting today.     Eye Exam up to date: Yes    Dental Exam up to date: No - 9 months ago.     Pap smear up to date: Yes    Mammogram up to date: Does Not Apply    Dexa Scan up to date: Does Not Apply    Colonoscopy up to date: Does Not Apply    Immunizations up to date: Yes-Td 12/06    Glucose screen if over 40:  No - n/a     Social Determinants of Health     Financial Resource Strain: Not on file   Food Insecurity: Not on file   Transportation Needs: Not on file   Physical Activity: Not on file   Stress: Not on file   Social Connections: Not on file   Intimate Partner Violence: Not on file   Housing Stability: Not on file       Physical Exam:  Ht 1.626 m (5' 4\")   Wt 62.1 kg (137 lb)   LMP  (LMP Unknown)   BMI 23.52 kg/m    GENERAL: Healthy, alert and no distress  EYES: Eyes grossly normal to inspection.  No discharge or erythema, or obvious scleral/conjunctival abnormalities.  RESP: No audible wheeze, cough, or visible cyanosis.  No visible retractions " or increased work of breathing.    SKIN: Visible skin clear. No significant rash, abnormal pigmentation or lesions.  NEURO: Cranial nerves grossly intact.  Mentation and speech appropriate for age.  PSYCH: Mentation appears normal, affect normal/bright, judgement and insight intact, normal speech and appearance well-groomed.    Laboratory/Imaging Studies  No results found for any visits on 09/08/23.    ASSESSMENT  We discussed the differences between cancer risk for the general population and those with BRCA mutations. Women with BRCA mutations have a 40-80% lifetime risk of breast cancer, compared to the general population risk of 12%. Those with a BRCA mutation also bear a 10-40% lifetime risk of ovarian cancer, compared to the 1-2% lifetime risk within the general population.  We discussed that although there are reports of higher rates of colon cancer in BRCA1+ carriers, there is not a recommendation to begin colon screening earlier or to have more frequent colonoscopies. However, due to her mother's colon cancer history, she should be having her colonoscopies every 5 years or more frequently, depending on prior results. At this point, because she had a sessile serrated adenoma in 2021, she will be due for a colonoscopy in three years, January 2024.     Because she has a history of atypical lobular hyperplasia it is reasonable to continue to perform chest wall exams periodically. I would like her to return to clinic in September for a clinical exam as well as coordination of care.    We reviewed her family history and I updated EPIC accordingly. There are no cases of pancreatic cancer in her family. Some of her maternal cousins not tested for BRCA; her maternal cousin Rachael, recently was found to be  BRCA1+ in her 30s. She does not have history of cancer.    I have placed orders for a Dermatology Referral and a colonoscopy for her.  She has her eyes examined periodically by an optometrist as she needs corrective  lenses.    She will proceed with the plan below.    Individualized Surveillance Plan for women  Hereditary Breast and/or Ovarian Cancer Syndrome   Per NCCN Guidelines Version 1.2024   Recommended screening Test or procedure Last done Next Scheduled    Breast self awareness starting at age 18.    Breast cancer risk >60% Women should be familiar with their breasts and promptly report changes to their care provider. Periodic, consistent self exam may facilitate breast self awareness. Premenopausal women may find self exams to be most informative when performed at the end of menses.   9/8/2023 September 2024   Breast screening, starting at age 25 Clinical breast exams every 6 -12 months Exam deferred September 2024   Breast screening   Age 25-29 Annual breast MRI screening with contrast (or mammogram if MRI is unavailable) or individualized based on family history if a breast cancer diagnosis before age 30 is present.       Breast MRI is performed preferably on day 7-15 of menstrual cycle for premenopausal women.     NA     NA   Breast screening   Age >30-75 years     Annual mammogram (consider tomosynthesis mammogram) and annual screening MRI.     Breast MRI is performed preferably on day 7-15 of menstrual cycle for premenopausal women.    Age>75 years, management should be considered on an individual basis.     NA     NA   Ovarian cancer screening, starting at age 30-35    Absolute risk for epithelial ovarian cancer -39-58% for BRCA1+ carriers and 13-29% for BRCA2+ carriers   Consider transvaginal ultrasound and  tests.   RRSO complete   Uterus intact, continued PAPs and pelvic exams   Pancreatic Cancer Screening beginning at age 50 or 10 years prior to the earliest pancreatic cancer on the BRCA-side of the family  In families with exocrine pancreatic cancer in a first or second degree relative    Risk is <5% for BRCA1+ carriers    5-10% for BRCA2+ carriers     Consider Annual MRI/MRCP and/or Endoscopic  Ultrasound      Review at next visit   Recommendation: risk-reducing salpingo-oophorectomy(RRSO), typically between 35 and 40 years old and  upon completion of child bearing.     Because ovarian cancer onset in patients with BRCA2 mutations is on average of 8-10 years later than in patients with BRCA1 mutations, it is reasonable to delay RRSO until 40-45 years in patients with BRCA2 mutations  unless age at diagnosis in the family warrants earlier age for consideration for prophylactic surgery.    Limited data suggest that there may be a slightly increased risk of serous uterine cancer among women with BRCA1+ pathogenic variants. The provider and the patient should discuss the risks and benefits of a concurrent hysterectomy at the time of RRSO for women with a BRCA1+ pathogenic variant /like pathogenic variant prior to surgery.     Salpingectomy alone is not the standard of care for risk reduction although clinical trials are ongoing.     Discuss option of risk-reducing mastectomy.    Consider risks and benefits of risk reducing agents, such as tamoxifen and raloxifene for breast and ovarian cancer.    Consider a full body skin and eye exam for melanoma screening for both BRCA1+ and BRCA2+.     NOTE: Women with BRCA mutation who are treated for breast cancer should have screening of the remaining breast tissue with annual mammography and breast MRI.       I spent a total of 55 minutes on the day of the visit. Please see the note for further information on patient assessment and treatment.    Jessica Long, CARLOS ALBERTO-CNS, OCN, AGN-BC  Clinical Nurse Specialist  Cancer Risk Management Program  [employer]      CC: Antonina Martinez MD

## 2023-09-08 NOTE — LETTER
9/8/2023         RE: Yamila Myles  [address] [facility], [place] [address]        Dear Colleague,    Thank you for referring your patient, Yamila Myles, to the [facility]. Please see a copy of my visit note below.    Virtual Visit Details    Type of service:  Video Visit     Originating Location (pt. Location): Home  Distant Location (provider location):  On-site  Platform used for Video Visit: [facility]      Oncology Risk Management Consultation:  Date on this visit: 9/8/2023    Yamila Myles  is referred by Dr.Jane Juan Ramon Rodríguez for an oncology risk management consultation. She requires high risk screening and surveillance to reduce her risk of cancer secondary to Hereditary Breast and Ovarian Cancer Syndrome (due to a deleterious BRCA1 mutation )and is considered to be at high risk for hereditary breast and ovarian cancer.    Primary Physician: Antonina Martinez MD    History Of Present Illness:  Ms. Myles is a very pleasant, healthy 43 year old female who presents with a family history of breast and ovarian cancer and a personal diagnosis of a BRCA1 mutation.     Genetic Testing:  Reports her family participated in genetic testing with Dr. Lyle Edwards in late 1990s; had confirmatory genetic testing for BRCA1+ here at [facility] in early 2000s; no records in the chart. Records requested from archives    Pertinent history:  7/12/2016- FINAL DIAGNOSIS:   A. Right fallopian tube and ovary, right salpingo-oophorectomy:    - Acute salpingitis    - Paratubal cyst    - Ovary with hemorrhagic corpus luteal cyst and benign epithelial   inclusion cysts    - Fibrous adhesions of fallopian tube and ovary    - Negative for dysplasia, atypia, or malignancy     B. Left fallopian tube and ovary, left salpingo-oophorectomy:    - Acute salpingitis    - Ovary with corpus luteal cyst and benign epithelial inclusion cyst    - Fibrous adhesions of fallopian tube and ovary    - Negative for  dysplasia, atypia, or malignancy     4/13/2022- Mastopexy:  A. LEFT breast, mastopexy:  -Benign breast tissue with fibrocystic change (including microcysts with apocrine metaplasia), duct ectasia and usual ductal hyperplasia  -Negative for atypia or malignancy      B. RIGHT breast, mastopexy:  -Specimen consists predominantly of unremarkable skin  -Scant benign breast parenchyma present  -Negative for atypia or malignancy     1/23/2023- A. LEFT breast, retro nipple tissue, excision:  -Benign breast tissue, including lactiferous duct with duct ectasia  -Negative for atypia or malignancy  B. LEFT breast, risk reducing nipple-sparing mastectomy:  -Atypical lobular hyperplasia (ALH)  -Other findings: fibrocystic change (including cysts with apocrine metaplasia), columnar cell change, sclerosing adenosis, duct ectasia, and usual ductal hyperplasia  -Foreign body giant cell reaction, consistent with prior operative site  -Calcifications associated with benign ducts and acini  -Negative for malignancy  C. RIGHT breast, retro nipple tissue, excision:  -Benign breast tissue, including lactiferous duct with luminal calcification  -Negative for atypia or malignancy  D. RIGHT breast, risk reducing nipple-sparing mastectomy:  -Benign breast tissue with fibrocystic change (including cysts with apocrine metaplasia), columnar cell change, fibroadenomatoid change, and usual ductal hyperplasia  -Foreign body giant cell reaction, consistent with prior operative site  -Negative for atypia or malignancy      8/2/2023- A. Skin, LEFT breast, excision during revision of reconstruction:  -Benign skin  -Negative for atypia or malignancy     B. Skin, RIGHT breast, excision during revision of reconstruction:  -Benign skin  -Negative for atypia or malignancy    9/1/2023- A. Skin, reconstructed LEFT breast, excision:  -Skin and subcutaneous adipose tissue with post-operative changes (including dermal scar and foreign body giant cell reaction),  granulation tissue, and acute inflammation (inflammation involves the full thickness of the specimen, including abscess in subcutaneous adipose tissue)  -Negative for malignancy     B. Medical device, LEFT breast implant, removal:  -Intact breast implant (gross examination only)    Pertinent screening history:  1/8/2021- Colonoscopy:  CECUM POLYP, POLYPECTOMY:   - Sessile serrated adenoma; no evidence of cytological dysplasia   - Prominent lymphoid aggregate present Repeat in January 2024 4/26/2018- Mirena IUD inserted; expires April 2025    At this visit, she is waiting to have further surgery to replace her left breast implant, which had become infected. She denies new fatigue and breasts pain.    Past Medical/Surgical History:  Past Medical History:   Diagnosis Date    Allergic rhinitis     Allergic rhinitis     BRCA1 positive 06/2001    Breast disorder 06/08/2011    Encounter for insertion of mirena IUD 07/12/2016    Hoarseness     Lump or mass in breast 2001    BRCA-1 positive (Mother, MGM w/ breast ca, mother w/ gene +), mother dx at 32)    Microcalcifications of the breast, right uoq 06/08/2011     Past Surgical History:   Procedure Laterality Date    GRAFT FAT TO BREAST Bilateral 8/2/2023    Procedure: fat grafting from abdomen and thighs;  Surgeon: LING Pradhan MD;  Location: [facility] OR    INSERT INTRAUTERINE DEVICE N/A 7/12/2016    Procedure: INSERT INTRAUTERINE DEVICE;  Surgeon: Fidelia Ricks MD;  [facility]    LAPAROSCOPIC SALPINGO-OOPHORECTOMY Bilateral 7/12/2016    Procedure: LAPAROSCOPIC SALPINGO-OOPHORECTOMY;  Surgeon: Fidelia Ricks MD;  [facility]    LAPAROSCOPY OPERATIVE ADULT N/A 7/12/2016    Procedure: LAPAROSCOPY OPERATIVE ADULT;  Surgeon: Fidelia Ricks MD;  [facility]    MAMMOPLASTY REDUCTION BILATERAL Bilateral 4/13/2022    Procedure: Bilateral breast lift;  Surgeon: LING Pradhan MD;  Location: [facility] OR    MASTECTOMY SIMPLE Bilateral  1/23/2023    Procedure: BILATERAL Risk-Reducing Nipple-Sparing Mastectomy;  Surgeon: Monica Rodríguez MD;  [facility] OR    RECONSTRUCT BREAST BILATERAL, IMPLANT PROSTHESIS BILATERAL, COMBINED Bilateral 1/23/2023    Procedure: Bilateral breast reconstruction with implant/Alloderm, SPY;  Surgeon: LING Pradhan MD;  Location: [facility] OR    REVISE RECONSTRUCTED BREAST BILATERAL Bilateral 8/2/2023    Procedure: Bilateral breast revision;  Surgeon: LING Pradhan MD;  [facility]    [facility] APPENDECTOMY  1987       Allergies:  Allergies as of 09/08/2023 - Reviewed 09/08/2023   Allergen Reaction Noted    Advil [nsaids] Other (See Comments) 04/23/2007    Seasonal allergies  04/01/2022    Adhesive tape Rash 04/26/2022       Current Medications:  Current Outpatient Medications   Medication Sig Dispense Refill    acetaminophen (TYLENOL) 325 MG tablet Take 325-650 mg by mouth every 6 hours as needed for mild pain      calcium carbonate (OS-GRETCHEN) 500 MG tablet Take 1 tablet by mouth every evening Unsure if 500 or 1000mg      cetirizine (ZYRTEC) 10 MG tablet Take 10 mg by mouth every evening      estradiol (ESTRACE) 2 MG tablet Take 1 tablet (2 mg) by mouth daily (Patient taking differently: Take 2 mg by mouth every evening) 90 tablet 3    fluticasone (FLONASE) 50 MCG/ACT nasal spray Spray 2 sprays into both nostrils daily (Patient taking differently: Spray 2 sprays into both nostrils as needed) 16 g 3    HEMP OIL OR EXTRACT OR OTHER CBD CANNABINOID, NOT MEDICAL CANNABIS, 10 mg every evening [employer] CBD 10 mg      hydrocortisone 2.5 % ointment Apply topically 2 times daily 30 g 1    lactobacillus rhamnosus (GG) (CULTURELL) capsule Take 1 capsule by mouth every evening      ondansetron (ZOFRAN ODT) 4 MG ODT tab Take 1 tablet (4 mg) by mouth every 8 hours as needed for nausea 4 tablet 0    senna-docusate (SENOKOT-S/PERICOLACE) 8.6-50 MG tablet Take 1-2 tablets by mouth 2 times daily 30 tablet 0     "sulfamethoxazole-trimethoprim (BACTRIM DS) 800-160 MG tablet Take 1 tablet by mouth 2 times daily for 10 days 20 tablet 0    Vitamin D, Cholecalciferol, 25 MCG (1000 UT) CAPS Take 1,000 mg % by mouth every evening Unsure is 500 or 1000      ciprofloxacin (CIPRO) 500 MG tablet Take 1 tablet (500 mg) by mouth 2 times daily for 10 days (Patient not taking: Reported on 2023) 20 tablet 0    oxyCODONE (ROXICODONE) 5 MG tablet Take 1-2 tablets (5-10 mg) by mouth every 6 hours as needed for moderate to severe pain (Patient not taking: Reported on 2023) 15 tablet 0        Family History:  Family History   Problem Relation Age of Onset    Breast Cancer Mother 32        dx age 32, doing well    Colon Cancer Mother 67        dx 2019. s/p surg and chemo    Hereditary Breast and Ovarian Cancer Syndrome Mother         BRCA1+    Anesthesia Reaction Sister         PONV    Hereditary Breast and Ovarian Cancer Syndrome Sister         double Mastectomy and oophrectomy; BRCA1+    Other - See Comments Sister         negative genetic testing    Other - See Comments Sister         negative genetic testing    Breast Cancer Maternal Grandmother     Ovarian Cancer Maternal Grandmother          in 40s; \"stomach cancer\"    Diabetes Paternal Grandmother     Breast Cancer Paternal Grandmother 59        cause of death at 62    Skin Cancer Maternal Uncle         negative for BRCA    Genetic Disorder Maternal Uncle         BRCA1+    Genetic Disorder Maternal Uncle         BRCA1+    Thrombosis No family hx of        Social History:  Social History     Socioeconomic History    Marital status:      Spouse name: Mani Robles    Number of children: 2    Years of education: BA    Highest education level: Not on file   Occupational History    Occupation: Staff director     Comment: [employer]   Tobacco Use    Smoking status: Never     Passive exposure: Never    Smokeless tobacco: Never   Substance and Sexual " "Activity    Alcohol use: Not Currently    Drug use: Yes     Comment: CBD 10 mg     Sexual activity: Yes     Partners: Male     Birth control/protection: I.U.D.     Comment: Mirena   Other Topics Concern    Parent/sibling w/ CABG, MI or angioplasty before 65F 55M? No     Service Not Asked    Blood Transfusions No    Caffeine Concern No    Occupational Exposure No    Hobby Hazards Not Asked    Sleep Concern No    Stress Concern Yes     Comment: life/work -->coping    Weight Concern No    Special Diet No    Back Care Yes     Comment: chiropracter for old injury    Exercise No    Bike Helmet Not Asked     Comment: not biker    Seat Belt No    Self-Exams Not Asked   Social History Narrative    4/12/23    Going to [facility] this summer with daughter for 12th birthday    Fidelia Ricks MD            Social Documentation:        Balanced Diet: YES    Calcium intake: less than 2 per day    Caffeine: 0-1 per day    Exercise:  type of activity walk; daily times per week    Sunscreen: Yes    Seatbelts:  Yes    Self Breast Exam:  Yes    Self Testicular Exam: No - n/a    Physical/Emotional/Sexual Abuse: No     Do you feel safe in your environment? Yes        Cholesterol screen up to date: Yes-3 yrs ago per pt.  Not fasting today.     Eye Exam up to date: Yes    Dental Exam up to date: No - 9 months ago.     Pap smear up to date: Yes    Mammogram up to date: Does Not Apply    Dexa Scan up to date: Does Not Apply    Colonoscopy up to date: Does Not Apply    Immunizations up to date: Yes-Td 12/06    Glucose screen if over 40:  No - n/a     Social Determinants of Health     Financial Resource Strain: Not on file   Food Insecurity: Not on file   Transportation Needs: Not on file   Physical Activity: Not on file   Stress: Not on file   Social Connections: Not on file   Intimate Partner Violence: Not on file   Housing Stability: Not on file       Physical Exam:  Ht 1.626 m (5' 4\")   Wt 62.1 kg (137 lb)   LMP  (LMP Unknown)  "  BMI 23.52 kg/m    GENERAL: Healthy, alert and no distress  EYES: Eyes grossly normal to inspection.  No discharge or erythema, or obvious scleral/conjunctival abnormalities.  RESP: No audible wheeze, cough, or visible cyanosis.  No visible retractions or increased work of breathing.    SKIN: Visible skin clear. No significant rash, abnormal pigmentation or lesions.  NEURO: Cranial nerves grossly intact.  Mentation and speech appropriate for age.  PSYCH: Mentation appears normal, affect normal/bright, judgement and insight intact, normal speech and appearance well-groomed.    Laboratory/Imaging Studies  No results found for any visits on 09/08/23.    ASSESSMENT  We discussed the differences between cancer risk for the general population and those with BRCA mutations. Women with BRCA mutations have a 40-80% lifetime risk of breast cancer, compared to the general population risk of 12%. Those with a BRCA mutation also bear a 10-40% lifetime risk of ovarian cancer, compared to the 1-2% lifetime risk within the general population.  We discussed that although there are reports of higher rates of colon cancer in BRCA1+ carriers, there is not a recommendation to begin colon screening earlier or to have more frequent colonoscopies. However, due to her mother's colon cancer history, she should be having her colonoscopies every 5 years or more frequently, depending on prior results. At this point, because she had a sessile serrated adenoma in 2021, she will be due for a colonoscopy in three years, January 2024.     Because she has a history of atypical lobular hyperplasia it is reasonable to continue to perform chest wall exams periodically. I would like her to return to clinic in September for a clinical exam as well as coordination of care.    We reviewed her family history and I updated EPIC accordingly. There are no cases of pancreatic cancer in her family. Some of her maternal cousins not tested for BRCA; her maternal  cousin Rachael, recently was found to be  BRCA1+ in her 30s. She does not have history of cancer.    I have placed orders for a Dermatology Referral and a colonoscopy for her.  She has her eyes examined periodically by an optometrist as she needs corrective lenses.    She will proceed with the plan below.    Individualized Surveillance Plan for women  Hereditary Breast and/or Ovarian Cancer Syndrome   Per NCCN Guidelines Version 1.2024   Recommended screening Test or procedure Last done Next Scheduled    Breast self awareness starting at age 18.    Breast cancer risk >60% Women should be familiar with their breasts and promptly report changes to their care provider. Periodic, consistent self exam may facilitate breast self awareness. Premenopausal women may find self exams to be most informative when performed at the end of menses.   9/8/2023 September 2024   Breast screening, starting at age 25 Clinical breast exams every 6 -12 months Exam deferred September 2024   Breast screening   Age 25-29 Annual breast MRI screening with contrast (or mammogram if MRI is unavailable) or individualized based on family history if a breast cancer diagnosis before age 30 is present.       Breast MRI is performed preferably on day 7-15 of menstrual cycle for premenopausal women.     NA     NA   Breast screening   Age >30-75 years     Annual mammogram (consider tomosynthesis mammogram) and annual screening MRI.     Breast MRI is performed preferably on day 7-15 of menstrual cycle for premenopausal women.    Age>75 years, management should be considered on an individual basis.     NA     NA   Ovarian cancer screening, starting at age 30-35    Absolute risk for epithelial ovarian cancer -39-58% for BRCA1+ carriers and 13-29% for BRCA2+ carriers   Consider transvaginal ultrasound and  tests.   RRSO complete   Uterus intact, continued PAPs and pelvic exams   Pancreatic Cancer Screening beginning at age 50 or 10 years prior to the  earliest pancreatic cancer on the BRCA-side of the family  In families with exocrine pancreatic cancer in a first or second degree relative    Risk is <5% for BRCA1+ carriers    5-10% for BRCA2+ carriers     Consider Annual MRI/MRCP and/or Endoscopic Ultrasound      Review at next visit   Recommendation: risk-reducing salpingo-oophorectomy(RRSO), typically between 35 and 40 years old and  upon completion of child bearing.     Because ovarian cancer onset in patients with BRCA2 mutations is on average of 8-10 years later than in patients with BRCA1 mutations, it is reasonable to delay RRSO until 40-45 years in patients with BRCA2 mutations  unless age at diagnosis in the family warrants earlier age for consideration for prophylactic surgery.    Limited data suggest that there may be a slightly increased risk of serous uterine cancer among women with BRCA1+ pathogenic variants. The provider and the patient should discuss the risks and benefits of a concurrent hysterectomy at the time of RRSO for women with a BRCA1+ pathogenic variant /like pathogenic variant prior to surgery.     Salpingectomy alone is not the standard of care for risk reduction although clinical trials are ongoing.     Discuss option of risk-reducing mastectomy.    Consider risks and benefits of risk reducing agents, such as tamoxifen and raloxifene for breast and ovarian cancer.    Consider a full body skin and eye exam for melanoma screening for both BRCA1+ and BRCA2+.     NOTE: Women with BRCA mutation who are treated for breast cancer should have screening of the remaining breast tissue with annual mammography and breast MRI.       I spent a total of 55 minutes on the day of the visit. Please see the note for further information on patient assessment and treatment.    Jessica Long, CARLOS ALBERTO-CNS, OCN, AGN-BC  Clinical Nurse Specialist  Cancer Risk Management Program  [facility]      CC: Antonina Martinez MD

## 2023-09-08 NOTE — PATIENT INSTRUCTIONS
Individualized Surveillance Plan for women  Hereditary Breast and/or Ovarian Cancer Syndrome   Per NCCN Guidelines Version 1.2024   Recommended screening Test or procedure Last done Next Scheduled    Breast self awareness starting at age 18.    Breast cancer risk >60% Women should be familiar with their breasts and promptly report changes to their care provider. Periodic, consistent self exam may facilitate breast self awareness. Premenopausal women may find self exams to be most informative when performed at the end of menses.   9/8/2023 September 2024   Breast screening, starting at age 25 Clinical breast exams every 6 -12 months Exam deferred September 2024   Breast screening   Age 25-29 Annual breast MRI screening with contrast (or mammogram if MRI is unavailable) or individualized based on family history if a breast cancer diagnosis before age 30 is present.       Breast MRI is performed preferably on day 7-15 of menstrual cycle for premenopausal women.     NA     NA   Breast screening   Age >30-75 years     Annual mammogram (consider tomosynthesis mammogram) and annual screening MRI.     Breast MRI is performed preferably on day 7-15 of menstrual cycle for premenopausal women.    Age>75 years, management should be considered on an individual basis.     NA     NA   Ovarian cancer screening, starting at age 30-35    Absolute risk for epithelial ovarian cancer -39-58% for BRCA1+ carriers and 13-29% for BRCA2+ carriers   Consider transvaginal ultrasound and  tests.   RRSO complete   Uterus intact, continued PAPs and pelvic exams   Pancreatic Cancer Screening beginning at age 50 or 10 years prior to the earliest pancreatic cancer on the BRCA-side of the family  In families with exocrine pancreatic cancer in a first or second degree relative    Risk is <5% for BRCA1+ carriers    5-10% for BRCA2+ carriers     Consider Annual MRI/MRCP and/or Endoscopic Ultrasound      Review at next visit   Recommendation:  risk-reducing salpingo-oophorectomy(RRSO), typically between 35 and 40 years old and  upon completion of child bearing.     Because ovarian cancer onset in patients with BRCA2 mutations is on average of 8-10 years later than in patients with BRCA1 mutations, it is reasonable to delay RRSO until 40-45 years in patients with BRCA2 mutations  unless age at diagnosis in the family warrants earlier age for consideration for prophylactic surgery.    Limited data suggest that there may be a slightly increased risk of serous uterine cancer among women with BRCA1+ pathogenic variants. The provider and the patient should discuss the risks and benefits of a concurrent hysterectomy at the time of RRSO for women with a BRCA1+ pathogenic variant /like pathogenic variant prior to surgery.     Salpingectomy alone is not the standard of care for risk reduction although clinical trials are ongoing.     Discuss option of risk-reducing mastectomy.    Consider risks and benefits of risk reducing agents, such as tamoxifen and raloxifene for breast and ovarian cancer.    Consider a full body skin and eye exam for melanoma screening for both BRCA1+ and BRCA2+.     NOTE: Women with BRCA mutation who are treated for breast cancer should have screening of the remaining breast tissue with annual mammography and breast MRI.

## 2023-09-08 NOTE — NURSING NOTE
Is the patient currently in the state of [place]? YES    Visit mode:VIDEO    If the visit is dropped, the patient can be reconnected by: VIDEO VISIT: Text to cell phone:   Telephone Information:   Mobile [phone]       Will anyone else be joining the visit? NO  (If patient encounters technical issues they should call [phone]    How would you like to obtain your AVS? MyChart    Are changes needed to the allergy or medication list? No    Reason for visit: Consult    Mary Kay DIOR

## 2023-09-12 ENCOUNTER — OFFICE VISIT (OUTPATIENT)
Dept: PLASTIC SURGERY | Facility: CLINIC | Age: 43
End: 2023-09-12
Attending: PHYSICIAN ASSISTANT
Payer: COMMERCIAL

## 2023-09-12 VITALS
WEIGHT: 139.8 LBS | BODY MASS INDEX: 24 KG/M2 | RESPIRATION RATE: 16 BRPM | OXYGEN SATURATION: 98 % | DIASTOLIC BLOOD PRESSURE: 70 MMHG | SYSTOLIC BLOOD PRESSURE: 102 MMHG | TEMPERATURE: 97.5 F | HEART RATE: 77 BPM

## 2023-09-12 DIAGNOSIS — Z98.890 S/P BREAST RECONSTRUCTION, BILATERAL: Primary | ICD-10-CM

## 2023-09-12 DIAGNOSIS — B99.9 INFECTION: ICD-10-CM

## 2023-09-12 PROCEDURE — G0463 HOSPITAL OUTPT CLINIC VISIT: HCPCS | Performed by: PLASTIC SURGERY

## 2023-09-12 PROCEDURE — 99024 POSTOP FOLLOW-UP VISIT: CPT | Performed by: PLASTIC SURGERY

## 2023-09-12 ASSESSMENT — PAIN SCALES - GENERAL: PAINLEVEL: NO PAIN (0)

## 2023-09-12 NOTE — NURSING NOTE
"Oncology Rooming Note    September 12, 2023 9:30 AM   Yamila Myles is a 43 year old female who presents for:    Chief Complaint   Patient presents with    Oncology Clinic Visit     s/p breast reconstruction     Initial Vitals: /70 (BP Location: Left arm, Patient Position: Sitting, Cuff Size: Adult Regular)   Pulse 77   Temp 97.5  F (36.4  C) (Oral)   Resp 16   Wt 63.4 kg (139 lb 12.8 oz)   SpO2 98%   BMI 24.00 kg/m   Estimated body mass index is 24 kg/m  as calculated from the following:    Height as of 9/8/23: 1.626 m (5' 4\").    Weight as of this encounter: 63.4 kg (139 lb 12.8 oz). Body surface area is 1.69 meters squared.  No Pain (0) Comment: Data Unavailable   No LMP recorded. (Menstrual status: IUD).  Allergies reviewed: Yes  Medications reviewed: Yes    Medications: Medication refills not needed today.  Pharmacy name entered into [facility]:    [id number] - [place], [place] - [address] AT [facility] [address] [id number] - [place], [place] - [address] AT [facility] [address]  [facility] - [place], [place] - [address] [place] [id number] [facility] - [place], [place] - [address] [place] [address]    Clinical concerns: Post op f/u       Sandra Castro LPN  9/12/2023              "

## 2023-09-12 NOTE — LETTER
9/12/2023         RE: Yamila Myles  [address] [facility] [facility] [address]        Dear Colleague,    Thank you for referring your patient, Yamila Myles, to the [facility]. Please see a copy of my visit note below.    PRESENTING COMPLAINT:  Post-operative visit s/p left breast infection requiring removal of implant on 9/1/2023.     HISTORY OF PRESENTING COMPLAINT: The patient is here for post-operative visit.  The patient is being seen in the presence of my nurse.     Doing well.  No fevers.  Feels well.  TRACY drainage is less than 5 cc in 24 hours.  Last day of Bactrim.     ASSESSMENT AND PLAN:  Based upon the above findings, the patient is here for post-operative visit.     Doing well.  Removed TRACY drain.  Plan to see her back in 4 weeks and plan the next stages of reconstruction.  That will depend on maturation of the area.  She will aggressively moisturize the area.    All questions were answered.  The patient was happy with the visit.      Again, thank you for allowing me to participate in the care of your patient.        Sincerely,        LING Pradhan MD

## 2023-09-12 NOTE — PROGRESS NOTES
PRESENTING COMPLAINT:  Post-operative visit s/p left breast infection requiring removal of implant on 9/1/2023.     HISTORY OF PRESENTING COMPLAINT: The patient is here for post-operative visit.  The patient is being seen in the presence of my nurse.     Doing well.  No fevers.  Feels well.  TRACY drainage is less than 5 cc in 24 hours.  Last day of Bactrim.     ASSESSMENT AND PLAN:  Based upon the above findings, the patient is here for post-operative visit.     Doing well.  Removed TRACY drain.  Plan to see her back in 4 weeks and plan the next stages of reconstruction.  That will depend on maturation of the area.  She will aggressively moisturize the area.    All questions were answered.  The patient was happy with the visit.

## 2023-09-26 ENCOUNTER — OFFICE VISIT (OUTPATIENT)
Dept: PLASTIC SURGERY | Facility: CLINIC | Age: 43
End: 2023-09-26
Attending: PLASTIC SURGERY
Payer: COMMERCIAL

## 2023-09-26 VITALS
WEIGHT: 135.1 LBS | HEART RATE: 58 BPM | DIASTOLIC BLOOD PRESSURE: 73 MMHG | OXYGEN SATURATION: 100 % | TEMPERATURE: 97.6 F | BODY MASS INDEX: 23.19 KG/M2 | SYSTOLIC BLOOD PRESSURE: 107 MMHG | RESPIRATION RATE: 16 BRPM

## 2023-09-26 DIAGNOSIS — Z98.890 S/P BREAST RECONSTRUCTION, BILATERAL: Primary | ICD-10-CM

## 2023-09-26 DIAGNOSIS — B99.9 INFECTION: ICD-10-CM

## 2023-09-26 PROCEDURE — G0463 HOSPITAL OUTPT CLINIC VISIT: HCPCS | Performed by: PLASTIC SURGERY

## 2023-09-26 PROCEDURE — 99024 POSTOP FOLLOW-UP VISIT: CPT | Performed by: PLASTIC SURGERY

## 2023-09-26 ASSESSMENT — PAIN SCALES - GENERAL: PAINLEVEL: NO PAIN (0)

## 2023-09-26 NOTE — LETTER
9/26/2023         RE: Yamila Myles  [address] [facility] [address]        Dear Colleague,    Thank you for referring your patient, Yamila Myles, to the [facility] BREAST [facility]. Please see a copy of my visit note below.    PRESENTING COMPLAINT:  Post-operative visit s/p bilateral breast reconstruction with implants with left-sided infection requiring removal last surgery done 9/1/2023.     HISTORY OF PRESENTING COMPLAINT: The patient is here for post-operative visit.  The patient is being seen in the presence of my nurse.     Overall doing well now.  Left side is softening up.  No fevers feels well.  Right side had a small scab develop on the periareolar scar.  No redness no drainage no pain.     ASSESSMENT AND PLAN:  Based upon the above findings, the patient is here for post-operative visit.     For now we will just advised moisturizing the left side and putting a little antibiotic ointment/Vaseline on the right scab which is approximately 2 mm x 2 mm.    We will see her back in 1 to 2 weeks time or earlier if needed.  Overall plan is to allow the left side to mature and then sometime next spring proceed with expander placement.    All questions were answered.  The patient was happy with the visit.        LING Pradhan MD

## 2023-09-26 NOTE — NURSING NOTE
"Oncology Rooming Note    September 26, 2023 10:25 AM   Yamila Myles is a 43 year old female who presents for:    Chief Complaint   Patient presents with    Oncology Clinic Visit     Return for Plastic Surgery      Initial Vitals: /73 (BP Location: Right arm, Patient Position: Sitting, Cuff Size: Adult Regular)   Pulse 58   Temp 97.6  F (36.4  C) (Oral)   Resp 16   Wt 61.3 kg (135 lb 1.6 oz)   SpO2 100%   BMI 23.19 kg/m   Estimated body mass index is 23.19 kg/m  as calculated from the following:    Height as of 9/8/23: 1.626 m (5' 4\").    Weight as of this encounter: 61.3 kg (135 lb 1.6 oz). Body surface area is 1.66 meters squared.  No Pain (0) Comment: Data Unavailable   No LMP recorded. (Menstrual status: IUD).  Allergies reviewed: Yes  Medications reviewed: Yes    Medications: Medication refills not needed today.  Pharmacy name entered into [facility]:    [facility][id number] - [place], [place] - [address] AT [facility] OF [address]  [id number] - [place], [place] - [address] AT [facility] OF [address]  [facility] - [address] [address] [place] [facility] - [place], [place] - [address] [place] [address]    Clinical concerns: none       Linda Solorzano              "

## 2023-09-26 NOTE — PROGRESS NOTES
PRESENTING COMPLAINT:  Post-operative visit s/p bilateral breast reconstruction with implants with left-sided infection requiring removal last surgery done 9/1/2023.     HISTORY OF PRESENTING COMPLAINT: The patient is here for post-operative visit.  The patient is being seen in the presence of my nurse.     Overall doing well now.  Left side is softening up.  No fevers feels well.  Right side had a small scab develop on the periareolar scar.  No redness no drainage no pain.     ASSESSMENT AND PLAN:  Based upon the above findings, the patient is here for post-operative visit.     For now we will just advised moisturizing the left side and putting a little antibiotic ointment/Vaseline on the right scab which is approximately 2 mm x 2 mm.    We will see her back in 1 to 2 weeks time or earlier if needed.  Overall plan is to allow the left side to mature and then sometime next spring proceed with expander placement.    All questions were answered.  The patient was happy with the visit.

## 2023-09-29 LAB — BACTERIA WND CULT: NO GROWTH

## 2023-10-02 DIAGNOSIS — Z12.11 ENCOUNTER FOR SCREENING COLONOSCOPY: Primary | ICD-10-CM

## 2023-10-10 ENCOUNTER — OFFICE VISIT (OUTPATIENT)
Dept: PLASTIC SURGERY | Facility: CLINIC | Age: 43
End: 2023-10-10
Attending: PLASTIC SURGERY
Payer: COMMERCIAL

## 2023-10-10 VITALS
SYSTOLIC BLOOD PRESSURE: 105 MMHG | WEIGHT: 136.7 LBS | HEART RATE: 85 BPM | OXYGEN SATURATION: 98 % | BODY MASS INDEX: 23.46 KG/M2 | TEMPERATURE: 97.8 F | DIASTOLIC BLOOD PRESSURE: 71 MMHG

## 2023-10-10 DIAGNOSIS — Z98.890 S/P BREAST RECONSTRUCTION, BILATERAL: Primary | ICD-10-CM

## 2023-10-10 PROCEDURE — 99213 OFFICE O/P EST LOW 20 MIN: CPT | Performed by: PLASTIC SURGERY

## 2023-10-10 PROCEDURE — 99024 POSTOP FOLLOW-UP VISIT: CPT | Performed by: PLASTIC SURGERY

## 2023-10-10 RX ORDER — CEFAZOLIN SODIUM 2 G/50ML
2 SOLUTION INTRAVENOUS
Status: CANCELLED | OUTPATIENT
Start: 2023-10-10

## 2023-10-10 RX ORDER — CEFAZOLIN SODIUM 2 G/50ML
2 SOLUTION INTRAVENOUS SEE ADMIN INSTRUCTIONS
Status: CANCELLED | OUTPATIENT
Start: 2023-10-10

## 2023-10-10 ASSESSMENT — PAIN SCALES - GENERAL: PAINLEVEL: NO PAIN (0)

## 2023-10-10 NOTE — LETTER
10/10/2023         RE: Yamila Myles  [address] [facility] [address]        Dear Colleague,    Thank you for referring your patient, Yamila Myles, to the [facility] BREAST [facility]. Please see a copy of my visit note below.         PRESENTING COMPLAINT:  Post-operative visit s/p bilateral breast reconstruction with implants with left-sided infection requiring removal last surgery done 9/1/2023.     HISTORY OF PRESENTING COMPLAINT: The patient is here for post-operative visit.  The patient is being seen in the presence of my nurse.      Overall doing well now.  Left side is softening up.  No fevers feels well.  Right side looks normal.    ASSESSMENT AND PLAN:  Based upon the above findings, the patient is here for post-operative visit.      Maturing well.  We will see her back in 6 weeks time or earlier if needed.  Overall plan is to allow the left side to mature and then sometime next spring proceed with expander placement, probably sometime in spring.     All questions were answered.  The patient was happy with the visit.              LING Pradhan MD

## 2023-10-10 NOTE — NURSING NOTE
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    Weight as of this encounter: 62 kg (136 lb 11.2 oz). Body surface area is 1.67 meters squared.  No Pain (0) Comment: Data Unavailable   No LMP recorded. (Menstrual status: IUD).  Allergies reviewed: Yes  Medications reviewed: Yes    Medications: Medication refills not needed today.  Pharmacy name entered into [facility]:    [facility] #[id number] - [place], [place] - [address] AT [address] & [facility] #[id number] - [place], [place] - [address] AT [facility] OF [address]  [facility] - [address] [address] [place] [facility] - [address]    Clinical concerns: none      Ghazala Church, EMT  10/10/2023            "

## 2023-10-10 NOTE — PROGRESS NOTES
PRESENTING COMPLAINT:  Post-operative visit s/p bilateral breast reconstruction with implants with left-sided infection requiring removal last surgery done 9/1/2023.     HISTORY OF PRESENTING COMPLAINT: The patient is here for post-operative visit.  The patient is being seen in the presence of my nurse.      Overall doing well now.  Left side is softening up.  No fevers feels well.  Right side looks normal.    ASSESSMENT AND PLAN:  Based upon the above findings, the patient is here for post-operative visit.      Maturing well.  We will see her back in 6 weeks time or earlier if needed.  Overall plan is to allow the left side to mature and then sometime next spring proceed with expander placement, probably sometime in spring.     All questions were answered.  The patient was happy with the visit.

## 2023-10-17 ENCOUNTER — ALLIED HEALTH/NURSE VISIT (OUTPATIENT)
Dept: OBGYN | Facility: CLINIC | Age: 43
End: 2023-10-17
Attending: OBSTETRICS & GYNECOLOGY
Payer: COMMERCIAL

## 2023-10-17 DIAGNOSIS — Z23 NEED FOR HEPATITIS B VACCINATION: Primary | ICD-10-CM

## 2023-10-17 PROCEDURE — 90746 HEPB VACCINE 3 DOSE ADULT IM: CPT

## 2023-10-17 PROCEDURE — G0010 ADMIN HEPATITIS B VACCINE: HCPCS

## 2023-10-17 PROCEDURE — 250N000011 HC RX IP 250 OP 636

## 2023-10-19 ENCOUNTER — TELEPHONE (OUTPATIENT)
Dept: PLASTIC SURGERY | Facility: CLINIC | Age: 43
End: 2023-10-19
Payer: COMMERCIAL

## 2023-10-19 NOTE — TELEPHONE ENCOUNTER
Left voicemail for patient regarding scheduling surgery with Dr. Pradhan.    Provided direct contact number to discuss.    P: [phone]    __    Hoda Heard, Senior Perioperative Coordinator, on 10/19/2023 at 3:36 PM

## 2023-10-23 ENCOUNTER — HOSPITAL ENCOUNTER (OUTPATIENT)
Facility: CLINIC | Age: 43
End: 2023-10-23
Attending: PLASTIC SURGERY | Admitting: PLASTIC SURGERY
Payer: COMMERCIAL

## 2023-10-23 NOTE — TELEPHONE ENCOUNTER
RN Care Coordinator: Jacqueline Peck    Surgery is scheduled with Dr. Pradhan  Date: 2/15   [facility]      H&P to be completed by: PAC clinic - scheduled on 1/30, video visit     Surgical consult: 2/6 with Kiya Castillo [facility]    Post-op: 2/28 with Kiya Castillo [facility]      Patient will receive surgery arrival and start time from PAC. Patient is aware that if times change after they see PAC, they will receive a call from the pre-admission nurses 1-2 days prior to surgery with updated arrival time and NPO instructions.       Spoke with the patient and was able to confirm all scheduled information.       Patient questions/concerns: N/A       Surgery packet: to be sent via [facility]    __    Hoda Heard, Senior Perioperative Coordinator, on 10/23/2023 at 12:54 PM  P: [phone]

## 2023-10-25 ENCOUNTER — HOSPITAL ENCOUNTER (OUTPATIENT)
Facility: AMBULATORY SURGERY CENTER | Age: 43
End: 2023-10-25
Attending: SURGERY | Admitting: SURGERY
Payer: COMMERCIAL

## 2023-10-25 ENCOUNTER — TELEPHONE (OUTPATIENT)
Dept: GASTROENTEROLOGY | Facility: CLINIC | Age: 43
End: 2023-10-25
Payer: COMMERCIAL

## 2023-10-25 NOTE — TELEPHONE ENCOUNTER
"Endoscopy Scheduling Screen    Have you had a positive Covid test in the last 14 days?  No    Are you active on MyChart?   Yes    What insurance is in the chart?  Other:  [facility]    Ordering/Referring Provider:     AMBROSIO GASTON      (If ordering provider performs procedure, schedule with ordering provider unless otherwise instructed. )    BMI: Estimated body mass index is 23.46 kg/m  as calculated from the following:    Height as of 9/8/23: 1.626 m (5' 4\").    Weight as of 10/10/23: 62 kg (136 lb 11.2 oz).     Sedation Ordered  moderate sedation.   If patient BMI > 50 do not schedule in ASC.    If patient BMI > 45 do not schedule at [facility].    Are you taking methadone or Suboxone?  No    Are you taking any prescription medications for pain 3 or more times per week?   No    Do you have a history of malignant hyperthermia or adverse reaction to anesthesia?  No    (Females) Are you currently pregnant?   No     Have you been diagnosed or told you have pulmonary hypertension?   No    Do you have an LVAD?  No    Have you been told you have moderate to severe sleep apnea?  No    Have you been told you have COPD, asthma, or any other lung disease?  No    Do you have any heart conditions?  No     Have you ever had an organ transplant?   No    Have you ever had or are you awaiting a heart or lung transplant?   No    Have you had a stroke or transient ischemic attack (TIA aka \"mini stroke\" in the last 6 months?   No    Have you been diagnosed with or been told you have cirrhosis of the liver?   No    Are you currently on dialysis?   No    Do you need assistance transferring?   No    BMI: Estimated body mass index is 23.46 kg/m  as calculated from the following:    Height as of 9/8/23: 1.626 m (5' 4\").    Weight as of 10/10/23: 62 kg (136 lb 11.2 oz).     Is patients BMI > 40 and scheduling location [facility]?  No    Do you take an injectable medication for weight loss or diabetes (excluding insulin)?  No    Do you take the " medication Naltrexone?  No    Do you take blood thinners?  No       Prep   Are you currently on dialysis or do you have chronic kidney disease?  No    Do you have a diagnosis of diabetes?  No    Do you have a diagnosis of cystic fibrosis (CF)?  No    On a regular basis do you go 3 -5 days between bowel movements?  No    BMI > 40?  No    Preferred Pharmacy:    [id number] - [place], [place] - [address] [facility]  [address] [facility] [address]  Phone: [phone] Fax: [phone]      Final Scheduling Details   Colonoscopy prep sent?  Standard MiraLAX    Procedure scheduled  Colonoscopy    Surgeon:  CARLOS    Date of procedure:  2/9     Pre-OP / PAC:   No - Not required for this site.    Location  [facility] - Per order.    Sedation   Moderate Sedation - Per order.      Patient Reminders:   You will receive a call from a Nurse to review instructions and health history.  This assessment must be completed prior to your procedure.  Failure to complete the Nurse assessment may result in the procedure being cancelled.      On the day of your procedure, please designate an adult(s) who can drive you home stay with you for the next 24 hours. The medicines used in the exam will make you sleepy. You will not be able to drive.      You cannot take public transportation, ride share services, or non-medical taxi service without a responsible caregiver.  Medical transport services are allowed with the requirement that a responsible caregiver will receive you at your destination.  We require that drivers and caregivers are confirmed prior to your procedure.

## 2023-10-26 NOTE — TELEPHONE ENCOUNTER
FUTURE VISIT INFORMATION      SURGERY INFORMATION:  Date: 2/9/24  [facility]  Surgeon:  Lance Jordan MD   Anesthesia Type:  Moderate Sedation   Procedure: Colonoscopy     RECORDS REQUESTED FROM:       Primary Care Provider: Antonina Martinez MD

## 2023-10-30 ENCOUNTER — PATIENT OUTREACH (OUTPATIENT)
Dept: GASTROENTEROLOGY | Facility: CLINIC | Age: 43
End: 2023-10-30
Payer: COMMERCIAL

## 2023-11-24 NOTE — TELEPHONE ENCOUNTER
Rescheduled surgery due to Dr. Pradhan as he is OOO 2/12 to 2/16.     Spoke with the patient and was able to confirm all scheduled information.     Surgery is rescheduled with Dr. Pradhan   Date: 2/5   Location: [facility]      H&P to be completed by: PAC clinic - scheduled on 1/30    Surgical consult: rescheduled to 1/30 with Kiya Castillo [facility]    Post-op: rescheduled to 2/21 with Kyia Castillo [facility]      Patient will receive surgery arrival and start time from PAC. Patient is aware that if times change after they see PAC, they will receive a call from the pre-admission nurses 1-2 days prior to surgery with updated arrival time and NPO instructions.       Patient questions/concerns: N/A       Surgery packet: sent previously     __    Hoda Heard, Senior Perioperative Coordinator, on 11/24/2023 at 2:52 PM  P: [phone]

## 2023-11-24 NOTE — TELEPHONE ENCOUNTER
Left voicemail for patient regarding rescheduling surgery with Dr. Pradhan.    Dr. Pradhan will be OOO 12/12 to 12/16.    Provided direct contact number to discuss.    P: [phone]    __    Hoda Heard, Senior Perioperative Coordinator, on 11/24/2023 at 2:33 PM

## 2023-11-28 ENCOUNTER — OFFICE VISIT (OUTPATIENT)
Dept: PLASTIC SURGERY | Facility: CLINIC | Age: 43
End: 2023-11-28
Payer: COMMERCIAL

## 2023-11-28 VITALS
TEMPERATURE: 97.8 F | DIASTOLIC BLOOD PRESSURE: 77 MMHG | HEART RATE: 76 BPM | BODY MASS INDEX: 23.4 KG/M2 | OXYGEN SATURATION: 100 % | SYSTOLIC BLOOD PRESSURE: 109 MMHG | WEIGHT: 136.3 LBS | RESPIRATION RATE: 12 BRPM

## 2023-11-28 DIAGNOSIS — Z98.890 S/P BREAST RECONSTRUCTION, BILATERAL: Primary | ICD-10-CM

## 2023-11-28 PROCEDURE — 99024 POSTOP FOLLOW-UP VISIT: CPT | Performed by: PLASTIC SURGERY

## 2023-11-28 PROCEDURE — 99213 OFFICE O/P EST LOW 20 MIN: CPT | Performed by: PLASTIC SURGERY

## 2023-11-28 ASSESSMENT — PAIN SCALES - GENERAL: PAINLEVEL: NO PAIN (0)

## 2023-11-28 NOTE — NURSING NOTE
Chlorhexidine soap x2 bottles given to patient today in clinic for upcoming surgery, she may not come back to see Dr Pradhan again before the surgery if she does not have questions. She will let us know what she prefers. She does have a pre-op scheduled, and we discussed the importance of this today.

## 2023-11-28 NOTE — LETTER
11/28/2023         RE: Yamila Myles  [address] [address]        Dear Colleague,    Thank you for referring your patient, Yamila Myles, to the [facility] BREAST [facility]. Please see a copy of my visit note below.               PRESENTING COMPLAINT:  Post-operative visit s/p bilateral breast reconstruction with implants with left-sided infection requiring removal last surgery done 9/1/2023.     HISTORY OF PRESENTING COMPLAINT: The patient is here for post-operative visit.  The patient is being seen in the presence of my nurse.      Overall doing well now.  Left side is softening up.  No fevers feels well.  Right side looks normal.     ASSESSMENT AND PLAN:  Based upon the above findings, the patient is here for post-operative visit.      Maturing well.  Plan the surgery in February.  Expander placement.     All questions were answered.  The patient was happy with the visit.               Sincerely,        LING Pradhan MD

## 2023-11-28 NOTE — NURSING NOTE
"Oncology Rooming Note    November 28, 2023 10:05 AM   Yamila Myles is a 43 year old female who presents for:    Chief Complaint   Patient presents with    Oncology Clinic Visit     Implant removal      Initial Vitals: /77 (BP Location: Right arm, Patient Position: Sitting, Cuff Size: Adult Regular)   Pulse 76   Temp 97.8  F (36.6  C) (Oral)   Resp 12   Wt 61.8 kg (136 lb 4.8 oz)   SpO2 100%   BMI 23.40 kg/m   Estimated body mass index is 23.4 kg/m  as calculated from the following:    Height as of 9/8/23: 1.626 m (5' 4\").    Weight as of this encounter: 61.8 kg (136 lb 4.8 oz). Body surface area is 1.67 meters squared.  No Pain (0) Comment: Data Unavailable   No LMP recorded. (Menstrual status: IUD).  Allergies reviewed: Yes  Medications reviewed: Yes    Medications: Medication refills not needed today.  Pharmacy name entered into [facility]:    [facility] #[id number] - [place], [place] - [address] AT [facility] OF [address] [facility] #[id number] - [place], [place] - [address] AT [facility] OF [address]  [facility] - [address] [address] [place] [facility] - [place], [place] - [address] [place] [address]    Clinical concerns:  none      Ana Fernandes              "

## 2023-11-28 NOTE — PROGRESS NOTES
PRESENTING COMPLAINT:  Post-operative visit s/p bilateral breast reconstruction with implants with left-sided infection requiring removal last surgery done 9/1/2023.     HISTORY OF PRESENTING COMPLAINT: The patient is here for post-operative visit.  The patient is being seen in the presence of my nurse.      Overall doing well now.  Left side is softening up.  No fevers feels well.  Right side looks normal.     ASSESSMENT AND PLAN:  Based upon the above findings, the patient is here for post-operative visit.      Maturing well.  Plan the surgery in February.  Expander placement.     All questions were answered.  The patient was happy with the visit.

## 2024-01-10 ENCOUNTER — TELEPHONE (OUTPATIENT)
Dept: GASTROENTEROLOGY | Facility: CLINIC | Age: 44
End: 2024-01-10
Payer: COMMERCIAL

## 2024-01-10 NOTE — TELEPHONE ENCOUNTER
Caller: AGENT 1ST ATT TO R/S   Reason for Reschedule/Cancellation (please be detailed, any staff messages or encounters to note?):     CARLOS OUT OF OFFICE       Prior to reschedule please review:  Ordering Provider:  AMBROSIO GASTON    Sedation per order:MODERATE   Does patient have any ASC Exclusions, please identify?: N      Notes on Cancelled Procedure:  Procedure:Lower Endoscopy [Colonoscopy]   Date: 02/09/2024  Location:[facility] [place]; [address] [place] [address], [address] [place] [id number]  Surgeon: CARLOS         Rescheduled:     1st Attempt LVMTCB - MyChart (R/S) msg sent    **CASE IN DEPOT        Send In - basket message to Panc - Juan Pool if EUS procedure is canceled or rescheduled: [ N/A, YES or NO] N/A

## 2024-01-18 ENCOUNTER — HOSPITAL ENCOUNTER (OUTPATIENT)
Facility: AMBULATORY SURGERY CENTER | Age: 44
End: 2024-01-18
Attending: SURGERY | Admitting: SURGERY
Payer: COMMERCIAL

## 2024-01-30 ENCOUNTER — PRE VISIT (OUTPATIENT)
Dept: SURGERY | Facility: CLINIC | Age: 44
End: 2024-01-30

## 2024-01-30 ENCOUNTER — ANESTHESIA EVENT (OUTPATIENT)
Dept: SURGERY | Facility: CLINIC | Age: 44
End: 2024-01-30
Payer: COMMERCIAL

## 2024-01-30 ENCOUNTER — VIRTUAL VISIT (OUTPATIENT)
Dept: SURGERY | Facility: CLINIC | Age: 44
End: 2024-01-30
Payer: COMMERCIAL

## 2024-01-30 DIAGNOSIS — Z01.818 PRE-OPERATIVE EXAMINATION: Primary | ICD-10-CM

## 2024-01-30 DIAGNOSIS — Z98.890 S/P BREAST RECONSTRUCTION, BILATERAL: ICD-10-CM

## 2024-01-30 PROCEDURE — 99213 OFFICE O/P EST LOW 20 MIN: CPT | Mod: 95 | Performed by: PHYSICIAN ASSISTANT

## 2024-01-30 RX ORDER — LACTOBACILLUS RHAMNOSUS GG 10B CELL
1 CAPSULE ORAL EVERY EVENING
COMMUNITY

## 2024-01-30 RX ORDER — MULTIVITAMIN
1 TABLET ORAL EVERY EVENING
COMMUNITY

## 2024-01-30 ASSESSMENT — LIFESTYLE VARIABLES: TOBACCO_USE: 0

## 2024-01-30 ASSESSMENT — PAIN SCALES - GENERAL: PAINLEVEL: NO PAIN (0)

## 2024-01-30 ASSESSMENT — ENCOUNTER SYMPTOMS: SEIZURES: 0

## 2024-01-30 NOTE — PROGRESS NOTES
Yamila is a 43 year old who is being evaluated via a billable video visit.      How would you like to obtain your AVS? MyChart  If the video visit is dropped, the invitation should be resent by: Text to cell phone: [phone]          HPI         Physical Exam

## 2024-01-30 NOTE — H&P
Pre-Operative H & P     CC:  Preoperative exam to assess for increased cardiopulmonary risk while undergoing surgery and anesthesia.    Date of Encounter: 1/30/2024  Primary Care Physician:  Antonina Martinez     Reason for visit:   Encounter Diagnoses   Name Primary?    Pre-operative examination Yes    S/P breast reconstruction, bilateral        HPI  Yamila Myles is a 43 year old female who presents for pre-operative H & P in preparation for  Procedure Information       Case: [id number] Date/Time: 02/05/24 0900    Procedure: INSERTION, TISSUE EXPANDER, BREAST, left (Left: Breast)    Anesthesia type: General with Block    Diagnosis: S/P breast reconstruction, bilateral [Z98.890]    Pre-op diagnosis: S/P breast reconstruction, bilateral [Z98.890]    Location:  OR [address] [place] [address] OR    Providers: LING Pradhan MD            The patient is a 43-year-old woman with a past medical history significant for seasonal allergies and BRCA 1 carrier.  Given her BRCA1 status she has undergone bilateral salpingo-oophorectomy, mastectomy with reconstruction, breast revision and removal of implant with washout of her left breast due to cellulitis.  Patient has continued to follow-up with Dr. Pradhan and is now scheduled for the procedure as above.    History is obtained from the patient and chart review    Hx of abnormal bleeding or anti-platelet use: none    Menstrual history: No LMP recorded. Patient has had a hysterectomy.:      Past Medical History  Past Medical History:   Diagnosis Date    Allergic rhinitis     BRCA1 positive 06/2001    [id number]    Encounter for insertion of mirena IUD 07/12/2016    Hoarseness     Lump or mass in breast 2001    BRCA-1 positive (Mother, MGM w/ breast ca, mother w/ gene +), mother dx at 32)    Microcalcifications of the breast, right uoq 06/08/2011       Past Surgical History  Past Surgical History:   Procedure Laterality Date    GRAFT FAT TO BREAST Bilateral 8/2/2023    Procedure: fat  grafting from abdomen and thighs;  Surgeon: LING Pradhan MD;  Location: [facility] OR    INSERT INTRAUTERINE DEVICE N/A 7/12/2016    Procedure: INSERT INTRAUTERINE DEVICE;  Surgeon: Fidelia Ricks MD;  Location: [facility] OR    LAPAROSCOPIC SALPINGO-OOPHORECTOMY Bilateral 7/12/2016    Procedure: LAPAROSCOPIC SALPINGO-OOPHORECTOMY;  Surgeon: Fidelia Ricks MD;  Location: [facility] OR    LAPAROSCOPY OPERATIVE ADULT N/A 7/12/2016    Procedure: LAPAROSCOPY OPERATIVE ADULT;  Surgeon: Fidelia Ricks MD;  Location: [facility] OR    MAMMOPLASTY REDUCTION BILATERAL Bilateral 4/13/2022    Procedure: Bilateral breast lift;  Surgeon: LING Pradhan MD;  Location: [facility] OR    MASTECTOMY SIMPLE Bilateral 1/23/2023    Procedure: BILATERAL Risk-Reducing Nipple-Sparing Mastectomy;  Surgeon: Monica Rodríguez MD;  Location: [facility] OR    RECONSTRUCT BREAST BILATERAL, IMPLANT PROSTHESIS BILATERAL, COMBINED Bilateral 1/23/2023    Procedure: Bilateral breast reconstruction with implant/Alloderm, SPY;  Surgeon: LING Pradhan MD;  Location: [facility] OR    REMOVE IMPLANT BREAST Left 9/1/2023    Procedure: REMOVAL, IMPLANT, BREAST, left and washout;  Surgeon: LING Pradhan MD;  Location: [facility] OR    REVISE RECONSTRUCTED BREAST BILATERAL Bilateral 8/2/2023    Procedure: Bilateral breast revision;  Surgeon: LING Pradhan MD;  Location: [facility] [place]    RUST APPENDECTOMY  1987       Prior to Admission Medications  Current Outpatient Medications   Medication Sig Dispense Refill    acetaminophen (TYLENOL) 325 MG tablet Take 325-650 mg by mouth every 6 hours as needed for mild pain      calcium carbonate (OS-GRETCHEN) 500 MG tablet Take 1 tablet by mouth every evening Unsure if 500 or 1000mg      cetirizine (ZYRTEC) 10 MG tablet Take 10 mg by mouth every evening      estradiol (ESTRACE) 2 MG tablet Take 1 tablet (2 mg) by mouth daily (Patient taking differently: Take 2 mg by mouth every evening) 90 tablet 3    fluticasone (FLONASE) 50  MCG/ACT nasal spray Spray 2 sprays into both nostrils daily 16 g 3    HEMP OIL OR EXTRACT OR OTHER CBD CANNABINOID, NOT MEDICAL CANNABIS, 10 mg every evening [employer] CBD 10 mg      lactobacillus rhamnosus, GG, (CULTURELL) capsule Take 1 capsule by mouth every evening      multivitamin w/minerals (MULTI-VITAMIN) tablet Take 1 tablet by mouth every evening      hydrocortisone 2.5 % ointment Apply topically 2 times daily 30 g 1    senna-docusate (SENOKOT-S/PERICOLACE) 8.6-50 MG tablet Take 1-2 tablets by mouth 2 times daily (Patient not taking: Reported on 11/28/2023) 30 tablet 0    Vitamin D, Cholecalciferol, 25 MCG (1000 UT) CAPS Take 1,000 mg % by mouth every evening Unsure is 500 or 1000 (Patient not taking: Reported on 1/30/2024)         Allergies  Allergies   Allergen Reactions    Advil [Nsaids] Other (See Comments)     Nasal congestion  Eye get puffy       Seasonal Allergies      Pollen and mold   Spring and Fall are main periods. Takes Zyrtec year round to manage it.   Gets post nasal drip     Adhesive Tape Rash     Surgical drape adhesive vs. ioban       Social History  Social History     Socioeconomic History    Marital status:      Spouse name: Mani Robles    Number of children: 2    Years of education: BA    Highest education level: Not on file   Occupational History    Occupation: Staff director     Comment: [employer]   Tobacco Use    Smoking status: Never     Passive exposure: Never    Smokeless tobacco: Never   Substance and Sexual Activity    Alcohol use: Not Currently     Comment: Holiday's 1 glass a wine    Drug use: Not Currently     Comment: CBD 10 mg     Sexual activity: Yes     Partners: Male     Birth control/protection: I.U.D.     Comment: Mirena   Other Topics Concern    Parent/sibling w/ CABG, MI or angioplasty before 65F 55M? No     Service Not Asked    Blood Transfusions No    Caffeine Concern No    Occupational Exposure No    Hobby Hazards  Not Asked    Sleep Concern No    Stress Concern Yes     Comment: life/work -->coping    Weight Concern No    Special Diet No    Back Care Yes     Comment: chiropracter for old injury    Exercise No    Bike Helmet Not Asked     Comment: not biker    Seat Belt No    Self-Exams Not Asked   Social History Narrative    4/12/23    Going to [place] [facility] this summer with daughter for 12th birthday    Fidelia Ricks MD            Social Documentation:        Balanced Diet: YES    Calcium intake: less than 2 per day    Caffeine: 0-1 per day    Exercise:  type of activity walk; daily times per week    Sunscreen: Yes    Seatbelts:  Yes    Self Breast Exam:  Yes    Self Testicular Exam: No - n/a    Physical/Emotional/Sexual Abuse: No     Do you feel safe in your environment? Yes        Cholesterol screen up to date: Yes-3 yrs ago per pt.  Not fasting today.     Eye Exam up to date: Yes    Dental Exam up to date: No - 9 months ago.     Pap smear up to date: Yes    Mammogram up to date: Does Not Apply    Dexa Scan up to date: Does Not Apply    Colonoscopy up to date: Does Not Apply    Immunizations up to date: Yes-Td 12/06    Glucose screen if over 40:  No - n/a     Social Determinants of Health     Financial Resource Strain: Not on file   Food Insecurity: Not on file   Transportation Needs: Not on file   Physical Activity: Not on file   Stress: Not on file   Social Connections: Not on file   Interpersonal Safety: Not on file   Housing Stability: Not on file       Family History  Family History   Problem Relation Age of Onset    Breast Cancer Mother 32        dx age 32, doing well    Colon Cancer Mother 67        dx 2019. s/p surg and chemo    Hereditary Breast and Ovarian Cancer Syndrome Mother         BRCA1+    Anesthesia Reaction Sister         PONV    Hereditary Breast and Ovarian Cancer Syndrome Sister         double Mastectomy and oophrectomy; BRCA1+    Other - See Comments Sister         negative genetic testing    Other -  "See Comments Sister         negative genetic testing    Breast Cancer Maternal Grandmother     Ovarian Cancer Maternal Grandmother          in 40s; \"stomach cancer\"    Diabetes Paternal Grandmother     Breast Cancer Paternal Grandmother 59        cause of death at 62    Skin Cancer Maternal Uncle         negative for BRCA    Genetic Disorder Maternal Uncle         BRCA1+    Genetic Disorder Maternal Uncle         BRCA1+    Thrombosis No family hx of     Malignant Hyperthermia No family hx of        Review of Systems  The complete review of systems is negative other than noted in the HPI or here.   Anesthesia Evaluation   Pt has had prior anesthetic. Type: General.    No history of anesthetic complications       ROS/MED HX  ENT/Pulmonary:     (+)           allergic rhinitis,                          (-) tobacco use   Neurologic:  - neg neurologic ROS  (-) no seizures and no CVA   Cardiovascular:     (+)  - -   -  - -                                 No previous cardiac testing  (-) taking anticoagulants/antiplatelets   METS/Exercise Tolerance: >4 METS    Hematologic:  - neg hematologic  ROS  (-) history of blood clots and history of blood transfusion   Musculoskeletal:  - neg musculoskeletal ROS     GI/Hepatic:  - neg GI/hepatic ROS  (-) GERD   Renal/Genitourinary:  - neg Renal ROS     Endo:  - neg endo ROS  (-) chronic steroid usage   Psychiatric/Substance Use:  - neg psychiatric ROS     Infectious Disease: Comment: H/o Cellulitis       Malignancy: Comment: BRCA 1 carrier now s/p surgery      Other:  - neg other ROS          Virtual visit -  No vitals were obtained    Physical Exam  Constitutional: Awake, alert, cooperative, no apparent distress, and appears stated age.  Eyes: Pupils equal  HENT: Normocephalic  Respiratory: non labored breathing   Neurologic: Awake, alert, oriented to name, place and time.   Neuropsychiatric: Calm, cooperative. Normal affect.      Prior Labs/Diagnostic Studies   All labs and " imaging personally reviewed    Latest Reference Range & Units 01/13/23 10:04   Sodium 136 - 145 mmol/L 138   Potassium 3.4 - 5.3 mmol/L 4.2   Chloride 98 - 107 mmol/L 105   Carbon Dioxide (CO2) 22 - 29 mmol/L 26   Urea Nitrogen 6.0 - 20.0 mg/dL 11.4   Creatinine 0.51 - 0.95 mg/dL 0.75   GFR Estimate >60 mL/min/1.73m2 >90   Calcium 8.6 - 10.0 mg/dL 9.3   Anion Gap 7 - 15 mmol/L 7   Glucose 70 - 99 mg/dL 87   WBC 4.0 - 11.0 10e3/uL 4.4   Hemoglobin 11.7 - 15.7 g/dL 12.0   Hematocrit 35.0 - 47.0 % 36.2   Platelet Count 150 - 450 10e3/uL 231   RBC Count 3.80 - 5.20 10e6/uL 3.88   MCV 78 - 100 fL 93   MCH 26.5 - 33.0 pg 30.9   MCHC 31.5 - 36.5 g/dL 33.1   RDW 10.0 - 15.0 % 11.7       EKG/ stress test - if available please see in ROS above   No results found.      Latest Ref Rng & Units 3/16/2017     4:32 PM   PFT   FVC L 3.20    FEV1 L 2.67    FVC% % 85    FEV1% % 86          The patient's records and results personally reviewed by this provider.     Outside records reviewed from: No outside records available      Assessment    Yamila Myles is a 43 year old female seen as a PAC referral for risk assessment and optimization for anesthesia.    Plan/Recommendations  Pt will be optimized for the proposed procedure.  See below for details on the assessment, risk, and preoperative recommendations    NEUROLOGY  - No history of TIA, CVA or seizure  -Post Op delirium risk factors:  No risk identified    ENT  - No current airway concerns.  Will need to be reassessed day of surgery.  Mallampati: Unable to assess  TM: Unable to assess    CARDIAC  - No history of CAD, Hypertension, and Afib  - METS (Metabolic Equivalents)  Patient performs 4 or more METS exercise without symptoms            Total Score: 0      RCRI-Very low risk: Class 1 0.4% complication rate            Total Score: 0        PULMONARY  - Obstructive Sleep Apnea  No current risk of obstructive sleep apnea   JOSE LUIS Low Risk            Total Score: 0      - Denies  "asthma or inhaler use  - allergies to mold and mildew- hold zyrtec DOS  She denies any recent URI symptoms.   - Tobacco History    History   Smoking Status    Never   Smokeless Tobacco    Never       GI  PONV High Risk  Total Score: 3           1 AN PONV: Pt is Female    1 AN PONV: Patient is not a current smoker    1 AN PONV: Intended Post Op Opioids        /RENAL  - Baseline Creatinine  0.75  ~ BRCA 1 carrier - procedure as above.     ENDOCRINE    - BMI: Estimated body mass index is 23.4 kg/m  as calculated from the following:    Height as of 9/8/23: 1.626 m (5' 4\").    Weight as of 11/28/23: 61.8 kg (136 lb 4.8 oz).  Healthy Weight (BMI 18.5-24.9)  - No history of Diabetes Mellitus    HEME  VTE Low Risk 0.26%            Total Score: 0      - No history of abnormal bleeding or antiplatelet use.    ID  ~ History of cellulitis - now resolved.     OTHER  ~ The patient takes CBD gummy for sleep. Will be held for 8 hours prior.     Different anesthesia methods/types have been discussed with the patient, but they are aware that the final plan will be decided by the assigned anesthesia provider on the date of service.    The patient is optimized for their procedure. AVS with information on surgery time/arrival time, meds and NPO status given by nursing staff. No further diagnostic testing indicated.    Please refer to the physical examination documented by the anesthesiologist in the anesthesia record on the day of surgery.    Video-Visit Details    Type of service:  Video Visit    Provider received verbal consent for a Video Visit from the patient? Yes     Originating Location (pt. Location): Home    Distant Location (provider location):  Off-site  Mode of Communication:  Video Conference via [employer]  On the day of service:     Prep time: 5 minutes  Visit time: 11 minutes  Documentation time: 4 minutes  ------------------------------------------  Total time: 20 minutes      Lydia Hermosillo PA-C  Preoperative " Assessment Center  [facility]  Clinic and Surgery Center  Phone: [phone]  Fax: [phone]

## 2024-01-30 NOTE — PATIENT INSTRUCTIONS
Preparing for Your Surgery      Name:  Yamila Myles   MRN:  [id number]   :  1980   Today's Date:  2024       Arriving for surgery:  Surgery date:  2024  Arrival time:  7:00 am    Please come to:     Please come to:      [facility] [address]  [address] [place]  [address]      The [facility] Patient /Visitor Ramp is located at [address]. Patients and visitors who self-park will receive the reduced hospital parking rate. If the Patient /Visitor Ramp is full, please follow the signs to the  parking located at the main hospital entrance.     parking is available ( 24 hours/ 7 days a week)    Discounted parking pass options are available for patients and visitors. They can be purchased at the [employer] desk at the [facility] hospital entrance.    -    Stop at the security desk and they will direct surgery patients to the 3rd floor Surgery Waiting Room. [phone]     -  If you are in need of directions, wheelchair or escort please stop at the Information/security desk in the lobby.       What can I eat or drink?  -  You may eat and drink normally up to 8 hours prior to arrival time. (Until Midnight)  -  You may have clear liquids until 2 hours prior to arrival time. (Until 5 am)    Examples of clear liquids:  Water  Clear broth  Juices (apple, white grape, white cranberry  and cider) without pulp  Noncarbonated, powder based beverages  (lemonade and Jordan-Aid)  Sodas (Sprite, 7-Up, ginger ale and seltzer)  Coffee or tea (without milk or cream)  Gatorade    -  No Alcohol or cannabis products for at least 24 hours before surgery.     Which medicines can I take?    Hold Aspirin for 7 days before surgery.   Hold Multivitamins for 7 days before surgery.  Hold Supplements for 7 days before surgery.  Hold Ibuprofen (Advil, Motrin) for 1 day(s) before surgery--unless otherwise directed by surgeon.  Hold  Naproxen (Aleve) for 4 days before surgery.      -  PLEASE TAKE these medications the day of surgery:  Flonase if needed      How do I prepare myself?  - Please take 2 showers (one the night prior to surgery and one the morning of surgery) using Scrubcare or Hibiclens soap.    Use this soap only from the neck to your toes.     Leave the soap on your skin for one minute--then rinse thoroughly.      You may use your own shampoo and conditioner. No other hair products.   - Please remove all jewelry and body piercings.  - No lotions, deodorants or fragrance.  - No makeup or fingernail polish.   - Bring your ID and insurance card.    -If you use a CPAP machine, please bring the CPAP machine, tubing, and mask to hospital.    -If you have a Deep Brain Stimulator, Spinal Cord Stimulator, or any Neuro Stimulator device---you must bring the remote control to the hospital.      ALL PATIENTS GOING HOME THE SAME DAY OF SURGERY ARE REQUIRED TO HAVE A RESPONSIBLE ADULT TO DRIVE AND BE IN ATTENDANCE WITH THEM FOR 24 HOURS FOLLOWING SURGERY.    Covid testing policy as of 12/06/2022  Your surgeon will notify and schedule you for a COVID test if one is needed before surgery--please direct any questions or COVID symptoms to your surgeon      Questions or Concerns:    - For any questions regarding the day of surgery or your hospital stay, please contact the Pre Admission Nursing Office at [phone].       - If you have health changes between today and your surgery, please call your surgeon.       - For questions after surgery, please call your surgeons office.           Current Visitor Guidelines    You may have 2 visitors in the pre op area.    Visiting hours: 8 a.m. to 8:30 p.m.    Patients confirmed or suspected to have symptoms of COVID 19 or flu:     No visitors allowed for adult patients.   Children (under age 18) can have 1 named visitor.     People who are sick or showing symptoms of COVID 19 or flu:    Are not allowed to  visit patients--we can only make exceptions in special situations.       Please follow these guidelines for your visit:          Please maintain social distance          Masking is optional--however at times you may be asked to wear a mask for the safety of yourself and others     Clean your hands with alcohol hand . Do this when you arrive at and leave the building and patient room,    And again after you touch your mask or anything in the room.     Go directly to and from the room you are visiting.     Stay in the patient s room during your visit. Limit going to other places in the hospital as much as possible     Leave bags and jackets at home or in the car.     For everyone s health, please don t come and go during your visit. That includes for smoking   during your visit.

## 2024-02-04 NOTE — ANESTHESIA PREPROCEDURE EVALUATION
Anesthesia Pre-Procedure Evaluation    Patient: Yamila Myles   MRN: [id number] : 1980        Procedure : Procedure(s):  INSERTION, TISSUE EXPANDER, BREAST, left          Past Medical History:   Diagnosis Date    Allergic rhinitis     BRCA1 positive 2001    [id number]    Encounter for insertion of mirena IUD 2016    Hoarseness     Lump or mass in breast     BRCA-1 positive (Mother, MGM w/ breast ca, mother w/ gene +), mother dx at 32)    Microcalcifications of the breast, right uoq 2011      Past Surgical History:   Procedure Laterality Date    GRAFT FAT TO BREAST Bilateral 2023    Procedure: fat grafting from abdomen and thighs;  Surgeon: LING Pradhan MD;  Location: [facility] OR    INSERT INTRAUTERINE DEVICE N/A 2016    Procedure: INSERT INTRAUTERINE DEVICE;  Surgeon: Fidelia Ricks MD;  Location: [facility]    LAPAROSCOPIC SALPINGO-OOPHORECTOMY Bilateral 2016    Procedure: LAPAROSCOPIC SALPINGO-OOPHORECTOMY;  Surgeon: Fidelia Ricks MD;  [facility]: [facility]    LAPAROSCOPY OPERATIVE ADULT N/A 2016    Procedure: LAPAROSCOPY OPERATIVE ADULT;  Surgeon: Fidelia Ricks MD;  [facility]    MAMMOPLASTY REDUCTION BILATERAL Bilateral 2022    Procedure: Bilateral breast lift;  Surgeon: LING Pradhan MD;  Location: [facility] OR    MASTECTOMY SIMPLE Bilateral 2023    Procedure: BILATERAL Risk-Reducing Nipple-Sparing Mastectomy;  Surgeon: Monica Rodríguez MD;  Location: [facility] OR    RECONSTRUCT BREAST BILATERAL, IMPLANT PROSTHESIS BILATERAL, COMBINED Bilateral 2023    Procedure: Bilateral breast reconstruction with implant/Alloderm, SPY;  Surgeon: LING Pradhan MD;  Location: [facility] OR    REMOVE IMPLANT BREAST Left 2023    Procedure: REMOVAL, IMPLANT, BREAST, left and washout;  Surgeon: LING Pradhan MD;  Location: [facility] OR    REVISE RECONSTRUCTED BREAST BILATERAL Bilateral 2023    Procedure: Bilateral breast revision;  Surgeon:  LING Pradhan MD;  Location: [facility] OR    [facility] APPENDECTOMY  1987      Allergies   Allergen Reactions    Advil [Nsaids] Other (See Comments)     Nasal congestion  Eye get puffy       Seasonal Allergies      Pollen and mold   Spring and Fall are main periods. Takes Zyrtec year round to manage it.   Gets post nasal drip     Adhesive Tape Rash     Surgical drape adhesive vs. ioban      Social History     Tobacco Use    Smoking status: Never     Passive exposure: Never    Smokeless tobacco: Never   Substance Use Topics    Alcohol use: Not Currently     Comment: Holiday's 1 glass a wine      Wt Readings from Last 1 Encounters:   11/28/23 61.8 kg (136 lb 4.8 oz)        Anesthesia Evaluation   Pt has not had prior anesthetic         ROS/MED HX  ENT/Pulmonary:    (-) asthma, COPD, sleep apnea, allergic rhinitis and vocal abnormalities   Neurologic:    (-) no seizures, no CVA and migraines   Cardiovascular:       METS/Exercise Tolerance: 4 - Raking leaves, gardening    Hematologic:  - neg hematologic  ROS     Musculoskeletal:  - neg musculoskeletal ROS     GI/Hepatic:  - neg GI/hepatic ROS  (-) GERD and liver disease   Renal/Genitourinary:    (-) renal disease   Endo:    (-) Type I DM and Type II DM   Psychiatric/Substance Use:     (+)  anxiety and depression    (-) psychiatric history   Infectious Disease: Comment: Previous implant infected - neg infectious disease ROS     Malignancy:    (-) malignancy   Other:            Physical Exam    Airway        Mallampati: II   TM distance: > 3 FB   Neck ROM: full   Mouth opening: > 3 cm    Respiratory Devices and Support         Dental       (+) Minor Abnormalities - some fillings, tiny chips      Cardiovascular   cardiovascular exam normal          Pulmonary   pulmonary exam normal                OUTSIDE LABS:  CBC:   Lab Results   Component Value Date    WBC 4.4 01/13/2023    WBC 6.5 07/12/2016    HGB 12.0 01/13/2023    HGB 13.2 07/12/2016    HCT 36.2 01/13/2023    HCT 39.8  "07/12/2016     01/13/2023     07/12/2016     BMP:   Lab Results   Component Value Date     01/13/2023     02/18/2019    POTASSIUM 4.2 01/13/2023    POTASSIUM 4.5 02/18/2019    CHLORIDE 105 01/13/2023    CHLORIDE 111 (H) 02/18/2019    CO2 26 01/13/2023    CO2 26 02/18/2019    BUN 11.4 01/13/2023    BUN 15 02/18/2019    CR 0.75 01/13/2023    CR 0.82 02/18/2019    GLC 87 01/13/2023    GLC 85 02/18/2019     COAGS: No results found for: \"PTT\", \"INR\", \"FIBR\"  POC:   Lab Results   Component Value Date    HCG Negative 07/12/2016     HEPATIC: No results found for: \"ALBUMIN\", \"PROTTOTAL\", \"ALT\", \"AST\", \"GGT\", \"ALKPHOS\", \"BILITOTAL\", \"BILIDIRECT\", \"RADHA\"  OTHER:   Lab Results   Component Value Date    A1C 4.6 02/13/2015    GRETCHEN 9.3 01/13/2023    TSH 1.00 02/21/2013       Anesthesia Plan    ASA Status:  2    NPO Status:  NPO Appropriate    Anesthesia Type: General.     - Airway: ETT      Maintenance: Balanced.   Techniques and Equipment:     - Lines/Monitors: BIS     Consents    Anesthesia Plan(s) and associated risks, benefits, and realistic alternatives discussed. Questions answered and patient/representative(s) expressed understanding.     - Discussed: Risks, Benefits and Alternatives for BOTH SEDATION and the PROCEDURE were discussed     - Discussed with:  Patient, Spouse      - Extended Intubation/Ventilatory Support Discussed: No.      - Patient is DNR/DNI Status: No     Use of blood products discussed: No .     Postoperative Care    Pain management: Multi-modal analgesia.   PONV prophylaxis: Ondansetron (or other 5HT-3), Dexamethasone or Solumedrol, Background Propofol Infusion     Comments:               Cristela Ibarra MD    I have reviewed the pertinent notes and labs in the chart from the past 30 days and (re)examined the patient.  Any updates or changes from those notes are reflected in this note.                  "

## 2024-02-05 ENCOUNTER — HOSPITAL ENCOUNTER (OUTPATIENT)
Facility: CLINIC | Age: 44
Discharge: HOME | End: 2024-02-05
Attending: PLASTIC SURGERY | Admitting: PLASTIC SURGERY
Payer: COMMERCIAL

## 2024-02-05 ENCOUNTER — ANESTHESIA (OUTPATIENT)
Dept: SURGERY | Facility: CLINIC | Age: 44
End: 2024-02-05
Payer: COMMERCIAL

## 2024-02-05 VITALS
DIASTOLIC BLOOD PRESSURE: 69 MMHG | OXYGEN SATURATION: 97 % | HEART RATE: 77 BPM | HEIGHT: 64 IN | RESPIRATION RATE: 14 BRPM | SYSTOLIC BLOOD PRESSURE: 98 MMHG | WEIGHT: 143.3 LBS | BODY MASS INDEX: 24.46 KG/M2 | TEMPERATURE: 97.7 F

## 2024-02-05 DIAGNOSIS — Z98.890 S/P BREAST RECONSTRUCTION, BILATERAL: Primary | ICD-10-CM

## 2024-02-05 PROCEDURE — 250N000025 HC SEVOFLURANE, PER MIN: Performed by: PLASTIC SURGERY

## 2024-02-05 PROCEDURE — 250N000011 HC RX IP 250 OP 636: Performed by: STUDENT IN AN ORGANIZED HEALTH CARE EDUCATION/TRAINING PROGRAM

## 2024-02-05 PROCEDURE — 370N000017 HC ANESTHESIA TECHNICAL FEE, PER MIN: Performed by: PLASTIC SURGERY

## 2024-02-05 PROCEDURE — C1789 PROSTHESIS, BREAST, IMP: HCPCS | Performed by: PLASTIC SURGERY

## 2024-02-05 PROCEDURE — 710N000010 HC RECOVERY PHASE 1, LEVEL 2, PER MIN: Performed by: PLASTIC SURGERY

## 2024-02-05 PROCEDURE — 258N000001 HC RX 258: Performed by: PLASTIC SURGERY

## 2024-02-05 PROCEDURE — 999N000141 HC STATISTIC PRE-PROCEDURE NURSING ASSESSMENT: Performed by: PLASTIC SURGERY

## 2024-02-05 PROCEDURE — 258N000003 HC RX IP 258 OP 636: Performed by: REGISTERED NURSE

## 2024-02-05 PROCEDURE — 250N000011 HC RX IP 250 OP 636: Performed by: REGISTERED NURSE

## 2024-02-05 PROCEDURE — 360N000076 HC SURGERY LEVEL 3, PER MIN: Performed by: PLASTIC SURGERY

## 2024-02-05 PROCEDURE — 250N000011 HC RX IP 250 OP 636

## 2024-02-05 PROCEDURE — 710N000012 HC RECOVERY PHASE 2, PER MINUTE: Performed by: PLASTIC SURGERY

## 2024-02-05 PROCEDURE — 19357 TISS XPNDR PLMT BRST RCNSTJ: CPT | Mod: LT | Performed by: PLASTIC SURGERY

## 2024-02-05 PROCEDURE — 272N000001 HC OR GENERAL SUPPLY STERILE: Performed by: PLASTIC SURGERY

## 2024-02-05 PROCEDURE — 250N000011 HC RX IP 250 OP 636: Performed by: PLASTIC SURGERY

## 2024-02-05 PROCEDURE — 250N000009 HC RX 250: Performed by: STUDENT IN AN ORGANIZED HEALTH CARE EDUCATION/TRAINING PROGRAM

## 2024-02-05 PROCEDURE — 250N000009 HC RX 250: Performed by: REGISTERED NURSE

## 2024-02-05 DEVICE — NATRELLE TE SMOOTH 133S-FX-13-T (US)
Type: IMPLANTABLE DEVICE | Site: BREAST | Status: NON-FUNCTIONAL
Brand: NATRELLE 133S TISSUE EXPANDERS
Removed: 2024-06-12

## 2024-02-05 RX ORDER — FENTANYL CITRATE 50 UG/ML
50 INJECTION, SOLUTION INTRAMUSCULAR; INTRAVENOUS EVERY 5 MIN PRN
Status: DISCONTINUED | OUTPATIENT
Start: 2024-02-05 | End: 2024-02-05 | Stop reason: HOSPADM

## 2024-02-05 RX ORDER — BUPIVACAINE HYDROCHLORIDE 2.5 MG/ML
INJECTION, SOLUTION EPIDURAL; INFILTRATION; INTRACAUDAL
Status: COMPLETED | OUTPATIENT
Start: 2024-02-05 | End: 2024-02-05

## 2024-02-05 RX ORDER — FENTANYL CITRATE 50 UG/ML
25 INJECTION, SOLUTION INTRAMUSCULAR; INTRAVENOUS EVERY 5 MIN PRN
Status: DISCONTINUED | OUTPATIENT
Start: 2024-02-05 | End: 2024-02-05 | Stop reason: HOSPADM

## 2024-02-05 RX ORDER — FENTANYL CITRATE 50 UG/ML
25-50 INJECTION, SOLUTION INTRAMUSCULAR; INTRAVENOUS
Status: DISCONTINUED | OUTPATIENT
Start: 2024-02-05 | End: 2024-02-05 | Stop reason: HOSPADM

## 2024-02-05 RX ORDER — NALOXONE HYDROCHLORIDE 0.4 MG/ML
0.2 INJECTION, SOLUTION INTRAMUSCULAR; INTRAVENOUS; SUBCUTANEOUS
Status: DISCONTINUED | OUTPATIENT
Start: 2024-02-05 | End: 2024-02-05 | Stop reason: HOSPADM

## 2024-02-05 RX ORDER — PROPOFOL 10 MG/ML
INJECTION, EMULSION INTRAVENOUS CONTINUOUS PRN
Status: DISCONTINUED | OUTPATIENT
Start: 2024-02-05 | End: 2024-02-05

## 2024-02-05 RX ORDER — ONDANSETRON 4 MG/1
4 TABLET, ORALLY DISINTEGRATING ORAL EVERY 8 HOURS PRN
Qty: 4 TABLET | Refills: 0 | Status: SHIPPED | OUTPATIENT
Start: 2024-02-05 | End: 2024-06-12

## 2024-02-05 RX ORDER — ONDANSETRON 2 MG/ML
4 INJECTION INTRAMUSCULAR; INTRAVENOUS EVERY 30 MIN PRN
Status: DISCONTINUED | OUTPATIENT
Start: 2024-02-05 | End: 2024-02-05 | Stop reason: HOSPADM

## 2024-02-05 RX ORDER — HYDROMORPHONE HCL IN WATER/PF 6 MG/30 ML
0.2 PATIENT CONTROLLED ANALGESIA SYRINGE INTRAVENOUS EVERY 5 MIN PRN
Status: DISCONTINUED | OUTPATIENT
Start: 2024-02-05 | End: 2024-02-05 | Stop reason: HOSPADM

## 2024-02-05 RX ORDER — SODIUM CHLORIDE, SODIUM LACTATE, POTASSIUM CHLORIDE, CALCIUM CHLORIDE 600; 310; 30; 20 MG/100ML; MG/100ML; MG/100ML; MG/100ML
INJECTION, SOLUTION INTRAVENOUS CONTINUOUS
Status: DISCONTINUED | OUTPATIENT
Start: 2024-02-05 | End: 2024-02-05 | Stop reason: HOSPADM

## 2024-02-05 RX ORDER — CEFAZOLIN SODIUM/WATER 2 G/20 ML
2 SYRINGE (ML) INTRAVENOUS
Status: DISCONTINUED | OUTPATIENT
Start: 2024-02-05 | End: 2024-02-05 | Stop reason: HOSPADM

## 2024-02-05 RX ORDER — ONDANSETRON 4 MG/1
4 TABLET, ORALLY DISINTEGRATING ORAL EVERY 30 MIN PRN
Status: DISCONTINUED | OUTPATIENT
Start: 2024-02-05 | End: 2024-02-05 | Stop reason: HOSPADM

## 2024-02-05 RX ORDER — DEXAMETHASONE SODIUM PHOSPHATE 10 MG/ML
INJECTION, SOLUTION INTRAMUSCULAR; INTRAVENOUS
Status: COMPLETED | OUTPATIENT
Start: 2024-02-05 | End: 2024-02-05

## 2024-02-05 RX ORDER — ONDANSETRON 4 MG/1
4 TABLET, ORALLY DISINTEGRATING ORAL EVERY 30 MIN PRN
Status: CANCELLED | OUTPATIENT
Start: 2024-02-05

## 2024-02-05 RX ORDER — ONDANSETRON 2 MG/ML
INJECTION INTRAMUSCULAR; INTRAVENOUS PRN
Status: DISCONTINUED | OUTPATIENT
Start: 2024-02-05 | End: 2024-02-05

## 2024-02-05 RX ORDER — OXYCODONE HYDROCHLORIDE 10 MG/1
10 TABLET ORAL
Status: CANCELLED | OUTPATIENT
Start: 2024-02-05

## 2024-02-05 RX ORDER — CEFAZOLIN SODIUM/WATER 2 G/20 ML
2 SYRINGE (ML) INTRAVENOUS SEE ADMIN INSTRUCTIONS
Status: DISCONTINUED | OUTPATIENT
Start: 2024-02-05 | End: 2024-02-05 | Stop reason: HOSPADM

## 2024-02-05 RX ORDER — FENTANYL CITRATE 50 UG/ML
INJECTION, SOLUTION INTRAMUSCULAR; INTRAVENOUS PRN
Status: DISCONTINUED | OUTPATIENT
Start: 2024-02-05 | End: 2024-02-05

## 2024-02-05 RX ORDER — OXYCODONE HYDROCHLORIDE 5 MG/1
5-10 TABLET ORAL EVERY 6 HOURS PRN
Qty: 20 TABLET | Refills: 0 | Status: SHIPPED | OUTPATIENT
Start: 2024-02-05 | End: 2024-05-21

## 2024-02-05 RX ORDER — DEXMEDETOMIDINE HYDROCHLORIDE 4 UG/ML
INJECTION, SOLUTION INTRAVENOUS
Status: COMPLETED | OUTPATIENT
Start: 2024-02-05 | End: 2024-02-05

## 2024-02-05 RX ORDER — AMOXICILLIN 250 MG
1-2 CAPSULE ORAL 2 TIMES DAILY
Qty: 30 TABLET | Refills: 0 | Status: SHIPPED | OUTPATIENT
Start: 2024-02-05 | End: 2024-05-21

## 2024-02-05 RX ORDER — NALOXONE HYDROCHLORIDE 0.4 MG/ML
0.4 INJECTION, SOLUTION INTRAMUSCULAR; INTRAVENOUS; SUBCUTANEOUS
Status: DISCONTINUED | OUTPATIENT
Start: 2024-02-05 | End: 2024-02-05 | Stop reason: HOSPADM

## 2024-02-05 RX ORDER — DEXAMETHASONE SODIUM PHOSPHATE 4 MG/ML
INJECTION, SOLUTION INTRA-ARTICULAR; INTRALESIONAL; INTRAMUSCULAR; INTRAVENOUS; SOFT TISSUE PRN
Status: DISCONTINUED | OUTPATIENT
Start: 2024-02-05 | End: 2024-02-05

## 2024-02-05 RX ORDER — LIDOCAINE HYDROCHLORIDE 20 MG/ML
INJECTION, SOLUTION INFILTRATION; PERINEURAL PRN
Status: DISCONTINUED | OUTPATIENT
Start: 2024-02-05 | End: 2024-02-05

## 2024-02-05 RX ORDER — PROPOFOL 10 MG/ML
INJECTION, EMULSION INTRAVENOUS PRN
Status: DISCONTINUED | OUTPATIENT
Start: 2024-02-05 | End: 2024-02-05

## 2024-02-05 RX ORDER — ONDANSETRON 2 MG/ML
4 INJECTION INTRAMUSCULAR; INTRAVENOUS EVERY 30 MIN PRN
Status: CANCELLED | OUTPATIENT
Start: 2024-02-05

## 2024-02-05 RX ORDER — SODIUM CHLORIDE, SODIUM LACTATE, POTASSIUM CHLORIDE, CALCIUM CHLORIDE 600; 310; 30; 20 MG/100ML; MG/100ML; MG/100ML; MG/100ML
INJECTION, SOLUTION INTRAVENOUS CONTINUOUS PRN
Status: DISCONTINUED | OUTPATIENT
Start: 2024-02-05 | End: 2024-02-05

## 2024-02-05 RX ORDER — OXYCODONE HYDROCHLORIDE 5 MG/1
5 TABLET ORAL
Status: CANCELLED | OUTPATIENT
Start: 2024-02-05

## 2024-02-05 RX ORDER — SULFAMETHOXAZOLE/TRIMETHOPRIM 800-160 MG
1 TABLET ORAL 2 TIMES DAILY
Qty: 14 TABLET | Refills: 0 | Status: SHIPPED | OUTPATIENT
Start: 2024-02-05 | End: 2024-02-12

## 2024-02-05 RX ORDER — FLUMAZENIL 0.1 MG/ML
0.2 INJECTION, SOLUTION INTRAVENOUS
Status: DISCONTINUED | OUTPATIENT
Start: 2024-02-05 | End: 2024-02-05 | Stop reason: HOSPADM

## 2024-02-05 RX ORDER — HYDROMORPHONE HCL IN WATER/PF 6 MG/30 ML
0.4 PATIENT CONTROLLED ANALGESIA SYRINGE INTRAVENOUS EVERY 5 MIN PRN
Status: DISCONTINUED | OUTPATIENT
Start: 2024-02-05 | End: 2024-02-05 | Stop reason: HOSPADM

## 2024-02-05 RX ADMIN — MIDAZOLAM 1 MG: 1 INJECTION INTRAMUSCULAR; INTRAVENOUS at 08:20

## 2024-02-05 RX ADMIN — PHENYLEPHRINE HYDROCHLORIDE 100 MCG: 10 INJECTION INTRAVENOUS at 09:25

## 2024-02-05 RX ADMIN — FENTANYL CITRATE 25 MCG: 50 INJECTION, SOLUTION INTRAMUSCULAR; INTRAVENOUS at 11:00

## 2024-02-05 RX ADMIN — FENTANYL CITRATE 25 MCG: 50 INJECTION, SOLUTION INTRAMUSCULAR; INTRAVENOUS at 11:14

## 2024-02-05 RX ADMIN — PROPOFOL 100 MG: 10 INJECTION, EMULSION INTRAVENOUS at 09:13

## 2024-02-05 RX ADMIN — DEXAMETHASONE SODIUM PHOSPHATE 2 MG: 10 INJECTION, SOLUTION INTRAMUSCULAR; INTRAVENOUS at 08:20

## 2024-02-05 RX ADMIN — ONDANSETRON 4 MG: 2 INJECTION INTRAMUSCULAR; INTRAVENOUS at 10:22

## 2024-02-05 RX ADMIN — DEXMEDETOMIDINE 40 MCG: 100 INJECTION, SOLUTION, CONCENTRATE INTRAVENOUS at 08:20

## 2024-02-05 RX ADMIN — Medication 2 G: at 09:20

## 2024-02-05 RX ADMIN — DEXAMETHASONE SODIUM PHOSPHATE 6 MG: 4 INJECTION, SOLUTION INTRA-ARTICULAR; INTRALESIONAL; INTRAMUSCULAR; INTRAVENOUS; SOFT TISSUE at 09:16

## 2024-02-05 RX ADMIN — Medication 20 MG: at 09:53

## 2024-02-05 RX ADMIN — FENTANYL CITRATE 100 MCG: 50 INJECTION INTRAMUSCULAR; INTRAVENOUS at 09:12

## 2024-02-05 RX ADMIN — SUGAMMADEX 200 MG: 100 INJECTION, SOLUTION INTRAVENOUS at 10:28

## 2024-02-05 RX ADMIN — SODIUM CHLORIDE, POTASSIUM CHLORIDE, SODIUM LACTATE AND CALCIUM CHLORIDE: 600; 310; 30; 20 INJECTION, SOLUTION INTRAVENOUS at 09:09

## 2024-02-05 RX ADMIN — PHENYLEPHRINE HYDROCHLORIDE 100 MCG: 10 INJECTION INTRAVENOUS at 09:35

## 2024-02-05 RX ADMIN — PHENYLEPHRINE HYDROCHLORIDE 100 MCG: 10 INJECTION INTRAVENOUS at 09:46

## 2024-02-05 RX ADMIN — Medication 50 MG: at 09:13

## 2024-02-05 RX ADMIN — LIDOCAINE HYDROCHLORIDE 100 MG: 20 INJECTION, SOLUTION INFILTRATION; PERINEURAL at 09:13

## 2024-02-05 RX ADMIN — PROPOFOL 20 MCG/KG/MIN: 10 INJECTION, EMULSION INTRAVENOUS at 09:21

## 2024-02-05 RX ADMIN — FENTANYL CITRATE 50 MCG: 50 INJECTION, SOLUTION INTRAMUSCULAR; INTRAVENOUS at 08:20

## 2024-02-05 RX ADMIN — BUPIVACAINE HYDROCHLORIDE 40 ML: 2.5 INJECTION, SOLUTION EPIDURAL; INFILTRATION; INTRACAUDAL; PERINEURAL at 08:20

## 2024-02-05 ASSESSMENT — COPD QUESTIONNAIRES: COPD: 0

## 2024-02-05 ASSESSMENT — ENCOUNTER SYMPTOMS: SEIZURES: 0

## 2024-02-05 ASSESSMENT — ACTIVITIES OF DAILY LIVING (ADL)
ADLS_ACUITY_SCORE: 31
ADLS_ACUITY_SCORE: 32
ADLS_ACUITY_SCORE: 31
ADLS_ACUITY_SCORE: 32

## 2024-02-05 NOTE — ANESTHESIA PROCEDURE NOTES
Airway       Patient location during procedure: OR       Procedure Start/Stop Times: 2/5/2024 9:17 AM  Staff -        CRNA: Jag Claudio APRN CRNA       Performed By: CRNA  Consent for Airway        Urgency: elective  Indications and Patient Condition       Indications for airway management: kimberli-procedural       Induction type:intravenous       Mask difficulty assessment: 1 - vent by mask    Final Airway Details       Final airway type: endotracheal airway       Successful airway: ETT - single and Oral  Endotracheal Airway Details        ETT size (mm): 7.0       Cuffed: yes       Successful intubation technique: direct laryngoscopy       DL Blade Type: MAC 3       Grade View of Cords: 1       Adjucts: stylet       Position: Right       Measured from: lips       Secured at (cm): 22       Bite block used: None    Post intubation assessment        Placement verified by: capnometry and equal breath sounds        Number of attempts at approach: 1       Number of other approaches attempted: 0       Secured with: tape       Ease of procedure: easy       Dentition: Intact and Unchanged    Medication(s) Administered   Medication Administration Time: 2/5/2024 9:17 AM

## 2024-02-05 NOTE — DISCHARGE INSTRUCTIONS
TISSUE EXPANSION FOR BREAST RECONSTRUCTION  POST-OPERATIVE INSTRUCTIONS    Instructions  ?  Shower with Bactoshield the night before and morning of each tissue     expansion.  ?  You need someone to drive you to your appointment for the first 3 weeks     post-operatively or as long as you requiring narcotic pain medicine.    Activities  ?  You cannot perform house hold chores or heavy lifting greater than 5 to     10 pounds for 6 weeks post-operatively.  ?  No hot tubs, swimming in lakes until fully healed.  Be Aware:  ?  You cannot have an MRI if you have tissue expanders in place.  ?  The tissue expanders may be detected via the security scanners at the      airport. You will need a note from your Doctor if you plan to travel      indicating you have tissue expanders in place with metal.    Incision Care  ?  You can shower 48 hours after the last drain tube has removed.  ?  Avoid sun exposure to scars for at least 12 months after your last     surgery.  ?  If sun exposure is unavoidable then always use a sun block with 50 SPF     or higher.  ?  Shower regularly to keep the incisions clean and inspect for signs of      Infection.  ?  You can use moisturizer on the incisions and surrounding skin starting 3      Weeks.    What to Expect  ?  You may experience minor discomfort for the following 12 to 24 hours      after each tissue expansion. This discomfort usually subsides 2 to 3 days      after each tissue expansion.  ?  The tissue expander may shift after the 1st or 2 nd expansion.  ?  You may experience more discomfort on one side than the other if you      have bilateral tissue expanders placed.  ?  Your posture may change causing you to have some upper and/or mid      back pain as you re expanded.  ?  Your shoulder range of motion may become stiff requiring continuing to     perform the shoulder exercises during the tissue expansion process to     prevent shoulder stiffness and a frozen shoulder.  ?  You may  find it difficult to sleep because you feel tight across the chest      and shoulders.  ?  You may find it difficult to fit into certain types of tight fitting clothing.      You may need to wear oversized shirts until the final exchange of the      tissue expander (s).  ?  You may feel chest persist tightness or heaviness secondary to being     expanded. This usually subsides with time.  ?  The tissue expanders will remain in place for a minimum of 8 weeks after     completing the last tissue expansion. This will allow for the soft tissues     (i.e. skin and muscle) to heal and recover from being stretched before the     second surgery takes place for the exchange of the tissue expander for a     permanent breast implant.  ?  You ll see your surgeon when we think you have achieve your desired     breast size to determine if you need additional expansions and for     surgical planning.    How can I treat discomfort?  ?  Ibuprofen (or other NSAIDs) 600 mg by mouth every 6 to 8 hours with     food starting the morning of each tissue expansion and continue to take     around the clock for 3 to 5 days as needed for discomfort. You can start     this 2 weeks after surgery.  ?  If you need additional pain control at night, you may take the prescribed      Vicodin or Norco you were prescribed for pain immediately after surgery.  ?  You can take the ibuprofen with the Vicodin or Norco if additional pain      relief is required.    Will I achieve my desired breast size?  ?  This is determined on an individual basis.  ?  There is no scientific way to determine the exact breast size. It will be     determined by a number of different factors i.e.  ?  How well you tolerate each tissue expansion.  ?  How well your skin reacts to the expansions.  ?  The size of the other breast (if a unilateral tissue expander).  ?  Previous surgeries or amount of scarring  ?  We recommend trying on a desired bra size as you approach your  desired      breast size to see how well the bra size fits.    Appearance  ?  While the tissue is being expanded, a bulge will be created. Depending     upon the location of the tissue expander, the bulge may be considered     desirable or unsightly.  ?  Following the exchange of the tissue expander, a more normal and     desirable look should be restored.  ?  After the exchange for permanent implant, the breasts will feel softer     with less fullness in your axilla.    When to Call:  ?  If you have increasing swelling or bruising.  ?  If swelling and redness persist after a few days.  ?  If you have increased redness along the incision.  ?  If you have severe or increased pain not relieved by medication.  ?  If you have any side effects to mediations; such as, rash, nauseas,      headache, vomiting, etc.  ?  If you have an oral temperature of 100.5 degrees or higher.  ?  If you have any yellow or greenish drainage from the incisions or notice a      foul smell.  ?  If you have bleeding from the incisions that is difficult to control with      light pressure.  ?  If you have loss of feeling or motion.    For Medical Questions, Please Call:  ?   [phone], Monday - Friday, 8 a.m. - 4:30 p.m.  ?   After hours and on weekends, call Hospital Paging at [phone] and       ask for the Plastic Surgery Resident on call.    Please note my office will call you 1-2 business days after your procedure to check up on how you're doing and to schedule your post-operative appointment.

## 2024-02-05 NOTE — OP NOTE
PREOPERATIVE DIAGNOSIS: BRCA1 gene mutation s/p bilateral breast reconstruction after prophylactic mastectomy with left-sided implant infection now ready for next stage of reconstruction.    POSTOPERATIVE DIAGNOSIS: As above    PROCEDURES:   1.  Placement of subcutaneous left-sided breast expander (Allergan fracture 550 cc expander filled with air without tension)     SURGEON: Laxmi Pradhan MD      RESIDENT: Naa Sloan MD     ANESTHESIA: General anesthesia with LMA.     COMPLICATIONS: Nil.      DRAINS: None     SPECIMENS: None     BLOOD LOSS: 10 mL        DESCRIPTION OF PROCEDURE: After informed consent was taken from the patient, the proper site and procedure was ascertained with them and they were appropriately marked, they were taken to the operating room. They were placed in a supine position with their knees comfortably flexed with pillows underneath them, and pneumoboots placed and running prior to induction of anesthesia. Preoperative antibiotics were given in the OR and appropriately redosed during the case. General anesthesia was administered without any complications.      I began by first making the boundaries of the left breast.  I then made my incision in the original inframammary scar.  I then dissected down to the muscle layer.  I then elevated the skin and subcutaneous tissues off of the underlying muscle layer according to the boundaries.  I ensured hemostasis.  I measured the base diameter at about 13 cm and knowing that a 450 cc implants on the right side were used, a 550 cc expander was then chosen.  I first of all thoroughly irrigated out the surgical pocket with antibiotic irrigation and Betadine reprepped the area changed my gloves ensured hemostasis and then placed the new expander in the pocket.  I used 0 Prolene suture to sew down the tabs.  I then temporarily stapled the incision and filled the expanders with air such that there was no tension on the skin flaps but fill the space.   I then closed the incision with 2-0 Monocryl suture in a deep dermal layer followed by 3-0 Stratafix suture for the skin.  Xeroform dry gauze and a wrap was placed as the patient was allergic to tape and glue. The patient tolerated the procedure well. All counts were correct at the end of the case. The patient was extubated and sent to recovery room in stable condition.

## 2024-02-05 NOTE — PROGRESS NOTES
Left pectoral block performed without complications.  VSS.  Pt tolerated well.  Will continue to monitor.

## 2024-02-05 NOTE — ANESTHESIA CARE TRANSFER NOTE
Patient: Yamila Myles    Procedure: Procedure(s):  INSERTION, TISSUE EXPANDER, BREAST, left       Diagnosis: S/P breast reconstruction, bilateral [Z98.890]  Diagnosis Additional Information: No value filed.    Anesthesia Type:   General     Note:    Oropharynx: oropharynx clear of all foreign objects and spontaneously breathing  Level of Consciousness: drowsy  Oxygen Supplementation: room air    Independent Airway: airway patency satisfactory and stable  Dentition: dentition unchanged  Vital Signs Stable: post-procedure vital signs reviewed and stable  Report to RN Given: handoff report given  Patient transferred to: PACU    Handoff Report: Identifed the Patient, Identified the Reponsible Provider, Reviewed the pertinent medical history, Discussed the surgical course, Reviewed Intra-OP anesthesia mangement and issues during anesthesia, Set expectations for post-procedure period and Allowed opportunity for questions and acknowledgement of understanding      Vitals:  Vitals Value Taken Time   /62 02/05/24 1041   Temp     Pulse 76 02/05/24 1042   Resp 16 02/05/24 1042   SpO2 95 % 02/05/24 1042   Vitals shown include unfiled device data.    Electronically Signed By: CARLOS ALBERTO Cabral CRNA  February 5, 2024  10:44 AM

## 2024-02-05 NOTE — ANESTHESIA PROCEDURE NOTES
Pectoralis Procedure Note    Pre-Procedure   Staff -        Anesthesiologist:  Cl Rosado MD       Resident/Fellow: Jonathan Grady MD       Other Anesthesia Staff: Bashir Higginbotham MD       Performed By: resident       Location: pre-op       Procedure Start/Stop Times: 2/5/2024 8:20 AM and 2/5/2024 8:25 AM       Pre-Anesthestic Checklist: patient identified, IV checked, site marked, risks and benefits discussed, informed consent, monitors and equipment checked, pre-op evaluation, at physician/surgeon's request and post-op pain management  Timeout:       Correct Patient: Yes        Correct Procedure: Yes        Correct Site: Yes        Correct Position: Yes        Correct Laterality: Yes        Site Marked: Yes  Procedure Documentation  Procedure: Pectoralis             Pectoralis I and Pectoralis II       Laterality: left       Patient Position: supine       Patient Prep/Sterile Barriers: sterile gloves, mask       Skin prep: Chloraprep       Needle Type: short bevel       Needle Gauge: 21.        Needle Length (millimeters): 110        Ultrasound guided       1. Ultrasound was used to identify targeted nerve, plexus, vascular marker, or fascial plane and place a needle adjacent to it in real-time.       2. Ultrasound was used to visualize the spread of anesthetic in close proximity to the above referenced structure.       3. A permanent image is entered into the patient's record.       4. The visualized anatomic structures appeared normal.       5. There were no apparent abnormal pathologic findings.    Assessment/Narrative         The placement was negative for: blood aspirated, painful injection and site bleeding       Paresthesias: No.       Bolus given via needle..        Secured via.        Insertion/Infusion Method: Single Shot       Complications: none    Medication(s) Administered   Bupivacaine 0.25% PF (Infiltration) - Infiltration   40 mL - 2/5/2024 8:20:00 AM  Dexmedetomidine 4 mcg/mL  "(Perineural) - Perineural   40 mcg - 2/5/2024 8:20:00 AM  Dexamethasone 10 mg/mL PF (Perineural) - Perineural   2 mg - 2/5/2024 8:20:00 AM  Medication Administration Time: 2/5/2024 8:20 AM     Comments:  40 mL total volume: 25 mL at pectoserratus, and 15 mL at interpectoralis      FOR [facility] ONLY:   Pain Team Contact information: please page the Pain Team Via mEgo. Search \"Pain\". During daytime hours, please page the attending first. At night please page the resident first.      "

## 2024-02-21 ENCOUNTER — OFFICE VISIT (OUTPATIENT)
Dept: PLASTIC SURGERY | Facility: CLINIC | Age: 44
End: 2024-02-21
Payer: COMMERCIAL

## 2024-02-21 VITALS
SYSTOLIC BLOOD PRESSURE: 112 MMHG | OXYGEN SATURATION: 99 % | HEIGHT: 64 IN | HEART RATE: 84 BPM | BODY MASS INDEX: 24.87 KG/M2 | DIASTOLIC BLOOD PRESSURE: 74 MMHG | WEIGHT: 145.7 LBS | TEMPERATURE: 98.1 F

## 2024-02-21 DIAGNOSIS — Z15.01 BRCA1 POSITIVE: Primary | ICD-10-CM

## 2024-02-21 DIAGNOSIS — Z15.09 BRCA1 POSITIVE: Primary | ICD-10-CM

## 2024-02-21 DIAGNOSIS — Z98.890 S/P BREAST RECONSTRUCTION, BILATERAL: ICD-10-CM

## 2024-02-21 PROCEDURE — 99024 POSTOP FOLLOW-UP VISIT: CPT | Performed by: PLASTIC SURGERY

## 2024-02-21 ASSESSMENT — PAIN SCALES - GENERAL: PAINLEVEL: MILD PAIN (2)

## 2024-02-21 NOTE — PROGRESS NOTES
PRESENTING COMPLAINT:  Post-operative visit s/p left breast expander placement for delayed reconstruction on 2/5/2024.     HISTORY OF PRESENTING COMPLAINT: The patient is here for post-operative visit.  The patient is being seen in the presence of my nurse.     Patient is doing well.  Minimal pain.  No issues.    On exam: Vital signs stable afebrile.  Incision healing in well.  Minimal seroma.  No infection.     ASSESSMENT AND PLAN:  Based upon the above findings, the patient is here for post-operative visit.     Plan is to do expansion starting next week.  Once expansion completed see me back to plan next stage of reconstruction.      All questions were answered.  The patient was happy with the visit.

## 2024-02-21 NOTE — LETTER
2/21/2024       RE: Yamila Myles  [address] [address]     Dear Colleague,    Thank you for referring your patient, Yamila Myles, to the [facility] [place] [facility]. Please see a copy of my visit note below.    PRESENTING COMPLAINT:  Post-operative visit s/p left breast expander placement for delayed reconstruction on 2/5/2024.     HISTORY OF PRESENTING COMPLAINT: The patient is here for post-operative visit.  The patient is being seen in the presence of my nurse.     Patient is doing well.  Minimal pain.  No issues.    On exam: Vital signs stable afebrile.  Incision healing in well.  Minimal seroma.  No infection.     ASSESSMENT AND PLAN:  Based upon the above findings, the patient is here for post-operative visit.     Plan is to do expansion starting next week.  Once expansion completed see me back to plan next stage of reconstruction.      All questions were answered.  The patient was happy with the visit.      Again, thank you for allowing me to participate in the care of your patient.      Sincerely,    LING Pradhan MD

## 2024-02-21 NOTE — NURSING NOTE
"Chief Complaint   Patient presents with    Surgical Followup     2 week post-op, DOS 2/5/24.       Vitals:    02/21/24 1010   BP: 112/74   BP Location: Left arm   Patient Position: Sitting   Cuff Size: Adult Regular   Pulse: 84   Temp: 98.1  F (36.7  C)   TempSrc: Oral   SpO2: 99%   Weight: 145 lb 11.2 oz   Height: 5' 4\"       Body mass index is 25.01 kg/m .      Luisa Godwin RN      "

## 2024-03-05 ENCOUNTER — ALLIED HEALTH/NURSE VISIT (OUTPATIENT)
Dept: PLASTIC SURGERY | Facility: CLINIC | Age: 44
End: 2024-03-05
Attending: PLASTIC SURGERY
Payer: COMMERCIAL

## 2024-03-05 DIAGNOSIS — Z98.890 S/P BREAST RECONSTRUCTION, BILATERAL: Primary | ICD-10-CM

## 2024-03-05 NOTE — PROGRESS NOTES
Pt. comes into clinic today at the request of Dr. Laxmi Pradhan.     This service provided today was under the supervising provider of the day Dr. Pradhan, who was available if needed.     Reason for visit: Breast expansion.     Under sterile conditions 20 ml of air was removed from left expander, 230 ml of saline was injected in the left expander.     Totals: Left: 230 mL of saline was injected in expander. She tolerated the procedure well. Photos taken for her chart. We will see her back next week.      Frederick Carey RN

## 2024-03-12 ENCOUNTER — ALLIED HEALTH/NURSE VISIT (OUTPATIENT)
Dept: PLASTIC SURGERY | Facility: CLINIC | Age: 44
End: 2024-03-12
Attending: PLASTIC SURGERY
Payer: COMMERCIAL

## 2024-03-12 DIAGNOSIS — Z98.890 S/P BREAST RECONSTRUCTION, BILATERAL: Primary | ICD-10-CM

## 2024-03-12 DIAGNOSIS — B99.9 INFECTION: ICD-10-CM

## 2024-03-19 ENCOUNTER — ALLIED HEALTH/NURSE VISIT (OUTPATIENT)
Dept: PLASTIC SURGERY | Facility: CLINIC | Age: 44
End: 2024-03-19
Attending: PLASTIC SURGERY
Payer: COMMERCIAL

## 2024-03-19 DIAGNOSIS — Z98.890 S/P BREAST RECONSTRUCTION, BILATERAL: Primary | ICD-10-CM

## 2024-03-19 NOTE — PROGRESS NOTES
Pt. comes into clinic today at the request of Dr. Laxmi Pradhan.     This service provided today was under the supervising provider of the day Dr. Pradhan, who was available if needed.     Reason for visit: Breast expansion.     Under sterile conditions, 120 ml of saline was injected in the left expander.     Totals: Left: 600 mL of saline has been injected into expander. She tolerated the procedure well. Photos taken for the chart. Pt feels she is slightly larger than she needs to be which will provide enough skin for her next surgery. I have updated Dr Pradhan and Jacqueline that pt is ready for next surgery. Photos taken for her chart.       Frederick Carey RN

## 2024-03-26 ENCOUNTER — TELEPHONE (OUTPATIENT)
Dept: PLASTIC SURGERY | Facility: CLINIC | Age: 44
End: 2024-03-26
Payer: COMMERCIAL

## 2024-03-26 PROBLEM — Z98.890 S/P BREAST RECONSTRUCTION, BILATERAL: Status: ACTIVE | Noted: 2023-04-11

## 2024-03-26 NOTE — TELEPHONE ENCOUNTER
Called patient to schedule post-op appointment. Call went straight to voicemail.    Left message to call back surgery scheduling so we can assist with scheduling a post-op after surgery with Dr. Pradhan on 6/12. Provided direct call back number [phone].    Geetha Hernández on 3/26/2024 at 5:08 PM

## 2024-03-26 NOTE — TELEPHONE ENCOUNTER
Left patient a voicemail to schedule surgery for REPLACEMENT, IMPLANT, BREAST, possible right breast revision (Left)  with Dr. Pradhan - Left Surgery Scheduling line for callback [phone]    Anna Brock on 3/26/2024 at 2:05 PM

## 2024-03-26 NOTE — TELEPHONE ENCOUNTER
Called patient to schedule surgery with Dr. Pradhan    Patient unsure when she can be scheduled after her last surgery. Informed patient next available.     Patient scheduled for 6/12/2024     Approximate arrival time given:  No    Location of surgery: [facility]    Pre-Op H&P: PAC same day as pre-surgical consult, 5/21/2024     Post-Op Appt Date: 2 weeks post op with Dr. Pradhan needed        Patient aware that pre-op RN will call 2-3 days prior to surgery with arrival time and instructions Yes    Packet sent out: Yes to be mailed 3/27/2024      Additional Comments: All patients questions were answered and was instructed to review surgical packet and call back with any questions or concerns.       Hoda Peck on 3/26/2024 at 4:03 PM

## 2024-03-27 NOTE — TELEPHONE ENCOUNTER
FUTURE VISIT INFORMATION      SURGERY INFORMATION:  Date: 6/12/24  Location:  or  Surgeon:  LING Pradhan MD   Anesthesia Type:  general with block  Procedure: replacement breast implant,  possible right breast revision   Consult: ov 2/21/24    RECORDS REQUESTED FROM:       Primary Care Provider: Antonina Martinez MD - [facility]

## 2024-03-28 NOTE — TELEPHONE ENCOUNTER
Received voicemail from patient requesting that post-op be scheduled for her any date, preferred time after 9:30 AM    Scheduled post-op with Cece Chavez PA-C on 6/25 at 10:40 AM    Left voicemail for Yamila with this appointment, instructed her to reach out if there are any issues  __    Hoda Heard on 3/28/2024 at 11:28 AM  [phone]

## 2024-04-17 ENCOUNTER — OFFICE VISIT (OUTPATIENT)
Dept: DERMATOLOGY | Facility: CLINIC | Age: 44
End: 2024-04-17
Attending: CLINICAL NURSE SPECIALIST
Payer: COMMERCIAL

## 2024-04-17 DIAGNOSIS — D22.9 MULTIPLE BENIGN NEVI: ICD-10-CM

## 2024-04-17 DIAGNOSIS — Z15.01 BRCA1 GENETIC CARRIER: ICD-10-CM

## 2024-04-17 DIAGNOSIS — L82.1 SEBORRHEIC KERATOSES: Primary | ICD-10-CM

## 2024-04-17 DIAGNOSIS — D18.01 CHERRY ANGIOMA: ICD-10-CM

## 2024-04-17 DIAGNOSIS — Z12.83 SKIN CANCER SCREENING: ICD-10-CM

## 2024-04-17 DIAGNOSIS — L81.4 SOLAR LENTIGO: ICD-10-CM

## 2024-04-17 DIAGNOSIS — B35.1 ONYCHOMYCOSIS: ICD-10-CM

## 2024-04-17 DIAGNOSIS — L60.8 ONYCHOMADESIS OF TOENAIL: ICD-10-CM

## 2024-04-17 DIAGNOSIS — Z15.09 BRCA1 GENETIC CARRIER: ICD-10-CM

## 2024-04-17 PROCEDURE — 99213 OFFICE O/P EST LOW 20 MIN: CPT | Performed by: PHYSICIAN ASSISTANT

## 2024-04-17 RX ORDER — TERBINAFINE HYDROCHLORIDE 250 MG/1
250 TABLET ORAL DAILY
Qty: 90 TABLET | Refills: 0 | Status: CANCELLED | OUTPATIENT
Start: 2024-04-17 | End: 2024-07-16

## 2024-04-17 RX ORDER — CICLOPIROX 80 MG/ML
SOLUTION TOPICAL
Qty: 6 ML | Refills: 11 | Status: SHIPPED | OUTPATIENT
Start: 2024-04-17 | End: 2024-06-13

## 2024-04-17 ASSESSMENT — PAIN SCALES - GENERAL: PAINLEVEL: NO PAIN (0)

## 2024-04-17 NOTE — PATIENT INSTRUCTIONS

## 2024-04-17 NOTE — LETTER
4/17/2024       RE: Yamila Myles  [address] [facility] [address]     Dear Colleague,    Thank you for referring your patient, Yamila Myles, to the [facility] DERMATOLOGY CLINIC [place] at [facility]. Please see a copy of my visit note below.    [facility] Dermatology Note  Encounter Date: Apr 17, 2024  Office Visit     Dermatology Problem List:  1. Onychomycosis  - s/p clipping  - current tx: Ciclopirox 8% solution  ____________________________________________    Assessment & Plan:    # Onychomycosis with tinea pedis and onychomadesis on the right great nail plate.    Start Ciclopirox 8% solution to toenails at bedtime after soaking and scraping away subungual debris. Remove once weekly with alcohol and repeat.      # Skin cancer screening with multiple benign nevi, solar lentigines  - ABCDEs: Counseled ABCDEs of melanoma: Asymmetry, Border (irregularity), Color (not uniform, changes in color), Diameter (greater than 6 mm which is about the size of a pencil eraser), and Evolving (any changes in preexisting moles).  - Sun protection: Counseled SPF30+ sunscreen, UPF clothing, sun avoidance, tanning bed avoidance.    # Cherry angiomas and seborrheic keratoses,  Benign, reassurance given.       Procedures Performed:   None      Follow-up: 2 year(s) in-person, or earlier for new or changing lesions    Staff and Scribe:   Scribe Disclosure:   By signing my name below, I, Hoda Ferguson, attest that this documentation has been prepared under the direction and in the presence of Carmen Guzman PA-C.  - Electronically Signed: Hoda Ferguson 04/17/24       Provider Disclosure:  I agree with above History, Review of Systems, Physical exam and Plan.  I have reviewed the content of the documentation and have edited it as needed. I have personally performed the services documented here and the documentation accurately represents  those services and the decisions I have made.      Electronically signed by:  All risks, benefits and alternatives were discussed with patient.  Patient is in agreement and understands the assessment and plan.  All questions were answered.  Sun Screen Education was given.   Return to Clinic in 2 years or sooner as needed.   Carmen Guzman PA-C        ____________________________________________    CC: Skin Check (FBSE - no new spots or lesions of concern)    HPI:  Ms. Yamila Myles is a(n) 43 year old female who presents today as a new patient for a FBSC. Patient reports her maternal uncle has hx of cancer. Patient does well with moisturizing and sun protection.     Patient notes some recurring chronic toe fungus. She uses OTC Lamisil and it will improve the symptoms and growth but it always returns. She notes in the summer it is in the best quality as she does not wear socks as often during that time. She also is undergoing breast reconstruction s/p implant removal, and had liposuction, but also developed some infection with it.      Patient is otherwise feeling well, without additional skin concerns.    Labs Reviewed:  None reviewed.    Physical exam:  Vitals: LMP  (LMP Unknown)   GEN: This is a well developed, well-nourished female in no acute distress, in a pleasant mood.    SKIN: Full skin, which includes the head/face, both arms, chest, back, abdomen,both legs, genitalia and/or groin buttocks, digits and/or nails, was examined.  - There are dome shaped bright red papules on the head/neck, trunk, extremities.   - Multiple regular brown pigmented macules and papules are identified on the head/neck, trunk, extremities.   - Scattered brown macules on sun exposed areas.  - R nail plate mild yellowing  - R mid nail plate onychomadesis   -Mild scaling noted inter digitally  - There are waxy stuck on tan to brown papules on the head/neck, trunk, extremities.  - No other lesions of concern on areas examined.            Medications:  Current Outpatient Medications   Medication Sig Dispense Refill    acetaminophen (TYLENOL) 325 MG tablet Take 325-650 mg by mouth every 6 hours as needed for mild pain      calcium carbonate (OS-GRETCHEN) 500 MG tablet Take 1 tablet by mouth every evening Unsure if 500 or 1000mg      cetirizine (ZYRTEC) 10 MG tablet Take 10 mg by mouth every evening      estradiol (ESTRACE) 2 MG tablet Take 1 tablet (2 mg) by mouth daily (Patient taking differently: Take 2 mg by mouth every evening) 90 tablet 3    fluticasone (FLONASE) 50 MCG/ACT nasal spray Spray 2 sprays into both nostrils daily 16 g 3    HEMP OIL OR EXTRACT OR OTHER CBD CANNABINOID, NOT MEDICAL CANNABIS, 10 mg every evening [employer] CBD 10 mg      hydrocortisone 2.5 % ointment Apply topically 2 times daily 30 g 1    lactobacillus rhamnosus, GG, (CULTURELL) capsule Take 1 capsule by mouth every evening      multivitamin w/minerals (MULTI-VITAMIN) tablet Take 1 tablet by mouth every evening      ondansetron (ZOFRAN ODT) 4 MG ODT tab Take 1 tablet (4 mg) by mouth every 8 hours as needed for nausea 4 tablet 0    oxyCODONE (ROXICODONE) 5 MG tablet Take 1-2 tablets (5-10 mg) by mouth every 6 hours as needed for moderate to severe pain 20 tablet 0    senna-docusate (SENOKOT-S/PERICOLACE) 8.6-50 MG tablet Take 1-2 tablets by mouth 2 times daily 30 tablet 0    Vitamin D, Cholecalciferol, 25 MCG (1000 UT) CAPS Take 1,000 mg % by mouth every evening Unsure is 500 or 1000       No current facility-administered medications for this visit.     Facility-Administered Medications Ordered in Other Visits   Medication Dose Route Frequency Provider Last Rate Last Admin    lidocaine 1 % 9 mL  9 mL Intradermal Once Antonina Martinez MD        lidocaine 1 % 9 mL  9 mL Intradermal Once Antonina Martinez MD        sodium bicarbonate 8.4 % injection 1 mEq  1 mEq Intradermal Once Antonina Martinez MD        sodium bicarbonate 8.4 % injection 1 mEq  1 mEq  Intradermal Once Antonina Martinez MD          Past Medical History:   Patient Active Problem List   Diagnosis    Microcalcifications of the breast, right uoq    Fibrocystic breast changes    BRCA status    Mirena placed 4/26/18 (3rd one)    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      Again, thank you for allowing me to participate in the care of your patient.      Sincerely,    Carmen Guzman PA-C

## 2024-04-17 NOTE — NURSING NOTE
Dermatology Rooming Note    Yamila Myles's goals for this visit include:   Chief Complaint   Patient presents with    Skin Check     Yamila is here today for a FBSE - no new spots or lesions of concern      Tang Samayoa CMA

## 2024-04-17 NOTE — PROGRESS NOTES
[facility] Dermatology Note  Encounter Date: Apr 17, 2024  Office Visit     Dermatology Problem List:  1. Onychomycosis  - s/p clipping  - current tx: Ciclopirox 8% solution  ____________________________________________    Assessment & Plan:    # Onychomycosis with tinea pedis and onychomadesis on the right great nail plate.    Start Ciclopirox 8% solution to toenails at bedtime after soaking and scraping away subungual debris. Remove once weekly with alcohol and repeat.      # Skin cancer screening with multiple benign nevi, solar lentigines  - ABCDEs: Counseled ABCDEs of melanoma: Asymmetry, Border (irregularity), Color (not uniform, changes in color), Diameter (greater than 6 mm which is about the size of a pencil eraser), and Evolving (any changes in preexisting moles).  - Sun protection: Counseled SPF30+ sunscreen, UPF clothing, sun avoidance, tanning bed avoidance.    # Cherry angiomas and seborrheic keratoses,  Benign, reassurance given.       Procedures Performed:   None      Follow-up: 2 year(s) in-person, or earlier for new or changing lesions    Staff and Scribe:   Scribe Disclosure:   By signing my name below, I, Hoda Ferguson, attest that this documentation has been prepared under the direction and in the presence of Carmen Guzman PA-C.  - Electronically Signed: Hoda Ferguson 04/17/24       Provider Disclosure:  I agree with above History, Review of Systems, Physical exam and Plan.  I have reviewed the content of the documentation and have edited it as needed. I have personally performed the services documented here and the documentation accurately represents those services and the decisions I have made.      Electronically signed by:  All risks, benefits and alternatives were discussed with patient.  Patient is in agreement and understands the assessment and plan.  All questions were answered.  Sun Screen Education was given.   Return to Clinic in 2 years or sooner as needed.    Carmen Guzman PA-C        ____________________________________________    CC: Skin Check (FBSE - no new spots or lesions of concern)    HPI:  Ms. Yamila Myles is a(n) 43 year old female who presents today as a new patient for a FBSC. Patient reports her maternal uncle has hx of cancer. Patient does well with moisturizing and sun protection.     Patient notes some recurring chronic toe fungus. She uses OTC Lamisil and it will improve the symptoms and growth but it always returns. She notes in the summer it is in the best quality as she does not wear socks as often during that time. She also is undergoing breast reconstruction s/p implant removal, and had liposuction, but also developed some infection with it.      Patient is otherwise feeling well, without additional skin concerns.    Labs Reviewed:  None reviewed.    Physical exam:  Vitals: LMP  (LMP Unknown)   GEN: This is a well developed, well-nourished female in no acute distress, in a pleasant mood.    SKIN: Full skin, which includes the head/face, both arms, chest, back, abdomen,both legs, genitalia and/or groin buttocks, digits and/or nails, was examined.  - There are dome shaped bright red papules on the head/neck, trunk, extremities.   - Multiple regular brown pigmented macules and papules are identified on the head/neck, trunk, extremities.   - Scattered brown macules on sun exposed areas.  - R nail plate mild yellowing  - R mid nail plate onychomadesis   -Mild scaling noted inter digitally  - There are waxy stuck on tan to brown papules on the head/neck, trunk, extremities.  - No other lesions of concern on areas examined.           Medications:  Current Outpatient Medications   Medication Sig Dispense Refill    acetaminophen (TYLENOL) 325 MG tablet Take 325-650 mg by mouth every 6 hours as needed for mild pain      calcium carbonate (OS-GRETCHEN) 500 MG tablet Take 1 tablet by mouth every evening Unsure if 500 or 1000mg      cetirizine (ZYRTEC) 10 MG  tablet Take 10 mg by mouth every evening      estradiol (ESTRACE) 2 MG tablet Take 1 tablet (2 mg) by mouth daily (Patient taking differently: Take 2 mg by mouth every evening) 90 tablet 3    fluticasone (FLONASE) 50 MCG/ACT nasal spray Spray 2 sprays into both nostrils daily 16 g 3    HEMP OIL OR EXTRACT OR OTHER CBD CANNABINOID, NOT MEDICAL CANNABIS, 10 mg every evening [employer] CBD 10 mg      hydrocortisone 2.5 % ointment Apply topically 2 times daily 30 g 1    lactobacillus rhamnosus, GG, (CULTURELL) capsule Take 1 capsule by mouth every evening      multivitamin w/minerals (MULTI-VITAMIN) tablet Take 1 tablet by mouth every evening      ondansetron (ZOFRAN ODT) 4 MG ODT tab Take 1 tablet (4 mg) by mouth every 8 hours as needed for nausea 4 tablet 0    oxyCODONE (ROXICODONE) 5 MG tablet Take 1-2 tablets (5-10 mg) by mouth every 6 hours as needed for moderate to severe pain 20 tablet 0    senna-docusate (SENOKOT-S/PERICOLACE) 8.6-50 MG tablet Take 1-2 tablets by mouth 2 times daily 30 tablet 0    Vitamin D, Cholecalciferol, 25 MCG (1000 UT) CAPS Take 1,000 mg % by mouth every evening Unsure is 500 or 1000       No current facility-administered medications for this visit.     Facility-Administered Medications Ordered in Other Visits   Medication Dose Route Frequency Provider Last Rate Last Admin    lidocaine 1 % 9 mL  9 mL Intradermal Once Antonina Martinez MD        lidocaine 1 % 9 mL  9 mL Intradermal Once Antonina Martinez MD        sodium bicarbonate 8.4 % injection 1 mEq  1 mEq Intradermal Once Antonina Martinez MD        sodium bicarbonate 8.4 % injection 1 mEq  1 mEq Intradermal Once Antonina Martinez MD          Past Medical History:   Patient Active Problem List   Diagnosis    Microcalcifications of the breast, right uoq    Fibrocystic breast changes    BRCA status    Mirena placed 4/26/18 (3rd one)    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

## 2024-04-18 ENCOUNTER — TELEPHONE (OUTPATIENT)
Dept: GASTROENTEROLOGY | Facility: CLINIC | Age: 44
End: 2024-04-18
Payer: COMMERCIAL

## 2024-04-18 NOTE — TELEPHONE ENCOUNTER
"Endoscopy Scheduling Screen    Have you had a positive Covid test in the last 14 days?  No    What is your communication preference for Instructions and/or Bowel Prep?   MyChart    What insurance is in the chart?  Other:  [facility]    Ordering/Referring Provider: Kendell Haile MD   (If ordering provider performs procedure, schedule with ordering provider unless otherwise instructed. )    BMI: Estimated body mass index is 25.01 kg/m  as calculated from the following:    Height as of 2/21/24: 1.626 m (5' 4\").    Weight as of 2/21/24: 66.1 kg (145 lb 11.2 oz).     Sedation Ordered  moderate sedation.   If patient BMI > 50 do not schedule in [facility].    [facility] > 45 [facility]    Are you taking methadone or Suboxone?  No    Have you had difficulties, pain, or discomfort during past endoscopy procedures?  No    Are you taking any prescription medications for pain 3 or more times per week?   NO, No RN review required.    Do you have a history of malignant hyperthermia?  No    (Females) Are you currently pregnant?   No     Have you been diagnosed or told you have pulmonary hypertension?   No    Do you have an LVAD?  No    Have you been told you have moderate to severe sleep apnea?  No    Have you been told you have COPD, asthma, or any other lung disease?  No    Do you have any heart conditions?  No     Have you ever had or are you waiting for an organ transplant?  No. Continue scheduling, no site restrictions.    Have you had a stroke or transient ischemic attack (TIA aka \"mini stroke\" in the last 6 months?   No    Have you been diagnosed with or been told you have cirrhosis of the liver?   No    Are you currently on dialysis?   No    Do you need assistance transferring?   No    BMI: Estimated body mass index is 25.01 kg/m  as calculated from the following:    Height as of 2/21/24: 1.626 m (5' 4\").    Weight as of 2/21/24: 66.1 kg (145 lb 11.2 oz).     Is patients BMI > 40 and scheduling location " [facility]?  No    Do you take an injectable medication for weight loss or diabetes (excluding insulin)?  No    Do you take the medication Naltrexone?  No    Do you take blood thinners?  No       Prep   Are you currently on dialysis or do you have chronic kidney disease?  No    Do you have a diagnosis of diabetes?  No    Do you have a diagnosis of cystic fibrosis (CF)?  No    On a regular basis do you go 3 -5 days between bowel movements?  No    BMI > 40?  No    Preferred Pharmacy:        [id number] - [place], [place] - [address] [place] [address]  [address] [facility] [address]  Phone: [phone] Fax: [phone]      Final Scheduling Details     Procedure scheduled  Colonoscopy    Surgeon:  Kendell Haile MD     Date of procedure:  TBD     Pre-OP / PAC:   TBD     Location  TBD  TBD     Sedation   TBD  TBD       Patient Reminders:   You will receive a call from a Nurse to review instructions and health history.  This assessment must be completed prior to your procedure.  Failure to complete the Nurse assessment may result in the procedure being cancelled.      On the day of your procedure, please designate an adult(s) who can drive you home stay with you for the next 24 hours. The medicines used in the exam will make you sleepy. You will not be able to drive.      You cannot take public transportation, ride share services, or non-medical taxi service without a responsible caregiver.  Medical transport services are allowed with the requirement that a responsible caregiver will receive you at your destination.  We require that drivers and caregivers are confirmed prior to your procedure.

## 2024-05-20 ENCOUNTER — OFFICE VISIT (OUTPATIENT)
Dept: INTERNAL MEDICINE | Facility: CLINIC | Age: 44
End: 2024-05-20
Attending: INTERNAL MEDICINE
Payer: COMMERCIAL

## 2024-05-20 ENCOUNTER — LAB (OUTPATIENT)
Dept: LAB | Facility: CLINIC | Age: 44
End: 2024-05-20
Attending: INTERNAL MEDICINE
Payer: COMMERCIAL

## 2024-05-20 VITALS
DIASTOLIC BLOOD PRESSURE: 78 MMHG | BODY MASS INDEX: 26.29 KG/M2 | SYSTOLIC BLOOD PRESSURE: 112 MMHG | HEIGHT: 64 IN | HEART RATE: 78 BPM | WEIGHT: 154 LBS

## 2024-05-20 DIAGNOSIS — Z15.09 BRCA1 GENETIC CARRIER: ICD-10-CM

## 2024-05-20 DIAGNOSIS — Z15.01 BRCA1 GENETIC CARRIER: ICD-10-CM

## 2024-05-20 DIAGNOSIS — Z00.00 ROUTINE GENERAL MEDICAL EXAMINATION AT A HEALTH CARE FACILITY: ICD-10-CM

## 2024-05-20 DIAGNOSIS — Z00.00 ROUTINE GENERAL MEDICAL EXAMINATION AT A HEALTH CARE FACILITY: Primary | ICD-10-CM

## 2024-05-20 DIAGNOSIS — L60.0 INGROWN TOENAIL: ICD-10-CM

## 2024-05-20 LAB
ANION GAP SERPL CALCULATED.3IONS-SCNC: 8 MMOL/L (ref 7–15)
BUN SERPL-MCNC: 17.6 MG/DL (ref 6–20)
CALCIUM SERPL-MCNC: 9.4 MG/DL (ref 8.6–10)
CHLORIDE SERPL-SCNC: 105 MMOL/L (ref 98–107)
CHOLEST SERPL-MCNC: 208 MG/DL
CREAT SERPL-MCNC: 0.69 MG/DL (ref 0.51–0.95)
DEPRECATED HCO3 PLAS-SCNC: 26 MMOL/L (ref 22–29)
EGFRCR SERPLBLD CKD-EPI 2021: >90 ML/MIN/1.73M2
FASTING STATUS PATIENT QL REPORTED: NO
FASTING STATUS PATIENT QL REPORTED: NO
GLUCOSE SERPL-MCNC: 90 MG/DL (ref 70–99)
HBA1C MFR BLD: 5.1 %
HDLC SERPL-MCNC: 88 MG/DL
LDLC SERPL CALC-MCNC: 86 MG/DL
NONHDLC SERPL-MCNC: 120 MG/DL
POTASSIUM SERPL-SCNC: 4.7 MMOL/L (ref 3.4–5.3)
SODIUM SERPL-SCNC: 139 MMOL/L (ref 135–145)
TRIGL SERPL-MCNC: 171 MG/DL

## 2024-05-20 PROCEDURE — 99386 PREV VISIT NEW AGE 40-64: CPT | Mod: GC

## 2024-05-20 PROCEDURE — 83036 HEMOGLOBIN GLYCOSYLATED A1C: CPT

## 2024-05-20 PROCEDURE — 80061 LIPID PANEL: CPT

## 2024-05-20 PROCEDURE — 80048 BASIC METABOLIC PNL TOTAL CA: CPT

## 2024-05-20 PROCEDURE — 36415 COLL VENOUS BLD VENIPUNCTURE: CPT

## 2024-05-20 PROCEDURE — 99213 OFFICE O/P EST LOW 20 MIN: CPT | Performed by: INTERNAL MEDICINE

## 2024-05-20 SDOH — HEALTH STABILITY: PHYSICAL HEALTH: ON AVERAGE, HOW MANY DAYS PER WEEK DO YOU ENGAGE IN MODERATE TO STRENUOUS EXERCISE (LIKE A BRISK WALK)?: 4 DAYS

## 2024-05-20 SDOH — HEALTH STABILITY: PHYSICAL HEALTH: ON AVERAGE, HOW MANY MINUTES DO YOU ENGAGE IN EXERCISE AT THIS LEVEL?: 30 MIN

## 2024-05-20 ASSESSMENT — ANXIETY QUESTIONNAIRES
7. FEELING AFRAID AS IF SOMETHING AWFUL MIGHT HAPPEN: NOT AT ALL
1. FEELING NERVOUS, ANXIOUS, OR ON EDGE: NOT AT ALL
3. WORRYING TOO MUCH ABOUT DIFFERENT THINGS: NOT AT ALL
5. BEING SO RESTLESS THAT IT IS HARD TO SIT STILL: NOT AT ALL
GAD7 TOTAL SCORE: 0
7. FEELING AFRAID AS IF SOMETHING AWFUL MIGHT HAPPEN: NOT AT ALL
IF YOU CHECKED OFF ANY PROBLEMS ON THIS QUESTIONNAIRE, HOW DIFFICULT HAVE THESE PROBLEMS MADE IT FOR YOU TO DO YOUR WORK, TAKE CARE OF THINGS AT HOME, OR GET ALONG WITH OTHER PEOPLE: NOT DIFFICULT AT ALL
4. TROUBLE RELAXING: NOT AT ALL
8. IF YOU CHECKED OFF ANY PROBLEMS, HOW DIFFICULT HAVE THESE MADE IT FOR YOU TO DO YOUR WORK, TAKE CARE OF THINGS AT HOME, OR GET ALONG WITH OTHER PEOPLE?: NOT DIFFICULT AT ALL
2. NOT BEING ABLE TO STOP OR CONTROL WORRYING: NOT AT ALL
6. BECOMING EASILY ANNOYED OR IRRITABLE: NOT AT ALL
GAD7 TOTAL SCORE: 0

## 2024-05-20 ASSESSMENT — SOCIAL DETERMINANTS OF HEALTH (SDOH): HOW OFTEN DO YOU GET TOGETHER WITH FRIENDS OR RELATIVES?: ONCE A WEEK

## 2024-05-20 NOTE — LETTER
"5/20/2024       RE: Yamila Myles  [address] [facility] [address]     Dear Colleague,    Thank you for referring your patient, Yamila Myles, to the [facility] at [facility]. Please see a copy of my visit note below.    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 Screening with mammogram and breast MRI per the breast center practitioners.    Ingrown toenail  No evidence of infection at this time, but has associated pain and symptoms.  - Orthopedic  Referral      BMI  Estimated body mass index is 26.43 kg/m  as calculated from the following:    Height as of this encounter: 1.626 m (5' 4\").    Weight as of this encounter: 69.9 kg (154 lb).         See Patient Instructions    Return in about 53 weeks (around 5/26/2025) for Annual Wellness Visit.    Subjective  Yamila is a 44 year old, presenting for the following:  Establish Care (Annual exam)    Health Care Directive  Patient does not have a Health Care Directive or Living Will: Discussed advance care planning with patient; information given to patient to review.    HPI    BRCA gene carrier, has undergone bilateral salpingo-oophorectomy, mastectomy with reconstruction, breast revision and removal of implant with washout of her left breast due to cellulitis. Has been having ongoing breast MRI and mammogram for screening. Following with the nurse practitioner in the breast center now.    Walking or elliptical 4x per week now  Possible ingrown toenail on left big toe. It is " painful with ambulation. She already wears wide toe box shoes.    Hx sessile serrated adenoma 2021, due for repeat colonoscopy           Today's PHQ-2 Score:       1/30/2024    11:24 AM   PHQ-2 ( 1999 [employer])   Q1: Little interest or pleasure in doing things 0   Q2: Feeling down, depressed or hopeless 0   PHQ-2 Score 0           Social History     Tobacco Use    Smoking status: Never     Passive exposure: Never    Smokeless tobacco: Never   Substance Use Topics    Alcohol use: Not Currently     Comment: Holiday's 1 glass a wine    Drug use: Not Currently     Comment: CBD 10 mg            8/19/2022   LAST FHS-7 RESULTS   1st degree relative breast or ovarian cancer Yes   Any relative bilateral breast cancer Yes   Any male have breast cancer No   Any ONE woman have BOTH breast AND ovarian cancer Yes    Yes   Any woman with breast cancer before 50yrs Yes    Yes   2 or more relatives with breast AND/OR ovarian cancer Yes    Yes   2 or more relatives with breast AND/OR bowel cancer Yes    Yes     History of abnormal Pap smear: No - age 30- 64 PAP with HPV every 5 years recommended        Latest Ref Rng & Units 4/12/2023    10:59 AM 9/27/2017    10:00 AM 9/27/2017     9:45 AM   PAP / HPV   PAP  Negative for Intraepithelial Lesion or Malignancy (NILM)      PAP (Historical)   NIL     HPV 16 DNA Negative Negative   Negative    HPV 18 DNA Negative Negative   Negative    Other HR HPV Negative Negative   Negative      ASCVD Risk   The 10-year ASCVD risk score (Karina MARSH, et al., 2019) is: 0.3%    Values used to calculate the score:      Age: 44 years      Sex: Female      Is Non- : No      Diabetic: No      Tobacco smoker: No      Systolic Blood Pressure: 112 mmHg      Is BP treated: No      HDL Cholesterol: 88 mg/dL      Total Cholesterol: 208 mg/dL       Reviewed and updated as needed this visit by Provider                    Past Medical History:   Diagnosis Date    Allergic rhinitis      BRCA1 positive 06/2001    c.1806T    Encounter for insertion of mirena IUD 07/12/2016    Hoarseness     Lump or mass in breast 2001    BRCA-1 positive (Mother, MGM w/ breast ca, mother w/ gene +), mother dx at 32)    Microcalcifications of the breast, right uoq 06/08/2011     Past Surgical History:   Procedure Laterality Date    GRAFT FAT TO BREAST Bilateral 8/2/2023    Procedure: fat grafting from abdomen and thighs;  Surgeon: LING Pradhan MD;  Location: [facility] OR    INSERT INTRAUTERINE DEVICE N/A 7/12/2016    Procedure: INSERT INTRAUTERINE DEVICE;  Surgeon: Fidelia Ricks MD;  [facility]    INSERT TISSUE EXPANDER BREAST Left 2/5/2024    Procedure: INSERTION, TISSUE EXPANDER, BREAST, left;  Surgeon: LING Pradhan MD;  [facility]    LAPAROSCOPIC SALPINGO-OOPHORECTOMY Bilateral 7/12/2016    Procedure: LAPAROSCOPIC SALPINGO-OOPHORECTOMY;  Surgeon: Fidelia Ricks MD;  [facility]    LAPAROSCOPY OPERATIVE ADULT N/A 7/12/2016    Procedure: LAPAROSCOPY OPERATIVE ADULT;  Surgeon: Fidelia Ricks MD;  [facility]    MAMMOPLASTY REDUCTION BILATERAL Bilateral 4/13/2022    Procedure: Bilateral breast lift;  Surgeon: LING Pradhan MD;  Location: [facility] OR    MASTECTOMY SIMPLE Bilateral 1/23/2023    Procedure: BILATERAL Risk-Reducing Nipple-Sparing Mastectomy;  Surgeon: Monica Rodríguez MD;  [facility]    RECONSTRUCT BREAST BILATERAL, IMPLANT PROSTHESIS BILATERAL, COMBINED Bilateral 1/23/2023    Procedure: Bilateral breast reconstruction with implant/Alloderm, SPY;  Surgeon: LING Pradhan MD;  [facility]    REMOVE IMPLANT BREAST Left 9/1/2023    Procedure: REMOVAL, IMPLANT, BREAST, left and washout;  Surgeon: LING Pradhan MD;  [facility]    REVISE RECONSTRUCTED BREAST BILATERAL Bilateral 8/2/2023    Procedure: Bilateral breast revision;  Surgeon: LING Pradhan MD;  Location: [facility] OR    ZZC APPENDECTOMY  1987     OB History  "   Para Term  AB Living   2 2 2 0 0 2   SAB IAB Ectopic Multiple Live Births   0 0 0 0 2      # Outcome Date GA Lbr [place]/2nd Weight Sex Type Anes PTL Lv   2 Term 12 40w0d 05:25 / 00:38 3.629 kg (8 lb) F Vag-Spont None  JEFFERSON      Name: MYLES ANGEL      Apgar1: 8  Apgar5: 9   1 Term 08/12/10 40w0d 18:00 3.544 kg (7 lb 13 oz) F  None  JEFFERSON      Birth Comments: no problems      Name: Prudence      Obstetric Comments   Possible early SAB in , was going to have  but then she miscarried.      Labs reviewed in EPIC     Objective   Exam  /78   Pulse 78   Ht 1.626 m (5' 4\")   Wt 69.9 kg (154 lb)   LMP  (LMP Unknown)   BMI 26.43 kg/m     Estimated body mass index is 26.43 kg/m  as calculated from the following:    Height as of this encounter: 1.626 m (5' 4\").    Weight as of this encounter: 69.9 kg (154 lb).    Physical Exam  GENERAL: alert and no distress  NECK: no adenopathy, no asymmetry, masses, or scars  RESP: lungs clear to auscultation - no rales, rhonchi or wheezes  CV: regular rate and rhythm, normal S1 S2, no S3 or S4, no murmur, click or rub, no peripheral edema  ABDOMEN: soft, nontender, no hepatosplenomegaly, no masses and bowel sounds normal  MS: no gross musculoskeletal defects noted, no edema. Great toe with medial border erythema, no evidence of purulence of fluctuance.      Signed Electronically by: Antonina Martinez MD        Attestation signed by Antonina Martinez MD at 2024  2:52 PM:  Physician Attestation  I, Antonina Martinez MD, saw this patient and agree with the findings and plan of care as documented in the note.      Items personally reviewed/procedural attestation: vitals and labs.    Antonina Martinez MD    "

## 2024-05-20 NOTE — PROGRESS NOTES
"Preventive Care Visit  [facility]  Antonina Martinez MD, Internal Medicine  May 20, 2024      Assessment & Plan     Routine general medical examination at a health care facility  - Lipid Panel  - Hemoglobin A1c  - Basic Metabolic Panel  - Due for colonoscopy this year, already ordered    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  Has undergone bilateral mastectomy, still undergoing L breast reconstruction process. Screening with mammogram and breast MRI per the breast center practitioners.    Ingrown toenail  No evidence of infection at this time, but has associated pain and symptoms.  - Orthopedic  Referral      BMI  Estimated body mass index is 26.43 kg/m  as calculated from the following:    Height as of this encounter: 1.626 m (5' 4\").    Weight as of this encounter: 69.9 kg (154 lb).         See Patient Instructions    Return in about 53 weeks (around 5/26/2025) for Annual Wellness Visit.    Shayan Driscoll is a 44 year old, presenting for the following:  Establish Care (Annual exam)    Health Care Directive  Patient does not have a Health Care Directive or Living Will: Discussed advance care planning with patient; information given to patient to review.    HPI    BRCA gene carrier, has undergone bilateral salpingo-oophorectomy, mastectomy with reconstruction, breast revision and removal of implant with washout of her left breast due to cellulitis. Has been having ongoing breast MRI and mammogram for screening. Following with the nurse practitioner in the breast center now.    Walking or elliptical 4x per week now  Possible ingrown toenail on left big toe. It is painful with ambulation. She already wears wide toe box shoes.    Hx sessile serrated adenoma 2021, due for repeat colonoscopy           Today's PHQ-2 Score:       1/30/2024    11:24 AM   PHQ-2 ( 1999 Pfizer)   Q1: Little interest or pleasure in doing things 0   Q2: Feeling down, depressed or hopeless 0   PHQ-2 " Score 0           Social History     Tobacco Use    Smoking status: Never     Passive exposure: Never    Smokeless tobacco: Never   Substance Use Topics    Alcohol use: Not Currently     Comment: Holiday's 1 glass a wine    Drug use: Not Currently     Comment: CBD 10 mg            8/19/2022   LAST FHS-7 RESULTS   1st degree relative breast or ovarian cancer Yes   Any relative bilateral breast cancer Yes   Any male have breast cancer No   Any ONE woman have BOTH breast AND ovarian cancer Yes    Yes   Any woman with breast cancer before 50yrs Yes    Yes   2 or more relatives with breast AND/OR ovarian cancer Yes    Yes   2 or more relatives with breast AND/OR bowel cancer Yes    Yes     History of abnormal Pap smear: No - age 30- 64 PAP with HPV every 5 years recommended        Latest Ref Rng & Units 4/12/2023    10:59 AM 9/27/2017    10:00 AM 9/27/2017     9:45 AM   PAP / HPV   PAP  Negative for Intraepithelial Lesion or Malignancy (NILM)      PAP (Historical)   NIL     HPV 16 DNA Negative Negative   Negative    HPV 18 DNA Negative Negative   Negative    Other HR HPV Negative Negative   Negative      ASCVD Risk   The 10-year ASCVD risk score (Karina MARSH, et al., 2019) is: 0.3%    Values used to calculate the score:      Age: 44 years      Sex: Female      Is Non- : No      Diabetic: No      Tobacco smoker: No      Systolic Blood Pressure: 112 mmHg      Is BP treated: No      HDL Cholesterol: 88 mg/dL      Total Cholesterol: 208 mg/dL       Reviewed and updated as needed this visit by Provider                    Past Medical History:   Diagnosis Date    Allergic rhinitis     BRCA1 positive 06/2001    c.1806T    Encounter for insertion of mirena IUD 07/12/2016    Hoarseness     Lump or mass in breast 2001    BRCA-1 positive (Mother, MGM w/ breast ca, mother w/ gene +), mother dx at 32)    Microcalcifications of the breast, right uoq 06/08/2011     Past Surgical History:   Procedure  Laterality Date    GRAFT FAT TO BREAST Bilateral 2023    Procedure: fat grafting from abdomen and thighs;  Surgeon: LING Pradhan MD;  Location: [facility] OR    INSERT INTRAUTERINE DEVICE N/A 2016    Procedure: INSERT INTRAUTERINE DEVICE;  Surgeon: Fidelia Ricks MD;  [facility]    INSERT TISSUE EXPANDER BREAST Left 2024    Procedure: INSERTION, TISSUE EXPANDER, BREAST, left;  Surgeon: LING Pradhan MD;  [facility]    LAPAROSCOPIC SALPINGO-OOPHORECTOMY Bilateral 2016    Procedure: LAPAROSCOPIC SALPINGO-OOPHORECTOMY;  Surgeon: Fidelia Ricks MD;  [facility]    LAPAROSCOPY OPERATIVE ADULT N/A 2016    Procedure: LAPAROSCOPY OPERATIVE ADULT;  Surgeon: Fidelia Ricks MD;  [facility]    MAMMOPLASTY REDUCTION BILATERAL Bilateral 2022    Procedure: Bilateral breast lift;  Surgeon: LING Pradhan MD;  Location: [facility] OR    MASTECTOMY SIMPLE Bilateral 2023    Procedure: BILATERAL Risk-Reducing Nipple-Sparing Mastectomy;  Surgeon: Monica Rodríguez MD;  [facility]    RECONSTRUCT BREAST BILATERAL, IMPLANT PROSTHESIS BILATERAL, COMBINED Bilateral 2023    Procedure: Bilateral breast reconstruction with implant/Alloderm, SPY;  Surgeon: LING Pradhan MD;  [facility]    REMOVE IMPLANT BREAST Left 2023    Procedure: REMOVAL, IMPLANT, BREAST, left and washout;  Surgeon: LING Pradhan MD;  [facility]    REVISE RECONSTRUCTED BREAST BILATERAL Bilateral 2023    Procedure: Bilateral breast revision;  Surgeon: LING Pradhan MD;  Location: [facility] OR    ZZC APPENDECTOMY       OB History    Para Term  AB Living   2 2 2 0 0 2   SAB IAB Ectopic Multiple Live Births   0 0 0 0 2      # Outcome Date GA Lbr [place]/2nd Weight Sex Type Anes PTL Lv   2 Term 12 40w0d 05:25 / 00:38 3.629 kg (8 lb) F Vag-Spont None  [place]      Name: MYLES ANGEL      Apgar1: 8  Apgar5: 9   1 Term  "08/12/10 40w0d 18:00 3.544 kg (7 lb 13 oz) F  None  [place]FFERSON      Birth Comments: no problems      Name: Prudence      Obstetric Comments   Possible early SAB in , was going to have  but then she miscarried.      Labs reviewed in EPIC     Objective    Exam  /78   Pulse 78   Ht 1.626 m (5' 4\")   Wt 69.9 kg (154 lb)   LMP  (LMP Unknown)   BMI 26.43 kg/m     Estimated body mass index is 26.43 kg/m  as calculated from the following:    Height as of this encounter: 1.626 m (5' 4\").    Weight as of this encounter: 69.9 kg (154 lb).    Physical Exam  GENERAL: alert and no distress  NECK: no adenopathy, no asymmetry, masses, or scars  RESP: lungs clear to auscultation - no rales, rhonchi or wheezes  CV: regular rate and rhythm, normal S1 S2, no S3 or S4, no murmur, click or rub, no peripheral edema  ABDOMEN: soft, nontender, no hepatosplenomegaly, no masses and bowel sounds normal  MS: no gross musculoskeletal defects noted, no edema. Great toe with medial border erythema, no evidence of purulence of fluctuance.      Signed Electronically by: Antonina Martinez MD      "

## 2024-05-20 NOTE — PATIENT INSTRUCTIONS
"Preventive Care Advice   This is general advice we often give to help people stay healthy. Your care team may have specific advice just for you. Please talk to your care team about your own preventive care needs.  Lifestyle  Exercise at least 150 minutes each week (30 minutes a day, 5 days a week).  Do muscle strengthening activities 2 days a week. These help control your weight and prevent disease.  No smoking.  Wear sunscreen to prevent skin cancer.  Have your home tested for radon every 2 to 5 years. Radon is a colorless, odorless gas that can harm your lungs. To learn more, go to www.health.[facility].mn. and search for \"Radon in Homes.\"  Keep guns unloaded and locked up in a safe place like a safe or gun vault, or, use a gun lock and hide the keys. Always lock away bullets separately. To learn more, visit [employer].gov and search for \"safe gun storage.\"  Nutrition  Eat 5 or more servings of fruits and vegetables each day.  Try wheat bread, brown rice and whole grain pasta (instead of white bread, rice, and pasta).  Get enough calcium and vitamin D. Check the label on foods and aim for 100% of the RDA (recommended daily allowance).  Regular exams  Have a dental exam and cleaning every 6 months.  See your health care team every year to talk about:  Any changes in your health.  Any medicines your care team has prescribed.  Preventive care, family planning, and ways to prevent chronic diseases.  Shots (vaccines)   HPV shots (up to age 26), if you've never had them before.  Hepatitis B shots (up to age 59), if you've never had them before.  COVID-19 shot: Get this shot when it's due.  Flu shot: Get a flu shot every year.  Tetanus shot: Get a tetanus shot every 10 years.  Pneumococcal, hepatitis A, and RSV shots: Ask your care team if you need these based on your risk.  Shingles shot (for age 50 and up).  General health tests  Diabetes screening:  Starting at age 35, Get screened for diabetes at least every 3 years.  If " you are younger than age 35, ask your care team if you should be screened for diabetes.  Cholesterol test: At age 39, start having a cholesterol test every 5 years, or more often if advised.  Bone density scan (DEXA): At age 50, ask your care team if you should have this scan for osteoporosis (brittle bones).  Hepatitis C: Get tested at least once in your life.  Abdominal aortic aneurysm screening: Talk to your doctor about having this screening if you:  Have ever smoked; and  Are biologically male; and  Are between the ages of 65 and 75.  STIs (sexually transmitted infections)  Before age 24: Ask your care team if you should be screened for STIs.  After age 24: Get screened for STIs if you're at risk. You are at risk for STIs (including HIV) if:  You are sexually active with more than one person.  You don't use condoms every time.  You or a partner was diagnosed with a sexually transmitted infection.  If you are at risk for HIV, ask about PrEP medicine to prevent HIV.  Get tested for HIV at least once in your life, whether you are at risk for HIV or not.  Cancer screening tests  Cervical cancer screening: If you have a cervix, begin getting regular cervical cancer screening tests at age 21. Most people who have regular screenings with normal results can stop after age 65. Talk about this with your provider.  Breast cancer scan (mammogram): If you've ever had breasts, begin having regular mammograms starting at age 40. This is a scan to check for breast cancer.  Colon cancer screening: It is important to start screening for colon cancer at age 45.  Have a colonoscopy test every 10 years (or more often if you're at risk) Or, ask your provider about stool tests like a FIT test every year or Cologuard test every 3 years.  To learn more about your testing options, visit: www.[employer]/459488.pdf.  For help making a decision, visit: siddharth/kv21413.  Prostate cancer screening test: If you have a prostate and are age 55  to 69, ask your provider if you would benefit from a yearly prostate cancer screening test.  Lung cancer screening: If you are a current or former smoker age 50 to 80, ask your care team if ongoing lung cancer screenings are right for you.  For informational purposes only. Not to replace the advice of your health care provider. Copyright   2023 [employer]. All rights reserved. Clinically reviewed by the [facility] Transitions Program. [employer] 033460 - REV 04/24.

## 2024-05-21 ENCOUNTER — PRE VISIT (OUTPATIENT)
Dept: SURGERY | Facility: CLINIC | Age: 44
End: 2024-05-21

## 2024-05-21 ENCOUNTER — OFFICE VISIT (OUTPATIENT)
Dept: PLASTIC SURGERY | Facility: CLINIC | Age: 44
End: 2024-05-21
Attending: PLASTIC SURGERY
Payer: COMMERCIAL

## 2024-05-21 ENCOUNTER — OFFICE VISIT (OUTPATIENT)
Dept: SURGERY | Facility: CLINIC | Age: 44
End: 2024-05-21
Payer: COMMERCIAL

## 2024-05-21 ENCOUNTER — ANESTHESIA EVENT (OUTPATIENT)
Dept: SURGERY | Facility: AMBULATORY SURGERY CENTER | Age: 44
End: 2024-05-21
Payer: COMMERCIAL

## 2024-05-21 VITALS
SYSTOLIC BLOOD PRESSURE: 115 MMHG | BODY MASS INDEX: 26.29 KG/M2 | TEMPERATURE: 97.4 F | HEART RATE: 93 BPM | DIASTOLIC BLOOD PRESSURE: 74 MMHG | OXYGEN SATURATION: 97 % | WEIGHT: 154 LBS | HEIGHT: 64 IN

## 2024-05-21 DIAGNOSIS — Z01.818 PRE-OP EVALUATION: Primary | ICD-10-CM

## 2024-05-21 DIAGNOSIS — Z98.890 S/P BREAST RECONSTRUCTION, BILATERAL: Primary | ICD-10-CM

## 2024-05-21 PROCEDURE — 99212 OFFICE O/P EST SF 10 MIN: CPT | Performed by: PHYSICIAN ASSISTANT

## 2024-05-21 PROCEDURE — 99212 OFFICE O/P EST SF 10 MIN: CPT | Performed by: PLASTIC SURGERY

## 2024-05-21 PROCEDURE — 99214 OFFICE O/P EST MOD 30 MIN: CPT | Performed by: PLASTIC SURGERY

## 2024-05-21 ASSESSMENT — ENCOUNTER SYMPTOMS: SEIZURES: 0

## 2024-05-21 ASSESSMENT — LIFESTYLE VARIABLES: TOBACCO_USE: 0

## 2024-05-21 ASSESSMENT — PAIN SCALES - GENERAL: PAINLEVEL: MILD PAIN (3)

## 2024-05-21 NOTE — NURSING NOTE
"Oncology Rooming Note    May 21, 2024 10:05 AM   Yamila Myles is a 44 year old female who presents for:    Chief Complaint   Patient presents with    Oncology Clinic Visit    Breast Cancer     Initial Vitals: LMP  (LMP Unknown)  Estimated body mass index is 26.43 kg/m  as calculated from the following:    Height as of an earlier encounter on 5/21/24: 1.626 m (5' 4\").    Weight as of an earlier encounter on 5/21/24: 69.9 kg (154 lb). There is no height or weight on file to calculate BSA.  Data Unavailable Comment: Data Unavailable   No LMP recorded (lmp unknown). Patient has had a hysterectomy.  Allergies reviewed: Yes  Medications reviewed: Yes    Medications: Medication refills not needed today.  Pharmacy name entered into [facility]:    [id number] - [place], [place] - [address] AT [facility] [address]  [id number] - [place] - [address] [place] [address] [address] AT [facility] [address]  [facility] - [address] [address] [place] [facility] - [place], [place] - [address] [place] [address]    Frailty Screening:   Is the patient here for a new oncology consult visit in cancer care? 2. No      Clinical concerns: Pt reports no new concerns for this visit.       Shila Cunningham             "

## 2024-05-21 NOTE — LETTER
5/21/2024         RE: Yamila Myles  [address] [facility] [address]        Dear Colleague,    Thank you for referring your patient, Yamila Myles, to the [facility]. Please see a copy of my visit note below.    PREOPERATIVE VISIT NOTE     PRESENTING COMPLAINT:  Preop visit for upcoming left breast implant placement for breast reconstruction on 6/12/2024     HISTORY OF PRESENTING COMPLAINT: The patient is here for a pre-op visit for the patient's surgery.  The patient is being seen in the presence of my nurse. There is no change since the patient's consultation. Please see the consult note for details.     The patient is scheduled for implant exchange for left breast and possible right breast revision.  She currently has 600 cc in the left expander.  No other change in her history physical exam.    She was made well aware that she may require another touchup surgery.  She understood.  This may include revisional surgeries to the breast as well as fat grafting.     ASSESSMENT AND PLAN:  Based upon the above findings, the patient is here for a preop visit for upcoming surgery.  I had a ghazala, detailed discussion with the patient in the presence of my nurse (who was present from beginning to end) about the proposed surgery. I was very clear and detailed about overview of surgery, perioperative plans, and expectations. All risks, benefits and alternatives of the surgery including but not limited to pain, infection, bleeding, scarring, asymmetry, seromas, hematomas, wound breakdown, wound dehiscence, prominent scar, hypertrophic scar, keloid scar, requirement of further surgeries, skin/tissue necrosis, nerve/muscle/deeper structure injury, DVT, PE, MI, CVA, pneumonia, renal failure and death, were explained in detail. The patient agreed and understood them all and had all the questions answered to the patient's satisfaction, and the patient wants to proceed with surgery.  I look forward to helping the patient out in the near future.  All questions were answered.  The patient was happy with the visit.    Total time spent in the encounter today including chart review, visit itself, and post-visit paperwork was 30 minutes.         LING Pradhan MD

## 2024-05-21 NOTE — NURSING NOTE
Pre Op Teaching Note:       Pre and Post op Patient Education    Relevant Diagnosis:  breast cancer  Surgical procedure:  L breast implant replacement, possible R breast revision    Patient demonstrates understanding of the following:  Date of surgery:  6/12/24  Location of surgery:  [facility] - 5th Floor  History and Physical and any other testing necessary prior to surgery: Yes, discussed with pt need for pre-op within 30 days of surgery with PCP.    Patient demonstrates understanding of the following:    Pre-op showering/scrub information with PCMX Soap: Yes, soap per PAC visit  Blood thinner medications discussed and when to stop (if applicable):  Per PCP at pre-op.     Post-op follow-up:  Discussed how to contact the hospital, nurse, and clinic scheduling staff if necessary. (See packet information)    Instructional materials used/given/mailed:  [facility] Packet per surgery scheduler, post op teaching sheet, Soap.  Pt advised that final arrival information to come closer to the surgery date by [facility] nurses.

## 2024-05-21 NOTE — PATIENT INSTRUCTIONS
Preparing for Your Surgery      Name:  Yamila Myles   MRN:  [id number]   :  1980   Today's Date:  2024         Arriving for surgery:  Surgery date:  2024  Arrival time:  12:50 PM    Restrictions due to COVID 19:    Please maintain social distance.  Masking is optional        parking is available for anyone with mobility limitations or disabilities. (Monday- Friday 7 am- 5 pm)    Please come to:    [facility]  [address] [place] [id number]    Check in on the 5th floor, Ambulatory Surgery Center.    What can I eat or drink?    -  You may eat and drink normally until 8 hours before arrival time  (Until 4:50 AM)  -  You may have clear liquids until 2 hours before arrival time  (Until 10:50 AM)    Examples of clear liquids:  Water  Clear broth  Juices (apple, white grape, white cranberry  and cider) without pulp  Noncarbonated, powder based beverages  (lemonade and Jordan-Aid)  Sodas (Sprite, 7-Up, ginger ale and seltzer)  Coffee or tea (without milk or cream)  Gatorade    --No alcohol or cannabis products for at least 24 hours before surgery    Which medicines can I take?    Hold Aspirin for 7 days before surgery.   **Hold Multivitamins for 7 days before surgery.  **Hold Supplements for 7 days before surgery. (Hemp Oil)  Hold Ibuprofen (Advil, Motrin) for 1 day before surgery--unless otherwise directed by surgeon.  Hold Naproxen (Aleve) for 4 days before surgery.    -  DO NOT take the following medications the day of surgery:  Zyrtec  Senna  Os-Manish  Vitamin D    -  PLEASE TAKE the following medications the day of surgery   Oxycodone if needed  Tylenol if needed  Zofran if needed  Flonase if needed    **Can take evening medications. (Culturell, Estradiol)    How do I prepare myself?  - Please take 2 showers (one the night prior to surgery and one the morning of surgery) using Scrubcare or Hibiclens soap.    Use this soap only from the neck to your toes.      Leave the soap on your skin for one minute--then rinse thoroughly.      You may use your own shampoo and conditioner; no other hair products.   - Please remove all jewelry and body piercings.  - No lotions, deodorants or fragrance.  - No makeup or fingernail polish.   - Bring your ID and insurance card.    -If you have a Deep Brain Stimulator, a Spinal Cord Stimulator or any implanted Neuro device you must bring the remote to the Surgery Center          ALL PATIENTS ARE REQUIRED TO HAVE A RESPONSIBLE ADULT TO DRIVE AND BE IN ATTENDANCE WITH THEM FOR 24 HOURS FOLLOWING SURGERY       Covid testing policy as of 12/06/2022  Your surgeon will notify and schedule you for a COVID test if one is needed before surgery--please direct any questions or COVID symptoms to your surgeon      Questions or Concerns:    -For questions regarding the day of surgery please contact the Ambulatory Surgery Center at [phone].    -If you have health changes between today and your surgery please contact your surgeon.     For questions after surgery please call your surgeons office.

## 2024-05-21 NOTE — PROGRESS NOTES
PREOPERATIVE VISIT NOTE     PRESENTING COMPLAINT:  Preop visit for upcoming left breast implant placement for breast reconstruction on 6/12/2024     HISTORY OF PRESENTING COMPLAINT: The patient is here for a pre-op visit for the patient's surgery.  The patient is being seen in the presence of my nurse. There is no change since the patient's consultation. Please see the consult note for details.     The patient is scheduled for implant exchange for left breast and possible right breast revision.  She currently has 600 cc in the left expander.  No other change in her history physical exam.    She was made well aware that she may require another touchup surgery.  She understood.  This may include revisional surgeries to the breast as well as fat grafting.     ASSESSMENT AND PLAN:  Based upon the above findings, the patient is here for a preop visit for upcoming surgery.  I had a ghazala, detailed discussion with the patient in the presence of my nurse (who was present from beginning to end) about the proposed surgery. I was very clear and detailed about overview of surgery, perioperative plans, and expectations. All risks, benefits and alternatives of the surgery including but not limited to pain, infection, bleeding, scarring, asymmetry, seromas, hematomas, wound breakdown, wound dehiscence, prominent scar, hypertrophic scar, keloid scar, requirement of further surgeries, skin/tissue necrosis, nerve/muscle/deeper structure injury, DVT, PE, MI, CVA, pneumonia, renal failure and death, were explained in detail. The patient agreed and understood them all and had all the questions answered to the patient's satisfaction, and the patient wants to proceed with surgery. I look forward to helping the patient out in the near future.  All questions were answered.  The patient was happy with the visit.    Total time spent in the encounter today including chart review, visit itself, and post-visit paperwork was 30 minutes.

## 2024-05-21 NOTE — H&P
Pre-Operative H & P     CC:  Preoperative exam to assess for increased cardiopulmonary risk while undergoing surgery and anesthesia.    Date of Encounter: 5/21/2024  Primary Care Physician:  Antonina Martinez     Reason for visit:   Encounter Diagnosis   Name Primary?    Pre-op evaluation Yes       HPI  Yamila Myles is a 44 year old female who presents for pre-operative H & P in preparation for  Procedure Information       Case: [id number] Date/Time: 06/12/24 1420    Procedure: left replacement breast implant,  possible right breast revision (Left: Breast)    Anesthesia type: General with Block    Diagnosis: S/P breast reconstruction, bilateral [Z98.890]    Pre-op diagnosis: S/P breast reconstruction, bilateral [Z98.890]    Location: [place] [address] / [facility]    Providers: LING Pradhan MD            Patient is being evaluated with comorbid conditions of allergic rhinitis, BRCA positive    Ms. Myles is a BRCA gene carrier.  She is s/p bilateral, mastectomy this was complicated by left breast cellular.  She is now with washout of her left breast insertion of breast tissue expander.  She continues to follow with Dr. Pradhan and is now scheduled for the above procedure.    History is obtained from the patient and chart review    Hx of abnormal bleeding or anti-platelet use: denies    Menstrual history: No LMP recorded (lmp unknown). Patient has had a hysterectomy.     Past Medical History  Past Medical History:   Diagnosis Date    Allergic rhinitis     BRCA1 positive 06/2001    [id number]    Encounter for insertion of mirena IUD 07/12/2016    Hoarseness     Lump or mass in breast 2001    BRCA-1 positive (Mother, MGM w/ breast ca, mother w/ gene +), mother dx at 32)    Microcalcifications of the breast, right uoq 06/08/2011       Past Surgical History  Past Surgical History:   Procedure Laterality Date    GRAFT FAT TO BREAST Bilateral 8/2/2023    Procedure:  fat grafting from abdomen and thighs;  Surgeon: LING Pradhan MD;  Location: [facility] OR    INSERT INTRAUTERINE DEVICE N/A 7/12/2016    Procedure: INSERT INTRAUTERINE DEVICE;  Surgeon: Fidelia Ricks MD;  [facility]: [facility]    INSERT TISSUE EXPANDER BREAST Left 2/5/2024    Procedure: INSERTION, TISSUE EXPANDER, BREAST, left;  Surgeon: LING Pradhan MD;  Location: [facility]    LAPAROSCOPIC SALPINGO-OOPHORECTOMY Bilateral 7/12/2016    Procedure: LAPAROSCOPIC SALPINGO-OOPHORECTOMY;  Surgeon: Fidelia Ricks MD;  [facility]    LAPAROSCOPY OPERATIVE ADULT N/A 7/12/2016    Procedure: LAPAROSCOPY OPERATIVE ADULT;  Surgeon: Fidelia Ricks MD;  [facility]    MAMMOPLASTY REDUCTION BILATERAL Bilateral 4/13/2022    Procedure: Bilateral breast lift;  Surgeon: LING Pradhan MD;  Location: [facility] OR    MASTECTOMY SIMPLE Bilateral 1/23/2023    Procedure: BILATERAL Risk-Reducing Nipple-Sparing Mastectomy;  Surgeon: Monica Rodríguez MD;  Location: [facility]    RECONSTRUCT BREAST BILATERAL, IMPLANT PROSTHESIS BILATERAL, COMBINED Bilateral 1/23/2023    Procedure: Bilateral breast reconstruction with implant/Alloderm, SPY;  Surgeon: LING Prdahan MD;  Location: [facility]    REMOVE IMPLANT BREAST Left 9/1/2023    Procedure: REMOVAL, IMPLANT, BREAST, left and washout;  Surgeon: LING rPadhan MD;  Location: [facility]    REVISE RECONSTRUCTED BREAST BILATERAL Bilateral 8/2/2023    Procedure: Bilateral breast revision;  Surgeon: LING Pradhan MD;  Location: [facility] OR    ZZC APPENDECTOMY  1987       Prior to Admission Medications  Current Outpatient Medications   Medication Sig Dispense Refill    acetaminophen (TYLENOL) 325 MG tablet Take 325-650 mg by mouth every 6 hours as needed for mild pain      calcium carbonate (OS-GRETCHEN) 500 MG tablet Take 1 tablet by mouth every evening Unsure if 500 or 1000mg      cetirizine (ZYRTEC) 10 MG tablet Take 10 mg by mouth every evening      estradiol  (ESTRACE) 2 MG tablet Take 1 tablet (2 mg) by mouth daily (Patient taking differently: Take 2 mg by mouth every evening) 90 tablet 3    fluticasone (FLONASE) 50 MCG/ACT nasal spray Spray 2 sprays into both nostrils daily (Patient taking differently: Spray 2 sprays into both nostrils as needed) 16 g 3    HEMP OIL OR EXTRACT OR OTHER CBD CANNABINOID, NOT MEDICAL CANNABIS, 10 mg every evening [employer] CBD 10 mg      lactobacillus rhamnosus, GG, (CULTURELL) capsule Take 1 capsule by mouth every evening      multivitamin w/minerals (MULTI-VITAMIN) tablet Take 1 tablet by mouth every evening      ondansetron (ZOFRAN ODT) 4 MG ODT tab Take 1 tablet (4 mg) by mouth every 8 hours as needed for nausea 4 tablet 0    ciclopirox (PENLAC) 8 % external solution Apply to adjacent skin and affected nails daily.  Remove with alcohol every 7 days, then repeat. 6 mL 11    hydrocortisone 2.5 % ointment Apply topically 2 times daily 30 g 1       Allergies  Allergies   Allergen Reactions    Advil [Nsaids] Other (See Comments)     Nasal congestion  Eye get puffy       Seasonal Allergies      Pollen and mold   Spring and Fall are main periods. Takes Zyrtec year round to manage it.   Gets post nasal drip     Adhesive Tape Rash     Surgical drape adhesive vs. ioban       Social History  Social History     Socioeconomic History    Marital status:      Spouse name: Mani Robles    Number of children: 2    Years of education: BA    Highest education level: Not on file   Occupational History    Occupation: Staff director     Comment: [employer]   Tobacco Use    Smoking status: Never     Passive exposure: Never    Smokeless tobacco: Never   Substance and Sexual Activity    Alcohol use: Not Currently     Comment: Holiday's 1 glass a wine    Drug use: Yes     Types: Marijuana     Comment: CBD 10 mg daily    Sexual activity: Yes     Partners: Male     Birth control/protection: I.U.D.     Comment: Mirena   Other  Topics Concern    Parent/sibling w/ CABG, MI or angioplasty before 65F 55M? No     Service Not Asked    Blood Transfusions No    Caffeine Concern No    Occupational Exposure No    Hobby Hazards Not Asked    Sleep Concern No    Stress Concern Yes     Comment: life/work -->coping    Weight Concern No    Special Diet No    Back Care Yes     Comment: chiropracter for old injury    Exercise No    Bike Helmet Not Asked     Comment: not biker    Seat Belt No    Self-Exams Not Asked   Social History Narrative    4/12/23    Going to [facility] this summer with daughter for 12th birthday    Fidelia Ricks MD            Social Documentation:        Balanced Diet: YES    Calcium intake: less than 2 per day    Caffeine: 0-1 per day    Exercise:  type of activity walk; daily times per week    Sunscreen: Yes    Seatbelts:  Yes    Self Breast Exam:  Yes    Self Testicular Exam: No - n/a    Physical/Emotional/Sexual Abuse: No     Do you feel safe in your environment? Yes        Cholesterol screen up to date: Yes-3 yrs ago per pt.  Not fasting today.     Eye Exam up to date: Yes    Dental Exam up to date: No - 9 months ago.     Pap smear up to date: Yes    Mammogram up to date: Does Not Apply    Dexa Scan up to date: Does Not Apply    Colonoscopy up to date: Does Not Apply    Immunizations up to date: Yes-Td 12/06    Glucose screen if over 40:  No - n/a     Social Determinants of Health     Financial Resource Strain: Low Risk  (5/20/2024)    Financial Resource Strain     Within the past 12 months, have you or your family members you live with been unable to get utilities (heat, electricity) when it was really needed?: No   Food Insecurity: Low Risk  (5/20/2024)    Food Insecurity     Within the past 12 months, did you worry that your food would run out before you got money to buy more?: No     Within the past 12 months, did the food you bought just not last and you didn t have money to get more?: No   Transportation Needs:  "Low Risk  (2024)    Transportation Needs     Within the past 12 months, has lack of transportation kept you from medical appointments, getting your medicines, non-medical meetings or appointments, work, or from getting things that you need?: No   Physical Activity: Insufficiently Active (2024)    Exercise Vital Sign     Days of Exercise per Week: 4 days     Minutes of Exercise per Session: 30 min   Stress: No Stress Concern Present (2024)    [place] [employer] - Occupational Stress Questionnaire     Feeling of Stress : Not at all   Social Connections: Unknown (2024)    Social Connection and Isolation Panel [NHANES]     Frequency of Communication with Friends and Family: Not on file     Frequency of Social Gatherings with Friends and Family: Once a week     Attends Roman Catholic Services: Not on file     Active Member of Clubs or Organizations: Not on file     Attends Club or Organization Meetings: Not on file     Marital Status: Not on file   Interpersonal Safety: Not on file   Housing Stability: Low Risk  (2024)    Housing Stability     Do you have housing? : Yes     Are you worried about losing your housing?: No       Family History  Family History   Problem Relation Age of Onset    Breast Cancer Mother 32        dx age 32, doing well    Colon Cancer Mother 67        dx 2019. s/p surg and chemo    Hereditary Breast and Ovarian Cancer Syndrome Mother         BRCA1+    Anesthesia Reaction Sister         PONV    Hereditary Breast and Ovarian Cancer Syndrome Sister         double Mastectomy and oophrectomy; BRCA1+    Other - See Comments Sister         negative genetic testing    Other - See Comments Sister         negative genetic testing    Breast Cancer Maternal Grandmother     Ovarian Cancer Maternal Grandmother          in 40s; \"stomach cancer\"    Diabetes Paternal Grandmother     Breast Cancer Paternal Grandmother 59        cause of death at 62    Skin Cancer " "Maternal Uncle         negative for BRCA    Genetic Disorder Maternal Uncle         BRCA1+    Genetic Disorder Maternal Uncle         BRCA1+    Thrombosis No family hx of     Malignant Hyperthermia No family hx of        Review of Systems  The complete review of systems is negative other than noted in the HPI or here.   Anesthesia Evaluation   Pt has had prior anesthetic. Type: General.    No history of anesthetic complications       ROS/MED HX  ENT/Pulmonary:     (+)           allergic rhinitis,                          (-) tobacco use   Neurologic:  - neg neurologic ROS  (-) no seizures and no CVA   Cardiovascular:     (+)  - -   -  - -                                 No previous cardiac testing  (-) taking anticoagulants/antiplatelets   METS/Exercise Tolerance: >4 METS Comment: Walking 30-40 minutes, elliptical 20 minutes   Hematologic:  - neg hematologic  ROS  (-) history of blood clots and history of blood transfusion   Musculoskeletal:  - neg musculoskeletal ROS     GI/Hepatic:  - neg GI/hepatic ROS  (-) GERD   Renal/Genitourinary:  - neg Renal ROS     Endo:  - neg endo ROS  (-) chronic steroid usage   Psychiatric/Substance Use:  - neg psychiatric ROS     Infectious Disease:       Malignancy: Comment: BRCA 1 carrier now s/p surgery      Other:  - neg other ROS          /74 (BP Location: Right arm, Patient Position: Sitting, Cuff Size: Adult Regular)   Pulse 93   Temp 97.4  F (36.3  C) (Oral)   Ht 1.626 m (5' 4\")   Wt 69.9 kg (154 lb)   LMP  (LMP Unknown)   SpO2 97%   BMI 26.43 kg/m      Physical Exam   Constitutional: Awake, alert, cooperative, no apparent distress, and appears stated age.  Eyes: Pupils equal, round and reactive to light, extra ocular muscles intact, sclera clear, conjunctiva normal.  HENT: Normocephalic, oral pharynx with moist mucus membranes, good dentition. No goiter appreciated.   Respiratory: Clear to auscultation bilaterally, no crackles or wheezing.  Cardiovascular: " Regular rate and rhythm, normal S1 and S2, and no murmur noted.  Carotids no bruits. No edema. Palpable pulses to radial arteries.   GI: Normal bowel sounds, soft, non-distended, non-tender  Genitourinary:  deferred  Skin: Warm and dry.    Musculoskeletal: Full ROM of neck. There is no redness, warmth, or swelling of the exposed joints. Gross motor strength is normal.    Neurologic: Awake, alert, oriented to name, place and time. Cranial nerves II-XII are grossly intact. Gait is normal.   Neuropsychiatric: Calm, cooperative. Normal affect.     Prior Labs/Diagnostic Studies   All labs and imaging personally reviewed     EKG/ stress test - if available please see in ROS above   No results found.      Latest Ref Rng & Units 3/16/2017     4:32 PM   PFT   FVC L 3.20    FEV1 L 2.67    FVC% % 85    FEV1% % 86          The patient's records and results personally reviewed by this provider.     Outside records reviewed from: No outside records available    LAB/DIAGNOSTIC STUDIES TODAY:  none    Assessment    Yamila Myles is a 44 year old female seen as a PAC referral for risk assessment and optimization for anesthesia.    Plan/Recommendations  Pt will be optimized for the proposed procedure.  See below for details on the assessment, risk, and preoperative recommendations    NEUROLOGY  - No history of TIA, CVA or seizure  -Post Op delirium risk factors:  No risk identified    ENT  - No current airway concerns.  Will need to be reassessed day of surgery.  Mallampati: II  TM: > 3  -lower permanent retainer present    CARDIAC  - No history of CAD, Hypertension, and Afib  -denies cardiac symptoms   - METS (Metabolic Equivalents)  Patient performs 4 or more METS exercise without symptoms             Total Score: 0      RCRI-Very low risk: Class 1 0.4% complication rate             Total Score: 0        PULMONARY  JOSE LUIS Low Risk             Total Score: 0      - Denies asthma or inhaler use  - Tobacco History    History   Smoking  "Status    Never   Smokeless Tobacco    Never       GI  PONV High Risk  Total Score: 3           1 AN PONV: Pt is Female    1 AN PONV: Patient is not a current smoker    1 AN PONV: Intended Post Op Opioids        /RENAL  - Baseline Creatinine 0.69    ENDOCRINE    - BMI: Estimated body mass index is 26.43 kg/m  as calculated from the following:    Height as of this encounter: 1.626 m (5' 4\").    Weight as of this encounter: 69.9 kg (154 lb).  Overweight (BMI 25.0-29.9)  - No history of Diabetes Mellitus    HEME  VTE Low Risk 0.26%             Total Score: 0      - No history of abnormal bleeding or antiplatelet use.    Different anesthesia methods/types have been discussed with the patient, but they are aware that the final plan will be decided by the assigned anesthesia provider on the date of service.    The patient is optimized for their procedure. AVS with information on surgery time/arrival time, meds and NPO status given by nursing staff. No further diagnostic testing indicated.      On the day of service:     Prep time: 6 minutes  Visit time: 10 minutes  Documentation time: 3 minutes  ------------------------------------------  Total time: 19 minutes      Mallory Sotomayor PA-C  Preoperative Assessment Center  [facility]  [facility] Center  Phone: [phone]  Fax: [phone]    "

## 2024-06-12 ENCOUNTER — ANESTHESIA (OUTPATIENT)
Dept: SURGERY | Facility: AMBULATORY SURGERY CENTER | Age: 44
End: 2024-06-12
Payer: COMMERCIAL

## 2024-06-12 ENCOUNTER — HOSPITAL ENCOUNTER (OUTPATIENT)
Facility: AMBULATORY SURGERY CENTER | Age: 44
Discharge: HOME | End: 2024-06-12
Attending: PLASTIC SURGERY
Payer: COMMERCIAL

## 2024-06-12 VITALS
TEMPERATURE: 97.2 F | SYSTOLIC BLOOD PRESSURE: 105 MMHG | BODY MASS INDEX: 25.61 KG/M2 | WEIGHT: 150 LBS | DIASTOLIC BLOOD PRESSURE: 67 MMHG | HEART RATE: 62 BPM | HEIGHT: 64 IN | RESPIRATION RATE: 14 BRPM | OXYGEN SATURATION: 100 %

## 2024-06-12 DIAGNOSIS — Z98.890 S/P BREAST RECONSTRUCTION, BILATERAL: Primary | ICD-10-CM

## 2024-06-12 PROCEDURE — 11970 RPLCMT TISS XPNDR PERM IMPLT: CPT | Mod: LT

## 2024-06-12 PROCEDURE — 11970 RPLCMT TISS XPNDR PERM IMPLT: CPT | Mod: LT | Performed by: PLASTIC SURGERY

## 2024-06-12 PROCEDURE — 11970 RPLCMT TISS XPNDR PERM IMPLT: CPT | Performed by: ANESTHESIOLOGY

## 2024-06-12 PROCEDURE — 11970 RPLCMT TISS XPNDR PERM IMPLT: CPT | Performed by: NURSE ANESTHETIST, CERTIFIED REGISTERED

## 2024-06-12 RX ORDER — ONDANSETRON 2 MG/ML
INJECTION INTRAMUSCULAR; INTRAVENOUS PRN
Status: DISCONTINUED | OUTPATIENT
Start: 2024-06-12 | End: 2024-06-12

## 2024-06-12 RX ORDER — PROPOFOL 10 MG/ML
INJECTION, EMULSION INTRAVENOUS PRN
Status: DISCONTINUED | OUTPATIENT
Start: 2024-06-12 | End: 2024-06-12

## 2024-06-12 RX ORDER — ACETAMINOPHEN 650 MG
TABLET, EXTENDED RELEASE ORAL PRN
Status: DISCONTINUED | OUTPATIENT
Start: 2024-06-12 | End: 2024-06-12 | Stop reason: HOSPADM

## 2024-06-12 RX ORDER — DEXAMETHASONE SODIUM PHOSPHATE 4 MG/ML
INJECTION, SOLUTION INTRA-ARTICULAR; INTRALESIONAL; INTRAMUSCULAR; INTRAVENOUS; SOFT TISSUE PRN
Status: DISCONTINUED | OUTPATIENT
Start: 2024-06-12 | End: 2024-06-12

## 2024-06-12 RX ORDER — ONDANSETRON 2 MG/ML
4 INJECTION INTRAMUSCULAR; INTRAVENOUS EVERY 30 MIN PRN
Status: DISCONTINUED | OUTPATIENT
Start: 2024-06-12 | End: 2024-06-13 | Stop reason: HOSPADM

## 2024-06-12 RX ORDER — NALOXONE HYDROCHLORIDE 0.4 MG/ML
0.1 INJECTION, SOLUTION INTRAMUSCULAR; INTRAVENOUS; SUBCUTANEOUS
Status: DISCONTINUED | OUTPATIENT
Start: 2024-06-12 | End: 2024-06-13 | Stop reason: HOSPADM

## 2024-06-12 RX ORDER — NALOXONE HYDROCHLORIDE 0.4 MG/ML
0.2 INJECTION, SOLUTION INTRAMUSCULAR; INTRAVENOUS; SUBCUTANEOUS
Status: DISCONTINUED | OUTPATIENT
Start: 2024-06-12 | End: 2024-06-13 | Stop reason: HOSPADM

## 2024-06-12 RX ORDER — NALOXONE HYDROCHLORIDE 0.4 MG/ML
0.4 INJECTION, SOLUTION INTRAMUSCULAR; INTRAVENOUS; SUBCUTANEOUS
Status: DISCONTINUED | OUTPATIENT
Start: 2024-06-12 | End: 2024-06-13 | Stop reason: HOSPADM

## 2024-06-12 RX ORDER — FENTANYL CITRATE 50 UG/ML
25-50 INJECTION, SOLUTION INTRAMUSCULAR; INTRAVENOUS
Status: DISCONTINUED | OUTPATIENT
Start: 2024-06-12 | End: 2024-06-13 | Stop reason: HOSPADM

## 2024-06-12 RX ORDER — HYDROMORPHONE HYDROCHLORIDE 1 MG/ML
0.4 INJECTION, SOLUTION INTRAMUSCULAR; INTRAVENOUS; SUBCUTANEOUS EVERY 5 MIN PRN
Status: DISCONTINUED | OUTPATIENT
Start: 2024-06-12 | End: 2024-06-13 | Stop reason: HOSPADM

## 2024-06-12 RX ORDER — OXYCODONE HYDROCHLORIDE 5 MG/1
5-10 TABLET ORAL EVERY 6 HOURS PRN
Qty: 15 TABLET | Refills: 0 | Status: SHIPPED | OUTPATIENT
Start: 2024-06-12

## 2024-06-12 RX ORDER — ONDANSETRON 4 MG/1
4 TABLET, ORALLY DISINTEGRATING ORAL EVERY 30 MIN PRN
Status: DISCONTINUED | OUTPATIENT
Start: 2024-06-12 | End: 2024-06-13 | Stop reason: HOSPADM

## 2024-06-12 RX ORDER — OXYCODONE HYDROCHLORIDE 5 MG/1
10 TABLET ORAL
Status: DISCONTINUED | OUTPATIENT
Start: 2024-06-12 | End: 2024-06-13 | Stop reason: HOSPADM

## 2024-06-12 RX ORDER — CEFAZOLIN SODIUM 2 G/50ML
2 SOLUTION INTRAVENOUS SEE ADMIN INSTRUCTIONS
Status: DISCONTINUED | OUTPATIENT
Start: 2024-06-12 | End: 2024-06-13 | Stop reason: HOSPADM

## 2024-06-12 RX ORDER — ONDANSETRON 4 MG/1
4 TABLET, ORALLY DISINTEGRATING ORAL EVERY 8 HOURS PRN
Qty: 4 TABLET | Refills: 0 | Status: SHIPPED | OUTPATIENT
Start: 2024-06-12

## 2024-06-12 RX ORDER — FENTANYL CITRATE 50 UG/ML
50 INJECTION, SOLUTION INTRAMUSCULAR; INTRAVENOUS EVERY 5 MIN PRN
Status: DISCONTINUED | OUTPATIENT
Start: 2024-06-12 | End: 2024-06-13 | Stop reason: HOSPADM

## 2024-06-12 RX ORDER — BUPIVACAINE HYDROCHLORIDE 2.5 MG/ML
INJECTION, SOLUTION EPIDURAL; INFILTRATION; INTRACAUDAL
Status: COMPLETED | OUTPATIENT
Start: 2024-06-12 | End: 2024-06-12

## 2024-06-12 RX ORDER — PROPOFOL 10 MG/ML
INJECTION, EMULSION INTRAVENOUS CONTINUOUS PRN
Status: DISCONTINUED | OUTPATIENT
Start: 2024-06-12 | End: 2024-06-12

## 2024-06-12 RX ORDER — GLYCOPYRROLATE 0.2 MG/ML
INJECTION, SOLUTION INTRAMUSCULAR; INTRAVENOUS PRN
Status: DISCONTINUED | OUTPATIENT
Start: 2024-06-12 | End: 2024-06-12

## 2024-06-12 RX ORDER — CEFAZOLIN SODIUM 2 G/50ML
2 SOLUTION INTRAVENOUS
Status: COMPLETED | OUTPATIENT
Start: 2024-06-12 | End: 2024-06-12

## 2024-06-12 RX ORDER — OXYCODONE HYDROCHLORIDE 5 MG/1
5 TABLET ORAL
Status: DISCONTINUED | OUTPATIENT
Start: 2024-06-12 | End: 2024-06-13 | Stop reason: HOSPADM

## 2024-06-12 RX ORDER — ACETAMINOPHEN 325 MG/1
975 TABLET ORAL ONCE
Status: COMPLETED | OUTPATIENT
Start: 2024-06-12 | End: 2024-06-12

## 2024-06-12 RX ORDER — LIDOCAINE 40 MG/G
CREAM TOPICAL
Status: DISCONTINUED | OUTPATIENT
Start: 2024-06-12 | End: 2024-06-13 | Stop reason: HOSPADM

## 2024-06-12 RX ORDER — FLUMAZENIL 0.1 MG/ML
0.2 INJECTION, SOLUTION INTRAVENOUS
Status: DISCONTINUED | OUTPATIENT
Start: 2024-06-12 | End: 2024-06-13 | Stop reason: HOSPADM

## 2024-06-12 RX ORDER — FENTANYL CITRATE 50 UG/ML
25 INJECTION, SOLUTION INTRAMUSCULAR; INTRAVENOUS
Status: DISCONTINUED | OUTPATIENT
Start: 2024-06-12 | End: 2024-06-13 | Stop reason: HOSPADM

## 2024-06-12 RX ORDER — DEXAMETHASONE SODIUM PHOSPHATE 10 MG/ML
4 INJECTION, SOLUTION INTRAMUSCULAR; INTRAVENOUS
Status: DISCONTINUED | OUTPATIENT
Start: 2024-06-12 | End: 2024-06-13 | Stop reason: HOSPADM

## 2024-06-12 RX ORDER — LIDOCAINE HYDROCHLORIDE 20 MG/ML
INJECTION, SOLUTION INFILTRATION; PERINEURAL PRN
Status: DISCONTINUED | OUTPATIENT
Start: 2024-06-12 | End: 2024-06-12

## 2024-06-12 RX ORDER — GABAPENTIN 300 MG/1
300 CAPSULE ORAL
Status: COMPLETED | OUTPATIENT
Start: 2024-06-12 | End: 2024-06-12

## 2024-06-12 RX ORDER — SODIUM CHLORIDE, SODIUM LACTATE, POTASSIUM CHLORIDE, CALCIUM CHLORIDE 600; 310; 30; 20 MG/100ML; MG/100ML; MG/100ML; MG/100ML
INJECTION, SOLUTION INTRAVENOUS CONTINUOUS
Status: DISCONTINUED | OUTPATIENT
Start: 2024-06-12 | End: 2024-06-13 | Stop reason: HOSPADM

## 2024-06-12 RX ORDER — AMOXICILLIN 250 MG
1-2 CAPSULE ORAL 2 TIMES DAILY
Qty: 30 TABLET | Refills: 0 | Status: SHIPPED | OUTPATIENT
Start: 2024-06-12

## 2024-06-12 RX ORDER — FENTANYL CITRATE 50 UG/ML
25 INJECTION, SOLUTION INTRAMUSCULAR; INTRAVENOUS EVERY 5 MIN PRN
Status: DISCONTINUED | OUTPATIENT
Start: 2024-06-12 | End: 2024-06-13 | Stop reason: HOSPADM

## 2024-06-12 RX ORDER — SULFAMETHOXAZOLE/TRIMETHOPRIM 800-160 MG
1 TABLET ORAL 2 TIMES DAILY
Qty: 14 TABLET | Refills: 0 | Status: SHIPPED | OUTPATIENT
Start: 2024-06-12 | End: 2024-06-19

## 2024-06-12 RX ORDER — HYDROMORPHONE HYDROCHLORIDE 1 MG/ML
0.2 INJECTION, SOLUTION INTRAMUSCULAR; INTRAVENOUS; SUBCUTANEOUS EVERY 5 MIN PRN
Status: DISCONTINUED | OUTPATIENT
Start: 2024-06-12 | End: 2024-06-13 | Stop reason: HOSPADM

## 2024-06-12 RX ADMIN — CEFAZOLIN SODIUM 2 G: 2 SOLUTION INTRAVENOUS at 14:21

## 2024-06-12 RX ADMIN — PROPOFOL 200 MCG/KG/MIN: 10 INJECTION, EMULSION INTRAVENOUS at 14:27

## 2024-06-12 RX ADMIN — FENTANYL CITRATE 50 MCG: 50 INJECTION, SOLUTION INTRAMUSCULAR; INTRAVENOUS at 14:28

## 2024-06-12 RX ADMIN — DEXAMETHASONE SODIUM PHOSPHATE 4 MG: 4 INJECTION, SOLUTION INTRA-ARTICULAR; INTRALESIONAL; INTRAMUSCULAR; INTRAVENOUS; SOFT TISSUE at 14:38

## 2024-06-12 RX ADMIN — LIDOCAINE HYDROCHLORIDE 100 MG: 20 INJECTION, SOLUTION INFILTRATION; PERINEURAL at 14:27

## 2024-06-12 RX ADMIN — SODIUM CHLORIDE, SODIUM LACTATE, POTASSIUM CHLORIDE, CALCIUM CHLORIDE: 600; 310; 30; 20 INJECTION, SOLUTION INTRAVENOUS at 14:21

## 2024-06-12 RX ADMIN — GABAPENTIN 300 MG: 300 CAPSULE ORAL at 13:12

## 2024-06-12 RX ADMIN — PROPOFOL 200 MG: 10 INJECTION, EMULSION INTRAVENOUS at 14:27

## 2024-06-12 RX ADMIN — FENTANYL CITRATE 50 MCG: 50 INJECTION, SOLUTION INTRAMUSCULAR; INTRAVENOUS at 13:45

## 2024-06-12 RX ADMIN — GLYCOPYRROLATE 0.2 MG: 0.2 INJECTION, SOLUTION INTRAMUSCULAR; INTRAVENOUS at 14:38

## 2024-06-12 RX ADMIN — ACETAMINOPHEN 975 MG: 325 TABLET ORAL at 13:11

## 2024-06-12 RX ADMIN — BUPIVACAINE HYDROCHLORIDE 20 ML: 2.5 INJECTION, SOLUTION EPIDURAL; INFILTRATION; INTRACAUDAL at 13:30

## 2024-06-12 RX ADMIN — ONDANSETRON 4 MG: 2 INJECTION INTRAMUSCULAR; INTRAVENOUS at 15:13

## 2024-06-12 ASSESSMENT — ENCOUNTER SYMPTOMS: SEIZURES: 0

## 2024-06-12 ASSESSMENT — LIFESTYLE VARIABLES: TOBACCO_USE: 0

## 2024-06-12 NOTE — BRIEF OP NOTE
[facility]    Brief Operative Note    Pre-operative diagnosis: S/P breast reconstruction, bilateral [Z98.890]  Post-operative diagnosis Same as pre-operative diagnosis    Procedure: remove left tissue expander and left replacement breast implant, Left - Breast    Surgeon: Surgeons and Role:     * LING Pradhan MD - Primary  Anesthesia: General with Block   Estimated Blood Loss: Minimal    Drains: None  Specimens: * No specimens in log *  Findings:   None.  Complications: None.  Implants:   Implant Name Type Inv. Item Serial No.  Lot No. LRB No. Used Action   IMP BREAST NATRELLE FULL PROFILE SMOOTH COHESIVE  SCF-485 - [id number] Breast Implant/Tissue Expander IMP BREAST NATRELLE FULL PROFILE SMOOTH COHESIVE  [id number]-485 [id number] [employer]  Left 1 Implanted   TISSUE EXPANDER NATRELLE 133S SMOOTH 550ML [id number] - [id number] Breast Implant/Tissue Expander TISSUE EXPANDER NATRELLE 133S SMOOTH 550ML [id number] [employer] [id number] Left 1 Explanted     PRECIOUS Eng, MS  Plastic Surgery, PGY-4

## 2024-06-12 NOTE — ADDENDUM NOTE
Addendum  created 06/12/24 1633 by Rahul Carter MD    Attestation recorded in Intraprocedure, Clinical Note Signed, Diagnosis association updated, Flowsheet accepted, Intraprocedure Attestations filed, Intraprocedure Blocks edited

## 2024-06-12 NOTE — ANESTHESIA PROCEDURE NOTES
Pectoralis Procedure Note    Pre-Procedure   Staff -        Anesthesiologist:  Cl Rosado MD       Resident/Fellow: Bashir Higginbotham MD       Performed By: fellow       Location: pre-op       Procedure Start/Stop Times: 6/12/2024 1:30 PM and 6/12/2024 1:59 PM       Pre-Anesthestic Checklist: patient identified, IV checked, site marked, risks and benefits discussed, informed consent, monitors and equipment checked, pre-op evaluation, at physician/surgeon's request and post-op pain management  Timeout:       Correct Patient: Yes        Correct Procedure: Yes        Correct Site: Yes        Correct Position: Yes        Correct Laterality: Yes        Site Marked: Yes  Procedure Documentation  Procedure: Pectoralis             Pectoralis II and Pectoralis I       Diagnosis: POST OPERATIVE PAIN CONTROL       Laterality: bilateral       Patient Position: supine       Patient Prep/Sterile Barriers: sterile gloves, mask       Skin prep: Chloraprep       Needle Type: insulated       Needle Gauge: 21.        Needle Length (Inches): 4        Ultrasound guided       1. Ultrasound was used to identify targeted nerve, plexus, vascular marker, or fascial plane and place a needle adjacent to it in real-time.       2. Ultrasound was used to visualize the spread of anesthetic in close proximity to the above referenced structure.       3. A permanent image is entered into the patient's record.    Assessment/Narrative         The placement was negative for: blood aspirated, painful injection and site bleeding       Paresthesias: No.       Bolus given via needle..        Secured via.        Insertion/Infusion Method: Single Shot    Medication(s) Administered   Bupivacaine 0.25% PF (Infiltration) - Infiltration   20 mL - 6/12/2024 1:30:00 PM  Bupivacaine liposome (Exparel) 1.3% LA inj susp (Infiltration) - Infiltration   20 mL - 6/12/2024 1:30:00 PM  Medication Administration Time: 6/12/2024 1:30 PM      FOR [facility] ([facility])  "ONLY:   Pain Team Contact information: please page the Pain Team Via [facility]. Search \"Pain\". During daytime hours, please page the attending first. At night please page the resident first.      "

## 2024-06-12 NOTE — PROGRESS NOTES
Patient received bilateral Pectoralis nerve block  with Exparel.  Fentanyl 50mcg and Versed 1mg given. Tolerated procedure well.

## 2024-06-12 NOTE — ANESTHESIA POSTPROCEDURE EVALUATION
Patient: Yamila Myles    Procedure: Procedure(s):  remove left tissue expander and left replacement breast implant       Anesthesia Type:  General    Note:  Disposition: Outpatient   Postop Pain Control: Uneventful            Sign Out: Well controlled pain   PONV: No   Neuro/Psych: Uneventful            Sign Out: Acceptable/Baseline neuro status   Airway/Respiratory: Uneventful            Sign Out: Acceptable/Baseline resp. status   CV/Hemodynamics: Uneventful            Sign Out: Acceptable CV status   Other NRE: NONE   DID A NON-ROUTINE EVENT OCCUR? No           Last vitals:  Vitals Value Taken Time   /73 06/12/24 1545   Temp 36  C (96.8  F) 06/12/24 1545   Pulse 63 06/12/24 1545   Resp 15 06/12/24 1545   SpO2 99 % 06/12/24 1545       Electronically Signed By: Rahul Carter MD  June 12, 2024  4:32 PM

## 2024-06-12 NOTE — ANESTHESIA PREPROCEDURE EVALUATION
Anesthesia Pre-Procedure Evaluation    Patient: Yamila Myles   MRN: [id number] : 1980        Procedure : Procedure(s):  left replacement breast implant,  possible right breast revision          Past Medical History:   Diagnosis Date    Allergic rhinitis     BRCA1 positive 2001    [id number]    Encounter for insertion of mirena IUD 2016    Hoarseness     Lump or mass in breast     BRCA-1 positive (Mother, MGM w/ breast ca, mother w/ gene +), mother dx at 32)    Microcalcifications of the breast, right uoq 2011      Past Surgical History:   Procedure Laterality Date    GRAFT FAT TO BREAST Bilateral 2023    Procedure: fat grafting from abdomen and thighs;  Surgeon: LING Pradhan MD;  Location: [facility] OR    INSERT INTRAUTERINE DEVICE N/A 2016    Procedure: INSERT INTRAUTERINE DEVICE;  Surgeon: Fidelia Ricks MD;  [facility]: [facility]    INSERT TISSUE EXPANDER BREAST Left 2024    Procedure: INSERTION, TISSUE EXPANDER, BREAST, left;  Surgeon: LING Pradhan MD;  Location: [facility] OR    LAPAROSCOPIC SALPINGO-OOPHORECTOMY Bilateral 2016    Procedure: LAPAROSCOPIC SALPINGO-OOPHORECTOMY;  Surgeon: Fidelia Ricks MD;  [facility]: [facility]    LAPAROSCOPY OPERATIVE ADULT N/A 2016    Procedure: LAPAROSCOPY OPERATIVE ADULT;  Surgeon: Fidelia Ricks MD;  [facility]    MAMMOPLASTY REDUCTION BILATERAL Bilateral 2022    Procedure: Bilateral breast lift;  Surgeon: LING Pradhan MD;  Location: [facility] OR    MASTECTOMY SIMPLE Bilateral 2023    Procedure: BILATERAL Risk-Reducing Nipple-Sparing Mastectomy;  Surgeon: Monica Rodríguez MD;  Location: [facility] OR    RECONSTRUCT BREAST BILATERAL, IMPLANT PROSTHESIS BILATERAL, COMBINED Bilateral 2023    Procedure: Bilateral breast reconstruction with implant/Alloderm, SPY;  Surgeon: LING Pradhan MD;  Location: [facility] OR    REMOVE IMPLANT BREAST Left 2023    Procedure: REMOVAL, IMPLANT, BREAST,  left and washout;  Surgeon: LING Pradhan MD;  Location:  OR    REVISE RECONSTRUCTED BREAST BILATERAL Bilateral 8/2/2023    Procedure: Bilateral breast revision;  Surgeon: LING Pradhan MD;  Location: [facility] OR    [facility] APPENDECTOMY  1987      Allergies   Allergen Reactions    Advil [Nsaids] Other (See Comments)     Nasal congestion  Eye get puffy       Ibuprofen     Seasonal Allergies      Pollen and mold   Spring and Fall are main periods. Takes Zyrtec year round to manage it.   Gets post nasal drip     Adhesive Tape Rash     Surgical drape adhesive vs. ioban      Social History     Tobacco Use    Smoking status: Never     Passive exposure: Never    Smokeless tobacco: Never   Substance Use Topics    Alcohol use: Not Currently     Comment: Holiday's 1 glass a wine      Wt Readings from Last 1 Encounters:   06/12/24 68 kg (150 lb)        Anesthesia Evaluation   Pt has had prior anesthetic. Type: General.    No history of anesthetic complications       ROS/MED HX  ENT/Pulmonary:     (+)           allergic rhinitis,                          (-) tobacco use   Neurologic:  - neg neurologic ROS  (-) no seizures and no CVA   Cardiovascular:     (+)  - -   -  - -                                 No previous cardiac testing  (-) taking anticoagulants/antiplatelets   METS/Exercise Tolerance: >4 METS Comment: Walking 30-40 minutes, elliptical 20 minutes   Hematologic:  - neg hematologic  ROS  (-) history of blood clots and history of blood transfusion   Musculoskeletal:  - neg musculoskeletal ROS     GI/Hepatic:  - neg GI/hepatic ROS  (-) GERD   Renal/Genitourinary:  - neg Renal ROS     Endo:  - neg endo ROS  (-) chronic steroid usage   Psychiatric/Substance Use:  - neg psychiatric ROS     Infectious Disease:       Malignancy: Comment: BRCA 1 carrier now s/p surgery      Other:  - neg other ROS          Physical Exam    Airway        Mallampati: II   TM distance: > 3 FB   Neck ROM: full   Mouth opening: > 3  "cm    Respiratory Devices and Support         Dental       (+) Minor Abnormalities - some fillings, tiny chips      Cardiovascular   cardiovascular exam normal          Pulmonary   pulmonary exam normal                OUTSIDE LABS:  CBC:   Lab Results   Component Value Date    WBC 4.4 01/13/2023    WBC 6.5 07/12/2016    HGB 12.0 01/13/2023    HGB 13.2 07/12/2016    HCT 36.2 01/13/2023    HCT 39.8 07/12/2016     01/13/2023     07/12/2016     BMP:   Lab Results   Component Value Date     05/20/2024     01/13/2023    POTASSIUM 4.7 05/20/2024    POTASSIUM 4.2 01/13/2023    CHLORIDE 105 05/20/2024    CHLORIDE 105 01/13/2023    CO2 26 05/20/2024    CO2 26 01/13/2023    BUN 17.6 05/20/2024    BUN 11.4 01/13/2023    CR 0.69 05/20/2024    CR 0.75 01/13/2023    GLC 90 05/20/2024    GLC 87 01/13/2023     COAGS: No results found for: \"PTT\", \"INR\", \"FIBR\"  POC:   Lab Results   Component Value Date    HCG Negative 07/12/2016     HEPATIC: No results found for: \"ALBUMIN\", \"PROTTOTAL\", \"ALT\", \"AST\", \"GGT\", \"ALKPHOS\", \"BILITOTAL\", \"BILIDIRECT\", \"RADHA\"  OTHER:   Lab Results   Component Value Date    A1C 5.1 05/20/2024    GRETCHEN 9.4 05/20/2024    TSH 1.00 02/21/2013       Anesthesia Plan    ASA Status:  2    NPO Status:  NPO Appropriate    Anesthesia Type: General.     - Airway: LMA      Maintenance: Balanced.        Consents    Anesthesia Plan(s) and associated risks, benefits, and realistic alternatives discussed. Questions answered and patient/representative(s) expressed understanding.     - Discussed: Risks, Benefits and Alternatives for BOTH SEDATION and the PROCEDURE were discussed     - Discussed with:  Patient      - Extended Intubation/Ventilatory Support Discussed: No.      - Patient is DNR/DNI Status: No     Use of blood products discussed: No .     Postoperative Care    Pain management: Multi-modal analgesia, Peripheral nerve block (Single Shot).   PONV prophylaxis: Ondansetron (or other 5HT-3), " "Dexamethasone or Solumedrol, Background Propofol Infusion     Comments:               Bashir Higginbotham MD    I have reviewed the pertinent notes and labs in the chart from the past 30 days and (re)examined the patient.  Any updates or changes from those notes are reflected in this note.              # Overweight: Estimated body mass index is 25.75 kg/m  as calculated from the following:    Height as of this encounter: 1.626 m (5' 4\").    Weight as of this encounter: 68 kg (150 lb).      "

## 2024-06-12 NOTE — DISCHARGE INSTRUCTIONS
Breast Reconstruction with Implants Post Op Instructions     General:   Have someone at home with you for the first 24 hours. You may need additional support for the first week.   Get plenty of rest and eat a balanced diet.   Drink plenty of fluids to help with healing and to help prevent constipation.    Avoid alcohol for a minimum of 3 weeks after surgery as it can cause fluid retention.   No smoking or any nicotine products.      Activity:   Start walking around your house as soon as possible, this will help reduce swelling and decrease risk of blood clots   You may use your arms for activities of daily living (eating, drinking, showering, dressing, etc.) but avoid overuse and lifting them over your head for the 3 weeks.   Avoid strenuous activities, no working out   In general, you can drive around 3-4 weeks post op. You must be off all narcotic pain medications.   No heavy lifting, nothing greater than 10lbs until 6 weeks post op.     Medications:    You can take Ibuprofen 400-800 mg and Tylenol 650 mg for pain relief. Please take each every 6 hours, and for optimal pain relief - please stagger the medications so that you are taking one or the other every 3 hours. It is best to make a chart/schedule of this to stay organized. If you are taking additional pain medications, please do not exceed 4000 mg of Tylenol daily from all sources.   You have been prescribed additional pain meds such as narcotics and muscle relaxants, please take as instructed and as needed. Ok to take narcotics in addition to tylenol/ibuprofen.   If you are taking narcotic medications, please do not operate heavy machinery or drive.    Please take any antibiotics as prescribed     Incision Care:   Incisions have a tape and glue in place. Please leave in place for 2-3 weeks. If it starts to peel off on its own you can trim back the tape.   Ok to get incisions wet in the shower 48 hours post op, if you have drains please cover drain site with  tegaderm or saran wrap. No soaking in tubs or baths for 6 weeks or until all incisions/wounds have healed.   Please wear ace wrap or sports bra as instructed. Readjust as needed for comfort if too tight or too loose. Avoid underwire bras for 6 weeks.   Take down wrap and monitor incisions daily for signs of infection such as spreading redness to skin.   Small amounts of drainage from incisions can be expected.     Follow up:   Follow up with your Surgeon or Physician Assistant will be 7-10 days after discharge     When to call:   Please call the office and/or consider return to the ER if you experience worsening pain not relieved by medications, increased swelling, redness to skin or high fevers >101F or if there are unexpected problems like shortness of breath.   Contact us on [employer] Monday - Friday, 8 a.m. - 4:30 p.m. or call [phone] to speak with our nursing team   After hours and on weekends, call Hospital Paging at [phone], and ask for the Plastic Surgery Resident on call     [facility]  Home Care Following Anesthesia  For 24 hours after surgery:  Get plenty of rest.  A responsible adult must stay with you for at least 24 hours after you leave the surgery center.  Do not drive or use heavy equipment.  If you have weakness or tingling, don't drive or use heavy equipment until this feeling goes away.   Do not drink alcohol.   Avoid strenuous or risky activities.  Ask for help when climbing stairs.  You may feel lightheaded.  IF so, sit for a few minutes before standing.  Have someone help you get up.   If you have nausea (feel sick to your stomach): Drink only clear liquids such as apple juice, ginger ale, broth or 7-Up.  Rest may also help.  Be sure to drink enough fluids.  Move to a regular diet as you feel able.   You may have a slight fever.  Call the doctor if your fever is over 100 F (37.7 C) (taken under the tongue) or lasts longer than 24 hours.  You may  "have a dry mouth, a sore throat, muscle aches or trouble sleeping. These should go away after 24 hours.  Do not make important or legal decisions.   It is recommended to avoid smoking.        Today you received an Exparel block to numb the nerves near your surgery site.  This is a block using local anesthetic or \"numbing\" medication injected around the nerves to anesthetize or \"numb\" the area supplied by those nerves.  This block is injected into the muscle layer near your surgical site.  This medication may numb the location where you had surgery up to 72 hours.  If your surgical site is an arm or leg you should be careful with your affected limb, since it is possible to injure your limb without being aware of it due to the numbing.  Until full feeling returns, you should guard against bumping or hitting your limb, and avoid extreme hot or cold temperatures on the skin.  As the block wears off, the feeling will return as a tingling or prickly sensation near your surgical site.  You will experince more discomfort from your incision as the feeling returns.  You may want to take a pain pill (a narcotic or Tylenol if this was prescribed by your surgeon) when you start to experience mild pain before the pain beomes more severe.  If your pain medications do not control your pain, you should notify your surgeon.    Tips for taking pain medications  To get the best pain relief possible, remember these points:  Take pain medications as directed, before pain becomes severe.  Pain medication can upset your stomach: taking it with food may help.  Constipation is a common side effect of pain medication. Drink plenty of  fluids.  Eat foods high in fiber. Take a stool softener if recommended by your doctor or pharmacist.  Do not drink alcohol, drive or operate machinery while taking pain medications.  Ask about other ways to control pain, such as with heat, ice or relaxation.    Tylenol/Acetaminophen Consumption    If you feel " your pain relief is insufficient, you may take Tylenol/Acetaminophen in addition to your narcotic pain medication.   Be careful not to exceed 4,000 mg of Tylenol/Acetaminophen in a 24 hour period from all sources.  If you are taking extra strength Tylenol/acetaminophen (500 mg), the maximum dose is 8 tablets in 24 hours.  If you are taking regular strength acetaminophen (325 mg), the maximum dose is 12 tablets in 24 hours.  Tylenol 975 mg given at 1 PM.   Ok to take more after 7 PM.       Call a doctor for any of the following:  Signs of infection (fever, growing tenderness at the surgery site, a large amount of drainage or bleeding, severe pain, foul-smelling drainage, redness, swelling).  It has been over 8 to 10 hours since surgery and you are still not able to urinate (pass water).  Headache for over 24 hours.  Numbness, tingling or weakness the day after surgery (if you had spinal anesthesia).  Signs of Covid-19 infection (temperature over 100 degrees, shortness of breath, cough, loss of taste/smell, generalized body aches, persistent headache, chills, sore throat, nausea/vomiting/diarrhea)  Your doctor is:  Dr. Laxmi Pradhan, Plastic Surgery: [phone]                    Or dial [phone] and ask for the resident on call for:  Plastics  For emergency care, call the:  [place] [facility]:  [phone] (TTY for hearing impaired: [phone])

## 2024-06-12 NOTE — ANESTHESIA PROCEDURE NOTES
Airway       Patient location during procedure: OR  Staff -        Performed By: CRNA  Consent for Airway        Urgency: elective  Indications and Patient Condition       Indications for airway management: kimberli-procedural       Induction type:intravenous       Mask difficulty assessment: 0 - not attempted    Final Airway Details       Final airway type: supraglottic airway    Supraglottic Airway Details        Type: LMA       Brand: LMA Unique       LMA size: 4    Post intubation assessment        Placement verified by: capnometry, equal breath sounds and chest rise        Number of attempts at approach: 1       Secured with: tape       Ease of procedure: easy       Dentition: Intact and Unchanged

## 2024-06-12 NOTE — ANESTHESIA CARE TRANSFER NOTE
Patient: Yamila Myles    Procedure: Procedure(s):  remove left tissue expander and left replacement breast implant       Diagnosis: S/P breast reconstruction, bilateral [Z98.890]  Diagnosis Additional Information: No value filed.    Anesthesia Type:   General     Note:    Oropharynx: oropharynx clear of all foreign objects and spontaneously breathing  Level of Consciousness: awake  Oxygen Supplementation: room air  Level of Supplemental Oxygen (L/min / FiO2): 6  Independent Airway: airway patency satisfactory and stable  Dentition: dentition unchanged  Vital Signs Stable: post-procedure vital signs reviewed and stable  Report to RN Given: handoff report given  Patient transferred to: PACU    Handoff Report: Identifed the Patient, Identified the Reponsible Provider, Reviewed the pertinent medical history, Discussed the surgical course, Reviewed Intra-OP anesthesia mangement and issues during anesthesia, Set expectations for post-procedure period and Allowed opportunity for questions and acknowledgement of understanding    Vitals:  Vitals Value Taken Time   /78 06/12/24 1524   Temp 36  C (96.8  F) 06/12/24 1524   Pulse 63 06/12/24 1529   Resp 15 06/12/24 1524   SpO2 100 % 06/12/24 1529   Vitals shown include unfiled device data.    Electronically Signed By: CARLOS ALBERTO Skelton CRNA  June 12, 2024  3:30 PM

## 2024-06-12 NOTE — OP NOTE
PREOPERATIVE DIAGNOSIS: This post bilateral breast reconstruction with expander on the left side for BRCA1 gene mutation now ready for staged implant exchange.    POSTOPERATIVE DIAGNOSIS: As above    PROCEDURES:   1.  Left breast expander removal and exchange with permanent gel implant.  This was Allergan, 485 cc SCF implant.     SURGEON: Laxmi Pradhan MD      RESIDENT: Vince Nguyen MD.     ANESTHESIA: General anesthesia with LMA     COMPLICATIONS: Nil.      DRAINS: none    SPECIMENS: None     BLOOD LOSS: 2 mL        DESCRIPTION OF PROCEDURE: After informed consent was taken from the patient, the proper site and procedure was ascertained with them and they were appropriately marked, they were taken to the operating room. They were placed in a supine position with their knees comfortably flexed with pillows underneath them, and pneumoboots placed and running prior to induction of anesthesia. Preoperative antibiotics were given in the OR and appropriately redosed during the case. General anesthesia was administered without any complications.  She was prepped and draped in the standard surgical fashion.     The left breast inframammary fold incision was opened.  The capsule was opened.  The expander was removed.  Overall the surface of the capsule was normal.  Inferior lateral capsulotomies were carried out.  Hemostasis was ensured.  Multiple sizers were placed and the patient sat up.  Ultimately a 485 cc implant gave the best symmetry.  The pocket was irrigated out with antibiotic irrigation and Betadine.  The area was reprepped.  Gloves were changed.  The permanent implant was placed with the help of a Soler funnel.  The capsule was closed with a running 2-0 Monocryl suture.  The skin was closed using 2-0 Monocryl suture in a deep dermal layer and 3-0 Stratafix suture.  Xeroform dry gauze and a wrap was placed.  The patient tolerated the procedure well. All counts were correct at the end of the case. The  patient was extubated and sent to recovery room in stable condition.

## 2024-06-13 DIAGNOSIS — L60.8 ONYCHOMADESIS OF TOENAIL: ICD-10-CM

## 2024-06-14 RX ORDER — CICLOPIROX 80 MG/ML
SOLUTION TOPICAL
Qty: 6 ML | Refills: 11 | Status: SHIPPED | OUTPATIENT
Start: 2024-06-14

## 2024-06-20 NOTE — PROGRESS NOTES
"Plastic Surgery Outpatient Visit    ID: Yamila Myles is a 44 year old female with PMH BRCA1 s/p bilateral prophylactic mastectomies with L sided TE infection requiring removal and replacement of TE, now s/p TE removal and exchange for 485cc implant 6/12/2024 with Dr. Pradhan.     S: Doing well, no significant pain. Notices that lateral L breast appears a little flatter than R side.     O:  /75 (BP Location: Left arm, Patient Position: Sitting, Cuff Size: Adult Regular)   Pulse 95   Ht 1.626 m (5' 4\")   Wt 68.6 kg (151 lb 4.8 oz)   LMP  (LMP Unknown)   SpO2 100%   BMI 25.97 kg/m     General: NAD  Chest: L breast lateral incision c/d/I. Implant intact, soft, mobile. No significant swelling or ecchymosis.     A/P:  -healing well  -continue compression bra, ok to not wear at night  -moisturize incision for 1-2 weeks, then plain lotion or scar care ok  -start silicone strips to medial scars, slightly raised  -keep activity level low for 1 more week, then can increase walking distances. Continue with lifting restrictions  RTC 4-6 weeks with Dr. Lorne Chavez PA-C  Plastic and Reconstructive Surgery    20 minutes spent on the date of the encounter doing chart review, history and physical, dressing changes, documentation and further activity as noted above.    "

## 2024-06-25 ENCOUNTER — OFFICE VISIT (OUTPATIENT)
Dept: PLASTIC SURGERY | Facility: CLINIC | Age: 44
End: 2024-06-25
Payer: COMMERCIAL

## 2024-06-25 VITALS
HEART RATE: 95 BPM | BODY MASS INDEX: 25.83 KG/M2 | WEIGHT: 151.3 LBS | OXYGEN SATURATION: 100 % | HEIGHT: 64 IN | DIASTOLIC BLOOD PRESSURE: 75 MMHG | SYSTOLIC BLOOD PRESSURE: 111 MMHG

## 2024-06-25 DIAGNOSIS — Z98.890 S/P BREAST RECONSTRUCTION, BILATERAL: Primary | ICD-10-CM

## 2024-06-25 PROCEDURE — 99024 POSTOP FOLLOW-UP VISIT: CPT | Performed by: PHYSICIAN ASSISTANT

## 2024-06-25 ASSESSMENT — PAIN SCALES - GENERAL: PAINLEVEL: NO PAIN (0)

## 2024-06-25 NOTE — NURSING NOTE
"Chief Complaint   Patient presents with    Post-op Visit       Vitals:    06/25/24 1038   BP: 111/75   BP Location: Left arm   Patient Position: Sitting   Cuff Size: Adult Regular   Pulse: 95   SpO2: 100%   Weight: 151 lb 4.8 oz   Height: 5' 4\"       Body mass index is 25.97 kg/m .    Jayden Avila EMT-P    "

## 2024-06-25 NOTE — LETTER
"6/25/2024       RE: Yamila Myles  [address] [address]     Dear Colleague,    Thank you for referring your patient, Yamila Myles, to the [facility] [place] [facility]. Please see a copy of my visit note below.    Plastic Surgery Outpatient Visit    ID: Yamila Myles is a 44 year old female with PMH BRCA1 s/p bilateral prophylactic mastectomies with L sided TE infection requiring removal and replacement of TE, now s/p TE removal and exchange for 485cc implant 6/12/2024 with Dr. Pradhan.     S: Doing well, no significant pain. Notices that lateral L breast appears a little flatter than R side.     O:  /75 (BP Location: Left arm, Patient Position: Sitting, Cuff Size: Adult Regular)   Pulse 95   Ht 1.626 m (5' 4\")   Wt 68.6 kg (151 lb 4.8 oz)   LMP  (LMP Unknown)   SpO2 100%   BMI 25.97 kg/m     General: NAD  Chest: L breast lateral incision c/d/I. Implant intact, soft, mobile. No significant swelling or ecchymosis.     A/P:  -healing well  -continue compression bra, ok to not wear at night  -moisturize incision for 1-2 weeks, then plain lotion or scar care ok  -start silicone strips to medial scars, slightly raised  -keep activity level low for 1 more week, then can increase walking distances. Continue with lifting restrictions  RTC 4-6 weeks with Dr. Pradhan      20 minutes spent on the date of the encounter doing chart review, history and physical, dressing changes, documentation and further activity as noted above.          Again, thank you for allowing me to participate in the care of your patient.      Sincerely,    Cece Chavez PA-C    "

## 2024-06-27 ENCOUNTER — OFFICE VISIT (OUTPATIENT)
Dept: PODIATRY | Facility: CLINIC | Age: 44
End: 2024-06-27
Payer: COMMERCIAL

## 2024-06-27 VITALS
BODY MASS INDEX: 26.43 KG/M2 | DIASTOLIC BLOOD PRESSURE: 70 MMHG | HEART RATE: 80 BPM | SYSTOLIC BLOOD PRESSURE: 102 MMHG | WEIGHT: 154 LBS

## 2024-06-27 DIAGNOSIS — S91.209A AVULSION OF TOENAIL, INITIAL ENCOUNTER: Primary | ICD-10-CM

## 2024-06-27 DIAGNOSIS — M72.2 PLANTAR FASCIITIS: ICD-10-CM

## 2024-06-27 PROCEDURE — 99203 OFFICE O/P NEW LOW 30 MIN: CPT | Performed by: PODIATRIST

## 2024-06-27 NOTE — PATIENT INSTRUCTIONS
We wish you continued good healing. If you have any questions or concerns, please do not hesitate to contact us at  [phone]    [employer] (secure e-mail communication and access to your chart) to send a message or to make an appointment.    Please remember to call and schedule a follow up appointment if one was recommended at your earliest convenience.     PODIATRY CLINIC HOURS  TELEPHONE NUMBER    Dr. Cl SAINZPZAHRAA FACFAS        Clinics:  [facility]   [place]  Tuesday 1PM-6PM  [place]  Wednesday 745AM-330PM  [place]  Monday 2nd,4th  830AM-4PM  Thursday/Friday 745AM-230PM     [place] APPOINTMENTS  [phone]    [place] APPOINTMENTS  [phone]          If you need a medication refill, please contact us you may need lab work and/or a follow up visit prior to your refill (i.e. Antifungal medications).  If MRI needed please call Imaging at [phone]   HOW DO I GET MY KNEE SCOOTER? Knee scooters can be picked up at ANY Medical Supply stores with your knee scooter Prescription.  OR  Bring your signed prescription to an [facility] Medical Equipment showroom.   Set up an appointment for your custom Orthotics. Call any Orthotics locations call [phone]

## 2024-06-27 NOTE — LETTER
6/27/2024      Yamila Myles  [address] [facility] [address]      Dear Colleague,    Thank you for referring your patient, Yamila Myles, [facility]. Please see a copy of my visit note below.    S: patient seen today in consult from Dr. Martinez and complains of pain in the left first toe.  Noticed this recently.  Pain somewhat intermittent.  Not painful now.  Has noticed this now is more thick and discolored.  Seen by another physician and started placing Penlac on this.  Denies any trauma.  Denies any drainage.  Denies purulence or odor.  Also complains of left arch pain in the past.  Points to medial band of plantar fascia.  Started wearing better shoes and this is resolved.  She is wondering what caused this.  Denies history of masses.  History of bilateral mastectomy and breast reconstruction.  Works out of her house sitting down.    ROS:  see above         Allergies   Allergen Reactions     Advil [Nsaids] Other (See Comments)     Nasal congestion  Eye get puffy        Ibuprofen      Seasonal Allergies      Pollen and mold   Spring and Fall are main periods. Takes Zyrtec year round to manage it.   Gets post nasal drip      Adhesive Tape Rash     Surgical drape adhesive vs. ioban       Current Outpatient Medications   Medication Sig Dispense Refill     acetaminophen (TYLENOL) 325 MG tablet Take 325-650 mg by mouth every 6 hours as needed for mild pain       calcium carbonate (OS-GRETCHEN) 500 MG tablet Take 1 tablet by mouth every evening Unsure if 500 or 1000mg       cetirizine (ZYRTEC) 10 MG tablet Take 10 mg by mouth every evening       ciclopirox (PENLAC) 8 % external solution Apply to adjacent skin and affected nails daily.  Remove with alcohol every 7 days, then repeat. 6 mL 11     estradiol (ESTRACE) 2 MG tablet Take 1 tablet (2 mg) by mouth daily (Patient taking differently: Take 2 mg by mouth every evening) 90 tablet 3     fluticasone (FLONASE) 50 MCG/ACT nasal spray  Spray 2 sprays into both nostrils daily 16 g 3     HEMP OIL OR EXTRACT OR OTHER CBD CANNABINOID, NOT MEDICAL CANNABIS, 10 mg every evening [employer] CBD 10 mg       hydrocortisone 2.5 % ointment Apply topically 2 times daily 30 g 1     lactobacillus rhamnosus, GG, (CULTURELL) capsule Take 1 capsule by mouth every evening       multivitamin w/minerals (MULTI-VITAMIN) tablet Take 1 tablet by mouth every evening       ondansetron (ZOFRAN ODT) 4 MG ODT tab Take 1 tablet (4 mg) by mouth every 8 hours as needed for nausea (Patient not taking: Reported on 6/25/2024) 4 tablet 0     oxyCODONE (ROXICODONE) 5 MG tablet Take 1-2 tablets (5-10 mg) by mouth every 6 hours as needed for moderate to severe pain (Patient not taking: Reported on 6/25/2024) 15 tablet 0     senna-docusate (SENOKOT-S/PERICOLACE) 8.6-50 MG tablet Take 1-2 tablets by mouth 2 times daily (Patient not taking: Reported on 6/25/2024) 30 tablet 0       Patient Active Problem List   Diagnosis     Microcalcifications of the breast, right uoq     Fibrocystic breast changes     BRCA status     Mirena placed 4/26/18 (3rd one)     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     S/P breast reconstruction, bilateral       Past Medical History:   Diagnosis Date     Allergic rhinitis      BRCA1 positive 06/2001    [id number].[id number]     Encounter for insertion of mirena IUD 07/12/2016     Hoarseness      Lump or mass in breast 2001    BRCA-1 positive (Mother, MGM w/ breast ca, mother w/ gene +), mother dx at 32)     Microcalcifications of the breast, right uoq 06/08/2011       Past Surgical History:   Procedure Laterality Date     GRAFT FAT TO BREAST Bilateral 8/2/2023    Procedure: fat grafting from abdomen and thighs;  Surgeon: LING Pradhan MD;  Location: [facility] OR     INSERT INTRAUTERINE DEVICE N/A 7/12/2016    Procedure: INSERT INTRAUTERINE DEVICE;  Surgeon: Fidelia Ricks MD;  [facility]     INSERT TISSUE EXPANDER BREAST Left 2/5/2024    Procedure: INSERTION, TISSUE EXPANDER,  BREAST, left;  Surgeon: LING Pradhan MD;  [facility]     LAPAROSCOPIC SALPINGO-OOPHORECTOMY Bilateral 7/12/2016    Procedure: LAPAROSCOPIC SALPINGO-OOPHORECTOMY;  Surgeon: Fidelia Ricks MD;  [facility]     LAPAROSCOPY OPERATIVE ADULT N/A 7/12/2016    Procedure: LAPAROSCOPY OPERATIVE ADULT;  Surgeon: Fidelia Ricks MD;  [facility]     MAMMOPLASTY REDUCTION BILATERAL Bilateral 4/13/2022    Procedure: Bilateral breast lift;  Surgeon: LING Pradhan MD;  Location: [facility] OR     MASTECTOMY SIMPLE Bilateral 1/23/2023    Procedure: BILATERAL Risk-Reducing Nipple-Sparing Mastectomy;  Surgeon: Monica Rodríguez MD;  [facility]     RECONSTRUCT BREAST BILATERAL, IMPLANT PROSTHESIS BILATERAL, COMBINED Bilateral 1/23/2023    Procedure: Bilateral breast reconstruction with implant/Alloderm, SPY;  Surgeon: LING Pradhan MD;  [facility]     REMOVE AND REPLACE BREAST IMPLANT PROSTHESIS Left 6/12/2024    Procedure: remove left tissue expander and left replacement breast implant;  Surgeon: LING Pradhan MD;  Location: [facility] OR     REMOVE IMPLANT BREAST Left 9/1/2023    Procedure: REMOVAL, IMPLANT, BREAST, left and washout;  Surgeon: LING Pradhan MD;  [facility]     REVISE RECONSTRUCTED BREAST BILATERAL Bilateral 8/2/2023    Procedure: Bilateral breast revision;  Surgeon: LING Pradhan MD;  Location: [facility] OR     ZZC APPENDECTOMY  1987       Family History   Problem Relation Age of Onset     Breast Cancer Mother 32        dx age 32, doing well     Colon Cancer Mother 67        dx 2019. s/p surg and chemo     Hereditary Breast and Ovarian Cancer Syndrome Mother         BRCA1+     Anesthesia Reaction Sister         PONV     Hereditary Breast and Ovarian Cancer Syndrome Sister         double Mastectomy and oophrectomy; BRCA1+     Other - See Comments Sister         negative genetic testing     Other - See Comments Sister         negative  "genetic testing     Breast Cancer Maternal Grandmother      Ovarian Cancer Maternal Grandmother          in 40s; \"stomach cancer\"     Diabetes Paternal Grandmother      Breast Cancer Paternal Grandmother 59        cause of death at 62     Skin Cancer Maternal Uncle         negative for BRCA     Genetic Disorder Maternal Uncle         BRCA1+     Genetic Disorder Maternal Uncle         BRCA1+     Thrombosis No family hx of      Malignant Hyperthermia No family hx of        Social History     Tobacco Use     Smoking status: Never     Passive exposure: Never     Smokeless tobacco: Never   Substance Use Topics     Alcohol use: Not Currently     Comment: Holiday's 1 glass a wine         Exam:    Vitals: /70   Pulse 80   Wt 69.9 kg (154 lb)   LMP  (LMP Unknown)   BMI 26.43 kg/m    BMI: Body mass index is 26.43 kg/m .  Height: Data Unavailable    Constitutional/ general:  Pt is in no apparent distress, appears well-nourished.  Cooperative with history and physical exam.     Psych:  The patient answered questions appropriately.  Normal affect.  Seems to have reasonable expectations, in terms of treatment.     Lungs:  Non labored breathing, non labored speech. No cough.  No audible wheezing. Even, quiet breathing.       Vascular:  positive pedal pulses bilaterally for both the DP and PT arteries.  CFT < 3 sec.  negative ankle edema.  positive pedal hair growth.    Neuro:  Alert and oriented x 3. Coordinated gait.  Light touch sensation is intact to the L4, L5, S1 distributions. No obvious deficits.  No evidence of neurological-based weakness, spasticity, or contracture in the lower extremities.     Derm: Normal texture and turgor.  No erythema, ecchymosis, or cyanosis.      Musculoskeletal:    Lower extremity muscle strength is normal.  Patient is ambulatory without an assistive device or brace.  No gross deformities.   Cavus arch bilateral.  Prominent plantar fascia and arch bilaterally left greater than right. "  No pain with palpation on this.  No masses.  Patient has wide forefoot.  She has a long hallux bilaterally with the left being more prominent than the right.  Both big toes thickened and discolored.  Can see new nail growing in proximally.  No bleeding under the nail.    A: nail avulsion from repetative trauma      Planter fasciitis    P:   Explained to patient the mechanics causing pressure on her big toes.  Explained left more painful as left hallux longer.  Made suggestions on shoes to keep these offloaded.  discussed with patient will eventually loose nail and for now keep trimmed short, wear loose shoes.  Avoid repetative trauma in future.  If becomes more painful will avulse nail.  Explained cavus foot can have prominent plantar fascia and arch and occasionally become painful.  Continue to wear good shoes and to keep this offloaded.  Return to clinic prn.  Thank you for allowing me participate in the care of this patient.        Cl Page DPM, FACFAS            Again, thank you for allowing me to participate in the care of your patient.        Sincerely,        Cl Page DPM

## 2024-06-27 NOTE — PROGRESS NOTES
S: patient seen today in consult from Dr. Martinez and complains of pain in the left first toe.  Noticed this recently.  Pain somewhat intermittent.  Not painful now.  Has noticed this now is more thick and discolored.  Seen by another physician and started placing Penlac on this.  Denies any trauma.  Denies any drainage.  Denies purulence or odor.  Also complains of left arch pain in the past.  Points to medial band of plantar fascia.  Started wearing better shoes and this is resolved.  She is wondering what caused this.  Denies history of masses.  History of bilateral mastectomy and breast reconstruction.  Works out of her house sitting down.    ROS:  see above         Allergies   Allergen Reactions    Advil [Nsaids] Other (See Comments)     Nasal congestion  Eye get puffy       Ibuprofen     Seasonal Allergies      Pollen and mold   Spring and Fall are main periods. Takes Zyrtec year round to manage it.   Gets post nasal drip     Adhesive Tape Rash     Surgical drape adhesive vs. ioban       Current Outpatient Medications   Medication Sig Dispense Refill    acetaminophen (TYLENOL) 325 MG tablet Take 325-650 mg by mouth every 6 hours as needed for mild pain      calcium carbonate (OS-GRETCHEN) 500 MG tablet Take 1 tablet by mouth every evening Unsure if 500 or 1000mg      cetirizine (ZYRTEC) 10 MG tablet Take 10 mg by mouth every evening      ciclopirox (PENLAC) 8 % external solution Apply to adjacent skin and affected nails daily.  Remove with alcohol every 7 days, then repeat. 6 mL 11    estradiol (ESTRACE) 2 MG tablet Take 1 tablet (2 mg) by mouth daily (Patient taking differently: Take 2 mg by mouth every evening) 90 tablet 3    fluticasone (FLONASE) 50 MCG/ACT nasal spray Spray 2 sprays into both nostrils daily 16 g 3    HEMP OIL OR EXTRACT OR OTHER CBD CANNABINOID, NOT MEDICAL CANNABIS, 10 mg every evening [employer] CBD 10 mg      hydrocortisone 2.5 % ointment Apply topically 2 times daily 30 g 1     lactobacillus rhamnosus, GG, (CULTURELL) capsule Take 1 capsule by mouth every evening      multivitamin w/minerals (MULTI-VITAMIN) tablet Take 1 tablet by mouth every evening      ondansetron (ZOFRAN ODT) 4 MG ODT tab Take 1 tablet (4 mg) by mouth every 8 hours as needed for nausea (Patient not taking: Reported on 6/25/2024) 4 tablet 0    oxyCODONE (ROXICODONE) 5 MG tablet Take 1-2 tablets (5-10 mg) by mouth every 6 hours as needed for moderate to severe pain (Patient not taking: Reported on 6/25/2024) 15 tablet 0    senna-docusate (SENOKOT-S/PERICOLACE) 8.6-50 MG tablet Take 1-2 tablets by mouth 2 times daily (Patient not taking: Reported on 6/25/2024) 30 tablet 0       Patient Active Problem List   Diagnosis    Microcalcifications of the breast, right uoq    Fibrocystic breast changes    BRCA status    Mirena placed 4/26/18 (3rd one)    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    S/P breast reconstruction, bilateral       Past Medical History:   Diagnosis Date    Allergic rhinitis     BRCA1 positive 06/2001    [id number].1806T    Encounter for insertion of mirena IUD 07/12/2016    Hoarseness     Lump or mass in breast 2001    BRCA-1 positive (Mother, MGM w/ breast ca, mother w/ gene +), mother dx at 32)    Microcalcifications of the breast, right uoq 06/08/2011       Past Surgical History:   Procedure Laterality Date    GRAFT FAT TO BREAST Bilateral 8/2/2023    Procedure: fat grafting from abdomen and thighs;  Surgeon: LING Pradhan MD;  Location: [facility] OR    INSERT INTRAUTERINE DEVICE N/A 7/12/2016    Procedure: INSERT INTRAUTERINE DEVICE;  Surgeon: Fidelia Ricks MD;  [facility]    INSERT TISSUE EXPANDER BREAST Left 2/5/2024    Procedure: INSERTION, TISSUE EXPANDER, BREAST, left;  Surgeon: LING Pradhan MD;  [facility]    LAPAROSCOPIC SALPINGO-OOPHORECTOMY Bilateral 7/12/2016    Procedure: LAPAROSCOPIC SALPINGO-OOPHORECTOMY;  Surgeon: Fidelia Ricks MD;  [facility]    LAPAROSCOPY OPERATIVE ADULT  "N/A 2016    Procedure: LAPAROSCOPY OPERATIVE ADULT;  Surgeon: Fidelia Ricks MD;  [facility]    MAMMOPLASTY REDUCTION BILATERAL Bilateral 2022    Procedure: Bilateral breast lift;  Surgeon: LING Pardhan MD;  Location: [facility] OR    MASTECTOMY SIMPLE Bilateral 2023    Procedure: BILATERAL Risk-Reducing Nipple-Sparing Mastectomy;  Surgeon: Monica Rodríguez MD;  [facility]    RECONSTRUCT BREAST BILATERAL, IMPLANT PROSTHESIS BILATERAL, COMBINED Bilateral 2023    Procedure: Bilateral breast reconstruction with implant/Alloderm, SPY;  Surgeon: LING Pradhan MD;  [facility]    REMOVE AND REPLACE BREAST IMPLANT PROSTHESIS Left 2024    Procedure: remove left tissue expander and left replacement breast implant;  Surgeon: LING Pradhan MD;  Location: [facility] OR    REMOVE IMPLANT BREAST Left 2023    Procedure: REMOVAL, IMPLANT, BREAST, left and washout;  Surgeon: LING Pradhan MD;  [facility]    REVISE RECONSTRUCTED BREAST BILATERAL Bilateral 2023    Procedure: Bilateral breast revision;  Surgeon: LING Pradhan MD;  Location: [facility] OR    ZZC APPENDECTOMY  1987       Family History   Problem Relation Age of Onset    Breast Cancer Mother 32        dx age 32, doing well    Colon Cancer Mother 67        dx 2019. s/p surg and chemo    Hereditary Breast and Ovarian Cancer Syndrome Mother         BRCA1+    Anesthesia Reaction Sister         PONV    Hereditary Breast and Ovarian Cancer Syndrome Sister         double Mastectomy and oophrectomy; BRCA1+    Other - See Comments Sister         negative genetic testing    Other - See Comments Sister         negative genetic testing    Breast Cancer Maternal Grandmother     Ovarian Cancer Maternal Grandmother          in 40s; \"stomach cancer\"    Diabetes Paternal Grandmother     Breast Cancer Paternal Grandmother 59        cause of death at 62    Skin Cancer Maternal Uncle         " negative for BRCA    Genetic Disorder Maternal Uncle         BRCA1+    Genetic Disorder Maternal Uncle         BRCA1+    Thrombosis No family hx of     Malignant Hyperthermia No family hx of        Social History     Tobacco Use    Smoking status: Never     Passive exposure: Never    Smokeless tobacco: Never   Substance Use Topics    Alcohol use: Not Currently     Comment: Holiday's 1 glass a wine         Exam:    Vitals: /70   Pulse 80   Wt 69.9 kg (154 lb)   LMP  (LMP Unknown)   BMI 26.43 kg/m    BMI: Body mass index is 26.43 kg/m .  Height: Data Unavailable    Constitutional/ general:  Pt is in no apparent distress, appears well-nourished.  Cooperative with history and physical exam.     Psych:  The patient answered questions appropriately.  Normal affect.  Seems to have reasonable expectations, in terms of treatment.     Lungs:  Non labored breathing, non labored speech. No cough.  No audible wheezing. Even, quiet breathing.       Vascular:  positive pedal pulses bilaterally for both the DP and PT arteries.  CFT < 3 sec.  negative ankle edema.  positive pedal hair growth.    Neuro:  Alert and oriented x 3. Coordinated gait.  Light touch sensation is intact to the L4, L5, S1 distributions. No obvious deficits.  No evidence of neurological-based weakness, spasticity, or contracture in the lower extremities.     Derm: Normal texture and turgor.  No erythema, ecchymosis, or cyanosis.      Musculoskeletal:    Lower extremity muscle strength is normal.  Patient is ambulatory without an assistive device or brace.  No gross deformities.   Cavus arch bilateral.  Prominent plantar fascia and arch bilaterally left greater than right.  No pain with palpation on this.  No masses.  Patient has wide forefoot.  She has a long hallux bilaterally with the left being more prominent than the right.  Both big toes thickened and discolored.  Can see new nail growing in proximally.  No bleeding under the nail.    A: nail  avulsion from repetative trauma      Planter fasciitis    P:   Explained to patient the mechanics causing pressure on her big toes.  Explained left more painful as left hallux longer.  Made suggestions on shoes to keep these offloaded.  discussed with patient will eventually loose nail and for now keep trimmed short, wear loose shoes.  Avoid repetative trauma in future.  If becomes more painful will avulse nail.  Explained cavus foot can have prominent plantar fascia and arch and occasionally become painful.  Continue to wear good shoes and to keep this offloaded.  Return to clinic prn.  Thank you for allowing me participate in the care of this patient.        Cl Page, DARSHAN, FACFAS           good

## 2024-07-08 ENCOUNTER — TELEPHONE (OUTPATIENT)
Dept: GASTROENTEROLOGY | Facility: CLINIC | Age: 44
End: 2024-07-08
Payer: COMMERCIAL

## 2024-07-08 NOTE — TELEPHONE ENCOUNTER
"Endoscopy Scheduling Screen    Have you had a positive Covid test in the last 14 days?  No    What is your communication preference for Instructions and/or Bowel Prep?   MyChart    What insurance is in the chart?  Other:  [facility]     Ordering/Referring Provider: BO RODRÍGUEZ     (If ordering provider performs procedure, schedule with ordering provider unless otherwise instructed. )    BMI: Estimated body mass index is 26.43 kg/m  as calculated from the following:    Height as of 6/25/24: 1.626 m (5' 4\").    Weight as of 6/27/24: 69.9 kg (154 lb).     Sedation Ordered  moderate sedation.   If patient BMI > 50 do not schedule in [facility].    If patient BMI > 45 do not schedule at [facility].    Are you taking methadone or Suboxone?  No    Have you had difficulties, pain, or discomfort during past endoscopy procedures?  No    Are you taking any prescription medications for pain 3 or more times per week?   NO, No RN review required.    Do you have a history of malignant hyperthermia?  No    (Females) Are you currently pregnant?   No     Have you been diagnosed or told you have pulmonary hypertension?   No    Do you have an LVAD?  No    Have you been told you have moderate to severe sleep apnea?  No    Have you been told you have COPD, asthma, or any other lung disease?  No    Do you have any heart conditions?  No     Have you ever had or are you waiting for an organ transplant?  No. Continue scheduling, no site restrictions.    Have you had a stroke or transient ischemic attack (TIA aka \"mini stroke\" in the last 6 months?   No    Have you been diagnosed with or been told you have cirrhosis of the liver?   No    Are you currently on dialysis?   No    Do you need assistance transferring?   No    BMI: Estimated body mass index is 26.43 kg/m  as calculated from the following:    Height as of 6/25/24: 1.626 m (5' 4\").    Weight as of 6/27/24: 69.9 kg (154 lb).     Is patients BMI > 40 and scheduling location [facility]?  No    Do you " take an injectable medication for weight loss or diabetes (excluding insulin)?  No    Do you take the medication Naltrexone?  No    Do you take blood thinners?  No       Prep   Are you currently on dialysis or do you have chronic kidney disease?  No    Do you have a diagnosis of diabetes?  No    Do you have a diagnosis of cystic fibrosis (CF)?  No    On a regular basis do you go 3 -5 days between bowel movements?  No    BMI > 40?  No    Preferred Pharmacy:      [id number] - [place], [place] - [address]  [address] [address]  Phone: [phone] Fax: [phone]      Final Scheduling Details     Procedure scheduled  Colonoscopy    Surgeon:  Lazara per order      Date of procedure:  09/25/2024     Pre-OP / PAC:   No - Not required for this site.    Location  [facility] - Per order.    Sedation   MAC/Deep Sedation  per loc       Patient Reminders:   You will receive a call from a Nurse to review instructions and health history.  This assessment must be completed prior to your procedure.  Failure to complete the Nurse assessment may result in the procedure being cancelled.      On the day of your procedure, please designate an adult(s) who can drive you home stay with you for the next 24 hours. The medicines used in the exam will make you sleepy. You will not be able to drive.      You cannot take public transportation, ride share services, or non-medical taxi service without a responsible caregiver.  Medical transport services are allowed with the requirement that a responsible caregiver will receive you at your destination.  We require that drivers and caregivers are confirmed prior to your procedure.

## 2024-07-16 DIAGNOSIS — Z90.722 S/P BSO (BILATERAL SALPINGO-OOPHORECTOMY): ICD-10-CM

## 2024-07-17 RX ORDER — ESTRADIOL 2 MG/1
2 TABLET ORAL DAILY
Qty: 90 TABLET | Refills: 0 | Status: SHIPPED | OUTPATIENT
Start: 2024-07-17 | End: 2024-09-23

## 2024-07-24 ENCOUNTER — OFFICE VISIT (OUTPATIENT)
Dept: PLASTIC SURGERY | Facility: CLINIC | Age: 44
End: 2024-07-24
Payer: COMMERCIAL

## 2024-07-24 VITALS
DIASTOLIC BLOOD PRESSURE: 78 MMHG | SYSTOLIC BLOOD PRESSURE: 118 MMHG | HEIGHT: 64 IN | HEART RATE: 77 BPM | OXYGEN SATURATION: 97 % | BODY MASS INDEX: 26.17 KG/M2 | WEIGHT: 153.3 LBS

## 2024-07-24 DIAGNOSIS — Z98.890 S/P BREAST RECONSTRUCTION, BILATERAL: Primary | ICD-10-CM

## 2024-07-24 PROCEDURE — 99024 POSTOP FOLLOW-UP VISIT: CPT | Performed by: PLASTIC SURGERY

## 2024-07-24 ASSESSMENT — PAIN SCALES - GENERAL: PAINLEVEL: NO PAIN (0)

## 2024-07-24 NOTE — NURSING NOTE
"Chief Complaint   Patient presents with    Surgical Followup     Post-op, DOS 6/12/24.       Vitals:    07/24/24 0920   BP: 118/78   BP Location: Left arm   Patient Position: Sitting   Cuff Size: Adult Regular   Pulse: 77   SpO2: 97%   Weight: 69.5 kg (153 lb 4.8 oz)   Height: 1.626 m (5' 4\")       Body mass index is 26.31 kg/m .      Eliel Cornejo, EMT    "

## 2024-07-24 NOTE — PROGRESS NOTES
PRESENTING COMPLAINT:  Post-operative visit s/p bilateral breast reconstruction last surgery done 6/12/24 with left breast implant placement.  This was done for BRCA1 gene mutation and prophylactic purposes.     HISTORY OF PRESENTING COMPLAINT: The patient is here for post-operative visit.  The patient is being seen in the presence of my nurse.     Patient doing extremely well.  Overall happy with results.  Has a bit of rippling.    On exam: Vital signs stable afebrile.  Fully healed aesthetic symmetric breasts.  Minimal upper pole indentations.  Rippling in the medial aspect of both breasts.     ASSESSMENT AND PLAN:  Based upon the above findings, the patient is here for post-operative visit.     Discussed potential fat grafting as an option.  She will think about it and let me know if she wants to proceed.  Otherwise no restrictions and I will see her back as needed.    All questions were answered.  The patient was happy with the visit.

## 2024-07-24 NOTE — LETTER
7/24/2024       RE: Yamila Myles  [address] [facility] [address] [facility] [address]     Dear Colleague,    Thank you for referring your patient, Yamila Myles, to the [facility] [place] [facility]. Please see a copy of my visit note below.    PRESENTING COMPLAINT:  Post-operative visit s/p bilateral breast reconstruction last surgery done 6/12/24 with left breast implant placement.  This was done for BRCA1 gene mutation and prophylactic purposes.     HISTORY OF PRESENTING COMPLAINT: The patient is here for post-operative visit.  The patient is being seen in the presence of my nurse.     Patient doing extremely well.  Overall happy with results.  Has a bit of rippling.    On exam: Vital signs stable afebrile.  Fully healed aesthetic symmetric breasts.  Minimal upper pole indentations.  Rippling in the medial aspect of both breasts.     ASSESSMENT AND PLAN:  Based upon the above findings, the patient is here for post-operative visit.     Discussed potential fat grafting as an option.  She will think about it and let me know if she wants to proceed.  Otherwise no restrictions and I will see her back as needed.    All questions were answered.  The patient was happy with the visit.      Again, thank you for allowing me to participate in the care of your patient.      Sincerely,    LING Pradhan MD

## 2024-09-01 NOTE — PROGRESS NOTES
Virtual Visit Details    Type of service:  Video Visit     Originating Location (pt. Location): Home  Distant Location (provider location):  On-site  Platform used for Video Visit: [facility]        Oncology Risk Management Consultation:  Date on this visit: 9/3/2024    Yamila Myles returns to the Cancer Risk Management Program for an oncology risk management consultation. She requires high risk screening and surveillance to reduce her risk of cancer secondary to Hereditary Breast and Ovarian Cancer Syndrome (due to a deleterious BRCA1 mutation) and is considered to be at high risk for hereditary breast and ovarian cancer. She has mitigated her risk of cancer by having a mastectomy and salpingo-oophorectomy.      Primary Physician: Antonina Martinez MD     History Of Present Illness:  Ms. Myles is a very pleasant, healthy 44 year old female who presents with a family history of breast and ovarian cancer and a personal diagnosis of a BRCA1 mutation.      Genetic Testing:  Reports her family participated in genetic testing with Dr. Lyle Edwards in late 1990s; had confirmatory genetic testing for BRCA1+ here at [place] in early 2000s; no records in the chart. Records requested from archives     Pertinent history:  7/12/2016- FINAL DIAGNOSIS:   A. Right fallopian tube and ovary, right salpingo-oophorectomy:    - Acute salpingitis    - Paratubal cyst    - Ovary with hemorrhagic corpus luteal cyst and benign epithelial   inclusion cysts    - Fibrous adhesions of fallopian tube and ovary    - Negative for dysplasia, atypia, or malignancy     B. Left fallopian tube and ovary, left salpingo-oophorectomy:    - Acute salpingitis    - Ovary with corpus luteal cyst and benign epithelial inclusion cyst    - Fibrous adhesions of fallopian tube and ovary    - Negative for dysplasia, atypia, or malignancy      4/13/2022- Mastopexy:  A. LEFT breast, mastopexy:  -Benign breast tissue with fibrocystic change (including microcysts  with apocrine metaplasia), duct ectasia and usual ductal hyperplasia  -Negative for atypia or malignancy      B. RIGHT breast, mastopexy:  -Specimen consists predominantly of unremarkable skin  -Scant benign breast parenchyma present  -Negative for atypia or malignancy      1/23/2023- A. LEFT breast, retro nipple tissue, excision:  -Benign breast tissue, including lactiferous duct with duct ectasia  -Negative for atypia or malignancy  B. LEFT breast, risk reducing nipple-sparing mastectomy:  -Atypical lobular hyperplasia (ALH)  -Other findings: fibrocystic change (including cysts with apocrine metaplasia), columnar cell change, sclerosing adenosis, duct ectasia, and usual ductal hyperplasia  -Foreign body giant cell reaction, consistent with prior operative site  -Calcifications associated with benign ducts and acini  -Negative for malignancy  C. RIGHT breast, retro nipple tissue, excision:  -Benign breast tissue, including lactiferous duct with luminal calcification  -Negative for atypia or malignancy  D. RIGHT breast, risk reducing nipple-sparing mastectomy:  -Benign breast tissue with fibrocystic change (including cysts with apocrine metaplasia), columnar cell change, fibroadenomatoid change, and usual ductal hyperplasia  -Foreign body giant cell reaction, consistent with prior operative site  -Negative for atypia or malignancy       8/2/2023- A. Skin, LEFT breast, excision during revision of reconstruction:  -Benign skin  -Negative for atypia or malignancy     B. Skin, RIGHT breast, excision during revision of reconstruction:  -Benign skin  -Negative for atypia or malignancy     9/1/2023- A. Skin, reconstructed LEFT breast, excision:  -Skin and subcutaneous adipose tissue with post-operative changes (including dermal scar and foreign body giant cell reaction), granulation tissue, and acute inflammation (inflammation involves the full thickness of the specimen, including abscess in subcutaneous adipose  tissue)  -Negative for malignancy     B. Medical device, LEFT breast implant, removal:  -Intact breast implant (gross examination only)     Pertinent screening history:  1/8/2021- Colonoscopy:  CECUM POLYP, POLYPECTOMY:   - Sessile serrated adenoma; no evidence of cytological dysplasia   - Prominent lymphoid aggregate present        Past Medical/Surgical History:  Past Medical History:   Diagnosis Date    Allergic rhinitis     BRCA1 positive 06/2001    [id number]    Encounter for insertion of mirena IUD 07/12/2016    Hoarseness     Lump or mass in breast 2001    BRCA-1 positive (Mother, MGM w/ breast ca, mother w/ gene +), mother dx at 32)    Microcalcifications of the breast, right uoq 06/08/2011     Past Surgical History:   Procedure Laterality Date    GRAFT FAT TO BREAST Bilateral 8/2/2023    Procedure: fat grafting from abdomen and thighs;  Surgeon: LING Pradhan MD;  Location: [facility] OR    INSERT INTRAUTERINE DEVICE N/A 7/12/2016    Procedure: INSERT INTRAUTERINE DEVICE;  Surgeon: Fidelia Ricks MD;  Location: [facility]    INSERT TISSUE EXPANDER BREAST Left 2/5/2024    Procedure: INSERTION, TISSUE EXPANDER, BREAST, left;  Surgeon: LING Pradhan MD;  Location: [facility] OR    LAPAROSCOPIC SALPINGO-OOPHORECTOMY Bilateral 7/12/2016    Procedure: LAPAROSCOPIC SALPINGO-OOPHORECTOMY;  Surgeon: Fidelia Ricks MD;  [facility]    LAPAROSCOPY OPERATIVE ADULT N/A 7/12/2016    Procedure: LAPAROSCOPY OPERATIVE ADULT;  Surgeon: Fidelia Ricks MD;  [facility]    MAMMOPLASTY REDUCTION BILATERAL Bilateral 4/13/2022    Procedure: Bilateral breast lift;  Surgeon: LING Pradhan MD;  Location: [facility] OR    MASTECTOMY SIMPLE Bilateral 1/23/2023    Procedure: BILATERAL Risk-Reducing Nipple-Sparing Mastectomy;  Surgeon: Monica Rodríguez MD;  Location: [facility] OR    RECONSTRUCT BREAST BILATERAL, IMPLANT PROSTHESIS BILATERAL, COMBINED Bilateral 1/23/2023    Procedure: Bilateral breast reconstruction  with implant/Alloderm, SPY;  Surgeon: LING Pradhan MD;  Location: [facility]    REMOVE AND REPLACE BREAST IMPLANT PROSTHESIS Left 6/12/2024    Procedure: remove left tissue expander and left replacement breast implant;  Surgeon: LING Pradhan MD;  Location: [facility] OR    REMOVE IMPLANT BREAST Left 9/1/2023    Procedure: REMOVAL, IMPLANT, BREAST, left and washout;  Surgeon: LING Pradhan MD;  Location: [facility] OR    REVISE RECONSTRUCTED BREAST BILATERAL Bilateral 8/2/2023    Procedure: Bilateral breast revision;  Surgeon: LING Pradhan MD;  Location: [facility]    [facility] APPENDECTOMY  1987       Allergies:  Allergies as of 09/03/2024 - Reviewed 09/03/2024   Allergen Reaction Noted    Advil [nsaids] Other (See Comments) 04/23/2007    Ibuprofen  06/12/2024    Seasonal allergies  04/01/2022    Adhesive tape Rash 04/26/2022       Current Medications:  Current Outpatient Medications   Medication Sig Dispense Refill    acetaminophen (TYLENOL) 325 MG tablet Take 325-650 mg by mouth every 6 hours as needed for mild pain      calcium carbonate (OS-GRETCHEN) 500 MG tablet Take 1 tablet by mouth every evening Unsure if 500 or 1000mg      cetirizine (ZYRTEC) 10 MG tablet Take 10 mg by mouth every evening      ciclopirox (PENLAC) 8 % external solution Apply to adjacent skin and affected nails daily.  Remove with alcohol every 7 days, then repeat. 6 mL 11    estradiol (ESTRACE) 2 MG tablet Take 1 tablet (2 mg) by mouth daily 90 tablet 0    fluticasone (FLONASE) 50 MCG/ACT nasal spray Spray 2 sprays into both nostrils daily 16 g 3    HEMP OIL OR EXTRACT OR OTHER CBD CANNABINOID, NOT MEDICAL CANNABIS, 10 mg every evening [employer] CBD 10 mg      hydrocortisone 2.5 % ointment Apply topically 2 times daily 30 g 1    lactobacillus rhamnosus, GG, (CULTURELL) capsule Take 1 capsule by mouth every evening      multivitamin w/minerals (MULTI-VITAMIN) tablet Take 1 tablet by mouth every evening      ondansetron (ZOFRAN ODT)  "4 MG ODT tab Take 1 tablet (4 mg) by mouth every 8 hours as needed for nausea (Patient not taking: Reported on 2024) 4 tablet 0    oxyCODONE (ROXICODONE) 5 MG tablet Take 1-2 tablets (5-10 mg) by mouth every 6 hours as needed for moderate to severe pain (Patient not taking: Reported on 2024) 15 tablet 0    senna-docusate (SENOKOT-S/PERICOLACE) 8.6-50 MG tablet Take 1-2 tablets by mouth 2 times daily (Patient not taking: Reported on 2024) 30 tablet 0        Family History:  Family History   Problem Relation Age of Onset    Breast Cancer Mother 32        dx age 32, doing well    Colon Cancer Mother 67        dx 2019. s/p surg and chemo    Hereditary Breast and Ovarian Cancer Syndrome Mother         BRCA1+    Anesthesia Reaction Sister         PONV    Hereditary Breast and Ovarian Cancer Syndrome Sister         double Mastectomy and oophrectomy; BRCA1+    Other - See Comments Sister         negative genetic testing    Other - See Comments Sister         negative genetic testing    Breast Cancer Maternal Grandmother     Ovarian Cancer Maternal Grandmother          in 40s; \"stomach cancer\"    Diabetes Paternal Grandmother     Breast Cancer Paternal Grandmother 59        cause of death at 62    Skin Cancer Maternal Uncle         negative for BRCA    Genetic Disorder Maternal Uncle         BRCA1+    Genetic Disorder Maternal Uncle         BRCA1+    Thrombosis No family hx of     Malignant Hyperthermia No family hx of        Social History:  Social History     Socioeconomic History    Marital status:      Spouse name: Mani Robles    Number of children: 2    Years of education: BA    Highest education level: Not on file   Occupational History    Occupation: Staff director     Comment: [employer]   Tobacco Use    Smoking status: Never     Passive exposure: Never    Smokeless tobacco: Never   Substance and Sexual Activity    Alcohol use: Not Currently     Comment: " Holiday's 1 glass a wine    Drug use: Yes     Types: Marijuana     Comment: CBD 10 mg daily    Sexual activity: Yes     Partners: Male     Birth control/protection: I.U.D.     Comment: Mirena   Other Topics Concern    Parent/sibling w/ CABG, MI or angioplasty before 65F 55M? No     Service Not Asked    Blood Transfusions No    Caffeine Concern No    Occupational Exposure No    Hobby Hazards Not Asked    Sleep Concern No    Stress Concern Yes     Comment: life/work -->coping    Weight Concern No    Special Diet No    Back Care Yes     Comment: chiropracter for old injury    Exercise No    Bike Helmet Not Asked     Comment: not biker    Seat Belt No    Self-Exams Not Asked   Social History Narrative    4/12/23    Going to [facility] this summer with daughter for 12th birthday    Fidelia Ricks MD            Social Documentation:        Balanced Diet: YES    Calcium intake: less than 2 per day    Caffeine: 0-1 per day    Exercise:  type of activity walk; daily times per week    Sunscreen: Yes    Seatbelts:  Yes    Self Breast Exam:  Yes    Self Testicular Exam: No - n/a    Physical/Emotional/Sexual Abuse: No     Do you feel safe in your environment? Yes        Cholesterol screen up to date: Yes-3 yrs ago per pt.  Not fasting today.     Eye Exam up to date: Yes    Dental Exam up to date: No - 9 months ago.     Pap smear up to date: Yes    Mammogram up to date: Does Not Apply    Dexa Scan up to date: Does Not Apply    Colonoscopy up to date: Does Not Apply    Immunizations up to date: Yes-Td 12/06    Glucose screen if over 40:  No - n/a     Social Determinants of Health     Financial Resource Strain: Low Risk  (5/20/2024)    Financial Resource Strain     Within the past 12 months, have you or your family members you live with been unable to get utilities (heat, electricity) when it was really needed?: No   Food Insecurity: Low Risk  (5/20/2024)    Food Insecurity     Within the past 12 months, did you worry  "that your food would run out before you got money to buy more?: No     Within the past 12 months, did the food you bought just not last and you didn t have money to get more?: No   Transportation Needs: Low Risk  (5/20/2024)    Transportation Needs     Within the past 12 months, has lack of transportation kept you from medical appointments, getting your medicines, non-medical meetings or appointments, work, or from getting things that you need?: No   Physical Activity: Insufficiently Active (5/20/2024)    Exercise Vital Sign     Days of Exercise per Week: 4 days     Minutes of Exercise per Session: 30 min   Stress: No Stress Concern Present (5/20/2024)    [place] [employer] - Occupational Stress Questionnaire     Feeling of Stress : Not at all   Social Connections: Unknown (5/20/2024)    Social Connection and Isolation Panel [NHANES]     Frequency of Communication with Friends and Family: Not on file     Frequency of Social Gatherings with Friends and Family: Once a week     Attends [employer] Services: Not on file     Active Member of Clubs or Organizations: Not on file     Attends Club or Organization Meetings: Not on file     Marital Status: Not on file   Interpersonal Safety: Not on file   Housing Stability: Low Risk  (5/20/2024)    Housing Stability     Do you have housing? : Yes     Are you worried about losing your housing?: No       Physical Exam:  Ht 1.626 m (5' 4\")   Wt 68 kg (150 lb)   LMP  (LMP Unknown)   BMI 25.75 kg/m    GENERAL: alert and no distress  EYES: Eyes grossly normal to inspection.  No discharge or erythema, or obvious scleral/conjunctival abnormalities.  RESP: No audible wheeze, cough, or visible cyanosis.    SKIN: Visible skin clear. No significant rash, abnormal pigmentation or lesions.  NEURO: Cranial nerves grossly intact.  Mentation and speech appropriate for age.  PSYCH: Appropriate affect, tone, and pace of words    Laboratory/Imaging Studies  No results found " for any visits on 09/03/24.    ASSESSMENT    Yamila reports that she is doing well at this visit. She has no concerns with her health, and no updates to her family's medical history. She did share that she had a left breast infection in her left breast last year, which required several more surgeries, implant removal and reimplantation. She is feeling recovered from this now. She postponed her colonoscopy during this time, and has one scheduled for the end of this month.     We discussed a plan moving forward. Yamila would like to check in with the CRMP annually to stay up to date with BRCA1 guidelines. I have no screening recommendations at this visit, but I would like to see her back in one year. We can do a chest wall exam at that visit.       INDIVIDUALIZED CANCER RISK MANAGEMENT PLAN:      Individualized Surveillance Plan for women  Hereditary Breast and/or Ovarian Cancer Syndrome   Per NCCN Guidelines Version 3.2024   Recommended screening Test or procedure Last done Next Scheduled    Breast self awareness starting at age 18.    Breast cancer risk >60% Women should be familiar with their breasts and promptly report changes to their care provider. Periodic, consistent self exam may facilitate breast self awareness. Premenopausal women may find self exams to be most informative when performed at the end of menses.   September 2024 Return to clinic in September, 2025 for a chest wall exam   Breast screening, starting at age 25 Clinical breast exams every 6 -12 months September 2024 September 2025   Breast screening   Age 25-29 Annual breast MRI screening with contrast (or mammogram if MRI is unavailable) or individualized based on family history if a breast cancer diagnosis before age 30 is present.       Breast MRI is performed preferably on day 7-15 of menstrual cycle for premenopausal women.   NA   NA   Breast screening   Age >30-75 years     Annual mammogram (consider tomosynthesis mammogram) and annual screening  MRI.     Breast MRI is performed preferably on day 7-15 of menstrual cycle for premenopausal women.    Age>75 years, management should be considered on an individual basis.   NA   NA   Ovarian cancer screening, starting at age 30-35    Absolute risk for epithelial ovarian cancer -39-58% for BRCA1+ carriers and 13-29% for BRCA2+ carriers   Consider transvaginal ultrasound and  tests.   RRSO complete    Uterus intact, continued PAPs and pelvic exams    Pancreatic Cancer Screening beginning at age 50 or 10 years prior to the earliest pancreatic cancer on the BRCA-side of the family  In families with exocrine pancreatic cancer in a first or second degree relative    Risk is <5% for BRCA1+ carriers    5-10% for BRCA2+ carriers     Consider Annual MRI/MRCP and/or Endoscopic Ultrasound   No family history of pancreatic cancer    Review at future visits   Recommendation: risk-reducing salpingo-oophorectomy(RRSO), typically between 35 and 40 years old recognizing childbearing is a consideration.    Because ovarian cancer onset in patients with BRCA2 mutations is on average of 8-10 years later than in patients with BRCA1 mutations, it is reasonable to delay RRSO until 40-45 years in patients with BRCA2 mutations  unless age at diagnosis in the family warrants earlier age for consideration for prophylactic surgery.    Limited data suggest that there may be a slightly increased risk of serous uterine cancer among women with BRCA1+ pathogenic variants. The provider and the patient should discuss the risks and benefits of a concurrent hysterectomy at the time of RRSO for women with a BRCA1+ pathogenic variant /like pathogenic variant prior to surgery.     Salpingectomy alone is not the standard of care for risk reduction although clinical trials are ongoing.     Discuss option of risk-reducing mastectomy.    Consider risks and benefits of risk reducing agents, such as tamoxifen and raloxifene for breast and ovarian  cancer.    Consider a full body skin and eye exam for melanoma screening for both BRCA1+ and BRCA2+.     NOTE: Women with BRCA mutation who are treated for breast cancer should have screening of the remaining breast tissue with annual mammography and breast MRI.         I spent a total of 19 minutes on the day of the visit. Please see the note for further information on patient assessment and treatment.     Nicole Castle, WILL, APRN, [facility]  Clinical Nurse Specialist  Cancer Risk Management Program  [facility]    Cc:    Antonina Martinez MD

## 2024-09-03 ENCOUNTER — VIRTUAL VISIT (OUTPATIENT)
Dept: ONCOLOGY | Facility: CLINIC | Age: 44
End: 2024-09-03
Payer: COMMERCIAL

## 2024-09-03 VITALS — BODY MASS INDEX: 25.61 KG/M2 | WEIGHT: 150 LBS | HEIGHT: 64 IN

## 2024-09-03 DIAGNOSIS — Z15.01 BRCA1 GENETIC CARRIER: Primary | ICD-10-CM

## 2024-09-03 DIAGNOSIS — Z15.09 BRCA1 GENETIC CARRIER: Primary | ICD-10-CM

## 2024-09-03 PROCEDURE — 99213 OFFICE O/P EST LOW 20 MIN: CPT | Mod: 24

## 2024-09-03 ASSESSMENT — PAIN SCALES - GENERAL: PAINLEVEL: NO PAIN (0)

## 2024-09-03 NOTE — NURSING NOTE
Current patient location: [address] [place] [id number]    Is the patient currently in the state of [place]? YES    Visit mode:VIDEO    If the visit is dropped, the patient can be reconnected by: VIDEO VISIT: Text to cell phone:   Telephone Information:   Mobile [phone]       Will anyone else be joining the visit? NO  (If patient encounters technical issues they should call [phone]    How would you like to obtain your AVS? MyChart    Are changes needed to the allergy or medication list? Pt stated no changes to allergies and Pt stated no med changes    Are refills needed on medications prescribed by this physician? NO    Rooming Documentation:  Questionnaire(s) completed      Reason for visit: RECHECK    Miroslava Delgado LPN

## 2024-09-03 NOTE — LETTER
9/3/2024      Yamila Myles  [address] [facility] [address]      Dear Colleague,    Thank you for referring your patient, Yamila Myles, to the [facility]. Please see a copy of my visit note below.    Virtual Visit Details    Type of service:  Video Visit     Originating Location (pt. Location): Home  Distant Location (provider location):  On-site  Platform used for Video Visit: [facility]        Oncology Risk Management Consultation:  Date on this visit: 9/3/2024    Yamila Myles returns to the Cancer Risk Management Program for an oncology risk management consultation. She requires high risk screening and surveillance to reduce her risk of cancer secondary to Hereditary Breast and Ovarian Cancer Syndrome (due to a deleterious BRCA1 mutation) and is considered to be at high risk for hereditary breast and ovarian cancer. She has mitigated her risk of cancer by having a mastectomy and salpingo-oophorectomy.      Primary Physician: Antonina Martinez MD     History Of Present Illness:  Ms. Myles is a very pleasant, healthy 44 year old female who presents with a family history of breast and ovarian cancer and a personal diagnosis of a BRCA1 mutation.      Genetic Testing:  Reports her family participated in genetic testing with Dr. Lyle Edwards in late 1990s; had confirmatory genetic testing for BRCA1+ here at [place] in early 2000s; no records in the chart. Records requested from archives     Pertinent history:  7/12/2016- FINAL DIAGNOSIS:   A. Right fallopian tube and ovary, right salpingo-oophorectomy:    - Acute salpingitis    - Paratubal cyst    - Ovary with hemorrhagic corpus luteal cyst and benign epithelial   inclusion cysts    - Fibrous adhesions of fallopian tube and ovary    - Negative for dysplasia, atypia, or malignancy     B. Left fallopian tube and ovary, left salpingo-oophorectomy:    - Acute salpingitis    - Ovary with corpus luteal cyst and benign  epithelial inclusion cyst    - Fibrous adhesions of fallopian tube and ovary    - Negative for dysplasia, atypia, or malignancy      4/13/2022- Mastopexy:  A. LEFT breast, mastopexy:  -Benign breast tissue with fibrocystic change (including microcysts with apocrine metaplasia), duct ectasia and usual ductal hyperplasia  -Negative for atypia or malignancy      B. RIGHT breast, mastopexy:  -Specimen consists predominantly of unremarkable skin  -Scant benign breast parenchyma present  -Negative for atypia or malignancy      1/23/2023- A. LEFT breast, retro nipple tissue, excision:  -Benign breast tissue, including lactiferous duct with duct ectasia  -Negative for atypia or malignancy  B. LEFT breast, risk reducing nipple-sparing mastectomy:  -Atypical lobular hyperplasia (ALH)  -Other findings: fibrocystic change (including cysts with apocrine metaplasia), columnar cell change, sclerosing adenosis, duct ectasia, and usual ductal hyperplasia  -Foreign body giant cell reaction, consistent with prior operative site  -Calcifications associated with benign ducts and acini  -Negative for malignancy  C. RIGHT breast, retro nipple tissue, excision:  -Benign breast tissue, including lactiferous duct with luminal calcification  -Negative for atypia or malignancy  D. RIGHT breast, risk reducing nipple-sparing mastectomy:  -Benign breast tissue with fibrocystic change (including cysts with apocrine metaplasia), columnar cell change, fibroadenomatoid change, and usual ductal hyperplasia  -Foreign body giant cell reaction, consistent with prior operative site  -Negative for atypia or malignancy       8/2/2023- A. Skin, LEFT breast, excision during revision of reconstruction:  -Benign skin  -Negative for atypia or malignancy     B. Skin, RIGHT breast, excision during revision of reconstruction:  -Benign skin  -Negative for atypia or malignancy     9/1/2023- A. Skin, reconstructed LEFT breast, excision:  -Skin and subcutaneous adipose  tissue with post-operative changes (including dermal scar and foreign body giant cell reaction), granulation tissue, and acute inflammation (inflammation involves the full thickness of the specimen, including abscess in subcutaneous adipose tissue)  -Negative for malignancy     B. Medical device, LEFT breast implant, removal:  -Intact breast implant (gross examination only)     Pertinent screening history:  1/8/2021- Colonoscopy:  CECUM POLYP, POLYPECTOMY:   - Sessile serrated adenoma; no evidence of cytological dysplasia   - Prominent lymphoid aggregate present        Past Medical/Surgical History:  Past Medical History:   Diagnosis Date     Allergic rhinitis      BRCA1 positive 06/2001    [id number]     Encounter for insertion of mirena IUD 07/12/2016     Hoarseness      Lump or mass in breast 2001    BRCA-1 positive (Mother, MGM w/ breast ca, mother w/ gene +), mother dx at 32)     Microcalcifications of the breast, right uoq 06/08/2011     Past Surgical History:   Procedure Laterality Date     GRAFT FAT TO BREAST Bilateral 8/2/2023    Procedure: fat grafting from abdomen and thighs;  Surgeon: LING Pradhan MD;  Location: [facility] OR     INSERT INTRAUTERINE DEVICE N/A 7/12/2016    Procedure: INSERT INTRAUTERINE DEVICE;  Surgeon: Fidelia Ricks MD;  [facility]     INSERT TISSUE EXPANDER BREAST Left 2/5/2024    Procedure: INSERTION, TISSUE EXPANDER, BREAST, left;  Surgeon: LING Pradhan MD;  Location: [facility]     LAPAROSCOPIC SALPINGO-OOPHORECTOMY Bilateral 7/12/2016    Procedure: LAPAROSCOPIC SALPINGO-OOPHORECTOMY;  Surgeon: Fidelia Ricks MD;  [facility]     LAPAROSCOPY OPERATIVE ADULT N/A 7/12/2016    Procedure: LAPAROSCOPY OPERATIVE ADULT;  Surgeon: Fidelia Ricks MD;  [facility]     MAMMOPLASTY REDUCTION BILATERAL Bilateral 4/13/2022    Procedure: Bilateral breast lift;  Surgeon: LING Pradhan MD;  Location: [facility] OR     MASTECTOMY SIMPLE Bilateral 1/23/2023     Procedure: BILATERAL Risk-Reducing Nipple-Sparing Mastectomy;  Surgeon: Monica Rodríguez MD;  Location: [facility]     RECONSTRUCT BREAST BILATERAL, IMPLANT PROSTHESIS BILATERAL, COMBINED Bilateral 1/23/2023    Procedure: Bilateral breast reconstruction with implant/Alloderm, SPY;  Surgeon: LING Pradhan MD;  Location: [facility]     REMOVE AND REPLACE BREAST IMPLANT PROSTHESIS Left 6/12/2024    Procedure: remove left tissue expander and left replacement breast implant;  Surgeon: LING Pradhan MD;  Location: [facility] OR     REMOVE IMPLANT BREAST Left 9/1/2023    Procedure: REMOVAL, IMPLANT, BREAST, left and washout;  Surgeon: LING Pradhan MD;  Location: [facility]     REVISE RECONSTRUCTED BREAST BILATERAL Bilateral 8/2/2023    Procedure: Bilateral breast revision;  Surgeon: LING Pradhan MD;  Location: [facility] OR     Z APPENDECTOMY  1987       Allergies:  Allergies as of 09/03/2024 - Reviewed 09/03/2024   Allergen Reaction Noted     Advil [nsaids] Other (See Comments) 04/23/2007     Ibuprofen  06/12/2024     Seasonal allergies  04/01/2022     Adhesive tape Rash 04/26/2022       Current Medications:  Current Outpatient Medications   Medication Sig Dispense Refill     acetaminophen (TYLENOL) 325 MG tablet Take 325-650 mg by mouth every 6 hours as needed for mild pain       calcium carbonate (OS-GRETCHEN) 500 MG tablet Take 1 tablet by mouth every evening Unsure if 500 or 1000mg       cetirizine (ZYRTEC) 10 MG tablet Take 10 mg by mouth every evening       ciclopirox (PENLAC) 8 % external solution Apply to adjacent skin and affected nails daily.  Remove with alcohol every 7 days, then repeat. 6 mL 11     estradiol (ESTRACE) 2 MG tablet Take 1 tablet (2 mg) by mouth daily 90 tablet 0     fluticasone (FLONASE) 50 MCG/ACT nasal spray Spray 2 sprays into both nostrils daily 16 g 3     HEMP OIL OR EXTRACT OR OTHER CBD CANNABINOID, NOT MEDICAL CANNABIS, 10 mg every evening [employer] CBD 10 mg        "hydrocortisone 2.5 % ointment Apply topically 2 times daily 30 g 1     lactobacillus rhamnosus, GG, (CULTURELL) capsule Take 1 capsule by mouth every evening       multivitamin w/minerals (MULTI-VITAMIN) tablet Take 1 tablet by mouth every evening       ondansetron (ZOFRAN ODT) 4 MG ODT tab Take 1 tablet (4 mg) by mouth every 8 hours as needed for nausea (Patient not taking: Reported on 2024) 4 tablet 0     oxyCODONE (ROXICODONE) 5 MG tablet Take 1-2 tablets (5-10 mg) by mouth every 6 hours as needed for moderate to severe pain (Patient not taking: Reported on 2024) 15 tablet 0     senna-docusate (SENOKOT-S/PERICOLACE) 8.6-50 MG tablet Take 1-2 tablets by mouth 2 times daily (Patient not taking: Reported on 2024) 30 tablet 0        Family History:  Family History   Problem Relation Age of Onset     Breast Cancer Mother 32        dx age 32, doing well     Colon Cancer Mother 67        dx 2019. s/p surg and chemo     Hereditary Breast and Ovarian Cancer Syndrome Mother         BRCA1+     Anesthesia Reaction Sister         PONV     Hereditary Breast and Ovarian Cancer Syndrome Sister         double Mastectomy and oophrectomy; BRCA1+     Other - See Comments Sister         negative genetic testing     Other - See Comments Sister         negative genetic testing     Breast Cancer Maternal Grandmother      Ovarian Cancer Maternal Grandmother          in 40s; \"stomach cancer\"     Diabetes Paternal Grandmother      Breast Cancer Paternal Grandmother 59        cause of death at 62     Skin Cancer Maternal Uncle         negative for BRCA     Genetic Disorder Maternal Uncle         BRCA1+     Genetic Disorder Maternal Uncle         BRCA1+     Thrombosis No family hx of      Malignant Hyperthermia No family hx of        Social History:  Social History     Socioeconomic History     Marital status:      Spouse name: Mani Robles     Number of children: 2     Years of education: BA     Highest " education level: Not on file   Occupational History     Occupation: Staff director     Comment: [employer]   Tobacco Use     Smoking status: Never     Passive exposure: Never     Smokeless tobacco: Never   Substance and Sexual Activity     Alcohol use: Not Currently     Comment: [employer] 1 glass a wine     Drug use: Yes     Types: Marijuana     Comment: CBD 10 mg daily     Sexual activity: Yes     Partners: Male     Birth control/protection: I.U.D.     Comment: Mirena   Other Topics Concern     Parent/sibling w/ CABG, MI or angioplasty before 65F 55M? No      Service Not Asked     Blood Transfusions No     Caffeine Concern No     Occupational Exposure No     Hobby Hazards Not Asked     Sleep Concern No     Stress Concern Yes     Comment: life/work -->coping     Weight Concern No     Special Diet No     Back Care Yes     Comment: chiropracter for old injury     Exercise No     Bike Helmet Not Asked     Comment: not biker     Seat Belt No     Self-Exams Not Asked   Social History Narrative    4/12/23    Going to [facility] this summer with daughter for 11th birthday    Fidelia Ricks MD            Social Documentation:        Balanced Diet: YES    Calcium intake: less than 2 per day    Caffeine: 0-1 per day    Exercise:  type of activity walk; daily times per week    Sunscreen: Yes    Seatbelts:  Yes    Self Breast Exam:  Yes    Self Testicular Exam: No - n/a    Physical/Emotional/Sexual Abuse: No     Do you feel safe in your environment? Yes        Cholesterol screen up to date: Yes-3 yrs ago per pt.  Not fasting today.     Eye Exam up to date: Yes    Dental Exam up to date: No - 9 months ago.     Pap smear up to date: Yes    Mammogram up to date: Does Not Apply    Dexa Scan up to date: Does Not Apply    Colonoscopy up to date: Does Not Apply    Immunizations up to date: Yes-Td 12/06    Glucose screen if over 40:  No - n/a     Social Determinants of Health     Financial Resource  "Strain: Low Risk  (5/20/2024)    Financial Resource Strain      Within the past 12 months, have you or your family members you live with been unable to get utilities (heat, electricity) when it was really needed?: No   Food Insecurity: Low Risk  (5/20/2024)    Food Insecurity      Within the past 12 months, did you worry that your food would run out before you got money to buy more?: No      Within the past 12 months, did the food you bought just not last and you didn t have money to get more?: No   Transportation Needs: Low Risk  (5/20/2024)    Transportation Needs      Within the past 12 months, has lack of transportation kept you from medical appointments, getting your medicines, non-medical meetings or appointments, work, or from getting things that you need?: No   Physical Activity: Insufficiently Active (5/20/2024)    Exercise Vital Sign      Days of Exercise per Week: 4 days      Minutes of Exercise per Session: 30 min   Stress: No Stress Concern Present (5/20/2024)    [employer] - Occupational Stress Questionnaire      Feeling of Stress : Not at all   Social Connections: Unknown (5/20/2024)    Social Connection and Isolation Panel [NHANES]      Frequency of Communication with Friends and Family: Not on file      Frequency of Social Gatherings with Friends and Family: Once a week      Attends Jainism Services: Not on file      Active Member of Clubs or Organizations: Not on file      Attends Club or Organization Meetings: Not on file      Marital Status: Not on file   Interpersonal Safety: Not on file   Housing Stability: Low Risk  (5/20/2024)    Housing Stability      Do you have housing? : Yes      Are you worried about losing your housing?: No       Physical Exam:  Ht 1.626 m (5' 4\")   Wt 68 kg (150 lb)   LMP  (LMP Unknown)   BMI 25.75 kg/m    GENERAL: alert and no distress  EYES: Eyes grossly normal to inspection.  No discharge or erythema, or obvious " scleral/conjunctival abnormalities.  RESP: No audible wheeze, cough, or visible cyanosis.    SKIN: Visible skin clear. No significant rash, abnormal pigmentation or lesions.  NEURO: Cranial nerves grossly intact.  Mentation and speech appropriate for age.  PSYCH: Appropriate affect, tone, and pace of words    Laboratory/Imaging Studies  No results found for any visits on 09/03/24.    ASSESSMENT    Yamila reports that she is doing well at this visit. She has no concerns with her health, and no updates to her family's medical history. She did share that she had a left breast infection in her left breast last year, which required several more surgeries, implant removal and reimplantation. She is feeling recovered from this now. She postponed her colonoscopy during this time, and has one scheduled for the end of this month.     We discussed a plan moving forward. Yamila would like to check in with the CRMP annually to stay up to date with BRCA1 guidelines. I have no screening recommendations at this visit, but I would like to see her back in one year. We can do a chest wall exam at that visit.       INDIVIDUALIZED CANCER RISK MANAGEMENT PLAN:      Individualized Surveillance Plan for women  Hereditary Breast and/or Ovarian Cancer Syndrome   Per NCCN Guidelines Version 3.2024   Recommended screening Test or procedure Last done Next Scheduled    Breast self awareness starting at age 18.    Breast cancer risk >60% Women should be familiar with their breasts and promptly report changes to their care provider. Periodic, consistent self exam may facilitate breast self awareness. Premenopausal women may find self exams to be most informative when performed at the end of menses.   September 2024 Return to clinic in September, 2025 for a chest wall exam   Breast screening, starting at age 25 Clinical breast exams every 6 -12 months September 2024 September 2025   Breast screening   Age 25-29 Annual breast MRI screening with contrast  (or mammogram if MRI is unavailable) or individualized based on family history if a breast cancer diagnosis before age 30 is present.       Breast MRI is performed preferably on day 7-15 of menstrual cycle for premenopausal women.   NA   NA   Breast screening   Age >30-75 years     Annual mammogram (consider tomosynthesis mammogram) and annual screening MRI.     Breast MRI is performed preferably on day 7-15 of menstrual cycle for premenopausal women.    Age>75 years, management should be considered on an individual basis.   NA   NA   Ovarian cancer screening, starting at age 30-35    Absolute risk for epithelial ovarian cancer -39-58% for BRCA1+ carriers and 13-29% for BRCA2+ carriers   Consider transvaginal ultrasound and  tests.   RRSO complete    Uterus intact, continued PAPs and pelvic exams    Pancreatic Cancer Screening beginning at age 50 or 10 years prior to the earliest pancreatic cancer on the BRCA-side of the family  In families with exocrine pancreatic cancer in a first or second degree relative    Risk is <5% for BRCA1+ carriers    5-10% for BRCA2+ carriers     Consider Annual MRI/MRCP and/or Endoscopic Ultrasound   No family history of pancreatic cancer    Review at future visits   Recommendation: risk-reducing salpingo-oophorectomy(RRSO), typically between 35 and 40 years old recognizing childbearing is a consideration.    Because ovarian cancer onset in patients with BRCA2 mutations is on average of 8-10 years later than in patients with BRCA1 mutations, it is reasonable to delay RRSO until 40-45 years in patients with BRCA2 mutations  unless age at diagnosis in the family warrants earlier age for consideration for prophylactic surgery.    Limited data suggest that there may be a slightly increased risk of serous uterine cancer among women with BRCA1+ pathogenic variants. The provider and the patient should discuss the risks and benefits of a concurrent hysterectomy at the time of RRSO for  women with a BRCA1+ pathogenic variant /like pathogenic variant prior to surgery.     Salpingectomy alone is not the standard of care for risk reduction although clinical trials are ongoing.     Discuss option of risk-reducing mastectomy.    Consider risks and benefits of risk reducing agents, such as tamoxifen and raloxifene for breast and ovarian cancer.    Consider a full body skin and eye exam for melanoma screening for both BRCA1+ and BRCA2+.     NOTE: Women with BRCA mutation who are treated for breast cancer should have screening of the remaining breast tissue with annual mammography and breast MRI.         I spent a total of 19 minutes on the day of the visit. Please see the note for further information on patient assessment and treatment.     Nicole Castle DNP, CARLOS ALBERTO, [facility]  Clinical Nurse Specialist  Cancer Risk Management Program  [facility]    Cc:    Antonina Martinez MD                      Again, thank you for allowing me to participate in the care of your patient.        Sincerely,        CARLOS ALBERTO Priest CNP

## 2024-09-03 NOTE — PATIENT INSTRUCTIONS
Individualized Surveillance Plan for women  Hereditary Breast and/or Ovarian Cancer Syndrome   Per NCCN Guidelines Version 3.2024   Recommended screening Test or procedure Last done Next Scheduled    Breast self awareness starting at age 18.    Breast cancer risk >60% Women should be familiar with their breasts and promptly report changes to their care provider. Periodic, consistent self exam may facilitate breast self awareness. Premenopausal women may find self exams to be most informative when performed at the end of menses.   September 2024 Return to clinic in September, 2025 for a chest wall exam   Breast screening, starting at age 25 Clinical breast exams every 6 -12 months September 2024 September 2025   Breast screening   Age 25-29 Annual breast MRI screening with contrast (or mammogram if MRI is unavailable) or individualized based on family history if a breast cancer diagnosis before age 30 is present.       Breast MRI is performed preferably on day 7-15 of menstrual cycle for premenopausal women.   NA   NA   Breast screening   Age >30-75 years     Annual mammogram (consider tomosynthesis mammogram) and annual screening MRI.     Breast MRI is performed preferably on day 7-15 of menstrual cycle for premenopausal women.    Age>75 years, management should be considered on an individual basis.   NA   NA   Ovarian cancer screening, starting at age 30-35    Absolute risk for epithelial ovarian cancer -39-58% for BRCA1+ carriers and 13-29% for BRCA2+ carriers   Consider transvaginal ultrasound and  tests.   RRSO complete    Uterus intact, continued PAPs and pelvic exams    Pancreatic Cancer Screening beginning at age 50 or 10 years prior to the earliest pancreatic cancer on the BRCA-side of the family  In families with exocrine pancreatic cancer in a first or second degree relative    Risk is <5% for BRCA1+ carriers    5-10% for BRCA2+ carriers     Consider Annual MRI/MRCP and/or Endoscopic Ultrasound   No  family history of pancreatic cancer    Review at future visits   Recommendation: risk-reducing salpingo-oophorectomy(RRSO), typically between 35 and 40 years old recognizing childbearing is a consideration.    Because ovarian cancer onset in patients with BRCA2 mutations is on average of 8-10 years later than in patients with BRCA1 mutations, it is reasonable to delay RRSO until 40-45 years in patients with BRCA2 mutations  unless age at diagnosis in the family warrants earlier age for consideration for prophylactic surgery.    Limited data suggest that there may be a slightly increased risk of serous uterine cancer among women with BRCA1+ pathogenic variants. The provider and the patient should discuss the risks and benefits of a concurrent hysterectomy at the time of RRSO for women with a BRCA1+ pathogenic variant /like pathogenic variant prior to surgery.     Salpingectomy alone is not the standard of care for risk reduction although clinical trials are ongoing.     Discuss option of risk-reducing mastectomy.    Consider risks and benefits of risk reducing agents, such as tamoxifen and raloxifene for breast and ovarian cancer.    Consider a full body skin and eye exam for melanoma screening for both BRCA1+ and BRCA2+.     NOTE: Women with BRCA mutation who are treated for breast cancer should have screening of the remaining breast tissue with annual mammography and breast MRI.

## 2024-09-11 ENCOUNTER — TELEPHONE (OUTPATIENT)
Dept: GASTROENTEROLOGY | Facility: CLINIC | Age: 44
End: 2024-09-11
Payer: COMMERCIAL

## 2024-09-11 NOTE — TELEPHONE ENCOUNTER
Pre visit planning completed.      Procedure details:    Patient scheduled for Colonoscopy on 9/25/24.     Arrival time: 0830. Procedure time 0930    Facility location: [facility]; [address], [place] [address]. Check in location: 2nd Floor.    Sedation type: MAC    Pre op exam needed? No.    Indication for procedure: screening      Chart review:     Electronic implanted devices? No    Recent diagnosis of diverticulitis within the last 6 weeks? No      Medication review:    Diabetic? No    Anticoagulants? No    Weight loss medication/injectable? No.    Other medication HOLDING recommendations:  N/A      Prep for procedure:     Bowel prep recommendation: Standard Miralax  Due to: standard bowel prep.    Prep instructions sent via [employer]          Nancy Anne RN  Endoscopy Procedure Pre Assessment RN  [phone] option 2

## 2024-09-17 NOTE — TELEPHONE ENCOUNTER
Attempted to contact patient in order to complete pre assessment questions.     No answer. Left message to return call to [phone] option 2.    Missed call communication sent via [employer]      Sheila Campos RN  Endoscopy Procedure Pre Assessment   Adult

## 2024-09-17 NOTE — TELEPHONE ENCOUNTER
Pre assessment completed for upcoming procedure.   (Please see previous telephone encounter notes for complete details)    Patient  returned call.       Procedure details:    Arrival time and facility location reviewed.    Pre op exam needed? No.    Designated  policy reviewed. Instructed to have someone stay 24  hours post procedure.       Medication review:    Medications reviewed. Please see supporting documentation below. Holding recommendations discussed (if applicable).       Prep for procedure:     Procedure prep instructions reviewed.        Any additional information needed:  N/A      Patient  verbalized understanding and had no questions or concerns at this time.      Miroslava Allen RN  Endoscopy Procedure Pre Assessment   [phone] option 2

## 2024-09-23 DIAGNOSIS — Z90.722 S/P BSO (BILATERAL SALPINGO-OOPHORECTOMY): ICD-10-CM

## 2024-09-24 RX ORDER — ESTRADIOL 2 MG/1
2 TABLET ORAL DAILY
Qty: 90 TABLET | Refills: 1 | Status: SHIPPED | OUTPATIENT
Start: 2024-09-24

## 2024-09-25 ENCOUNTER — TRANSFERRED RECORDS (OUTPATIENT)
Dept: HEALTH INFORMATION MANAGEMENT | Facility: CLINIC | Age: 44
End: 2024-09-25
Payer: COMMERCIAL

## 2024-10-02 ENCOUNTER — PATIENT OUTREACH (OUTPATIENT)
Dept: GASTROENTEROLOGY | Facility: CLINIC | Age: 44
End: 2024-10-02
Payer: COMMERCIAL

## 2024-10-29 ASSESSMENT — ANXIETY QUESTIONNAIRES: GAD7 TOTAL SCORE: 0

## 2024-11-26 ASSESSMENT — ANXIETY QUESTIONNAIRES: GAD7 TOTAL SCORE: 0

## 2025-04-17 DIAGNOSIS — Z90.722 S/P BSO (BILATERAL SALPINGO-OOPHORECTOMY): ICD-10-CM

## 2025-04-21 ENCOUNTER — OFFICE VISIT (OUTPATIENT)
Dept: FAMILY MEDICINE | Facility: CLINIC | Age: 45
End: 2025-04-21
Payer: COMMERCIAL

## 2025-04-21 VITALS
OXYGEN SATURATION: 98 % | BODY MASS INDEX: 25.08 KG/M2 | HEART RATE: 86 BPM | HEIGHT: 64 IN | TEMPERATURE: 98.1 F | WEIGHT: 146.9 LBS | RESPIRATION RATE: 16 BRPM | SYSTOLIC BLOOD PRESSURE: 98 MMHG | DIASTOLIC BLOOD PRESSURE: 68 MMHG

## 2025-04-21 DIAGNOSIS — Z87.898 H/O MOTION SICKNESS: Primary | ICD-10-CM

## 2025-04-21 RX ORDER — ONDANSETRON 4 MG/1
4 TABLET, ORALLY DISINTEGRATING ORAL EVERY 8 HOURS PRN
Qty: 10 TABLET | Refills: 0 | Status: SHIPPED | OUTPATIENT
Start: 2025-04-21

## 2025-04-21 ASSESSMENT — PAIN SCALES - GENERAL: PAINLEVEL_OUTOF10: NO PAIN (0)

## 2025-04-21 NOTE — PATIENT INSTRUCTIONS
Thank you for visiting the [facility] : [phone]  Today April 21, 2025 you received the    none    Follow up vaccine appointments can be made as a NURSE ONLY visit at the Travel Clinic, (BE PREPARED TO WAIT, ) or at designated [facility].    If you are receiving the Rabies vaccines series, it is important that you follow the exact schedule ordered.     Pre-travel     We recommend that you purchase Medical Evacuation Insurance prior to your departure.  Https://wwwnc.cdc.gov/travel/page/insurance    [place] your travel plans with the [employer] Department of State through STEP ( Smart Traveler Enrollment Program ) https://step.state.gov.  STEP is a free service to allow U[place] citizens and nationals traveling and living abroad to enroll their trip with the nearest U.S. Embassy or Consulate.    Animal Exposure: Avoid all mammals even if they look healthy.  If there is a bite, scratch or even a lick, wash area immediately with soap and water for 15 minutes and seek medical care within 24 hours for evaluation of Rabies post exposure treatment.  Contact your Medical Evacuation Insurance.    Repiratory illness prevention strategies ( Covid and Influenza ) CDC recommendations:  Consider wearing a mask in crowded or poorly ventilated indoor areas, including on public transportation and in transportation hubs.  Hand washing: frequent, thorough handwashing with soap and water for 20 seconds. Use an alcohol-based hand  with at least 60% alcohol if soap and water are not readily available. Avoid touching face, nose, eyes, mouth unless you have done appropriate hand washing as above.  VACCINES: Staying up to date on COVID-19 vaccines significantly lowers the risk of getting very sick, being hospitalized, or dying from COVID-19. CDC recommends that everyone stay up to date on their COVID-19 vaccines, especially people with weakened immune systems.    Travel Covid 19 Testing:   updated 12/06/2021  International travelers: Pre-travel:  See country specific Embassy websites or airline websites.    Post travel: CDC recommends getting tested 3-5 days after your trip     Post-travel illness:  Contact your provider or [facility] if you develop a fever, rash, cough, diarrhea or other symptoms for up to 1 year after travel.  Inform your healthcare provider when and where you traveled to.    Please call the [facility] with any questions [phone]  Or send your provider a 'My Chart' note.

## 2025-04-21 NOTE — PROGRESS NOTES
"Nurse Note ( Pre-Travel Consult)    Itinerary:  [facility] and [facility]     Departure Date: 6/09/2025    Return Date: 6/25/2025     Length of Trip 2 Weeks     Reason for Travel: Tourism         Urban or rural: both    Accommodations: Hotel        IMMUNIZATION HISTORY  Have you received any immunizations within the past 4 weeks?  No  Have you ever fainted from having your blood drawn or from an injection?  Yes  Have you ever had a fever reaction to vaccination?  No  Have you ever had any bad reaction or side effect from any vaccination?  No  Do you live (or work closely) with anyone who has AIDS, an AIDS-like condition, any other immune disorder or who is on chemotherapy for cancer?  No  Do you have a family history of immunodeficiency?  No  Have you received any injection of immune globulin or any blood products during the past 12 months?  No    Patient roomed by Olga Warnermaura Pinoeman is a 44 year old female seen today with family member for counsultation for international travel.   Patient will be departing in  7 week(s) and  traveling with family member(s).      Patient itinerary :  will be in the [place] region of [facility] and [place] and [place]  which risk for none. exposure.      Patient's activities will include sightseeing and travel by car or other vehicle.    Patient's country of birth is [place]    Special medical concerns: Motion sickness  Pre-travel questionnaire was completed by patient and reviewed by provider.     Vitals: BP 98/68 (BP Location: Left arm, Patient Position: Sitting, Cuff Size: Adult Regular)   Pulse 86   Temp 98.1  F (36.7  C) (Oral)   Resp 16   Ht 1.625 m (5' 3.98\")   Wt 66.6 kg (146 lb 14.4 oz)   LMP  (LMP Unknown)   SpO2 98%   BMI 25.23 kg/m    BMI= Body mass index is 25.23 kg/m .    EXAM:  General:  Well-nourished, well-developed in no acute distress.  Appears to be stated age, interacts appropriately and expresses understanding of information given to " patient.    Current Outpatient Medications   Medication Sig Dispense Refill    acetaminophen (TYLENOL) 325 MG tablet Take 325-650 mg by mouth every 6 hours as needed for mild pain      calcium carbonate (OS-GRETCHEN) 500 MG tablet Take 1 tablet by mouth every evening Unsure if 500 or 1000mg      cetirizine (ZYRTEC) 10 MG tablet Take 10 mg by mouth every evening      estradiol (ESTRACE) 2 MG tablet TAKE 1 TABLET BY MOUTH DAILY 90 tablet 0    fluticasone (FLONASE) 50 MCG/ACT nasal spray Spray 2 sprays into both nostrils daily 16 g 3    HEMP OIL OR EXTRACT OR OTHER CBD CANNABINOID, NOT MEDICAL CANNABIS, 10 mg every evening [employer] CBD 10 mg      lactobacillus rhamnosus, GG, (CULTURELL) capsule Take 1 capsule by mouth every evening      multivitamin w/minerals (MULTI-VITAMIN) tablet Take 1 tablet by mouth every evening      ondansetron (ZOFRAN ODT) 4 MG ODT tab Take 1 tablet (4 mg) by mouth every 8 hours as needed for nausea or vomiting. 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 Takes Zyrtec year round to manage it.   Gets post nasal drip     Adhesive Tape Rash     Surgical drape adhesive vs. ioban         Immunizations discussed include:   Covid 19: Up to date  Hepatitis A:  immune  Hepatitis B: Up to date  Influenza: Up to date  Typhoid: Not indicated  Rabies: Not indicated  Yellow Fever: Up to date  Japanese Encephalitis: Not indicated  Meningococcus: Not indicated  Tetanus/Diphtheria: Up to date  Measles/Mumps/Rubella: immune  Cholera: Not needed  Polio: Not indicated  Pneumococcal: Under age of 65  Varicella:  Immune by disease history per patient report  Shingrix: Not indicated  HPV:  Not indicated   [place]: Not indicated   Mpox:  Not indicated     TB: not a risk     Altitude Exposure on this trip: no  Past tolerance to Altitude: na    ASSESSMENT/PLAN:  Yamila was seen today for travel clinic.    Diagnoses and all orders for this visit:    H/O motion sickness  -     ondansetron (ZOFRAN ODT) 4 MG ODT tab; Take 1 tablet (4 mg) by mouth every 8 hours as needed for nausea or vomiting.      I have reviewed general recommendations for safe travel   including: food/water precautions, insect precautions, ,   roadway safety. Educational materials and [employer] report provided.    Waiver form signed     [facility] prophylaxis recommended: none  Symptomatic treatment for traveler's diarrhea: loperamide/diphenoxylate  Altitude illness prevention and treatment: none      Evacuation insurance advised and resources were provided to patient.    Total visit time 15 minutes  with over 50% of time spent counseling patient and shared decision making as detailed above.    Cece Geronimo CNP  Certificate in Travel Health

## 2025-04-22 RX ORDER — ESTRADIOL 2 MG/1
2 TABLET ORAL DAILY
Qty: 90 TABLET | Refills: 3 | OUTPATIENT
Start: 2025-04-22

## 2025-04-22 NOTE — TELEPHONE ENCOUNTER
Estradiol refill requested.     Last seen in clinic with ordering provider, Dr. Ricks, on 4/12/2023.     [employer] message sent to patient requesting appointment for further refills. Offered short-term fill until appointment date.     Rx refill refused as patient needs appointment.

## 2025-05-08 ENCOUNTER — OFFICE VISIT (OUTPATIENT)
Dept: OBGYN | Facility: CLINIC | Age: 45
End: 2025-05-08
Attending: OBSTETRICS & GYNECOLOGY
Payer: COMMERCIAL

## 2025-05-08 VITALS
DIASTOLIC BLOOD PRESSURE: 81 MMHG | HEART RATE: 91 BPM | SYSTOLIC BLOOD PRESSURE: 123 MMHG | WEIGHT: 151 LBS | BODY MASS INDEX: 25.78 KG/M2 | HEIGHT: 64 IN

## 2025-05-08 DIAGNOSIS — Z90.722 S/P BSO (BILATERAL SALPINGO-OOPHORECTOMY): ICD-10-CM

## 2025-05-08 DIAGNOSIS — Z79.890 HORMONE REPLACEMENT THERAPY: Primary | ICD-10-CM

## 2025-05-08 PROCEDURE — 250N000011 HC RX IP 250 OP 636: Performed by: OBSTETRICS & GYNECOLOGY

## 2025-05-08 RX ORDER — ESTRADIOL 2 MG/1
2 TABLET ORAL DAILY
Qty: 90 TABLET | Refills: 3 | Status: SHIPPED | OUTPATIENT
Start: 2025-05-08

## 2025-05-08 RX ADMIN — LEVONORGESTREL 1 EACH: 52 INTRAUTERINE DEVICE INTRAUTERINE at 14:42

## 2025-05-08 ASSESSMENT — ANXIETY QUESTIONNAIRES
GAD7 TOTAL SCORE: 0
6. BECOMING EASILY ANNOYED OR IRRITABLE: NOT AT ALL
8. IF YOU CHECKED OFF ANY PROBLEMS, HOW DIFFICULT HAVE THESE MADE IT FOR YOU TO DO YOUR WORK, TAKE CARE OF THINGS AT HOME, OR GET ALONG WITH OTHER PEOPLE?: NOT DIFFICULT AT ALL
GAD7 TOTAL SCORE: 0
IF YOU CHECKED OFF ANY PROBLEMS ON THIS QUESTIONNAIRE, HOW DIFFICULT HAVE THESE PROBLEMS MADE IT FOR YOU TO DO YOUR WORK, TAKE CARE OF THINGS AT HOME, OR GET ALONG WITH OTHER PEOPLE: NOT DIFFICULT AT ALL
7. FEELING AFRAID AS IF SOMETHING AWFUL MIGHT HAPPEN: NOT AT ALL
5. BEING SO RESTLESS THAT IT IS HARD TO SIT STILL: NOT AT ALL
4. TROUBLE RELAXING: NOT AT ALL
3. WORRYING TOO MUCH ABOUT DIFFERENT THINGS: NOT AT ALL
GAD7 TOTAL SCORE: 0
1. FEELING NERVOUS, ANXIOUS, OR ON EDGE: NOT AT ALL
7. FEELING AFRAID AS IF SOMETHING AWFUL MIGHT HAPPEN: NOT AT ALL
2. NOT BEING ABLE TO STOP OR CONTROL WORRYING: NOT AT ALL

## 2025-05-08 NOTE — PATIENT INSTRUCTIONS
Thank you for trusting us with your care!   Please be aware, if you are on Mychart, you may see your results prior to your providers review. If labs are abnormal, we will call or message you on [facility] [place] with a follow up plan.    If you need to contact us for questions about:  Symptoms, Scheduling & Medical Questions; Non-urgent (2-3 day response) eCaring message, Urgent (needing response today) [phone] (if after 3:30pm next day response)   Prescriptions: Please call your Pharmacy   Billing: [facility] [phone] or [facility]:[phone]

## 2025-05-08 NOTE — PROGRESS NOTES
46 yo P2 followed for BRCA 1 status s/p bilateral mastectomy and BSO. Here for annual exam. Doing well. 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    S/P breast reconstruction, bilateral     Past Medical History:   Diagnosis Date    Allergic rhinitis     BRCA1 positive 06/2001    [id number]    Encounter for insertion of Mirena IUD 07/12/2016    Hoarseness     Lump or mass in breast 2001    BRCA-1 positive (Mother, MGM w/ breast ca, mother w/ gene +), mother dx at 32)    Microcalcifications of the breast, right uoq 06/08/2011     Past Surgical History:   Procedure Laterality Date    GRAFT FAT TO BREAST Bilateral 8/2/2023    Procedure: fat grafting from abdomen and thighs;  Surgeon: LING Pradhan MD;  Location: [facility] OR    INSERT INTRAUTERINE DEVICE N/A 7/12/2016    Procedure: INSERT INTRAUTERINE DEVICE;  Surgeon: Fidelia Ricks MD;  [facility]    INSERT TISSUE EXPANDER BREAST Left 2/5/2024    Procedure: INSERTION, TISSUE EXPANDER, BREAST, left;  Surgeon: LING Pradhan MD;  [facility]    LAPAROSCOPIC SALPINGO-OOPHORECTOMY Bilateral 7/12/2016    Procedure: LAPAROSCOPIC SALPINGO-OOPHORECTOMY;  Surgeon: Fidelia Ricks MD;  [facility]    LAPAROSCOPY OPERATIVE ADULT N/A 7/12/2016    Procedure: LAPAROSCOPY OPERATIVE ADULT;  Surgeon: Fidelia Ricks MD;  [facility]    MAMMOPLASTY REDUCTION BILATERAL Bilateral 4/13/2022    Procedure: Bilateral breast lift;  Surgeon: LING Pradhan MD;  Location: [facility] OR    MASTECTOMY SIMPLE Bilateral 1/23/2023    Procedure: BILATERAL Risk-Reducing Nipple-Sparing Mastectomy;  Surgeon: Monica Rodríguez MD;   "[facility]    RECONSTRUCT BREAST BILATERAL, IMPLANT PROSTHESIS BILATERAL, COMBINED Bilateral 2023    Procedure: Bilateral breast reconstruction with implant/Alloderm, SPY;  Surgeon: LING Pradhan MD;  [facility]    REMOVE AND REPLACE BREAST IMPLANT PROSTHESIS Left 2024    Procedure: remove left tissue expander and left replacement breast implant;  Surgeon: LING Pradhan MD;  Location: [facility] OR    REMOVE IMPLANT BREAST Left 2023    Procedure: REMOVAL, IMPLANT, BREAST, left and washout;  Surgeon: LING Pradhan MD;  [facility]    REVISE RECONSTRUCTED BREAST BILATERAL Bilateral 2023    Procedure: Bilateral breast revision;  Surgeon: LING Pradhan MD;  Location: [facility] OR    ZZC APPENDECTOMY       OB History    Para Term  AB Living   2 2 2 0 0 2   SAB IAB Ectopic Multiple Live Births   0 0 0 0 2      # Outcome Date GA Lbr [place]/2nd Weight Sex Type Anes PTL Lv   2 Term 12 40w0d 05:25 / 00:38 3.629 kg (8 lb) F Vag-Spont None  JEFFERSON      Name: MYLES ANGEL      Apgar1: 8  Apgar5: 9   1 Term 08/12/10 40w0d 18:00 3.544 kg (7 lb 13 oz) F  None  JEFFERSON      Birth Comments: no problems      Name: Prudence      Obstetric Comments   Possible early SAB in , was going to have  but then she miscarried.      /81   Pulse 91   Ht 1.625 m (5' 3.98\")   Wt 68.5 kg (151 lb)   LMP  (LMP Unknown)   BMI 25.94 kg/m    Appears well  Delines chest wall exam  Abdomen soft NT ND  EG wnl  Vagina wnl   Cervix multiparous and without lesions    PROCEDURE    Mirena IUD removal and insertion.    Time Out - \"Pause for the Cause\"  Just before the procedure begins, through verbal and active participation of team members, verify:    Initials   Patient Name    cat   Patient Date of Birth cat   Procedure to be performed  cat   Site, laterality, level or multiples, noting patient position   cat   Relevant images or diagnostics available/displayed   " cat   Implants, special equipment or special requirements        cat     Consent:  Verbal consent obtained from patient.     Reason for insertion:  progesterone for HRT.    Counseling:  Patient counseled on potential side effects of Mirena, including unpredictable spotting for at least 2-3 months, decreased dysmenorrhea and plan for removal/replacement of IUD in 7 years or when desired.  Reminded patient to check for IUD strings every month.       Patient was pre-medicated with tylenol prior to beginning the procedure.   The cervix was visualized with a medium Emilia speculum.  The existing Mirena strings were identified at the os and the IUD easily removed with ring forceps. The cervix was prepped with Betadine solution.  The uterus was sounded to 7 cm.  The Mirena IUD insertion apparatus was prepared and placed through the cervix without significant resistance and deployed at the fundus in the usual fashion.  The strings were trimmed to mid vagina.      EBL:  0  Complications:  none     Tolerance of Procedure:  Patient did tolerate the procedure well.     Patient was instructed to call if she experiences any heavy bleeding, severe cramping, or abnormal vaginal discharge.  May take Ibuprofen 400-800 mg PO TID PRN or Naproxen 500 mg PO BID for cramping. Return to clinic in 4-6 weeks for string check.      A/P:  BRCA 1 s/p BSO and mastectomy  New Mirena placed- change in 7 years if still on ERT  Pap in 2028  Ok to refill estradiol for 2 years until next visit  To see Urbano Giordano or Chintan for ongoing HRT   Fidelia Ricks MD    Answers submitted by the patient for this visit:  Patient Health Questionnaire (G7) (Submitted on 5/8/2025)  FAYE 7 TOTAL SCORE: 0  Fidelia Ricks MD

## 2025-05-08 NOTE — LETTER
5/8/2025       RE: Yamila Myles  [address] [facility] [address]     Dear Colleague,    Thank you for referring your patient, Yamila Myles, to the [facility] [place] at [facility]. Please see a copy of my visit note below.    44 yo P2 followed for BRCA 1 status s/p bilateral mastectomy and BSO. Here for annual exam. Doing well. 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     S/P breast reconstruction, bilateral     Past Medical History:   Diagnosis Date     Allergic rhinitis      BRCA1 positive 06/2001    [id number]     Encounter for insertion of Mirena IUD 07/12/2016     Hoarseness      Lump or mass in breast 2001    BRCA-1 positive (Mother, MGM w/ breast ca, mother w/ gene +), mother dx at 32)     Microcalcifications of the breast, right uoq 06/08/2011     Past Surgical History:   Procedure Laterality Date     GRAFT FAT TO BREAST Bilateral 8/2/2023    Procedure: fat grafting from abdomen and thighs;  Surgeon: LING Pradhan MD;  Location: [facility] OR     INSERT INTRAUTERINE DEVICE N/A 7/12/2016    Procedure: INSERT INTRAUTERINE DEVICE;  Surgeon: Fidelia Ricks MD;  [facility]     INSERT TISSUE EXPANDER BREAST Left 2/5/2024    Procedure: INSERTION, TISSUE EXPANDER, BREAST, left;  Surgeon: LING Pradhan MD;  [facility]     LAPAROSCOPIC SALPINGO-OOPHORECTOMY Bilateral 7/12/2016    Procedure: LAPAROSCOPIC SALPINGO-OOPHORECTOMY;  Surgeon: Fidelia Ricks MD;  [facility]     LAPAROSCOPY OPERATIVE ADULT N/A 7/12/2016    Procedure: LAPAROSCOPY OPERATIVE ADULT;  Surgeon: Rambo  "Fidelia Mitchell MD;  [facility]     MAMMOPLASTY REDUCTION BILATERAL Bilateral 2022    Procedure: Bilateral breast lift;  Surgeon: LING Pradhan MD;  Location: [facility] OR     MASTECTOMY SIMPLE Bilateral 2023    Procedure: BILATERAL Risk-Reducing Nipple-Sparing Mastectomy;  Surgeon: Monica Rodríguez MD;  [facility]     RECONSTRUCT BREAST BILATERAL, IMPLANT PROSTHESIS BILATERAL, COMBINED Bilateral 2023    Procedure: Bilateral breast reconstruction with implant/Alloderm, SPY;  Surgeon: LING Pradhan MD;  [facility]     REMOVE AND REPLACE BREAST IMPLANT PROSTHESIS Left 2024    Procedure: remove left tissue expander and left replacement breast implant;  Surgeon: LING Pradhan MD;  Location: [facility] OR     REMOVE IMPLANT BREAST Left 2023    Procedure: REMOVAL, IMPLANT, BREAST, left and washout;  Surgeon: LING Pradhan MD;  [facility]     REVISE RECONSTRUCTED BREAST BILATERAL Bilateral 2023    Procedure: Bilateral breast revision;  Surgeon: LING Pradhan MD;  Location: [facility] OR     ZZC APPENDECTOMY       OB History    Para Term  AB Living   2 2 2 0 0 2   SAB IAB Ectopic Multiple Live Births   0 0 0 0 2      # Outcome Date GA Lbr [place]/2nd Weight Sex Type Anes PTL Lv   2 Term 12 40w0d 05:25 / 00:38 3.629 kg (8 lb) F Vag-Spont None  [place]      Name: MYLES ANGEL      Apgar1: 8  Apgar5: 9   1 Term 08/12/10 40w0d 18:00 3.544 kg (7 lb 13 oz) F  None  [place]      Birth Comments: no problems      Name: Prudence      Obstetric Comments   Possible early SAB in , was going to have  but then she miscarried.      /81   Pulse 91   Ht 1.625 m (5' 3.98\")   Wt 68.5 kg (151 lb)   LMP  (LMP Unknown)   BMI 25.94 kg/m    Appears well  Delines chest wall exam  Abdomen soft NT ND  EG wnl  Vagina wnl   Cervix multiparous and without lesions    PROCEDURE    Mirena IUD removal and insertion.    Time Out - \"Pause for " "the Cause\"  Just before the procedure begins, through verbal and active participation of team members, verify:    Initials   Patient Name    cat   Patient Date of Birth cat   Procedure to be performed  cat   Site, laterality, level or multiples, noting patient position   cat   Relevant images or diagnostics available/displayed   cat   Implants, special equipment or special requirements        cat     Consent:  Verbal consent obtained from patient.     Reason for insertion:  progesterone for HRT.    Counseling:  Patient counseled on potential side effects of Mirena, including unpredictable spotting for at least 2-3 months, decreased dysmenorrhea and plan for removal/replacement of IUD in 7 years or when desired.  Reminded patient to check for IUD strings every month.       Patient was pre-medicated with tylenol prior to beginning the procedure.   The cervix was visualized with a medium Emilia speculum.  The existing Mirena strings were identified at the os and the IUD easily removed with ring forceps. The cervix was prepped with Betadine solution.  The uterus was sounded to 7 cm.  The Mirena IUD insertion apparatus was prepared and placed through the cervix without significant resistance and deployed at the fundus in the usual fashion.  The strings were trimmed to mid vagina.      EBL:  0  Complications:  none     Tolerance of Procedure:  Patient did tolerate the procedure well.     Patient was instructed to call if she experiences any heavy bleeding, severe cramping, or abnormal vaginal discharge.  May take Ibuprofen 400-800 mg PO TID PRN or Naproxen 500 mg PO BID for cramping. Return to clinic in 4-6 weeks for string check.      A/P:  BRCA 1 s/p BSO and mastectomy  New Mirena placed- change in 7 years if still on ERT  Pap in 2028  Ok to refill estradiol for 2 years until next visit  To see Urbano Giordano or Chintan for ongoing HRT   Fidelia Ricks MD    Answers submitted by the patient for this visit:  Patient " Health Questionnaire (G7) (Submitted on 5/8/2025)  FAYE 7 TOTAL SCORE: 0  Fidelia Ricks MD      Again, thank you for allowing me to participate in the care of your patient.      Sincerely,    Fidelia Ricks MD

## 2025-06-21 ENCOUNTER — HEALTH MAINTENANCE LETTER (OUTPATIENT)
Age: 45
End: 2025-06-21

## 2025-08-18 ENCOUNTER — MYC MEDICAL ADVICE (OUTPATIENT)
Dept: ENDOCRINOLOGY | Facility: CLINIC | Age: 45
End: 2025-08-18
Payer: COMMERCIAL

## 2025-09-02 ENCOUNTER — VIRTUAL VISIT (OUTPATIENT)
Dept: ONCOLOGY | Facility: CLINIC | Age: 45
End: 2025-09-02
Payer: COMMERCIAL

## 2025-09-02 DIAGNOSIS — Z15.09 BRCA1 GENETIC CARRIER: Primary | ICD-10-CM

## 2025-09-02 DIAGNOSIS — Z15.01 BRCA1 GENETIC CARRIER: Primary | ICD-10-CM

## 2025-09-02 PROCEDURE — 1126F AMNT PAIN NOTED NONE PRSNT: CPT

## 2025-09-02 PROCEDURE — 98004 SYNCH AUDIO-VIDEO EST SF 10: CPT

## (undated) DEVICE — ESU PENCIL SMOKE EVAC W/ROCKER SWITCH 0703-047-000

## (undated) DEVICE — DRAPE STERI FLUOROSCOPE 35X43"1012 LATEX FREE

## (undated) DEVICE — NDL 25GA 2"  8881200441

## (undated) DEVICE — DRSG KERLIX FLUFFS X5

## (undated) DEVICE — SYR 10ML LL W/O NDL

## (undated) DEVICE — CLIP HORIZON SM RED WIDE SLOT 001201

## (undated) DEVICE — ESU CLEANER TIP 31142717

## (undated) DEVICE — PAD CHUX UNDERPAD 30X36" P3036C

## (undated) DEVICE — CLOSURE SYS SKIN PREMIERPRO EXOFINFUSION 4X60CM 3473

## (undated) DEVICE — LABEL MEDICATION SYSTEM 3303-P

## (undated) DEVICE — PITCHER STERILE 1000ML  SSK9004A

## (undated) DEVICE — SUCTION MANIFOLD NEPTUNE 2 SYS 1 PORT 702-025-000

## (undated) DEVICE — SOL WATER IRRIG 1000ML BOTTLE 2F7114

## (undated) DEVICE — BLADE KNIFE SURG 10 371110

## (undated) DEVICE — NDL COUNTER 20CT 31142493

## (undated) DEVICE — ESU ELEC BLADE HEX-LOCKING 2.5" E1450X

## (undated) DEVICE — DRAIN JACKSON PRATT RESERVOIR 100ML SU130-1305

## (undated) DEVICE — SU MONOCRYL 3-0 PS-1 27" Y936H

## (undated) DEVICE — DRSG PRIMAPORE 02X3" 7133

## (undated) DEVICE — GLOVE BIOGEL PI MICRO SZ 7.0 48570

## (undated) DEVICE — SUCTION TIP YANKAUER W/O VENT K86

## (undated) DEVICE — SUCTION SLEEVE NEPTUNE 2 165MM 0703-005-165

## (undated) DEVICE — PACK MINOR SBA15MIFSE

## (undated) DEVICE — BASIN SET SINGLE STERILE 13752-624

## (undated) DEVICE — SYR 50ML CATH TIP W/O NDL 309620

## (undated) DEVICE — BNDG ELASTIC 6" DBL LENGTH UNSTERILE 6611-16

## (undated) DEVICE — DRSG TEGADERM 4X10" 1627

## (undated) DEVICE — SU PROLENE 0 CTX 30" 8454H

## (undated) DEVICE — SOL RINGERS LACTATED 1000ML BAG 2B2324X

## (undated) DEVICE — DRAIN JACKSON PRATT 15FR ROUND SU130-1323

## (undated) DEVICE — SU DERMABOND PRINEO 22CM CLR222US

## (undated) DEVICE — Device

## (undated) DEVICE — DRSG KERLIX 4 1/2"X4YDS ROLL 6730

## (undated) DEVICE — LINEN TOWEL PACK X30 5481

## (undated) DEVICE — DRSG XEROFORM 5X9" CUR253590W

## (undated) DEVICE — CLIP HORIZON MED BLUE 002200

## (undated) DEVICE — TUBING ASPIRATING BYRON 3/8"X12' PT-5558

## (undated) DEVICE — SU STRATAFIX MONOCRYL 3-0 SPIRAL PS-2 45CM SXMP1B107

## (undated) DEVICE — DRAPE U SPLIT 74X120" 29440

## (undated) DEVICE — STRAP UNIVERSAL POSITIONING 2-PIECE 4X47X76" 91-287

## (undated) DEVICE — LINEN TOWEL PACK X6 WHITE 5487

## (undated) DEVICE — SURGICEL ABSORBABLE HEMOSTAT SNOW 4"X4" 2083

## (undated) DEVICE — ESU GROUND PAD ADULT W/CORD E7507

## (undated) DEVICE — TUBING SUCTION MEDI-VAC 1/4"X20' N620A

## (undated) DEVICE — ADPT 5 IN 1 360

## (undated) DEVICE — SYR 10ML LL W/O NDL 302995

## (undated) DEVICE — LINEN TOWEL PACK X5 5464

## (undated) DEVICE — ESU ELEC BLADE 2.75" COATED/INSULATED E1455

## (undated) DEVICE — DRAPE BREAST/CHEST 29420

## (undated) DEVICE — SOL NACL 0.9% IRRIG 500ML BOTTLE 2F7123

## (undated) DEVICE — PREP POVIDONE IODINE SOLUTION 10% 4OZ BOTTLE 29906-004

## (undated) DEVICE — SPONGE LAP 18X18" X8435

## (undated) DEVICE — BLADE KNIFE SURG 11 371111

## (undated) DEVICE — PEN MARKING SKIN W/LABELS 31145884

## (undated) DEVICE — SUCTION MANIFOLD NEPTUNE 2 SYS 4 PORT 0702-020-000

## (undated) DEVICE — SYR BULB IRRIG DOVER 60 ML LATEX FREE 67000

## (undated) DEVICE — NDL 19GA 1.5"

## (undated) DEVICE — PREP CHLORAPREP 26ML TINTED HI-LITE ORANGE 930815

## (undated) DEVICE — SYR 10ML FINGER CONTROL W/O NDL 309695

## (undated) DEVICE — DECANTER TRANSFER DEVICE 2008S

## (undated) DEVICE — SOL WATER IRRIG 1000ML BOTTLE 07139-09

## (undated) DEVICE — DRAPE IOBAN INCISE 23X17" 6650EZ

## (undated) DEVICE — SOL NACL 0.9% INJ 1000ML BAG 2B1324X

## (undated) DEVICE — DRSG GAUZE 4X4" 3033

## (undated) DEVICE — DRSG GAUZE 4X4" TRAY 6939

## (undated) DEVICE — PAD ARMBOARD FOAM EGGCRATE 50676-378

## (undated) DEVICE — SOL WATER IRRIG 500ML BOTTLE 2F7113

## (undated) DEVICE — PEN MARKING SKIN W/PAPER RULER 31145785

## (undated) DEVICE — SU SILK 2-0 TIE 12X30" A305H

## (undated) DEVICE — DRSG XEROFORM 1X8"

## (undated) DEVICE — SPONGE LAP 18X18" 1515

## (undated) DEVICE — SU MONOCRYL 2-0 SH 27" UND Y417H

## (undated) DEVICE — SU ETHILON 3-0 PS-1 18" 1663H

## (undated) DEVICE — PREP CHLORAPREP 26ML TINTED ORANGE  260815

## (undated) DEVICE — PACK MINOR CUSTOM ASC

## (undated) DEVICE — SU PDS II 0 CT-1 27" Z340H

## (undated) DEVICE — BLANKET BAIR HUGGER UNDERBODY AU63500

## (undated) DEVICE — DRSG ABDOMINAL 07 1/2X8" 7197D

## (undated) DEVICE — TUBING IRRIGATION LIPOSUCTION 13' MRI COMP CG-INF-T

## (undated) DEVICE — PAD CHUX UNDERPAD 30X30"

## (undated) DEVICE — SU SILK 3-0 SH 30" K832H

## (undated) DEVICE — DRAPE SHEET HALF 40X60" 9358

## (undated) DEVICE — DRAPE SHEET REV FOLD 3/4 9349

## (undated) DEVICE — ESU ELEC BLADE 6" COATED E1450-6

## (undated) DEVICE — SUCTION SLEEVE NEPTUNE 2 125MM 0703-005-125

## (undated) DEVICE — NDL 21GA 1.5"

## (undated) DEVICE — NDL 18GA 1.5" 305196

## (undated) DEVICE — KIT PROCEDURE SPY-PHI SGL HH9001

## (undated) DEVICE — GOWN IMPERVIOUS BREATHABLE SMART LG 89015

## (undated) DEVICE — BNDG ELASTIC 6"X5YDS UNSTERILE 6611-60

## (undated) DEVICE — SU PLAIN FAST ABSORB 5-0 PC-1 18" 1915G

## (undated) DEVICE — DRSG KERLIX 4 1/2"X4YDS ROLL 6715

## (undated) DEVICE — GLOVE BIOGEL PI MICRO INDICATOR UNDERGLOVE SZ 7.5 48975

## (undated) DEVICE — FILTER HEPA FLUID TRAP NEPTUNE 0703-040-001

## (undated) DEVICE — SU STRATAFIX MONOCRYL 3-0 SPIRAL SH 23CM SXMP1B427

## (undated) DEVICE — STPL SKIN 35W ROTATING HEAD PRW35

## (undated) DEVICE — PHOTON GUIDE INVUITY WIDE FLAT 104015

## (undated) DEVICE — COLLECTION DEVICE SYSTEM TISSUE REVOLVE RV0001 4 PACK RV0004

## (undated) DEVICE — NATRELLE INSPIRA FULLPROF 450CC SSZ US
Type: IMPLANTABLE DEVICE | Site: BREAST | Status: NON-FUNCTIONAL
Brand: NATRELLE INSPIRA SINGLE USE SIZERS
Removed: 2023-01-23

## (undated) DEVICE — STPL SKIN 35W 059037

## (undated) DEVICE — GLOVE BIOGEL PI MICRO SZ 5.5 48555

## (undated) DEVICE — BNDG ABDOMINAL BINDER 09X46-62"

## (undated) DEVICE — GLOVE PROTEXIS W/NEU-THERA 7.0  2D73TE70

## (undated) DEVICE — ESU ELEC BLADE 6" COATED/INSULATED E1455-6

## (undated) DEVICE — SU MONOCRYL 4-0 PS-2 27" UND Y426H

## (undated) DEVICE — SU PROLENE 4-0 RB-1DA 36" 8557H

## (undated) DEVICE — SOL NACL 0.9% IRRIG 1000ML BOTTLE 2F7124

## (undated) DEVICE — GLOVE BIOGEL PI MICRO INDICATOR UNDERGLOVE SZ 6.0 48960

## (undated) DEVICE — DRAPE POUCH INSTRUMENT 1018

## (undated) DEVICE — SUCTION CANISTER MEDIVAC LINER 1500ML W/LID 65651-515

## (undated) RX ORDER — PROPOFOL 10 MG/ML
INJECTION, EMULSION INTRAVENOUS
Status: DISPENSED
Start: 2023-09-01

## (undated) RX ORDER — GABAPENTIN 300 MG/1
CAPSULE ORAL
Status: DISPENSED
Start: 2024-06-12

## (undated) RX ORDER — FENTANYL CITRATE 50 UG/ML
INJECTION, SOLUTION INTRAMUSCULAR; INTRAVENOUS
Status: DISPENSED
Start: 2024-02-05

## (undated) RX ORDER — OXYCODONE HYDROCHLORIDE 5 MG/1
TABLET ORAL
Status: DISPENSED
Start: 2023-08-02

## (undated) RX ORDER — GENTAMICIN 40 MG/ML
INJECTION, SOLUTION INTRAMUSCULAR; INTRAVENOUS
Status: DISPENSED
Start: 2024-02-05

## (undated) RX ORDER — FENTANYL CITRATE 50 UG/ML
INJECTION, SOLUTION INTRAMUSCULAR; INTRAVENOUS
Status: DISPENSED
Start: 2022-04-13

## (undated) RX ORDER — CEFAZOLIN SODIUM/WATER 2 G/20 ML
SYRINGE (ML) INTRAVENOUS
Status: DISPENSED
Start: 2024-02-05

## (undated) RX ORDER — PROPOFOL 10 MG/ML
INJECTION, EMULSION INTRAVENOUS
Status: DISPENSED
Start: 2023-01-23

## (undated) RX ORDER — FENTANYL CITRATE 50 UG/ML
INJECTION, SOLUTION INTRAMUSCULAR; INTRAVENOUS
Status: DISPENSED
Start: 2023-09-01

## (undated) RX ORDER — DEXAMETHASONE SODIUM PHOSPHATE 4 MG/ML
INJECTION, SOLUTION INTRA-ARTICULAR; INTRALESIONAL; INTRAMUSCULAR; INTRAVENOUS; SOFT TISSUE
Status: DISPENSED
Start: 2023-09-01

## (undated) RX ORDER — ACETAMINOPHEN 325 MG/1
TABLET ORAL
Status: DISPENSED
Start: 2023-09-01

## (undated) RX ORDER — PROPOFOL 10 MG/ML
INJECTION, EMULSION INTRAVENOUS
Status: DISPENSED
Start: 2023-08-02

## (undated) RX ORDER — HYDROMORPHONE HYDROCHLORIDE 1 MG/ML
INJECTION, SOLUTION INTRAMUSCULAR; INTRAVENOUS; SUBCUTANEOUS
Status: DISPENSED
Start: 2023-01-23

## (undated) RX ORDER — FENTANYL CITRATE-0.9 % NACL/PF 10 MCG/ML
PLASTIC BAG, INJECTION (ML) INTRAVENOUS
Status: DISPENSED
Start: 2023-01-23

## (undated) RX ORDER — GENTAMICIN 40 MG/ML
INJECTION, SOLUTION INTRAMUSCULAR; INTRAVENOUS
Status: DISPENSED
Start: 2023-01-23

## (undated) RX ORDER — CEFAZOLIN SODIUM 1 G/3ML
INJECTION, POWDER, FOR SOLUTION INTRAMUSCULAR; INTRAVENOUS
Status: DISPENSED
Start: 2023-01-23

## (undated) RX ORDER — ONDANSETRON 2 MG/ML
INJECTION INTRAMUSCULAR; INTRAVENOUS
Status: DISPENSED
Start: 2022-04-13

## (undated) RX ORDER — CEFAZOLIN SODIUM 2 G/50ML
SOLUTION INTRAVENOUS
Status: DISPENSED
Start: 2023-08-02

## (undated) RX ORDER — ACETAMINOPHEN 325 MG/1
TABLET ORAL
Status: DISPENSED
Start: 2023-01-23

## (undated) RX ORDER — CEFAZOLIN SODIUM 1 G/3ML
INJECTION, POWDER, FOR SOLUTION INTRAMUSCULAR; INTRAVENOUS
Status: DISPENSED
Start: 2023-09-01

## (undated) RX ORDER — DEXAMETHASONE SODIUM PHOSPHATE 4 MG/ML
INJECTION, SOLUTION INTRA-ARTICULAR; INTRALESIONAL; INTRAMUSCULAR; INTRAVENOUS; SOFT TISSUE
Status: DISPENSED
Start: 2024-06-12

## (undated) RX ORDER — ONDANSETRON 2 MG/ML
INJECTION INTRAMUSCULAR; INTRAVENOUS
Status: DISPENSED
Start: 2023-09-01

## (undated) RX ORDER — PROPOFOL 10 MG/ML
INJECTION, EMULSION INTRAVENOUS
Status: DISPENSED
Start: 2024-06-12

## (undated) RX ORDER — GLYCOPYRROLATE 0.2 MG/ML
INJECTION INTRAMUSCULAR; INTRAVENOUS
Status: DISPENSED
Start: 2024-06-12

## (undated) RX ORDER — BUPIVACAINE HYDROCHLORIDE 2.5 MG/ML
INJECTION, SOLUTION INFILTRATION; PERINEURAL
Status: DISPENSED
Start: 2023-09-01

## (undated) RX ORDER — FENTANYL CITRATE 50 UG/ML
INJECTION, SOLUTION INTRAMUSCULAR; INTRAVENOUS
Status: DISPENSED
Start: 2023-01-23

## (undated) RX ORDER — FENTANYL CITRATE 50 UG/ML
INJECTION, SOLUTION INTRAMUSCULAR; INTRAVENOUS
Status: DISPENSED
Start: 2023-08-02

## (undated) RX ORDER — HYDROMORPHONE HYDROCHLORIDE 1 MG/ML
INJECTION, SOLUTION INTRAMUSCULAR; INTRAVENOUS; SUBCUTANEOUS
Status: DISPENSED
Start: 2022-04-13

## (undated) RX ORDER — OXYCODONE HYDROCHLORIDE 5 MG/1
TABLET ORAL
Status: DISPENSED
Start: 2023-09-01

## (undated) RX ORDER — EPHEDRINE SULFATE 50 MG/ML
INJECTION, SOLUTION INTRAMUSCULAR; INTRAVENOUS; SUBCUTANEOUS
Status: DISPENSED
Start: 2023-08-02

## (undated) RX ORDER — CEFAZOLIN SODIUM 1 G/3ML
INJECTION, POWDER, FOR SOLUTION INTRAMUSCULAR; INTRAVENOUS
Status: DISPENSED
Start: 2024-06-12

## (undated) RX ORDER — LIDOCAINE HYDROCHLORIDE 10 MG/ML
INJECTION, SOLUTION EPIDURAL; INFILTRATION; INTRACAUDAL; PERINEURAL
Status: DISPENSED
Start: 2023-09-01

## (undated) RX ORDER — CEFAZOLIN SODIUM 1 G/3ML
INJECTION, POWDER, FOR SOLUTION INTRAMUSCULAR; INTRAVENOUS
Status: DISPENSED
Start: 2022-04-13

## (undated) RX ORDER — ONDANSETRON 2 MG/ML
INJECTION INTRAMUSCULAR; INTRAVENOUS
Status: DISPENSED
Start: 2023-01-23

## (undated) RX ORDER — OXYCODONE HYDROCHLORIDE 5 MG/1
TABLET ORAL
Status: DISPENSED
Start: 2022-04-13

## (undated) RX ORDER — CEFAZOLIN SODIUM 1 G/3ML
INJECTION, POWDER, FOR SOLUTION INTRAMUSCULAR; INTRAVENOUS
Status: DISPENSED
Start: 2024-02-05

## (undated) RX ORDER — ACETAMINOPHEN 325 MG/1
TABLET ORAL
Status: DISPENSED
Start: 2022-04-13

## (undated) RX ORDER — LIDOCAINE HYDROCHLORIDE 20 MG/ML
INJECTION, SOLUTION EPIDURAL; INFILTRATION; INTRACAUDAL; PERINEURAL
Status: DISPENSED
Start: 2022-04-13

## (undated) RX ORDER — DEXAMETHASONE SODIUM PHOSPHATE 4 MG/ML
INJECTION, SOLUTION INTRA-ARTICULAR; INTRALESIONAL; INTRAMUSCULAR; INTRAVENOUS; SOFT TISSUE
Status: DISPENSED
Start: 2023-01-23

## (undated) RX ORDER — GABAPENTIN 300 MG/1
CAPSULE ORAL
Status: DISPENSED
Start: 2022-04-13

## (undated) RX ORDER — LIDOCAINE HYDROCHLORIDE 10 MG/ML
INJECTION, SOLUTION EPIDURAL; INFILTRATION; INTRACAUDAL; PERINEURAL
Status: DISPENSED
Start: 2023-08-02

## (undated) RX ORDER — EPINEPHRINE 1 MG/ML
INJECTION, SOLUTION INTRAMUSCULAR; SUBCUTANEOUS
Status: DISPENSED
Start: 2023-08-02

## (undated) RX ORDER — PROPOFOL 10 MG/ML
INJECTION, EMULSION INTRAVENOUS
Status: DISPENSED
Start: 2022-04-13

## (undated) RX ORDER — CEFAZOLIN SODIUM 2 G/50ML
SOLUTION INTRAVENOUS
Status: DISPENSED
Start: 2024-06-12

## (undated) RX ORDER — ONDANSETRON 2 MG/ML
INJECTION INTRAMUSCULAR; INTRAVENOUS
Status: DISPENSED
Start: 2024-06-12

## (undated) RX ORDER — GENTAMICIN 40 MG/ML
INJECTION, SOLUTION INTRAMUSCULAR; INTRAVENOUS
Status: DISPENSED
Start: 2023-09-01

## (undated) RX ORDER — FENTANYL CITRATE 50 UG/ML
INJECTION, SOLUTION INTRAMUSCULAR; INTRAVENOUS
Status: DISPENSED
Start: 2024-06-12

## (undated) RX ORDER — GENTAMICIN 40 MG/ML
INJECTION, SOLUTION INTRAMUSCULAR; INTRAVENOUS
Status: DISPENSED
Start: 2024-06-12

## (undated) RX ORDER — CEFAZOLIN SODIUM/WATER 2 G/20 ML
SYRINGE (ML) INTRAVENOUS
Status: DISPENSED
Start: 2023-01-23

## (undated) RX ORDER — ACETAMINOPHEN 325 MG/1
TABLET ORAL
Status: DISPENSED
Start: 2024-06-12

## (undated) RX ORDER — EPINEPHRINE 1 MG/ML
INJECTION, SOLUTION INTRAMUSCULAR; SUBCUTANEOUS
Status: DISPENSED
Start: 2023-09-01

## (undated) RX ORDER — ACETAMINOPHEN 325 MG/1
TABLET ORAL
Status: DISPENSED
Start: 2023-08-02

## (undated) RX ORDER — ENOXAPARIN SODIUM 100 MG/ML
INJECTION SUBCUTANEOUS
Status: DISPENSED
Start: 2023-01-23

## (undated) RX ORDER — DEXAMETHASONE SODIUM PHOSPHATE 4 MG/ML
INJECTION, SOLUTION INTRA-ARTICULAR; INTRALESIONAL; INTRAMUSCULAR; INTRAVENOUS; SOFT TISSUE
Status: DISPENSED
Start: 2022-04-13